# Patient Record
Sex: FEMALE | Race: WHITE | NOT HISPANIC OR LATINO | Employment: OTHER | URBAN - METROPOLITAN AREA
[De-identification: names, ages, dates, MRNs, and addresses within clinical notes are randomized per-mention and may not be internally consistent; named-entity substitution may affect disease eponyms.]

---

## 2017-02-24 ENCOUNTER — ALLSCRIPTS OFFICE VISIT (OUTPATIENT)
Dept: OTHER | Facility: OTHER | Age: 82
End: 2017-02-24

## 2017-02-24 ENCOUNTER — HOSPITAL ENCOUNTER (OUTPATIENT)
Dept: RADIOLOGY | Facility: MEDICAL CENTER | Age: 82
Discharge: HOME | End: 2017-02-24
Payer: MEDICARE

## 2017-02-24 DIAGNOSIS — M25.519 PAIN IN SHOULDER: ICD-10-CM

## 2017-02-24 DIAGNOSIS — M75.101 RIGHT ROTATOR CUFF TEAR: ICD-10-CM

## 2017-02-24 PROCEDURE — 73030 X-RAY EXAM OF SHOULDER: CPT

## 2017-03-17 ENCOUNTER — APPOINTMENT (OUTPATIENT)
Dept: LAB | Facility: MEDICAL CENTER | Age: 82
End: 2017-03-17
Payer: MEDICARE

## 2017-03-17 DIAGNOSIS — M15.9 POLYOSTEOARTHRITIS: ICD-10-CM

## 2017-03-17 DIAGNOSIS — D17.71 BENIGN LIPOMATOUS NEOPLASM OF KIDNEY: ICD-10-CM

## 2017-03-17 DIAGNOSIS — I10 ESSENTIAL (PRIMARY) HYPERTENSION: ICD-10-CM

## 2017-03-17 DIAGNOSIS — Z00.00 ENCOUNTER FOR GENERAL ADULT MEDICAL EXAMINATION WITHOUT ABNORMAL FINDINGS: ICD-10-CM

## 2017-03-17 DIAGNOSIS — N18.9 CHRONIC KIDNEY DISEASE: ICD-10-CM

## 2017-03-17 DIAGNOSIS — I25.10 ATHEROSCLEROTIC HEART DISEASE OF NATIVE CORONARY ARTERY WITHOUT ANGINA PECTORIS: ICD-10-CM

## 2017-03-17 DIAGNOSIS — E03.9 HYPOTHYROIDISM: ICD-10-CM

## 2017-03-17 DIAGNOSIS — K21.9 GASTRO-ESOPHAGEAL REFLUX DISEASE WITHOUT ESOPHAGITIS: ICD-10-CM

## 2017-03-17 DIAGNOSIS — I49.3 VENTRICULAR PREMATURE DEPOLARIZATION: ICD-10-CM

## 2017-03-17 DIAGNOSIS — E78.2 MIXED HYPERLIPIDEMIA: ICD-10-CM

## 2017-03-17 LAB
ALBUMIN SERPL BCP-MCNC: 3.9 G/DL (ref 3.5–5)
ALP SERPL-CCNC: 129 U/L (ref 46–116)
ALT SERPL W P-5'-P-CCNC: 29 U/L (ref 12–78)
ANION GAP SERPL CALCULATED.3IONS-SCNC: 7 MMOL/L (ref 4–13)
AST SERPL W P-5'-P-CCNC: 23 U/L (ref 5–45)
BASOPHILS # BLD AUTO: 0.03 THOUSANDS/ΜL (ref 0–0.1)
BASOPHILS NFR BLD AUTO: 1 % (ref 0–1)
BILIRUB SERPL-MCNC: 0.48 MG/DL (ref 0.2–1)
BUN SERPL-MCNC: 20 MG/DL (ref 5–25)
CALCIUM SERPL-MCNC: 9.4 MG/DL (ref 8.3–10.1)
CHLORIDE SERPL-SCNC: 108 MMOL/L (ref 100–108)
CHOLEST SERPL-MCNC: 165 MG/DL (ref 50–200)
CO2 SERPL-SCNC: 29 MMOL/L (ref 21–32)
CREAT SERPL-MCNC: 1.24 MG/DL (ref 0.6–1.3)
CREAT UR-MCNC: 76.2 MG/DL
EOSINOPHIL # BLD AUTO: 0.11 THOUSAND/ΜL (ref 0–0.61)
EOSINOPHIL NFR BLD AUTO: 2 % (ref 0–6)
ERYTHROCYTE [DISTWIDTH] IN BLOOD BY AUTOMATED COUNT: 13.5 % (ref 11.6–15.1)
GFR SERPL CREATININE-BSD FRML MDRD: 40.5 ML/MIN/1.73SQ M
GLUCOSE P FAST SERPL-MCNC: 94 MG/DL (ref 65–99)
HCT VFR BLD AUTO: 38 % (ref 34.8–46.1)
HDLC SERPL-MCNC: 56 MG/DL (ref 40–60)
HGB BLD-MCNC: 12.3 G/DL (ref 11.5–15.4)
LDLC SERPL CALC-MCNC: 90 MG/DL (ref 0–100)
LYMPHOCYTES # BLD AUTO: 1.27 THOUSANDS/ΜL (ref 0.6–4.47)
LYMPHOCYTES NFR BLD AUTO: 23 % (ref 14–44)
MCH RBC QN AUTO: 28.9 PG (ref 26.8–34.3)
MCHC RBC AUTO-ENTMCNC: 32.4 G/DL (ref 31.4–37.4)
MCV RBC AUTO: 89 FL (ref 82–98)
MONOCYTES # BLD AUTO: 0.37 THOUSAND/ΜL (ref 0.17–1.22)
MONOCYTES NFR BLD AUTO: 7 % (ref 4–12)
NEUTROPHILS # BLD AUTO: 3.66 THOUSANDS/ΜL (ref 1.85–7.62)
NEUTS SEG NFR BLD AUTO: 67 % (ref 43–75)
NRBC BLD AUTO-RTO: 0 /100 WBCS
PLATELET # BLD AUTO: 233 THOUSANDS/UL (ref 149–390)
PMV BLD AUTO: 9.8 FL (ref 8.9–12.7)
POTASSIUM SERPL-SCNC: 3.9 MMOL/L (ref 3.5–5.3)
PROT SERPL-MCNC: 7 G/DL (ref 6.4–8.2)
PROT UR-MCNC: 7 MG/DL
PROT/CREAT UR: 0.09 MG/G{CREAT} (ref 0–0.1)
RBC # BLD AUTO: 4.25 MILLION/UL (ref 3.81–5.12)
SODIUM SERPL-SCNC: 144 MMOL/L (ref 136–145)
T4 FREE SERPL-MCNC: 0.87 NG/DL (ref 0.76–1.46)
TRIGL SERPL-MCNC: 97 MG/DL
TSH SERPL DL<=0.05 MIU/L-ACNC: 5.31 UIU/ML (ref 0.36–3.74)
WBC # BLD AUTO: 5.45 THOUSAND/UL (ref 4.31–10.16)

## 2017-03-17 PROCEDURE — 82570 ASSAY OF URINE CREATININE: CPT

## 2017-03-17 PROCEDURE — 80053 COMPREHEN METABOLIC PANEL: CPT

## 2017-03-17 PROCEDURE — 84443 ASSAY THYROID STIM HORMONE: CPT

## 2017-03-17 PROCEDURE — 84156 ASSAY OF PROTEIN URINE: CPT

## 2017-03-17 PROCEDURE — 80061 LIPID PANEL: CPT

## 2017-03-17 PROCEDURE — 86376 MICROSOMAL ANTIBODY EACH: CPT

## 2017-03-17 PROCEDURE — 84439 ASSAY OF FREE THYROXINE: CPT

## 2017-03-17 PROCEDURE — 85025 COMPLETE CBC W/AUTO DIFF WBC: CPT

## 2017-03-17 PROCEDURE — 36415 COLL VENOUS BLD VENIPUNCTURE: CPT

## 2017-03-18 LAB — THYROPEROXIDASE AB SERPL-ACNC: 104 IU/ML (ref 0–34)

## 2017-04-07 ENCOUNTER — HOSPITAL ENCOUNTER (OUTPATIENT)
Dept: RADIOLOGY | Facility: MEDICAL CENTER | Age: 82
Discharge: HOME | End: 2017-04-07
Payer: MEDICARE

## 2017-04-07 ENCOUNTER — ALLSCRIPTS OFFICE VISIT (OUTPATIENT)
Dept: OTHER | Facility: OTHER | Age: 82
End: 2017-04-07

## 2017-04-07 ENCOUNTER — TRANSCRIBE ORDERS (OUTPATIENT)
Dept: ADMINISTRATIVE | Facility: HOSPITAL | Age: 82
End: 2017-04-07

## 2017-04-07 DIAGNOSIS — M25.559 PAIN IN HIP: ICD-10-CM

## 2017-04-07 DIAGNOSIS — M25.579 PAIN IN ANKLE: ICD-10-CM

## 2017-04-07 DIAGNOSIS — M16.12 PRIMARY OSTEOARTHRITIS OF LEFT HIP: Primary | ICD-10-CM

## 2017-04-07 PROCEDURE — 73610 X-RAY EXAM OF ANKLE: CPT

## 2017-04-07 PROCEDURE — 73502 X-RAY EXAM HIP UNI 2-3 VIEWS: CPT

## 2017-04-12 RX ORDER — CLOPIDOGREL BISULFATE 75 MG/1
75 TABLET ORAL DAILY
COMMUNITY
End: 2018-11-24 | Stop reason: SDUPTHER

## 2017-04-13 ENCOUNTER — HOSPITAL ENCOUNTER (OUTPATIENT)
Dept: RADIOLOGY | Facility: HOSPITAL | Age: 82
Discharge: HOME | End: 2017-04-13
Attending: ORTHOPAEDIC SURGERY
Payer: MEDICARE

## 2017-04-13 DIAGNOSIS — M16.12 PRIMARY OSTEOARTHRITIS OF LEFT HIP: ICD-10-CM

## 2017-04-13 PROCEDURE — 20610 DRAIN/INJ JOINT/BURSA W/O US: CPT

## 2017-04-13 PROCEDURE — 77002 NEEDLE LOCALIZATION BY XRAY: CPT

## 2017-04-13 RX ORDER — LIDOCAINE WITH 8.4% SOD BICARB 0.9%(10ML)
10 SYRINGE (ML) INJECTION ONCE
Status: COMPLETED | OUTPATIENT
Start: 2017-04-13 | End: 2017-04-13

## 2017-04-13 RX ORDER — BUPIVACAINE HYDROCHLORIDE 2.5 MG/ML
10 INJECTION, SOLUTION EPIDURAL; INFILTRATION; INTRACAUDAL
Status: COMPLETED | OUTPATIENT
Start: 2017-04-13 | End: 2017-04-13

## 2017-04-13 RX ORDER — LIDOCAINE WITH 8.4% SOD BICARB 0.9%(10ML)
5 SYRINGE (ML) INJECTION ONCE
Status: DISCONTINUED | OUTPATIENT
Start: 2017-04-13 | End: 2017-04-13

## 2017-04-13 RX ORDER — METHYLPREDNISOLONE ACETATE 80 MG/ML
80 INJECTION, SUSPENSION INTRA-ARTICULAR; INTRALESIONAL; INTRAMUSCULAR; SOFT TISSUE
Status: COMPLETED | OUTPATIENT
Start: 2017-04-13 | End: 2017-04-13

## 2017-04-13 RX ADMIN — LIDOCAINE HYDROCHLORIDE 3 ML: 10 INJECTION, SOLUTION INFILTRATION; PERINEURAL at 13:45

## 2017-04-13 RX ADMIN — IOHEXOL 5 ML: 300 INJECTION, SOLUTION INTRAVENOUS at 13:45

## 2017-04-13 RX ADMIN — BUPIVACAINE HYDROCHLORIDE 2 ML: 2.5 INJECTION, SOLUTION EPIDURAL; INFILTRATION; INTRACAUDAL; PERINEURAL at 13:45

## 2017-04-13 RX ADMIN — METHYLPREDNISOLONE ACETATE 80 MG: 80 INJECTION, SUSPENSION INTRA-ARTICULAR; INTRALESIONAL; INTRAMUSCULAR; SOFT TISSUE at 13:45

## 2017-04-20 ENCOUNTER — ALLSCRIPTS OFFICE VISIT (OUTPATIENT)
Dept: OTHER | Facility: OTHER | Age: 82
End: 2017-04-20

## 2017-04-20 LAB
CLARITY UR: NORMAL
COLOR UR: YELLOW
GLUCOSE (HISTORICAL): NORMAL
HGB UR QL STRIP.AUTO: NORMAL
KETONES UR STRIP-MCNC: NORMAL MG/DL
LEUKOCYTE ESTERASE UR QL STRIP: NORMAL
NITRITE UR QL STRIP: NORMAL
PH UR STRIP.AUTO: 5 [PH]
PROT UR STRIP-MCNC: NORMAL MG/DL
SP GR UR STRIP.AUTO: 1

## 2017-05-15 ENCOUNTER — HOSPITAL ENCOUNTER (OUTPATIENT)
Dept: RADIOLOGY | Facility: MEDICAL CENTER | Age: 82
Discharge: HOME | End: 2017-05-15
Payer: MEDICARE

## 2017-05-15 DIAGNOSIS — Z12.31 ENCOUNTER FOR SCREENING MAMMOGRAM FOR MALIGNANT NEOPLASM OF BREAST: ICD-10-CM

## 2017-05-15 PROCEDURE — G0202 SCR MAMMO BI INCL CAD: HCPCS

## 2017-05-16 ENCOUNTER — GENERIC CONVERSION - ENCOUNTER (OUTPATIENT)
Dept: OTHER | Facility: OTHER | Age: 82
End: 2017-05-16

## 2017-05-22 ENCOUNTER — ALLSCRIPTS OFFICE VISIT (OUTPATIENT)
Dept: OTHER | Facility: OTHER | Age: 82
End: 2017-05-22

## 2017-05-22 ENCOUNTER — TRANSCRIBE ORDERS (OUTPATIENT)
Dept: ADMINISTRATIVE | Facility: HOSPITAL | Age: 82
End: 2017-05-22

## 2017-05-22 DIAGNOSIS — E78.2 MIXED HYPERLIPIDEMIA: ICD-10-CM

## 2017-05-22 DIAGNOSIS — I49.3 VENTRICULAR PREMATURE BEATS: ICD-10-CM

## 2017-05-22 DIAGNOSIS — I10 ESSENTIAL HYPERTENSION, MALIGNANT: Primary | ICD-10-CM

## 2017-05-31 ENCOUNTER — ALLSCRIPTS OFFICE VISIT (OUTPATIENT)
Dept: OTHER | Facility: OTHER | Age: 82
End: 2017-05-31

## 2017-07-28 DIAGNOSIS — E03.9 HYPOTHYROIDISM: ICD-10-CM

## 2017-09-21 ENCOUNTER — HOSPITAL ENCOUNTER (OUTPATIENT)
Dept: NON INVASIVE DIAGNOSTICS | Facility: MEDICAL CENTER | Age: 82
Discharge: HOME | End: 2017-09-21
Payer: MEDICARE

## 2017-09-21 DIAGNOSIS — E78.2 MIXED HYPERLIPIDEMIA: ICD-10-CM

## 2017-09-21 DIAGNOSIS — I49.3 VENTRICULAR PREMATURE DEPOLARIZATION: ICD-10-CM

## 2017-09-21 DIAGNOSIS — I10 ESSENTIAL (PRIMARY) HYPERTENSION: ICD-10-CM

## 2017-09-21 DIAGNOSIS — I25.10 ATHEROSCLEROTIC HEART DISEASE OF NATIVE CORONARY ARTERY WITHOUT ANGINA PECTORIS: ICD-10-CM

## 2017-09-21 PROCEDURE — 93306 TTE W/DOPPLER COMPLETE: CPT

## 2017-09-22 DIAGNOSIS — I10 ESSENTIAL (PRIMARY) HYPERTENSION: ICD-10-CM

## 2017-09-22 DIAGNOSIS — I49.3 VENTRICULAR PREMATURE DEPOLARIZATION: ICD-10-CM

## 2017-09-22 DIAGNOSIS — I25.10 ATHEROSCLEROTIC HEART DISEASE OF NATIVE CORONARY ARTERY WITHOUT ANGINA PECTORIS: ICD-10-CM

## 2017-09-22 DIAGNOSIS — E78.2 MIXED HYPERLIPIDEMIA: ICD-10-CM

## 2017-10-27 ENCOUNTER — ALLSCRIPTS OFFICE VISIT (OUTPATIENT)
Dept: OTHER | Facility: OTHER | Age: 82
End: 2017-10-27

## 2017-10-27 ENCOUNTER — TRANSCRIBE ORDERS (OUTPATIENT)
Dept: ADMINISTRATIVE | Facility: HOSPITAL | Age: 82
End: 2017-10-27

## 2017-10-27 DIAGNOSIS — M16.12 PRIMARY OSTEOARTHRITIS OF LEFT HIP: Primary | ICD-10-CM

## 2017-10-28 NOTE — PROGRESS NOTES
Assessment  1  Primary localized osteoarthritis of left hip (712 15) ()    80-year-old female last arthritis left hip with significant symptoms  An injection of cortisone steroid is indicated  This reschedule fluoroscopically  I did ask her to consider elective left hip replacement if her symptoms persist   I would welcome the opportunity see back in 3 months time for follow-up     Plan  Primary localized osteoarthritis of left hip    · Follow-up visit in 3 months Evaluation and Treatment  Follow-up  Status: Hold For -  Scheduling  Requested for: 27Oct2017   · Fluoroscopic Guidance For Needle Placement; Status:Active; Requested AIX:[id number];     Chief Complaint  1  Hip Pain    History of Present Illness  HPI: Patient is a 80-year-old female with an arthritic left hip who presents for follow-up  She describes 6 weeks of relief following an injection to the left hip joint that was performed several months ago  She reports return of pain in left hip  She has pain and level left hip joint, pain is made worse weightbearing, pain worsens activities  Pain greatly interferes with her pursuit of activities of daily living, despite use of a single-point cane  Review of Systems    Constitutional: No fever, no chills, feels well, no tiredness, no recent weight gain or loss  Eyes: No complaints of eyesight problems, no red eyes  ENT: no loss of hearing, no nosebleeds, no sore throat  Cardiovascular: No complaints of chest pain, no palpitations, no leg claudication or lower extremity edema  Respiratory: no compliants of shortness of breath, no wheezing, no cough  Gastrointestinal: no complaints of abdominal pain, no constipation, no nausea or diarrhea, no vomiting, no bloody stools  Genitourinary: no complaints of dysuria, no incontinence  Musculoskeletal: as noted in HPI  Integumentary: no complaints of skin rash or lesion, no itching or dry skin, no skin wounds     Neurological: no complaints of headache, no confusion, no numbness or tingling, no dizziness  Endocrine: No complaints of muscle weakness, no feelings of weakness, no frequent urination, no excessive thirst    Psychiatric: No suicidal thoughts, no anxiety, no feelings of depression  Active Problems  1  Acquired valgus deformity of knee, left (736 41) (M21 062)   2  Advance directive discussed with patient (V65 49) (Z71 89)   3  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)   4  Atherosclerotic heart disease of native coronary artery without angina pectoris (414 01)   (I25 10)   5  CKD (chronic kidney disease) (585 9) (N18 9)   6  Essential hypertension (401 9) (I10)   7  Gastroesophageal reflux disease (530 81) (K21 9)   8  Generalized osteoarthritis of multiple sites (715 09) (M15 9)   9  Mixed hyperlipidemia (272 2) (E78 2)   10  Primary localized osteoarthritis of left hip (715 15) (M16 12)   11  PVCs (premature ventricular contractions) (427 69) (I49 3)   12  Subclinical hypothyroidism (244 8) (E03 9)   13  Tear of right rotator cuff (840 4) (M75 101)   14  Trochanteric bursitis of left hip (726 5) (M70 62)   15  Urgency of urination (788 63) (R39 15)   16  Venous insufficiency (chronic) (peripheral) (459 81) (I87 2)   17  Vulvar Burning (625 8)    Past Medical History   · History of Abnormal EKG (794 31) (R94 31)   · History of anemia (V12 3) (Z86 2)   · History of depression (V11 8) (Z86 59)   · History of dizziness (V13 89) (Y11 094)   · History of hypercalcemia (V12 29) (Z86 39)   · 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)   · History of Osteoporosis screening (V82 81) (A45 941)   · History of Plantar fasciitis of right foot (728 71) (M72 2)   · History of Slurred speech (784 59) (R47 81)   · History of Superficial thrombophlebitis of leg, unspecified laterality    The active problems and past medical history were reviewed and updated today        Surgical History   · History of Cath Stent Placement   · History of Excision Of Lesion Face Malignant   · History of Total Abdominal Hysterectomy With Removal Of Both Ovaries   · History of Total Hip Replacement    The surgical history was reviewed and updated today  Family History  Mother    · Family history of Coronary disease   · Family history of cerebrovascular accident (CVA) (V17 1) (Z80 1)  Father    · Family history of Coronary disease  Family History    · Family history of Kidney Cancer (V16 51)    Social History   · Denied: History of Alcohol Use (History)   · Caffeine use (V49 89) (F15 90)   · Drinks wine (V49 89) (Z78 9)   · OCCASIONAL WINE WITH DINNER   · Denied: History of Drug Use   · Never A Smoker   · Never smoked cigarettes (V49 89) (Z78 9)    Current Meds   1  Adult Aspirin EC Low Strength 81 MG Oral Tablet Delayed Release; Take 1 tablet daily; Therapy: (Recorded:13Jan2014) to Recorded   2  Atorvastatin Calcium 40 MG Oral Tablet (Lipitor); take 1 tablet by mouth once daily as   directed; Therapy: 37DRK3262 to ([id number])  Requested for: 92Pux7237; Last   Rx:46Aqm3370 Ordered   3  Biotin CAPS; Therapy: (Arelis Nicholson) to Recorded   4  Centrum Silver TABS; TAKE 1 TABLET DAILY; Therapy: (Recorded:80Aku7690) to Recorded   5  Clopidogrel Bisulfate 75 MG Oral Tablet; TAKE 1 TABLET BY MOUTH   ONCE DAILY; Therapy: 69OMF7780 to (Keri Gongora)  Requested for: 39Pco4665; Last   Rx:78Iys4709 Ordered   6  ICaps CAPS; TAKE AS DIRECTED; Therapy: (Recorded:44Fpm1723) to Recorded   7  Levothyroxine Sodium 25 MCG Oral Tablet; TAKE 1 TABLET BY MOUTH DAILY; Therapy: 70LWY4404 to (Marion Diaz)  Requested for: 47PJP2009; Last   Rx:81Wih7852 Ordered   8  Metamucil CAPS; USE AS DIRECTED; Therapy: (Recorded:04Atr7150) to Recorded   9  Metoprolol Succinate ER 50 MG Oral Tablet Extended Release 24 Hour; take 1 tablet by   mouth once daily; Therapy: 26BDL5567 to (Evaluate:26Qnd5942)  Requested for: 63TED2531; Last   Rx:69Ndv9654 Ordered   10   Nitrostat 0 4 MG Sublingual Tablet Sublingual (Nitroglycerin); PLACE 1 TABLET UNDER    THE TONGUE AS NEEDED EVERY 5 MINUTES UP TO 3 TIMES FOR    CHEST PAIN;    Therapy: 95DSG4193 to (Evaluate:85Hnb1241)  Requested for: 75Lry5589; Last    Rx:06Zmp2527 Ordered   11  Omega-3 Fish Oil 1200 MG Oral Capsule; TAKE AS DIRECTED; Therapy: (Adilene Sarmiento) to Recorded   12  Omeprazole 20 MG Oral Capsule Delayed Release; take 1 capsule daily; Therapy: 24XSU2611 to (Evaluate:50Nkw6937)  Requested for: 90RKH1678; Last    Rx:64Ahs8360 Ordered   13  Restasis 0 05 % Ophthalmic Emulsion Recorded   14  Systane Ultra 0 4-0 3 % Ophthalmic Solution; Therapy: (Recorded:35Yxj8919) to Recorded   15  Valsartan-Hydrochlorothiazide 80-12 5 MG Oral Tablet (Diovan HCT); take 1 tablet by    mouth once daily; Therapy: 86BYT3353 to (Evaluate:31Ssu2514)  Requested for: 11UIW6151; Last    Rx:90Pws4159 Ordered   16  VESIcare 5 MG Oral Tablet; take 1 tablet by mouth once daily; Therapy: 55UYM5025 to (Evaluate:10Gtx6361)  Requested for: 15IFA5280; Last    NS:39NFO5172 Ordered   17  Vitamin D 1000 UNIT Oral Tablet; TAKE 1 TABLET DAILY; Therapy: (Recorded:70Hmr7536) to Recorded    Allergies  1  Adhesive Bandages Miscellaneous   2  Ciprofloxacin-Ciproflox HCl ER TB24   3  Iodinated Contrast Media  4  IVP Dye    Vitals   Recorded: 21LJS8311 09:12AM   Heart Rate 76   Systolic 950   Diastolic 71   Height 5 ft 1 in   Weight 146 lb    BMI Calculated 27 59   BSA Calculated 1 65     Physical Exam  Gait pattern is without antalgia despite use of a single-point cane  Breathing is unlabored  Right hip moves well  Left hip has restriction of internal rotation when a flexed 90Â°  This elicits pain and left groin  There is no thigh atrophy, and is no calf tenderness on the left side  There is a very little limb of inequality right being longer than left  Results/Data  I personally reviewed the films/images/results in the office today   My interpretation follows  X-ray Review Review of previous x-rays shows arthritic left hip, and replaced right hip  Future Appointments    Date/Time Provider Specialty Site   11/13/2017 03:00 PM FELIPA Marshall  Cardiology [facility]   11/30/2017 02:00 PM FELIPA Newman   Family Medicine [address]     Signatures   Electronically signed by : FELIPA Rabago ; Oct 27 2017  9:36AM EST                       (Author)

## 2017-11-01 DIAGNOSIS — I49.3 VENTRICULAR PREMATURE DEPOLARIZATION: ICD-10-CM

## 2017-11-01 DIAGNOSIS — E03.9 HYPOTHYROIDISM: ICD-10-CM

## 2017-11-01 DIAGNOSIS — I25.10 ATHEROSCLEROTIC HEART DISEASE OF NATIVE CORONARY ARTERY WITHOUT ANGINA PECTORIS: ICD-10-CM

## 2017-11-01 DIAGNOSIS — Z00.00 ENCOUNTER FOR GENERAL ADULT MEDICAL EXAMINATION WITHOUT ABNORMAL FINDINGS: ICD-10-CM

## 2017-11-01 DIAGNOSIS — I10 ESSENTIAL (PRIMARY) HYPERTENSION: ICD-10-CM

## 2017-11-01 DIAGNOSIS — M15.9 POLYOSTEOARTHRITIS: ICD-10-CM

## 2017-11-01 DIAGNOSIS — N18.9 CHRONIC KIDNEY DISEASE: ICD-10-CM

## 2017-11-01 DIAGNOSIS — K21.9 GASTRO-ESOPHAGEAL REFLUX DISEASE WITHOUT ESOPHAGITIS: ICD-10-CM

## 2017-11-06 ENCOUNTER — HOSPITAL ENCOUNTER (OUTPATIENT)
Dept: RADIOLOGY | Facility: HOSPITAL | Age: 82
Discharge: HOME | End: 2017-11-06
Attending: ORTHOPAEDIC SURGERY
Payer: MEDICARE

## 2017-11-06 DIAGNOSIS — M16.12 PRIMARY OSTEOARTHRITIS OF LEFT HIP: ICD-10-CM

## 2017-11-06 PROCEDURE — 20610 DRAIN/INJ JOINT/BURSA W/O US: CPT

## 2017-11-06 PROCEDURE — A9577 INJ MULTIHANCE: HCPCS | Performed by: ORTHOPAEDIC SURGERY

## 2017-11-06 PROCEDURE — 77002 NEEDLE LOCALIZATION BY XRAY: CPT

## 2017-11-06 RX ORDER — BUPIVACAINE HYDROCHLORIDE 2.5 MG/ML
10 INJECTION, SOLUTION EPIDURAL; INFILTRATION; INTRACAUDAL ONCE
Status: COMPLETED | OUTPATIENT
Start: 2017-11-06 | End: 2017-11-06

## 2017-11-06 RX ORDER — LIDOCAINE HYDROCHLORIDE 10 MG/ML
10 INJECTION, SOLUTION EPIDURAL; INFILTRATION; INTRACAUDAL; PERINEURAL ONCE
Status: COMPLETED | OUTPATIENT
Start: 2017-11-06 | End: 2017-11-06

## 2017-11-06 RX ORDER — METHYLPREDNISOLONE ACETATE 80 MG/ML
80 INJECTION, SUSPENSION INTRA-ARTICULAR; INTRALESIONAL; INTRAMUSCULAR; SOFT TISSUE ONCE
Status: COMPLETED | OUTPATIENT
Start: 2017-11-06 | End: 2017-11-06

## 2017-11-06 RX ADMIN — GADOBENATE DIMEGLUMINE 2 ML: 529 INJECTION, SOLUTION INTRAVENOUS at 12:59

## 2017-11-06 RX ADMIN — BUPIVACAINE HYDROCHLORIDE 3 ML: 2.5 INJECTION, SOLUTION EPIDURAL; INFILTRATION; INTRACAUDAL; PERINEURAL at 12:52

## 2017-11-06 RX ADMIN — LIDOCAINE HYDROCHLORIDE 7 ML: 10 INJECTION, SOLUTION EPIDURAL; INFILTRATION; INTRACAUDAL; PERINEURAL at 12:53

## 2017-11-06 RX ADMIN — METHYLPREDNISOLONE ACETATE 80 MG: 80 INJECTION, SUSPENSION INTRA-ARTICULAR; INTRALESIONAL; INTRAMUSCULAR; SOFT TISSUE at 12:52

## 2017-11-13 ENCOUNTER — ALLSCRIPTS OFFICE VISIT (OUTPATIENT)
Dept: OTHER | Facility: OTHER | Age: 82
End: 2017-11-13

## 2017-11-14 NOTE — PROGRESS NOTES
Assessment  Assessed    1  Atherosclerotic heart disease of native coronary artery without angina pectoris (414 01) (I25 10)   2  PVCs (premature ventricular contractions) (427 69) (I49 3)   3  Essential hypertension (401 9) (I10)   4  Mixed hyperlipidemia (272 2) (E78 2)    Plan  Atherosclerotic heart disease of native coronary artery without angina pectoris, Essentialhypertension, Mixed hyperlipidemia, PVCs (premature ventricular contractions)    · Follow-up visit in 6 months Evaluation and Treatment  Follow-up  Status: Hold For -Scheduling  Requested for: 27BZP8142   Ordered; Atherosclerotic heart disease of native coronary artery without angina pectoris, Essential hypertension, Mixed hyperlipidemia, PVCs (premature ventricular contractions); Ordered By: Sylvia Guerrier Performed:  Due: 38DGD0185    Discussion/Summary  Cardiology Discussion Summary Free Text Note Form [facility]:   I will continue her present medical regimen  She appears well compensated  I've asked her to call if there is a problem in the interim otherwise I will see her again in six months time  Chief Complaint  Chief Complaint Free Text Note Form: f/u  denies any cardiac issues      History of Present Illness  Cardiology HPI Free Text Note Form [facility]: The patient is here for a follow-up visit  She was last seen by me in May  Since that time in September she had an echocardiogram performed which demonstrated preserved LV systolic function with mild tricuspid and pulmonic regurgitation  She has been doing well  She has had no chest pain or significant dyspnea  Hyperlipidemia (Follow-Up): The patient states her hyperlipidemia has been under good control since the last visit  She has no significant interval events  Symptoms:   Premature Ventricular Complexes (Brief): The patient is being seen for a routine clinic follow-up of premature ventricular complexes  The patient is currently asymptomatic  Hypertension (Follow-Up):  The patient presents for follow-up of essential hypertension  The patient states she has been doing well with her blood pressure control since the last visit  Symptoms:      Review of Systems  Cardiology Female ROS:    Cardiac: No complaints of chest pain, no palpitations, no fainting  Skin: No complaints of nonhealing sores or skin rash  Genitourinary: No complaints of recurrent urinary tract infections, frequent urination at night, difficult urination, blood in urine, kidney stones, loss of bladder control, kidney problems, denies any birth control or hormone replacement, is not post menopausal, not currently pregnant  Psychological: No complaints of feeling depressed, anxiety, panic attacks, or difficulty concentrating  General: No complaints of trouble sleeping, lack of energy, fatigue, appetite changes, weight changes, fever, frequent infections, or night sweats  Respiratory: No complaints of shortness of breath, cough with sputum, or wheezing  HEENT: No complaints of serious problems, hearing problems, nose problems, throat problems, or snoring  Gastrointestinal: No complaints of liver problems, nausea, vomiting, heartburn, constipation, bloody stools, diarrhea, problems swallowing, adbominal pain, or rectal bleeding  Hematologic: No complaints of bleeding disorders, anemia, blood clots, or excessive brusing  Neurological: No complaints of numbness, tingling, dizziness, weakness, seizures, headaches, syncope or fainting, AM fatigue, daytime sleepiness, no witnessed apnea episodes  Musculoskeletal: No complaints of arthritis, back pain, or painfull swelling  Active Problems  Problems    1  Acquired valgus deformity of knee, left (736 41) (M21 062)   2  Advance directive discussed with patient (V65 49) (Z71 89)   3  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)   4  Atherosclerotic heart disease of native coronary artery without angina pectoris (414 01) (I25 10)   5   CKD (chronic kidney disease) (585 9) (N18 9)   6  Essential hypertension (401 9) (I10)   7  Gastroesophageal reflux disease (530 81) (K21 9)   8  Generalized osteoarthritis of multiple sites (715 09) (M15 9)   9  Mixed hyperlipidemia (272 2) (E78 2)   10  Primary localized osteoarthritis of left hip (715 15) (M16 12)   11  PVCs (premature ventricular contractions) (427 69) (I49 3)   12  Subclinical hypothyroidism (244 8) (E03 9)   13  Tear of right rotator cuff (840 4) (M75 101)   14  Trochanteric bursitis of left hip (726 5) (M70 62)   15  Urgency of urination (788 63) (R39 15)   16  Venous insufficiency (chronic) (peripheral) (459 81) (I87 2)   17  Vulvar Burning (625 8)    Past Medical History  Problems    1  History of Abnormal EKG (794 31) (R94 31)   2  History of anemia (V12 3) (Z86 2)   3  History of depression (V11 8) (Z86 59)   4  History of dizziness (V13 89) (Z87 898)   5  History of hypercalcemia (V12 29) (Z86 39)   6  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)   7  History of Osteoporosis screening (V82 81) (Z13 820)   8  History of Plantar fasciitis of right foot (728 71) (M72 2)   9  History of Slurred speech (784 59) (R47 81)   10  History of Superficial thrombophlebitis of leg, unspecified laterality  Active Problems And Past Medical History Reviewed: The active problems and past medical history were reviewed and updated today  Surgical History  Problems    1  History of Cath Stent Placement   2  History of Excision Of Lesion Face Malignant   3  History of Total Abdominal Hysterectomy With Removal Of Both Ovaries   4  History of Total Hip Replacement    Family History  Mother    1  Family history of Coronary disease   2  Family history of cerebrovascular accident (CVA) (V17 1) (Z82 3)  Father    3  Family history of Coronary disease  Family History    4   Family history of Kidney Cancer (V16 51)    Social History  Problems    · Denied: History of Alcohol Use (History)   · Caffeine use (V49 89) (F15 90)   · Drinks wine (V49 89) (Z78 9)   · Denied: History of Drug Use   · Never A Smoker   · Never smoked cigarettes (V49 89) (Z78 9)    Current Meds   1  Adult Aspirin EC Low Strength 81 MG Oral Tablet Delayed Release; Take 1 tablet daily; Therapy: (Recorded:13Jan2014) to Recorded   2  Atorvastatin Calcium 40 MG Oral Tablet; take 1 tablet by mouth once daily as directed; Therapy: 07KFG5746 to (21 )  Requested for: 00Dpn6672; Last Rx:28Uwu0292 Ordered   3  Biotin CAPS; Therapy: (Mello Cook) to Recorded   4  Centrum Silver TABS; TAKE 1 TABLET DAILY; Therapy: (Recorded:75Ped7263) to Recorded   5  Clopidogrel Bisulfate 75 MG Oral Tablet; TAKE 1 TABLET BY MOUTH   ONCE DAILY; Therapy: 93TXQ2499 to (Pauline Fort)  Requested for: 91Ecm3958; Last Rx:76Rxq5204 Ordered   6  ICaps CAPS; TAKE AS DIRECTED; Therapy: (Recorded:26Ebu3306) to Recorded   7  Levothyroxine Sodium 25 MCG Oral Tablet; TAKE 1 TABLET BY MOUTH DAILY; Therapy: 52XND6988 to (Salome Pear)  Requested for: 27ZDH8270; Last Rx:65Cla2082 Ordered   8  Metamucil CAPS; USE AS DIRECTED; Therapy: (Recorded:02Ihe6101) to Recorded   9  Metoprolol Succinate ER 50 MG Oral Tablet Extended Release 24 Hour; take 1 tablet by mouth once daily; Therapy: 25VBP4596 to (Evaluate:97Rag5955)  Requested for: 63JNW3785; Last Rx:81Hyp8672 Ordered   10  Nitrostat 0 4 MG Sublingual Tablet Sublingual; PLACE 1 TABLET UNDER THE TONGUE  AS NEEDED EVERY 5 MINUTES UP TO 3 TIMES FOR CHEST PAIN;  Therapy: 61RKX5668 to (Evaluate:68Xux7507)  Requested for: 36Qnr7467; Last  Rx:55Arv9967 Ordered   11  Omega-3 Fish Oil 1200 MG Oral Capsule; TAKE AS DIRECTED; Therapy: (Wyvonnia Copugo) to Recorded   12  Omeprazole 20 MG Oral Capsule Delayed Release; take 1 capsule daily; Therapy: 30VVH6682 to (Evaluate:06Btm4229)  Requested for: 92KJX0757; Last  Rx:83Oje5515 Ordered   13  Restasis 0 05 % Ophthalmic Emulsion Recorded   14  Systane Ultra 0 4-0 3 % Ophthalmic Solution;   Therapy: (Recorded:00Ehv7344) to Recorded   15  Valsartan-Hydrochlorothiazide 80-12 5 MG Oral Tablet; take 1 tablet by mouth once daily; Therapy: 72XZF9648 to (Evaluate:11Ozv7521)  Requested for: 04DPH7403; Last  Rx:99Fea6981 Ordered   16  VESIcare 5 MG Oral Tablet; take 1 tablet by mouth once daily; Therapy: 85QCF0829 to (Evaluate:20Oct2018)  Requested for: 05YRO2465; Last  HV:03GCF1998 Ordered   17  Vitamin D 1000 UNIT Oral Tablet; TAKE 1 TABLET DAILY; Therapy: (Recorded:09Ahn2446) to Recorded  Medication List Reviewed: The medication list was reviewed and updated today  Allergies  Medication    1  Adhesive Bandages Miscellaneous   2  Ciprofloxacin-Ciproflox HCl ER TB24   3  Iodinated Contrast Media  Non-Medication    4  IVP Dye    Vitals  Vital Signs    Recorded: 59CJT7684 03:01PM   Heart Rate 80   Systolic 422, LUE, Sitting   Diastolic 70, LUE, Sitting   Height 5 ft 1 in   Weight 144 lb    BMI Calculated 27 21   BSA Calculated 1 64       Physical Exam   Constitutional  General appearance: No acute distress, well appearing and well nourished  Eyes  Conjunctiva and Sclera examination: Conjunctiva pink, sclera anicteric  Ears, Nose, Mouth, and Throat - Oropharynx: Clear, nares are clear, mucous membranes are moist   Neck  Neck and thyroid: Normal, supple, trachea midline, no thyromegaly  Pulmonary  Respiratory effort: No increased work of breathing or signs of respiratory distress  Auscultation of lungs: Clear to auscultation, no rales, no rhonchi, no wheezing, good air movement  Cardiovascular  Palpation of heart: Normal PMI, no thrills  Auscultation of heart: Normal rate and rhythm, normal S1 and S2, no murmurs  Carotid pulses: Normal, 2+ bilaterally  Pedal pulses: Normal, 2+ bilaterally  Examination of extremities for edema and/or varicosities: Normal    Chest - Chest: Normal   Musculoskeletal Gait and station: Normal gait  Skin - Skin and subcutaneous tissue: Normal without rashes or lesions   Skin is warm and well perfused, normal turgor  Neurologic - Speech: Normal    Psychiatric - Orientation to person, place, and time: Normal -- Mood and affect: Normal       Future Appointments    Date/Time Provider Specialty Site   02/09/2018 11:15 AM FELIPA Landeros  Orthopedic Surgery [facility]   11/30/2017 02:00 PM FELIPA Hancock   Family Medicine [address] [facility]       Signatures   Electronically signed by : FELIPA Corbett ; Nov 13 2017  3:18PM EST                       (Author)

## 2017-11-28 ENCOUNTER — GENERIC CONVERSION - ENCOUNTER (OUTPATIENT)
Dept: OTHER | Facility: OTHER | Age: 82
End: 2017-11-28

## 2017-11-28 ENCOUNTER — LAB CONVERSION - ENCOUNTER (OUTPATIENT)
Dept: OTHER | Facility: OTHER | Age: 82
End: 2017-11-28

## 2017-11-28 LAB — TSH SERPL DL<=0.05 MIU/L-ACNC: 4.46 MIU/L (ref 0.4–4.5)

## 2017-11-30 ENCOUNTER — ALLSCRIPTS OFFICE VISIT (OUTPATIENT)
Dept: OTHER | Facility: OTHER | Age: 82
End: 2017-11-30

## 2017-12-05 NOTE — PROGRESS NOTES
Assessment    1  Encounter for preventive health examination (V70 0) (Z00 00)   2  Essential hypertension (401 9) (I10)   3  Mixed hyperlipidemia (272 2) (E78 2)   4  Atherosclerotic heart disease of native coronary artery without angina pectoris (414 01)   (I25 10)   5  CKD (chronic kidney disease) (585 9) (N18 9)   6  Hypothyroidism (244 9) (E03 9)   7  Gastroesophageal reflux disease (530 81) (K21 9)   8  Generalized osteoarthritis of multiple sites (715 09) (M15 9)    Plan  Angiomyolipoma of kidney, Atherosclerotic heart disease of native coronary artery without  angina pectoris, CKD (chronic kidney disease), Essential hypertension,  Gastroesophageal reflux disease, Generalized osteoarthritis of multiple sites,  Hypothyroidism, Mixed hyperlipidemia, PVCs (premature ventricular contractions)    · (1) CBC/PLT/DIFF; Status:Active; Requested for:21May2018;    · (1) COMPREHENSIVE METABOLIC PANEL; Status:Active; Requested for:21May2018;    · (1) LIPID PANEL, FASTING; Status:Active; Requested for:21May2018;    · (1) TSH WITH FT4 REFLEX; Status:Active; Requested for:21May2018; Atherosclerotic heart disease of native coronary artery without angina pectoris    · Clopidogrel Bisulfate 75 MG Oral Tablet; TAKE 1 TABLET BY MOUTH   ONCE  DAILY   · Nitrostat 0 4 MG Sublingual Tablet Sublingual (Nitroglycerin); PLACE 1 TABLET  UNDER THE TONGUE AS NEEDED EVERY 5 MINUTES UP TO 3 TIMES  FOR CHEST PAIN  Essential hypertension    · Follow-up visit in 6 months Evaluation and Treatment  Follow-up  Status: Hold For -  Scheduling  Requested for: 11XLN0540   · Eat a low fat and low cholesterol diet ; Status:Complete;   Done: 99IXE7843   · We encourage you to begin to make lifestyle changes to help control your blood  pressure    These may include losing weight, increasing your activity level, limiting salt in  your diet, decreasing alcohol intake, and eating a diet low in fat and rich in fruits  and vegetables ; Status:Complete; Done: 98ZIT1116  Gastroesophageal reflux disease    · Omeprazole 20 MG Oral Capsule Delayed Release; take 1 capsule daily  PMH: Subclinical hypothyroidism    · Levothyroxine Sodium 25 MCG Oral Tablet    Discussion/Summary    Continue with current medications  hold off restarting Levothyroxine  I discussed PT for gait and balance she declined  Repeat labs prior to next visit in 6 months  High-dose flu vaccine today  Up-to-date with pneumococcal vaccines  She is not interested in Zostavax  Possible side effects of new medications were reviewed with the patient/guardian today  The treatment plan was reviewed with the patient/guardian  The patient/guardian understands and agrees with the treatment plan      History of Present Illness  followup visit  medications reviewed  labs 03/2017 see note  hypertension blood pressures have been stable  creatinine 1 24  electrolytes normal  urine protein/creatinine ratio 0 09  Hgb 12  3  hyperlipidemia and CAD on Atorvastatin 40 mg daily  cholesterol 165  TGs 97  HDL 56  LDL 90  FBS 94  LFTs normal  episode of slurred speech 10/2015 no recurrence  on ASA 81 mg daily and Plavix  she refused work up  lives alone in [facility] apartment  independent with ADLs and IADLs  Review of Systems    Constitutional: feeling tired, but no recent weight gain and no recent weight loss  Eyes: no eyesight problems  Cardiovascular: palpitations, lower extremity edema and cardiology evaluation 5/2015 for exertional dyspnea and palpitations  Holter monitor showed normal sinus rhythm  139 single PVCs  No sustained ventricular rhythm  rare PACs, consisting of 183 single PACs  3 were noted in bigeminy  10 noted in couplets  no sustained supraventricular rhythm  No significant pauses or high grade AV block  Echocardiogram showed normal left ventricular function, ejection fraction 50% grade 1 diastolic dysfunction  No significant valvular abnormalities   chronic swelling right lower leg  s/p resection of leiomyosarcoma  Repeat echocardiogram 09/2017 normal left ventricular systolic function  EF 60%  No regional wall motion abnormalities  Right ventricle normal  Trace MR  No pericardial effusion  , but no chest pain  Respiratory: see above, but no cough, no orthopnea, no wheezing, no shortness of breath during exertion and no PND  Gastrointestinal: GERD stable on Omeprazole  no reflux  no dysphagia  , but no abdominal pain, no nausea, no vomiting, no constipation, no diarrhea and no blood in stools  Genitourinary: urinary frequency on Vesicare  nocturia x 1  followed by urology  right renal angiomyolipoma  renal u/s 09/2015, but no dysuria and no incontinence  Musculoskeletal: arthralgias and orthopedic evaluation 2/2017 for right shoulder pain  X-rays of right shoulder showed mild glenohumeral osteoarthritis and mild right AC joint osteoarthritis  She completed a course of physical therapy  She has been using as needed Aleve  X-rays of left hip showed moderate to severe osteoarthritis  Status post left hip intra-articular steroid injection 4/2017 and 11/2017 with symptomatic improvement  , but no myalgias  Neurological: difficulty walking and Status post recent fall patient attributed to new glasses  No injuries  At times feels off balance or unsteady no vertigo  Has both a cane and a walker  , but no headache and no dizziness  Endocrine: past history of mildly elevated Ca++  03/2017 Ca++ 9 4  11/2016 Ca++ 10 4  03/2016 Ca 10 4  11/2015 Ca++ 9 8  07/2015 10 4  01/2015 10 5 no change from 09/2014  SPEP normal  intact PTH normal  hypothyroidism  3/2017 TSH 5 310  Positive thyroid microsomal antibody  patient was started on Levothyroxine 25 mcg daily at last visit 05/2017  in August She stop levothyroxine when she started itching  No rash  repeat TSH 11/2017 4 46  Hematologic/Lymphatic: a tendency for easy bruising and on ASA and Plavix  Active Problems    1   Acquired valgus deformity of knee, left (736 41) (M21 062)   2  Advance directive discussed with patient (V65 49) (Z71 89)   3  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)   4  Atherosclerotic heart disease of native coronary artery without angina pectoris (414 01)   (I25 10)   5  CKD (chronic kidney disease) (585 9) (N18 9)   6  Essential hypertension (401 9) (I10)   7  Gastroesophageal reflux disease (530 81) (K21 9)   8  Generalized osteoarthritis of multiple sites (715 09) (M15 9)   9  Hypothyroidism (244 9) (E03 9)   10  Mixed hyperlipidemia (272 2) (E78 2)   11  Primary localized osteoarthritis of left hip (715 15) (M16 12)   12  PVCs (premature ventricular contractions) (427 69) (I49 3)   13  Tear of right rotator cuff (840 4) (M75 101)   14  Venous insufficiency (chronic) (peripheral) (459 81) (I87 2)    Past Medical History    1  History of anemia (V12 3) (Z86 2)   2  History of depression (V11 8) (Z86 59)   3  History of hypercalcemia (V12 29) (Z86 39)   4  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)   5  History of Plantar fasciitis of right foot (728 71) (M72 2)   6  History of Superficial thrombophlebitis of leg, unspecified laterality   7  History of Trochanteric bursitis of left hip (726 5) (M70 62)    Surgical History    1  History of Cath Stent Placement   2  History of Excision Of Lesion Face Malignant   3  History of Total Abdominal Hysterectomy With Removal Of Both Ovaries   4  History of Total Hip Replacement    Family History  Mother    1  Family history of Coronary disease   2  Family history of cerebrovascular accident (CVA) (V17 1) (Z82 3)  Father    3  Family history of Coronary disease  Family History    4  Family history of Kidney Cancer (V16 51)    Social History    · Denied: History of Alcohol Use (History)   · Caffeine use (V49 89) (F15 90)   · Drinks wine (V49 89) (Z78 9)   · Denied: History of Drug Use   · Never A Smoker   · Never smoked cigarettes (V49 89) (Z78 9)    Current Meds   1   Adult Aspirin EC Low Strength 81 MG Oral Tablet Delayed Release; Take 1 tablet daily; Therapy: (Recorded:25Keh7081) to Recorded   2  Atorvastatin Calcium 40 MG Oral Tablet; take 1 tablet by mouth once daily as directed; Therapy: 75HZF2307 to ([id number])  Requested for: 40Upz7437; Last   Rx:27Cfp9976 Ordered   3  Biotin CAPS; Therapy: (Pastor Mark) to Recorded   4  Centrum Silver TABS; TAKE 1 TABLET DAILY; Therapy: (Recorded:32Nkk3268) to Recorded   5  Clopidogrel Bisulfate 75 MG Oral Tablet; TAKE 1 TABLET BY MOUTH   ONCE DAILY; Therapy: 79HCV5399 to (Funmilayo Broussard)  Requested for: 91Gtj5560; Last   Rx:45Wih1811 Ordered   6  ICaps CAPS; TAKE AS DIRECTED; Therapy: (Recorded:67Tyv5729) to Recorded   7  Levothyroxine Sodium 25 MCG Oral Tablet; TAKE 1 TABLET BY MOUTH DAILY; Therapy: 07VHZ9501 to (Eulice Malia)  Requested for: 75MKB5252; Last   Rx:28Erp7091 Ordered   8  Metamucil CAPS; USE AS DIRECTED; Therapy: (Recorded:50Isc7606) to Recorded   9  Metoprolol Succinate ER 50 MG Oral Tablet Extended Release 24 Hour; take 1 tablet by   mouth once daily; Therapy: 12XTN2153 to (Evaluate:13Ggb3482)  Requested for: 13YUB8437; Last   Rx:54Xnh4222 Ordered   10  Nitrostat 0 4 MG Sublingual Tablet Sublingual; PLACE 1 TABLET UNDER THE TONGUE    AS NEEDED EVERY 5 MINUTES UP TO 3 TIMES FOR CHEST PAIN;    Therapy: 83JEZ7029 to (Evaluate:24Ldm9100)  Requested for: 83Iby8490; Last    Rx:61Gel8453 Ordered   11  Omega-3 Fish Oil 1200 MG Oral Capsule; TAKE AS DIRECTED; Therapy: (Pastor Mark) to Recorded   12  Omeprazole 20 MG Oral Capsule Delayed Release; take 1 capsule daily; Therapy: 40RAQ3481 to (Evaluate:67Mts9321)  Requested for: 07DBV0535; Last    Rx:33Wty3566 Ordered   13  Restasis 0 05 % Ophthalmic Emulsion Recorded   14  Systane Ultra 0 4-0 3 % Ophthalmic Solution; Therapy: (Recorded:42Pkw5725) to Recorded   15   Valsartan-Hydrochlorothiazide 80-12 5 MG Oral Tablet; take 1 tablet by mouth once daily; Therapy: 62FIG3865 to (Evaluate:56Man8066)  Requested for: 07YET8441; Last    Rx:44Hvh2220 Ordered   16  VESIcare 5 MG Oral Tablet; take 1 tablet by mouth once daily; Therapy: 92ESX4862 to (Evaluate:20Oct2018)  Requested for: 24MMF6864; Last    RQ:78VSB7504 Ordered   17  Vitamin D 1000 UNIT Oral Tablet; TAKE 1 TABLET DAILY; Therapy: (Recorded:00Fpq1129) to Recorded    Allergies    1  Adhesive Bandages Miscellaneous   2  Ciprofloxacin-Ciproflox HCl ER TB24   3  Iodinated Contrast Media    4  IVP Dye    Vitals  Vital Signs    Recorded: 17NGA4650 01:55PM   Temperature 96 9 F   Heart Rate 72   Respiration 16   Systolic 234   Diastolic 76   Height 5 ft 1 in   Weight 144 lb    BMI Calculated 27 21   BSA Calculated 1 64     Physical Exam    Constitutional   General appearance: No acute distress, well appearing and well nourished  Eyes   Conjunctiva and lids: No swelling, erythema or discharge  sclera anicteric  Ears, Nose, Mouth, and Throat   Otoscopic examination: Tympanic membranes translucent with normal light reflex  Canals patent without erythema  Oropharynx: Normal with no erythema, edema, exudate or lesions  Neck   Neck: Supple, symmetric, trachea midline, no masses  No JVD  Thyroid: Normal, no thyromegaly  There were no thyroid nodules  Pulmonary   Auscultation of lungs: Clear to auscultation  Cardiovascular   Auscultation of heart: Normal rate and rhythm, normal S1 and S2, no murmurs  Carotid pulses: 2+ bilaterally  no bruit heard over the right carotid and no bruit heard over the left carotid  Examination of extremities for edema and/or varicosities: Abnormal   right ankle 1+ pitting edema  Abdomen   Abdomen: Non-tender, no masses  Bowel sounds were normal    Liver and spleen: No hepatomegaly or splenomegaly  Lymphatic   Palpation of lymph nodes in neck: No lymphadenopathy    no anterior cervical node enlargement, no posterior cervical node enlargement, no submandibular node enlargement and no supraclavicular node enlargement  Musculoskeletal   Gait and station: Normal     Joints, bones, and muscles: Abnormal   Heberden's nodes hands  bilateral knee valgus deformity  + crepitus  no effusion  no joint line tenderness  Muscle strength/tone: Normal   Motor Strength Findings: normal upper extremity strength and normal lower extremity strength  Skin   Skin and subcutaneous tissue: Normal without rashes or lesions  Neurologic   Cranial nerves: Cranial nerves II-XII intact  Psychiatric   Recent and remote memory: Intact  Mood and affect: Normal        Results/Data  (Q) TSH, 3RD GENERATION W/REFLEX TO FT4 50LRK0611 09:49AM Sun Sear     Test Name Result Flag Reference   TSH W/REFLEX TO FT4 4 46 mIU/L  0 40-4 50     FL INJECTION LEFT HIP (STEROIDS) 66QFV2363 11:51AM Dani Solitario     Test Name Result Flag Reference   FL INJECTION LEFT HIP (STEROIDS) (Report)     LEFT HIP INJECTION     INDICATION: Chronic left hip and groin pain  Patient is allergic to iodinated diagnostic contrast agents  COMPARISON: 4/7/2017 XR Left Hip/Pelvis     IMAGES: 5     FLUOROSCOPY TIME:  0 1 minutes     FINDINGS:     After the risks and benefits of the procedure were thoroughly explained, informed consent was obtained  The patient verbalized expressed understanding of the above risks and wished to proceed with the procedure  Final standard time-out procedure performed  The patient was prepped and draped in the usual sterile fashion  1% lidocaine solution was utilized for local anesthesia  Intermittent fluoroscopy was utilized for placement a 20 gauge 3 5 inch spinal needle within the left hip joint  After    positioning was confirmed with 2 mL of Gadavist, a solution comprised of 1 mL 80 mg/mL Depomedrol, 2 mL of 0 25% Sensorcaine and 2 mL 1% Xylocaine was injected into the left hip joint  The patient tolerated the procedure well  There were no    complications   I asked the patient to call us with any questions, concerns, or acute problems  The patient expressed understanding of the above  IMPRESSION:     Technically successful left hip steroid injection  Procedure was performed by Rudy Puente PA-C under the direct supervision of Dr Radha Cardoza  PERFORMED, DICTATED AND SIGNED BY: Padmini Starks PA-C       Workstation performed: [id number]     Signed by:   Radha Cardoza MD   17     ECHO COMPLETE WITH CONTRAST IF INDICATED 2017 10:00AM Rambo Gallego Order Number: [id number]    - Patient Instructions: To schedule this appointment, please contact Central Scheduling at [phone]  Test Name Result Flag Reference   ECHO COMPLETE WITH CONTRAST IF INDICATED (Report)     [address] [place] [address]   [phone]     Transthoracic Echocardiogram   2D, M-mode, Doppler, and Color Doppler     Study date: 21-Sep-2017     Patient: Andrew Weiner   MR number: [id number]   Account number: [de-identified]   : 1926   Age: 80 years   Gender: Female   Status: Outpatient   Location: [place] [address]    Height: 61 in   Weight: 147 lb   BP: 150/ 60 mmHg     Indications: Atherosclerotic Heart Disease of native Coronary Artery without Angina Pectoris, Essential Primary Hypertension, Mixed Hyperlipidemia, Ventricular Premature Depolarization     Diagnoses: E78 2 - Mixed hyperlipidemia, I10  - Essential (primary) hypertension, I25 10 - Atherosclerotic heart disease of native coronary artery without angina pectoris, I49 3 - Ventricular premature depolarization     Sonographer: Ld Fuentes, BS,RDCS   Primary Physician: William Martinez MD   Referring Physician: Libra Burr MD   Group: [facility] [address]   Interpreting Physician: Andie Ledesma MD     SUMMARY     LEFT VENTRICLE:   Systolic function was normal  Ejection fraction was estimated to be 60 %     There were no regional wall motion abnormalities  Doppler parameters were consistent with abnormal left ventricular relaxation (grade 1 diastolic dysfunction)  Doppler parameters were consistent with high ventricular filling pressure, with an E/E' of 18  TRICUSPID VALVE:   There was mild regurgitation  PULMONIC VALVE:   There was trace to mild regurgitation  HISTORY: PRIOR HISTORY: Atherosclerotic Heart Disease of Native Coronary Artery without Angina Pectoris, Premature Ventricular Contractions, Essential Hypertension, Mixed Hyperlipidemia     PROCEDURE: The study was performed in the [address]  This was a routine study  The transthoracic approach was used  The study included complete 2D imaging, M-mode, complete spectral Doppler, and color Doppler  The heart rate was   63 bpm, at the start of the study  Images were obtained from the parasternal, apical, subcostal, and suprasternal notch acoustic windows  Echocardiographic views were limited due to poor acoustic window availability, decreased penetration,   and lung interference  This was a technically difficult study  LEFT VENTRICLE: Size was normal  Systolic function was normal  Ejection fraction was estimated to be 60 %  There were no regional wall motion abnormalities  Wall thickness could not be accurately determined  DOPPLER: There was an increased   relative contribution of atrial contraction to ventricular filling  Doppler parameters were consistent with abnormal left ventricular relaxation (grade 1 diastolic dysfunction)  Doppler parameters were consistent with high ventricular   filling pressure, with an E/E' of 18  RIGHT VENTRICLE: The size was normal  Systolic function was normal  Wall thickness was normal      LEFT ATRIUM: Size was normal      RIGHT ATRIUM: Size was normal      MITRAL VALVE: Valve structure was normal  There was normal leaflet separation  DOPPLER: The transmitral velocity was within the normal range  There was no evidence for stenosis  There was trace regurgitation  AORTIC VALVE: The valve was trileaflet  Leaflets exhibited normal thickness and normal cuspal separation  DOPPLER: Transaortic velocity was within the normal range  There was no evidence for stenosis  There was no significant   regurgitation  TRICUSPID VALVE: The valve structure was normal  There was normal leaflet separation  DOPPLER: The transtricuspid velocity was within the normal range  There was no evidence for stenosis  There was mild regurgitation  Pulmonary artery   systolic pressure was within the normal range  Estimated peak PA pressure was 25 mmHg  PULMONIC VALVE: Leaflets exhibited normal thickness, no calcification, and normal cuspal separation  DOPPLER: The transpulmonic velocity was within the normal range  There was trace to mild regurgitation  PERICARDIUM: There was no pericardial effusion  The pericardium was normal in appearance  AORTA: The root exhibited normal size  SYSTEMIC VEINS: IVC: The inferior vena cava was normal in size   Respirophasic changes were normal      SYSTEM MEASUREMENT TABLES     2D   %FS: 28 32 %   AV Diam: 1 63 cm   EDV(Teich): 67 53 ml   EF(Teich): 55 3 %   ESV(Teich): 30 19 ml   IVSd: 0 83 cm   LA Area: 16 57 cm2   LA Diam: 4 34 cm   LVEDV MOD A4C: 85 9 ml   LVEF MOD A4C: 71 76 %   LVESV MOD A4C: 24 26 ml   LVIDd: 3 94 cm   LVIDs: 2 82 cm   LVLd A4C: 7 36 cm   LVLs A4C: 5 78 cm   LVPWd: 0 7 cm   RA Area: 12 29 cm2   RV Diam : 1 73 cm   SV MOD A4C: 61 65 ml   SV(Cube): 38 63 ml   SV(Teich): 37 34 ml     CW   MV VTI: 35 94 cm   MV Vmax: 1 08 m/s   MV Vmean: 0 56 m/s   MV maxP 62 mmHg   MV meanP 51 mmHg   TR MaxP 03 mmHg   TR Vmax: 2 35 m/s     MM   TAPSE: 1 51 cm     PW   E': 0 04 m/s   E/E': 20 43   MV A Javier: 0 89 m/s   MV Dec Kemper: 4 34 m/s2   MV DecT: 169 82 ms   MV E Javier: 0 72 m/s   MV E/A Ratio: 0 81     [employer] Accredited Echocardiography Laboratory     Prepared and electronically signed by     Linsey Ford MD   Signed 21-Sep-2017 11:16:02     * MAMMO SCREENING BILATERAL W CAD 74JEZ6827 03:08PM Baron Bolden Order Number: [id number]     Test Name Result Flag Reference   MAMMO SCREENING BILATERAL W CAD (Report)     Patient History:   Patient is postmenopausal and has history of other cancer at age    68  No known family history of cancer  Took estrogen for 10 years  Patient has never smoked  Patient's BMI is 27 2  Reason for exam: screening, asymptomatic  Mammo Screening Bilateral W CAD: May 15, 2017 - Check In #:    [de-identified]   Bilateral CC and MLO view(s) were taken  Technologist: BRANDON Sue (R)(M)   Prior study comparison: March 1, 2016, mammo screening bilateral    W CAD performed at [address] [facility]  February 25, 2015, bilateral [facility] digtl scrn mammo w/CAD performed at [address] [facility]  There are scattered fibroglandular densities  No dominant soft tissue mass or suspicious calcifications are    noted  The skin and nipple contours are within normal limits  No mammographic evidence of malignancy  ACR BI-RADSï¾® Assessments: BiRad:1 - Negative   A reminder letter will be scheduled  Recommendation:   Routine screening mammogram in 1 year  Analyzed by CAD     8-10% of cancers will be missed on mammography  Management of a    palpable abnormality must be based on clinical grounds  Patients   will be notified of their results via letter from our facility  Accredited by [employer] of Radiology and FDA  Transcription Location: BRANDON Wilson 98: [id number]     Risk Value(s):   Myriad Table: 1 5%   Signed by:   Rebel Soriano MD   5/16/17     * XR ANKLE 3+ VIEW RIGHT 07Apr2017 09:37AM Stefanie Exon Order Number: [id number]   Performing Comments: 4     Test Name Result Flag Reference   XR ANKLE 3+ VW RIGHT (Report)     RIGHT ANKLE     INDICATION: Right ankle pain       COMPARISON: None     VIEWS: 3     IMAGES: 3     FINDINGS:   Diffuse osteopenia     There is no acute fracture or dislocation  Mild degenerative changes throughout the ankle     No lytic or blastic lesions are seen  Small plantar calcaneal heel spur     Numerous posterior vascular clips extending into the calf       IMPRESSION:   Diffuse osteopenia   Mild degenerative arthritis   Small heel spur   No acute osseous abnormality  Workstation performed: [id number]     Signed by:   Nohemy Rain MD   4/8/17     * XR HIP/PELV 2-3 VWS LEFT W PELVIS IF PERFORMED 07Apr2017 09:19AM Amrit Postal Order Number: [id number]   Performing Comments:  4     Test Name Result Flag Reference   * XR HIP/PELV 2-3 VWS LEFT (Report)     LEFT HIP     INDICATION: 70-year-old female, Left hip and groin pain  COMPARISON: 1/13/2011 x-rays     VIEWS: AP pelvis and 2 coned down views     IMAGES: 3     FINDINGS:     There is no fracture or dislocation  Progressive moderate to severe degenerative arthritis left hip     Stable intact appearing right hip arthroplasty     No lytic or blastic lesions are seen  Extraosseous extent of arthroplasty cement, unchanged       IMPRESSION:   Progressive moderate to severe degenerative arthritis left hip     Stable intact appearing right hip arthroplasty     No acute osseous abnormality  Workstation performed: [id number]     Signed by:   Nohemy Rain MD   4/8/17     (1) CBC/PLT/DIFF [id number] 09:25AM Rosemary Borjas Order Number: [id number]     Test Name Result Flag Reference   WBC COUNT 5 45 Thousand/uL  4 31-10 16   RBC COUNT 4 25 Million/uL  3 81-5 12   HEMOGLOBIN 12 3 g/dL  11 5-15 4   HEMATOCRIT 38 0 %  34 8-46  1   MCV 89 fL  82-98   MCH 28 9 pg  26 8-34 3   MCHC 32 4 g/dL  31 4-37 4   RDW 13 5 %  11 6-15 1   MPV 9 8 fL  8 9-12 7   PLATELET COUNT 396 Thousands/uL  149-390   nRBC AUTOMATED 0 /100 WBCs     NEUTROPHILS RELATIVE PERCENT 67 %  43-75   LYMPHOCYTES RELATIVE PERCENT 23 %  14-44   MONOCYTES RELATIVE PERCENT 7 %  4-12   EOSINOPHILS RELATIVE PERCENT 2 %  0-6   BASOPHILS RELATIVE PERCENT 1 %  0-1   NEUTROPHILS ABSOLUTE COUNT 3 66 Thousands/? ??L  1 85-7 62   LYMPHOCYTES ABSOLUTE COUNT 1 27 Thousands/? ??L  0 60-4 47   MONOCYTES ABSOLUTE COUNT 0 37 Thousand/? ??L  0 17-1 22   EOSINOPHILS ABSOLUTE COUNT 0 11 Thousand/? ??L  0 00-0 61   BASOPHILS ABSOLUTE COUNT 0 03 Thousands/? ??L  0 00-0 10   - Patient Instructions: This bloodwork is non-fasting  Please drink two glasses of water morning of bloodwork  - Patient Instructions: This bloodwork is non-fasting  Please drink two glasses of water morning of bloodwork  (1) COMPREHENSIVE METABOLIC PANEL [id number] 08:98YN Jaylon Trujillo Order Number: [id number]     Test Name Result Flag Reference   SODIUM 144 mmol/L  136-145   POTASSIUM 3 9 mmol/L  3 5-5 3   CHLORIDE 108 mmol/L  100-108   CARBON DIOXIDE 29 mmol/L  21-32   ANION GAP (CALC) 7 mmol/L  4-13   BLOOD UREA NITROGEN 20 mg/dL  5-25   CREATININE 1 24 mg/dL  0 60-1 30   Standardized to IDMS reference method   CALCIUM 9 4 mg/dL  8 3-10 1   BILI, TOTAL 0 48 mg/dL  0 20-1 00   ALK PHOSPHATAS 129 U/L H    ALT (SGPT) 29 U/L  12-78   AST(SGOT) 23 U/L  5-45   ALBUMIN 3 9 g/dL  3 5-5 0   TOTAL PROTEIN 7 0 g/dL  6 4-8 2   eGFR Non-African American 40 5 ml/min/1 73sq m     - Patient Instructions: This is a fasting blood test  Water,black tea or black  coffee only after 9:00pm the night before test Drink 2 glasses of water the morning of test   National Kidney Disease Education Program recommendations are as follows:  GFR calculation is accurate only with a steady state creatinine  Chronic Kidney disease less than 60 ml/min/1 73 sq  meters  Kidney failure less than 15 ml/min/1 73 sq  meters     GLUCOSE FASTING 94 mg/dL  65-99     (1) LIPID PANEL, FASTING [id number] 09:25AM Jaylon Trujillo Order Number: [id number]     Test Name Result Flag Reference CHOLESTEROL 165 mg/dL     HDL,DIRECT 56 mg/dL  40-60   Specimen collection should occur prior to Metamizole administration due to the potential for falsely depressed results  LDL CHOLESTEROL CALCULATED 90 mg/dL  0-100   - Patient Instructions: This is a fasting blood test  Water,black tea or black  coffee only after 9:00pm the night before test   Drink 2 glasses of water the morning of test     - Patient Instructions: This is a fasting blood test  Water,black tea or black  coffee only after 9:00pm the night before test Drink 2 glasses of water the morning of test   Triglyceride:         Normal              <150 mg/dl       Borderline High    150-199 mg/dl       High               200-499 mg/dl       Very High          >499 mg/dl  Cholesterol:         Desirable        <200 mg/dl      Borderline High  200-239 mg/dl      High             >239 mg/dl  HDL Cholesterol:        High    >59 mg/dL      Low     <41 mg/dL  LDL CALCULATED:    This screening LDL is a calculated result  It does not have the accuracy of the Direct Measured LDL in the monitoring of patients with hyperlipidemia and/or statin therapy  Direct Measure LDL (HHG787) must be ordered separately in these patients  TRIGLYCERIDES 97 mg/dL  <=150   Specimen collection should occur prior to N-Acetylcysteine or Metamizole administration due to the potential for falsely depressed results  (1) TSH WITH FT4 REFLEX [id number] 09:25AM Karlene Otero Order Number: [id number]     Test Name Result Flag Reference   TSH 5 310 uIU/mL H 0 358-3 740   - Patient Instructions: This is a fasting blood test  Water,black tea or black  coffee only after 9:00pm the night before test Drink 2 glasses of water the morning of test   Patients undergoing fluorescein dye angiography may retain small amounts of fluorescein in the body for 48-72 hours post procedure  Samples containing fluorescein can produce falsely depressed TSH values   If the patient had this procedure,a specimen should be resubmitted post fluorescein clearance  The recommended reference ranges for TSH during pregnancy are as follows:  First trimester 0 1 to 2 5 uIU/mL  Second trimester  0 2 to 3 0 uIU/mL  Third trimester 0 3 to 3 0 uIU/m   T4,FREE 0 87 ng/dL  0 76-1 46   - Patient Instructions: This is a fasting blood test  Water,black tea or black  coffee only after 9:00pm the night before test Drink 2 glasses of water the morning of test      (1) THYROID MICROSOMAL ANTIBODY [id number] 09:25AM Bhanu Pickett Order Number: [id number]     Test Name Result Flag Reference   [facility]  IU/mL H 0 - 34   Performed at:  [address] [place] [address]  [id number]  : Lambert Urias MD, Phone:  [phone]     (1) URINE PROTEIN CREATININE RATIO [id number] 09:25AM Bhanu Pickett Order Number: [id number]     Test Name Result Flag Reference   CREATININE URINE 76 2 mg/dL     URINE PROTEIN:CREATININE RATIO 0 09  0 00-0 10   URINE PROTEIN 7 mg/dL       * XR SHOULDER 2+ VIEW RIGHT [id number] 09:12AM Jorge Castelan Order Number: [id number]   Performing Comments:  5     Test Name Result Flag Reference   XR SHOULDER 2+ VW RIGHT (Report)     RIGHT SHOULDER     INDICATION: Posttraumatic right shoulder pain   COMPARISON: 6/25/2015 x-rays     VIEWS: 3     IMAGES: 3     FINDINGS:     There is no acute fracture or dislocation  Mild degenerative changes involve the acromioclavicular and glenohumeral joints  Tiny calcification is present adjacent to the greater tuberosity consistent with calcific tendinitis  These findings are stable  No lytic or blastic lesions are seen  Soft tissues are unremarkable  IMPRESSION:   Mild degenerative arthritis   Calcific tendinitis   No acute osseous abnormality     Stable appearance       Workstation performed: [id number]     Signed by:   Lelia Lynn MD   2/25/17     Future Appointments    Date/Time Provider Specialty Site   02/09/2018 11:15 AM FELIPA Meraz  Orthopedic Surgery [facility] [place] [facility] [place]   05/30/2018 02:00 PM FELIPA Glass   Family Medicine [id number]     Signatures   Electronically signed by : FELIPA Beckwith ; Nov 30 2017  5:38PM EST                       (Author)

## 2018-01-13 VITALS
BODY MASS INDEX: 27.38 KG/M2 | HEART RATE: 67 BPM | DIASTOLIC BLOOD PRESSURE: 74 MMHG | HEIGHT: 61 IN | WEIGHT: 145 LBS | SYSTOLIC BLOOD PRESSURE: 183 MMHG

## 2018-01-13 VITALS
BODY MASS INDEX: 27.19 KG/M2 | HEART RATE: 72 BPM | RESPIRATION RATE: 16 BRPM | HEIGHT: 61 IN | WEIGHT: 144 LBS | SYSTOLIC BLOOD PRESSURE: 128 MMHG | TEMPERATURE: 96.9 F | DIASTOLIC BLOOD PRESSURE: 76 MMHG

## 2018-01-13 VITALS
HEIGHT: 61 IN | BODY MASS INDEX: 27.19 KG/M2 | SYSTOLIC BLOOD PRESSURE: 128 MMHG | HEART RATE: 62 BPM | WEIGHT: 144 LBS | DIASTOLIC BLOOD PRESSURE: 70 MMHG

## 2018-01-13 VITALS
SYSTOLIC BLOOD PRESSURE: 130 MMHG | HEIGHT: 61 IN | WEIGHT: 144 LBS | DIASTOLIC BLOOD PRESSURE: 70 MMHG | BODY MASS INDEX: 27.19 KG/M2 | HEART RATE: 80 BPM

## 2018-01-13 VITALS
SYSTOLIC BLOOD PRESSURE: 166 MMHG | HEIGHT: 61 IN | DIASTOLIC BLOOD PRESSURE: 71 MMHG | WEIGHT: 146 LBS | HEART RATE: 76 BPM | BODY MASS INDEX: 27.56 KG/M2

## 2018-01-14 VITALS
TEMPERATURE: 97.4 F | DIASTOLIC BLOOD PRESSURE: 70 MMHG | RESPIRATION RATE: 16 BRPM | WEIGHT: 146 LBS | BODY MASS INDEX: 27.56 KG/M2 | SYSTOLIC BLOOD PRESSURE: 134 MMHG | HEART RATE: 70 BPM | HEIGHT: 61 IN

## 2018-01-14 VITALS
WEIGHT: 145 LBS | DIASTOLIC BLOOD PRESSURE: 79 MMHG | HEART RATE: 80 BPM | BODY MASS INDEX: 27.38 KG/M2 | SYSTOLIC BLOOD PRESSURE: 174 MMHG | HEIGHT: 61 IN

## 2018-01-14 VITALS
HEIGHT: 61 IN | DIASTOLIC BLOOD PRESSURE: 60 MMHG | WEIGHT: 147 LBS | SYSTOLIC BLOOD PRESSURE: 150 MMHG | HEART RATE: 70 BPM | BODY MASS INDEX: 27.75 KG/M2

## 2018-01-15 NOTE — RESULT NOTES
Verified Results  * MAMMO SCREENING BILATERAL W CAD [id number] 03:08PM Curly Solid Order Number: [id number]     Test Name Result Flag Reference   MAMMO SCREENING BILATERAL W CAD (Report)     Patient History:   Patient is postmenopausal and has history of other cancer at age    68  No known family history of cancer  Took estrogen for 10 years  Patient has never smoked  Patient's BMI is 27 2  Reason for exam: screening, asymptomatic  Mammo Screening Bilateral W CAD: May 15, 2017 - Check In #:    [de-identified]   Bilateral CC and MLO view(s) were taken  Technologist: BRANDON Washington (R)(M)   Prior study comparison: March 1, 2016, mammo screening bilateral    W CAD performed at [address] [facility]  February 25, 2015, bilateral [facility] digtl scrn mammo w/CAD performed at [address] [facility]  There are scattered fibroglandular densities  No dominant soft tissue mass or suspicious calcifications are    noted  The skin and nipple contours are within normal limits  No mammographic evidence of malignancy  ACR BI-RADSï¾® Assessments: BiRad:1 - Negative   A reminder letter will be scheduled  Recommendation:   Routine screening mammogram in 1 year  Analyzed by CAD     8-10% of cancers will be missed on mammography  Management of a    palpable abnormality must be based on clinical grounds  Patients   will be notified of their results via letter from our facility  Accredited by [employer] of [employer] and FDA       Transcription Location: BRANDON Mastmicah 98: [id number]     Risk Value(s):   Myriad Table: 1 5%   Signed by:   Kavin Moody MD   5/16/17

## 2018-01-17 NOTE — RESULT NOTES
Verified Results  (Q) TSH, 3RD GENERATION W/REFLEX TO FT4 [id number] 09:49AM [facility]     Test Name Result Flag Reference   TSH W/REFLEX TO FT4 4 46 mIU/L  0 40-4 50

## 2018-01-24 NOTE — PROGRESS NOTES
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)    Plan  Angiomyolipoma of kidney, Urgency of urination    · Urine Dip Non-Automated- POC; Status:Complete - Retrospective By Protocol  Authorization;   Done: 78UNS0282 01:18PM   Performed: In Office; Due:20Apr2018; Last Updated By:Miryam Nobles; 4/20/2017 1:19:11 PM;Ordered; For:Angiomyolipoma of kidney, Urgency of urination; Ordered By:Tamera Starkey;  Urgency of urination    · Follow-up visit in 1 year Evaluation and Treatment  Follow-up  Status: Hold For -  Scheduling,Retrospective Authorization  Requested for: 20Apr2017   Ordered; For: Urgency of urination; Ordered By: Brayton Mortimer Performed:  Due: 25Apr2017; Last Updated By: Brayton Mortimer; 4/20/2017 1:27:15 PM   · Measure Post Void Residual - POC; Status:Active - Perform Order; Requested  for:20Apr2017;    Perform: In Office; Due:20Apr2018; Ordered; For:Urgency of urination; Ordered By:Rudy Starkey; Discussion/Summary  Discussion Summary:   Patient with history of stable AML, urgency/urge incontinence managed by Dr Didier Peralta    Patient will continue to take vesicare 5mg daily  No refills needed at this time  Patient will follow up in 1 year with a PVR at her next appointment  We will hold on further ultrasound imaging of AML secondary to the patient's age and stable ultrasound  All questions answered  Chief Complaint  Chief Complaint Free Text Note Form: patient presents for PVR;urgency; R AML      History of Present Illness  HPI: Glenroy Cook is a 80year old female patient of Dr Didier Peralta with a history of stable right AML and urinary urgency presenting for 1 year follow up  She continues to take vesicare 5mg daily with good urinary results  She does have some dry mouth and consitpation associated with the medication however uses OTC lozenges and stool softeners   She denies any urinary tract infections over the past year  She feels like she has a good stream and feels empty  She denies any dysuria, gross hematuria, nocturia, urgency, or urge incontinence  PVR in the office today was 31mL  Review of Systems  Complete-Female Urology:   Constitutional: No fever, no chills, feels well, no tiredness, no recent weight gain or weight loss  Respiratory: No complaints of shortness of breath, no wheezing, no cough, no SOB on exertion, no orthopnea, no PND  Cardiovascular: No complaints of slow heart rate, no fast heart rate, no chest pain, no palpitations, no leg claudication, no lower extremity edema  Gastrointestinal: No complaints of abdominal pain, no constipation, no nausea or vomiting, no diarrhea, no bloody stools  Genitourinary: Empty sensation and stream quality good, but No complaints of dysuria, no incontinence, no pelvic pain, no dysmenorrhea, no vaginal discharge or bleeding, no dysuria, no urinary hesitancy, no feelings of urinary urgency, no incontinence, no hematuria and no nocturia  Musculoskeletal: No complaints of arthralgias, no myalgias, no joint swelling or stiffness, no limb pain or swelling  Integumentary: No complaints of skin rash or lesions, no itching, no skin wounds, no breast pain or lump  Hematologic/Lymphatic: No complaints of swollen glands, no swollen glands in the neck, does not bleed easily, does not bruise easily  Neurological: No complaints of headache, no confusion, no convulsions, no numbness, no dizziness or fainting, no tingling, no limb weakness, no difficulty walking  ROS Reviewed:   ROS reviewed  Active Problems    1  Acquired valgus deformity of knee, left (736 41) (M21 062)   2  Advance directive discussed with patient (V65 49) (Z71 89)   3  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)   4  Ankle pain (719 47) (M25 579)   5  Atherosclerotic heart disease of native coronary artery without angina pectoris (414 01)   (I25 10)   6   CKD (chronic kidney disease) (585 9) (N18 9)   7  Essential hypertension (401 9) (I10)   8  Gastroesophageal reflux disease (530 81) (K21 9)   9  Generalized osteoarthritis of multiple sites (715 09) (M15 9)   10  Hip pain (719 45) (M25 559)   11  History of surgery for malignant neoplasm (V45 89) (Z98 890)   12  Influenza vaccination administered at current visit (V04 81) (Z23)   13  Mixed hyperlipidemia (272 2) (E78 2)   14  Osteoporosis screening (V82 81) (Z13 820)   15  Plantar fasciitis of right foot (728 71) (M72 2)   16  Primary localized osteoarthritis of left hip (715 15) (M16 12)   17  PVCs (premature ventricular contractions) (427 69) (I49 3)   18  Screening for genitourinary condition (V81 6) (Z13 89)   19  Shoulder pain (719 41) (M25 519)   20  Subclinical hypothyroidism (244 8) (E03 9)   21  Tear of right rotator cuff (840 4) (M75 101)   22  Trochanteric bursitis of left hip (726 5) (M70 62)   23  Urgency of urination (788 63) (R39 15)   24  Urinary frequency (788 41) (R35 0)   25  Venous insufficiency (chronic) (peripheral) (459 81) (I87 2)   26  Vulvar Burning (625 8)    Past Medical History    1  History of Abnormal EKG (794 31) (R94 31)   2  History of anemia (V12 3) (Z86 2)   3  History of depression (V11 8) (Z86 59)   4  History of dizziness (V13 89) (Z87 898)   5  History of hypercalcemia (V12 29) (Z86 39)   6  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)   7  History of Osteoporosis screening (V82 81) (Z13 820)   8  History of Slurred speech (784 59) (R47 81)   9  History of Superficial thrombophlebitis of leg, unspecified laterality  Active Problems And Past Medical History Reviewed: The active problems and past medical history were reviewed and updated today  Surgical History    1  History of Cath Stent Placement   2  History of Excision Of Lesion Face Malignant   3  History of Total Abdominal Hysterectomy With Removal Of Both Ovaries   4  History of Total Hip Replacement  Surgical History Reviewed:    The surgical history was reviewed and updated today  Family History  Mother    1  Family history of Coronary disease   2  Family history of cerebrovascular accident (CVA) (V17 1) (Z82 3)  Father    3  Family history of Coronary disease  Family History    4  Family history of Kidney Cancer (V16 51)  Family History Reviewed: The family history was reviewed and updated today  Social History    · Denied: History of Alcohol Use (History)   · Caffeine use (V49 89) (F15 90)   · Drinks wine (V49 89) (Z78 9)   · Denied: History of Drug Use   · Never A Smoker  Social History Reviewed: The social history was reviewed and updated today  Current Meds   1  Adult Aspirin EC Low Strength 81 MG Oral Tablet Delayed Release; Take 1 tablet daily; Therapy: (Recorded:13Jan2014) to Recorded   2  Atorvastatin Calcium 40 MG Oral Tablet; TAKE ONE TABLET BY MOUTH ONCE DAILY AS   DIRECTED; Therapy: 63YIF5575 to (Evaluate:96Ncw5189)  Requested for: 10Aug2016; Last   Rx:10Aug2016 Ordered   3  Centrum Silver TABS; TAKE 1 TABLET DAILY; Therapy: (Recorded:34Vdc4810) to Recorded   4  Clopidogrel Bisulfate 75 MG Oral Tablet; TAKE 1 TABLET BY MOUTH   ONCE DAILY; Therapy: 21WHP0258 to (Evaluate:96Wyv4221)  Requested for: 91Sqp2053; Last   Rx:28Fkx5264 Ordered   5  [employer] Fish Oil + D3 5136-4618 MG-UNIT CAPS Recorded   6  ICaps CAPS; TAKE AS DIRECTED; Therapy: (Recorded:51Mst1669) to Recorded   7  Metamucil CAPS; USE AS DIRECTED; Therapy: (Recorded:39Dut1579) to Recorded   8  Metoprolol Succinate ER 50 MG Oral Tablet Extended Release 24 Hour; Take 1 tablet   daily; Therapy: 14XWW2818 to (Evaluate:52Exx2280)  Requested for: 10Aug2016; Last   Rx:10Aug2016 Ordered   9  Nitrostat 0 4 MG Sublingual Tablet Sublingual; PLACE 1 TABLET UNDER THE TONGUE   AS NEEDED EVERY 5 MINUTES UP TO 3 TIMES FOR CHEST PAIN;   Therapy: 50CYQ6188 to (Evaluate:56Wan6544)  Requested for: 87Suh3237; Last   Rx:15Wum1218 Ordered   10   Omeprazole 20 MG Oral Capsule Delayed Release; take 1 capsule daily; Therapy: 02NGM9919 to (Last Rx:24Nte2251)  Requested for: 21Bfe4913 Ordered   11  Restasis 0 05 % Ophthalmic Emulsion Recorded   12  Systane Ultra 0 4-0 3 % Ophthalmic Solution; Therapy: (Recorded:82Tou2936) to Recorded   13  Valsartan-Hydrochlorothiazide 80-12 5 MG Oral Tablet; TAKE 1 TABLET DAILY; Therapy: 79RVT8823 to (Last Rx:49Zta3801)  Requested for: 32Jri5894 Ordered   14  VESIcare 5 MG Oral Tablet; take 1 tablet by mouth every day; Therapy: 64HFA9269 to (Evaluate:12Mar2017)  Requested for: 17Dfo4780; Last    Rx:83Zsi7385 Ordered   15  Vitamin D 1000 UNIT Oral Tablet; TAKE 1 TABLET DAILY; Therapy: (Recorded:21Lnw7086) to Recorded  Medication List Reviewed: The medication list was reviewed and updated today  Allergies    1  Adhesive Bandages Miscellaneous   2  Ciprofloxacin-Ciproflox HCl ER TB24   3  Iodinated Contrast Media    4  IVP Dye    Vitals  Vital Signs    Recorded: 20Apr2017 01:17PM   Heart Rate 62   Systolic 790   Diastolic 70   Height 5 ft 1 in   Weight 144 lb    BMI Calculated 27 21   BSA Calculated 1 64     Physical Exam    Constitutional   General appearance: No acute distress, well appearing and well nourished  Pulmonary   Respiratory effort: No increased work of breathing or signs of respiratory distress  Cardiovascular   Examination of extremities for edema and/or varicosities: Normal     Musculoskeletal   Gait and station: Normal     Skin   Skin and subcutaneous tissue: Normal without rashes or lesions  Additional Exam:  no focal neurologic deficits  Mood and affect appropriate        Results/Data  Urine Dip Non-Automated- POC 20Apr2017 01:18PM Kiana Starkey     Test Name Result Flag Reference   Color Yellow     Clarity Transparent     Leukocytes -     Nitrite -     Blood -     Protein -     Ph 5 0     Specific Gravity 1 005     Ketone -     Glucose -         Procedure    Procedure: Bladder Ultrasound Post Void Residual  Test indication: Urinary Urgency  Equipment And Procedure: The patient voided  U/S Findings: bladder scan= 31ml  Future Appointments    Date/Time Provider Specialty Site   06/30/2017 10:00 AM FELIPA Curry  Orthopedic Surgery [facility] [place] [facility]   05/05/2017 01:40 PM FELIPA Rahman  Cardiology [facility]   05/31/2017 02:30 PM FELIPA Noble   Family Medicine [facility]   04/26/2018 01:15 PM [address] [place] [address] Urology [facility] Codie Rousseau     Signatures   Electronically signed by : Guille Tee, ; Apr 20 2017  1:39PM EST                       (Author)    Electronically signed by : Sherrie Jarvis MD; Apr 20 2017  3:41PM EST

## 2018-02-05 ENCOUNTER — TELEPHONE (OUTPATIENT)
Dept: FAMILY MEDICINE CLINIC | Facility: CLINIC | Age: 83
End: 2018-02-05

## 2018-02-05 DIAGNOSIS — E78.00 PURE HYPERCHOLESTEROLEMIA: Primary | ICD-10-CM

## 2018-02-05 RX ORDER — ATORVASTATIN CALCIUM 40 MG/1
40 TABLET, FILM COATED ORAL DAILY
Qty: 90 TABLET | Refills: 3 | Status: SHIPPED | OUTPATIENT
Start: 2018-02-05 | End: 2018-08-24

## 2018-02-05 RX ORDER — ATORVASTATIN CALCIUM 40 MG/1
1 TABLET, FILM COATED ORAL DAILY
COMMUNITY
Start: 2011-09-30 | End: 2018-02-05 | Stop reason: SDUPTHER

## 2018-02-09 ENCOUNTER — OFFICE VISIT (OUTPATIENT)
Dept: OBGYN CLINIC | Facility: MEDICAL CENTER | Age: 83
End: 2018-02-09
Payer: MEDICARE

## 2018-02-09 VITALS
WEIGHT: 140 LBS | HEIGHT: 61 IN | BODY MASS INDEX: 26.43 KG/M2 | SYSTOLIC BLOOD PRESSURE: 150 MMHG | HEART RATE: 64 BPM | DIASTOLIC BLOOD PRESSURE: 74 MMHG

## 2018-02-09 DIAGNOSIS — M25.552 PAIN IN LEFT HIP: Primary | ICD-10-CM

## 2018-02-09 DIAGNOSIS — M16.12 ARTHRITIS OF LEFT HIP: ICD-10-CM

## 2018-02-09 DIAGNOSIS — M70.62 TROCHANTERIC BURSITIS OF LEFT HIP: ICD-10-CM

## 2018-02-09 PROCEDURE — 20610 DRAIN/INJ JOINT/BURSA W/O US: CPT | Performed by: PHYSICIAN ASSISTANT

## 2018-02-09 PROCEDURE — 99213 OFFICE O/P EST LOW 20 MIN: CPT | Performed by: ORTHOPAEDIC SURGERY

## 2018-02-09 RX ORDER — OMEPRAZOLE 20 MG/1
20 CAPSULE, DELAYED RELEASE ORAL DAILY
Refills: 0 | COMMUNITY
Start: 2017-11-30 | End: 2018-12-03 | Stop reason: SDUPTHER

## 2018-02-09 RX ORDER — LIDOCAINE HYDROCHLORIDE 10 MG/ML
2 INJECTION, SOLUTION INFILTRATION; PERINEURAL
Status: COMPLETED | OUTPATIENT
Start: 2018-02-09 | End: 2018-02-09

## 2018-02-09 RX ORDER — NICOTINE POLACRILEX 2 MG
GUM BUCCAL
COMMUNITY
End: 2021-12-08 | Stop reason: ALTCHOICE

## 2018-02-09 RX ORDER — SOLIFENACIN SUCCINATE 5 MG/1
TABLET, FILM COATED ORAL
Refills: 0 | COMMUNITY
Start: 2018-01-24 | End: 2018-08-21 | Stop reason: SDUPTHER

## 2018-02-09 RX ORDER — FOLIC ACID/MULTIVIT,IRON,MINER 0.4MG-18MG
TABLET ORAL
COMMUNITY

## 2018-02-09 RX ORDER — BETAMETHASONE SODIUM PHOSPHATE AND BETAMETHASONE ACETATE 3; 3 MG/ML; MG/ML
12 INJECTION, SUSPENSION INTRA-ARTICULAR; INTRALESIONAL; INTRAMUSCULAR; SOFT TISSUE
Status: COMPLETED | OUTPATIENT
Start: 2018-02-09 | End: 2018-02-09

## 2018-02-09 RX ORDER — ASPIRIN 81 MG/1
1 TABLET ORAL DAILY
COMMUNITY

## 2018-02-09 RX ORDER — NITROGLYCERIN 0.4 MG/1
TABLET SUBLINGUAL
Refills: 0 | COMMUNITY
Start: 2017-11-30 | End: 2020-02-17 | Stop reason: SDUPTHER

## 2018-02-09 RX ORDER — VALSARTAN AND HYDROCHLOROTHIAZIDE 80; 12.5 MG/1; MG/1
1 TABLET, FILM COATED ORAL DAILY
Refills: 0 | COMMUNITY
Start: 2017-11-22 | End: 2018-02-19 | Stop reason: SDUPTHER

## 2018-02-09 RX ORDER — METOPROLOL SUCCINATE 50 MG/1
TABLET, EXTENDED RELEASE ORAL
COMMUNITY
Start: 2016-11-08 | End: 2018-07-28 | Stop reason: SDUPTHER

## 2018-02-09 RX ORDER — CYCLOSPORINE 0.5 MG/ML
EMULSION OPHTHALMIC
Refills: 0 | COMMUNITY
Start: 2017-12-06

## 2018-02-09 RX ADMIN — LIDOCAINE HYDROCHLORIDE 2 ML: 10 INJECTION, SOLUTION INFILTRATION; PERINEURAL at 11:56

## 2018-02-09 RX ADMIN — BETAMETHASONE SODIUM PHOSPHATE AND BETAMETHASONE ACETATE 12 MG: 3; 3 INJECTION, SUSPENSION INTRA-ARTICULAR; INTRALESIONAL; INTRAMUSCULAR; SOFT TISSUE at 11:56

## 2018-02-09 NOTE — PROGRESS NOTES
89 y o female care of left hip pain  She has known arthritic changes of her left hip  She drives significant benefit by periodic injections to the left hip  She relates that 3 days ago she had significant left hip pain the posterior buttock the side of the hip and felt as if it may be dislocated or have suffered a severe injury  Mary Ann Valeist quickly though the pain has gotten better and she is relatively pain free at today's visit compared to 3 days ago  She is accompanied by a female family member    Review of Systems  Review of systems negative unless otherwise specified in HPI    Past Medical History  Past Medical History:   Diagnosis Date    Cancer ([facility] [address] )     Hypertension     Osteoarthritis     Stomach disorder        Past Surgical History  Past Surgical History:   Procedure Laterality Date    CORONARY STENT PLACEMENT      FOOT SURGERY      HIP SURGERY      HYSTERECTOMY         Current Medications  Current Outpatient Prescriptions on File Prior to Visit   Medication Sig Dispense Refill    atorvastatin (LIPITOR) 40 mg tablet Take 1 tablet (40 mg total) by mouth daily for 90 days 90 tablet 3    clopidogrel (PLAVIX) 75 mg tablet Take 75 mg by mouth daily       No current facility-administered medications on file prior to visit  Recent Labs [facility])  @LABALLVALUEIP(HCT,HGB,WBC,PT,INR,ESR,CRP,GLUCOSE,HGBA1C)@      Physical exam  Standing the patient's pelvis is parallel to the floor she does not off load 1 leg or the other  She does have reproducible discomfort of a modest integrity lower outer quadrant of her posterior left buttock or the sciatic trough  Her most exquisite discomfort comes from direct palpation over the lateral proximal femur or trochanteric bursa      When seated her left leg is slightly externally rotated with hip flexion  She has significant discomfort with internal rotation  She has no motor deficit to knee extension or dorsiflexion of the foot at the ankle left lower extremity    Imaging  X-rays AP pelvis and left hip show degenerative changes of the left hip the incidentally show a total hip that was done many years ago that has an all poly cup and cement    Procedure  Large joint arthrocentesis  Date/Time: 2/9/2018 11:56 AM  Consent given by: patient  Site marked: site marked  Supporting Documentation  Indications: pain   Procedure Details  Location: hip - L greater trochanteric bursa  Needle size: 22 G  Ultrasound guidance: no  Approach: lateral  Medications administered: 2 mL lidocaine 1 %; 12 mg betamethasone acetate-betamethasone sodium phosphate 6 (3-3) mg/mL    Patient tolerance: patient tolerated the procedure well with no immediate complications  Dressing:  Sterile dressing applied          Assessment/Plan:   80 y  o female left hip degenerative osteoarthritis  As well as  greater trochanteric bursitis, left hip    She was offered and accepted a well placed injection at today's office visit to her greater trochanteric bursa  We will see her in follow-up in 3 months    Also at today's visit we felt we could hold her by scheduling a future left intra-articular hip injection under fluoroscopic control at an appropriate date and time  She was quite pleased with this action as well  Her exam was provided by and plan formulated by the attending surgeon    It was my privilege to assist him in its delivery

## 2018-02-09 NOTE — PATIENT INSTRUCTIONS
You had an injection provided to the lateral hip today to your hip bursa  You are permitted to stand and walk as you desire      Also to assist you I ordered a fluoroscopic guided hip injection similar to your one of 6 November 2017    I also asked for the young lady that did such a remarkable job,     We will see you in follow-up in 3 months

## 2018-02-19 DIAGNOSIS — I10 HYPERTENSION, UNSPECIFIED TYPE: Primary | ICD-10-CM

## 2018-02-19 RX ORDER — VALSARTAN AND HYDROCHLOROTHIAZIDE 80; 12.5 MG/1; MG/1
1 TABLET, FILM COATED ORAL DAILY
Qty: 90 TABLET | Refills: 3 | Status: SHIPPED | OUTPATIENT
Start: 2018-02-19 | End: 2018-11-14 | Stop reason: SDUPTHER

## 2018-04-26 ENCOUNTER — TELEPHONE (OUTPATIENT)
Dept: OBGYN CLINIC | Facility: HOSPITAL | Age: 83
End: 2018-04-26

## 2018-04-26 DIAGNOSIS — M25.552 PAIN IN LEFT HIP: Primary | ICD-10-CM

## 2018-04-26 NOTE — TELEPHONE ENCOUNTER
Called Paola Sorenson regarding getting her injection scheduled for her hip  She wanted to get it done in May before she left for [facility] for a month  I called central scheduling & got Paola Sorenson scheduled for 05/21 @ 8088p

## 2018-04-26 NOTE — TELEPHONE ENCOUNTER
I did indeed till this patient that I can order her hip injections over the phone  This is the 1st such message, since that visit of February    I entered a order or request for left hip steroid injection  This will be followed up on by our surgery schedulers      You may inform the patient that we are taking care of her

## 2018-04-26 NOTE — TELEPHONE ENCOUNTER
Patient is calling   [phone]  Patient sees Dr Cynthia Waldron    Patient is calling because at her last appointment Jeannie Bang told her that he would give her 3 month "rolling" appointments for the fluroscopy injections at [facility]  Patient is still waiting to hear back  Please advise

## 2018-05-10 ENCOUNTER — TELEPHONE (OUTPATIENT)
Dept: FAMILY MEDICINE CLINIC | Facility: CLINIC | Age: 83
End: 2018-05-10

## 2018-05-10 DIAGNOSIS — Z12.31 ENCOUNTER FOR SCREENING MAMMOGRAM FOR BREAST CANCER: Primary | ICD-10-CM

## 2018-05-11 ENCOUNTER — OFFICE VISIT (OUTPATIENT)
Dept: OBGYN CLINIC | Facility: MEDICAL CENTER | Age: 83
End: 2018-05-11
Payer: MEDICARE

## 2018-05-11 VITALS — BODY MASS INDEX: 27.19 KG/M2 | HEIGHT: 61 IN | WEIGHT: 144 LBS

## 2018-05-11 DIAGNOSIS — M25.511 CHRONIC RIGHT SHOULDER PAIN: Primary | ICD-10-CM

## 2018-05-11 DIAGNOSIS — M25.552 LEFT HIP PAIN: ICD-10-CM

## 2018-05-11 DIAGNOSIS — M70.62 TROCHANTERIC BURSITIS OF LEFT HIP: ICD-10-CM

## 2018-05-11 DIAGNOSIS — G89.29 CHRONIC RIGHT SHOULDER PAIN: Primary | ICD-10-CM

## 2018-05-11 PROCEDURE — 20610 DRAIN/INJ JOINT/BURSA W/O US: CPT | Performed by: ORTHOPAEDIC SURGERY

## 2018-05-11 PROCEDURE — 99213 OFFICE O/P EST LOW 20 MIN: CPT | Performed by: ORTHOPAEDIC SURGERY

## 2018-05-11 RX ORDER — BUPIVACAINE HYDROCHLORIDE 2.5 MG/ML
2 INJECTION, SOLUTION INFILTRATION; PERINEURAL
Status: COMPLETED | OUTPATIENT
Start: 2018-05-11 | End: 2018-05-11

## 2018-05-11 RX ORDER — BETAMETHASONE SODIUM PHOSPHATE AND BETAMETHASONE ACETATE 3; 3 MG/ML; MG/ML
12 INJECTION, SUSPENSION INTRA-ARTICULAR; INTRALESIONAL; INTRAMUSCULAR; SOFT TISSUE
Status: COMPLETED | OUTPATIENT
Start: 2018-05-11 | End: 2018-05-11

## 2018-05-11 RX ORDER — LIDOCAINE HYDROCHLORIDE 10 MG/ML
2 INJECTION, SOLUTION INFILTRATION; PERINEURAL
Status: COMPLETED | OUTPATIENT
Start: 2018-05-11 | End: 2018-05-11

## 2018-05-11 RX ADMIN — BETAMETHASONE SODIUM PHOSPHATE AND BETAMETHASONE ACETATE 12 MG: 3; 3 INJECTION, SUSPENSION INTRA-ARTICULAR; INTRALESIONAL; INTRAMUSCULAR; SOFT TISSUE at 12:00

## 2018-05-11 RX ADMIN — BETAMETHASONE SODIUM PHOSPHATE AND BETAMETHASONE ACETATE 12 MG: 3; 3 INJECTION, SUSPENSION INTRA-ARTICULAR; INTRALESIONAL; INTRAMUSCULAR; SOFT TISSUE at 12:01

## 2018-05-11 RX ADMIN — BUPIVACAINE HYDROCHLORIDE 2 ML: 2.5 INJECTION, SOLUTION INFILTRATION; PERINEURAL at 12:01

## 2018-05-11 RX ADMIN — BUPIVACAINE HYDROCHLORIDE 2 ML: 2.5 INJECTION, SOLUTION INFILTRATION; PERINEURAL at 12:00

## 2018-05-11 RX ADMIN — LIDOCAINE HYDROCHLORIDE 2 ML: 10 INJECTION, SOLUTION INFILTRATION; PERINEURAL at 12:01

## 2018-05-11 RX ADMIN — LIDOCAINE HYDROCHLORIDE 2 ML: 10 INJECTION, SOLUTION INFILTRATION; PERINEURAL at 12:00

## 2018-05-11 NOTE — PROGRESS NOTES
89 y o female with arthritic left hip, and a tear of the right shoulder rotator cuff presents for evaluation  She has onset of lateral left hip pain, and global pain in the right shoulder  Occurs the absent neck pain, occurs in the absence of back pain  She reports almost no left groin pain, the left hip pain is primarily lateral based in nature      Review of Systems  Review of systems negative unless otherwise specified in HPI    Past Medical History  Past Medical History:   Diagnosis Date    Anemia     last assessed - [id number]    Cancer [facility])     Depression     last assessed - [id number]    Hypercalcemia     last assessed - [id number]    Hypertension     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    Osteoarthritis     Plantar fasciitis of right foot     last assessed - 07Apr2017    Stomach disorder     Superficial thrombophlebitis of leg     unspecified laterality; last assessed - [id number]    Trochanteric bursitis of left hip     last assessed - 07Apr2017       Past Surgical History  Past Surgical History:   Procedure Laterality Date    CORONARY STENT PLACEMENT      stent RCA    FOOT SURGERY      HIP SURGERY      HYSTERECTOMY      SKIN LESION EXCISION  11/2010    Face - BCC - nose    TOTAL ABDOMINAL HYSTERECTOMY W/ BILATERAL SALPINGOOPHORECTOMY      TOTAL HIP ARTHROPLASTY Right        Current Medications  Current Outpatient Prescriptions on File Prior to Visit   Medication Sig Dispense Refill    aspirin (ADULT ASPIRIN EC LOW STRENGTH) 81 mg EC tablet Take 1 tablet by mouth daily      Biotin 1 MG CAPS Take by mouth      cholecalciferol (VITAMIN D3) 1,000 units tablet Take 1 tablet by mouth daily      clopidogrel (PLAVIX) 75 mg tablet Take 75 mg by mouth daily      metoprolol succinate (TOPROL-XL) 50 mg 24 hr tablet       Multiple Vitamins-Minerals (CENTRUM SILVER PO) Take 1 tablet by mouth daily      Multiple Vitamins-Minerals (ICAPS AREDS 2) CAPS Take by mouth      nitroglycerin (NITROSTAT) 0 4 mg SL tablet place 1 tablet under the tongue if needed EVERY 5 MINUTES UP TO 3 TIMES FOR CHEST PAIN  0    Omega-3 Fatty Acids (OMEGA-3 FISH OIL) 1200 MG CAPS Take by mouth      omeprazole (PriLOSEC) 20 mg delayed release capsule Take 20 mg by mouth daily  0    psyllium (METAMUCIL) 0 52 g capsule Take by mouth      RESTASIS 0 05 % ophthalmic emulsion instill 1 drop into both eyes twice a day  0    valsartan-hydrochlorothiazide (DIOVAN-HCT) 80-12 5 MG per tablet Take 1 tablet by mouth daily 90 tablet 3    VESICARE 5 MG tablet   0    atorvastatin (LIPITOR) 40 mg tablet Take 1 tablet (40 mg total) by mouth daily for 90 days 90 tablet 3     No current facility-administered medications on file prior to visit  Recent Labs [facility])    0  Lab Value Date/Time   HCT 38 0 03/17/2017 0925   HCT 36 8 11/16/2016 0919   HGB 12 3 03/17/2017 0925   HGB 12 2 11/16/2016 0919   WBC 5 45 03/17/2017 0925   WBC 6 1 11/16/2016 0919   GLUCOSE 96 11/16/2015 0915         Physical exam  · General: Awake, Alert, Oriented  · Eyes: Pupils equal, round and reactive to light  · Heart: regular rate and rhythm  · Lungs: No audible wheezing  · Abdomen: soft  Right shoulder is not effused  There is incomplete for flexion incomplete abduction  There is weakness of external rotation strength testing  Left hip is very little groin pain with motion  There is tenderness the patient the trochanteric flare  This occurs that erythema or fluctuance  Imaging  None performed today    Procedure  Injections corticosteroid were provided for the right shoulder in the subacromial space, and left greater trochanteric bursa today    There documented below    Large joint arthrocentesis  Date/Time: 5/11/2018 12:00 PM  Consent given by: patient  Supporting Documentation  Indications: pain   Procedure Details  Location: shoulder - R subacromial bursa  Preparation: Patient was prepped and draped in the usual sterile fashion  Needle size: 20 G  Approach: posterolateral  Medications administered: 2 mL bupivacaine 0 25 %; 12 mg betamethasone acetate-betamethasone sodium phosphate 6 (3-3) mg/mL; 2 mL lidocaine 1 %    Patient tolerance: patient tolerated the procedure well with no immediate complications  Dressing:  Sterile dressing applied  Large joint arthrocentesis  Date/Time: 5/11/2018 12:01 PM  Consent given by: patient  Procedure Details  Location: hip - L greater trochanteric bursa  Needle size: 20 G  Approach: lateral  Medications administered: 2 mL bupivacaine 0 25 %; 2 mL lidocaine 1 %; 12 mg betamethasone acetate-betamethasone sodium phosphate 6 (3-3) mg/mL    Patient tolerance: patient tolerated the procedure well with no immediate complications  Dressing:  Sterile dressing applied          Assessment/Plan:   80 y  o female who has lateral left hip pain consistent with trochanteric bursitis, addition she has a tear of the right shoulder rotator cuff and has subacromial impingement symptoms  Injections corticosteroid x2 are indicated  They are advised, except, administers outlined above    Would welcome the opportunity see back in the office in 3 months time for follow-up

## 2018-05-21 DIAGNOSIS — I49.3 VENTRICULAR PREMATURE DEPOLARIZATION: ICD-10-CM

## 2018-05-21 DIAGNOSIS — K21.9 GASTRO-ESOPHAGEAL REFLUX DISEASE WITHOUT ESOPHAGITIS: ICD-10-CM

## 2018-05-21 DIAGNOSIS — E78.2 MIXED HYPERLIPIDEMIA: ICD-10-CM

## 2018-05-21 DIAGNOSIS — M15.9 POLYOSTEOARTHRITIS: ICD-10-CM

## 2018-05-21 DIAGNOSIS — D17.71 BENIGN LIPOMATOUS NEOPLASM OF KIDNEY: ICD-10-CM

## 2018-05-21 DIAGNOSIS — N18.9 CHRONIC KIDNEY DISEASE: ICD-10-CM

## 2018-05-21 DIAGNOSIS — I10 ESSENTIAL (PRIMARY) HYPERTENSION: ICD-10-CM

## 2018-05-21 DIAGNOSIS — I25.10 ATHEROSCLEROTIC HEART DISEASE OF NATIVE CORONARY ARTERY WITHOUT ANGINA PECTORIS: ICD-10-CM

## 2018-05-21 DIAGNOSIS — E03.9 HYPOTHYROIDISM: ICD-10-CM

## 2018-05-22 LAB
ALBUMIN SERPL-MCNC: 4 G/DL (ref 3.6–5.1)
ALBUMIN/GLOB SERPL: 1.7 (CALC) (ref 1–2.5)
ALP SERPL-CCNC: 124 U/L (ref 33–130)
ALT SERPL-CCNC: 19 U/L (ref 6–29)
AST SERPL-CCNC: 17 U/L (ref 10–35)
BASOPHILS # BLD AUTO: 32 CELLS/UL (ref 0–200)
BASOPHILS NFR BLD AUTO: 0.5 %
BILIRUB SERPL-MCNC: 0.6 MG/DL (ref 0.2–1.2)
BUN SERPL-MCNC: 27 MG/DL (ref 7–25)
BUN/CREAT SERPL: 22 (CALC) (ref 6–22)
CALCIUM SERPL-MCNC: 10 MG/DL (ref 8.6–10.4)
CHLORIDE SERPL-SCNC: 107 MMOL/L (ref 98–110)
CHOLEST SERPL-MCNC: 188 MG/DL
CHOLEST/HDLC SERPL: 3.8 (CALC)
CO2 SERPL-SCNC: 23 MMOL/L (ref 20–31)
CREAT SERPL-MCNC: 1.22 MG/DL (ref 0.6–0.88)
EOSINOPHIL # BLD AUTO: 141 CELLS/UL (ref 15–500)
EOSINOPHIL NFR BLD AUTO: 2.2 %
ERYTHROCYTE [DISTWIDTH] IN BLOOD BY AUTOMATED COUNT: 13 % (ref 11–15)
GLOBULIN SER CALC-MCNC: 2.3 G/DL (CALC) (ref 1.9–3.7)
GLUCOSE SERPL-MCNC: 93 MG/DL (ref 65–99)
HCT VFR BLD AUTO: 36.3 % (ref 35–45)
HDLC SERPL-MCNC: 50 MG/DL
HGB BLD-MCNC: 11.8 G/DL (ref 11.7–15.5)
LDLC SERPL CALC-MCNC: 114 MG/DL (CALC)
LYMPHOCYTES # BLD AUTO: 2112 CELLS/UL (ref 850–3900)
LYMPHOCYTES NFR BLD AUTO: 33 %
MCH RBC QN AUTO: 29.1 PG (ref 27–33)
MCHC RBC AUTO-ENTMCNC: 32.5 G/DL (ref 32–36)
MCV RBC AUTO: 89.4 FL (ref 80–100)
MONOCYTES # BLD AUTO: 589 CELLS/UL (ref 200–950)
MONOCYTES NFR BLD AUTO: 9.2 %
NEUTROPHILS # BLD AUTO: 3526 CELLS/UL (ref 1500–7800)
NEUTROPHILS NFR BLD AUTO: 55.1 %
NONHDLC SERPL-MCNC: 138 MG/DL (CALC)
PLATELET # BLD AUTO: 276 THOUSAND/UL (ref 140–400)
PMV BLD REES-ECKER: 9.5 FL (ref 7.5–12.5)
POTASSIUM SERPL-SCNC: 4 MMOL/L (ref 3.5–5.3)
PROT SERPL-MCNC: 6.3 G/DL (ref 6.1–8.1)
RBC # BLD AUTO: 4.06 MILLION/UL (ref 3.8–5.1)
SL AMB EGFR AFRICAN AMERICAN: 45 ML/MIN/1.73M2
SL AMB EGFR NON AFRICAN AMERICAN: 38 ML/MIN/1.73M2
SODIUM SERPL-SCNC: 140 MMOL/L (ref 135–146)
TRIGL SERPL-MCNC: 128 MG/DL
TSH SERPL-ACNC: 3.84 MIU/L (ref 0.4–4.5)
WBC # BLD AUTO: 6.4 THOUSAND/UL (ref 3.8–10.8)

## 2018-05-30 ENCOUNTER — OFFICE VISIT (OUTPATIENT)
Dept: FAMILY MEDICINE CLINIC | Facility: CLINIC | Age: 83
End: 2018-05-30
Payer: MEDICARE

## 2018-05-30 VITALS
BODY MASS INDEX: 27.88 KG/M2 | WEIGHT: 142 LBS | HEIGHT: 60 IN | SYSTOLIC BLOOD PRESSURE: 128 MMHG | TEMPERATURE: 96.8 F | DIASTOLIC BLOOD PRESSURE: 58 MMHG | HEART RATE: 60 BPM

## 2018-05-30 DIAGNOSIS — I10 ESSENTIAL HYPERTENSION: Primary | ICD-10-CM

## 2018-05-30 DIAGNOSIS — I25.10 ATHEROSCLEROSIS OF NATIVE CORONARY ARTERY OF NATIVE HEART WITHOUT ANGINA PECTORIS: ICD-10-CM

## 2018-05-30 DIAGNOSIS — E03.9 ACQUIRED HYPOTHYROIDISM: ICD-10-CM

## 2018-05-30 DIAGNOSIS — N18.30 STAGE 3 CHRONIC KIDNEY DISEASE (HCC): ICD-10-CM

## 2018-05-30 DIAGNOSIS — E78.2 MIXED HYPERLIPIDEMIA: ICD-10-CM

## 2018-05-30 PROBLEM — M25.552 LEFT HIP PAIN: Status: RESOLVED | Noted: 2018-05-11 | Resolved: 2018-05-30

## 2018-05-30 PROBLEM — M21.062: Status: ACTIVE | Noted: 2017-04-07

## 2018-05-30 PROBLEM — M75.101 TEAR OF RIGHT ROTATOR CUFF: Status: ACTIVE | Noted: 2017-02-24

## 2018-05-30 PROBLEM — M16.12 PRIMARY LOCALIZED OSTEOARTHRITIS OF LEFT HIP: Status: ACTIVE | Noted: 2017-04-07

## 2018-05-30 PROCEDURE — 99214 OFFICE O/P EST MOD 30 MIN: CPT | Performed by: FAMILY MEDICINE

## 2018-05-30 NOTE — PROGRESS NOTES
Assessment/Plan:     Diagnoses and all orders for this visit:    Essential hypertension  -     CBC and differential  -     Comprehensive metabolic panel    Mixed hyperlipidemia  -     Lipid panel    Stage 3 chronic kidney disease    Acquired hypothyroidism  -     TSH, 3rd generation with T4 reflex    Atherosclerosis of native coronary artery of native heart without angina pectoris        continue with current medications  Office visit in 6 months with repeat labs at that time  Follow up with various specialists  Patient ID: Audi Méndez is a 80 y o  female  followup visit  medications reviewed  labs 05/2018 see note  hypertension blood pressures have been stable on Valsartan HCTZ 80/12 5 daily and Metoprolol ER 50 mg daily  creatinine 1 22  electrolytes normal  Hgb 11  8   hyperlipidemia and CAD on Atorvastatin 40 mg daily  Lipid profile 05/2018 cholesterol 188  TGs 128  HDL 50  CEQ63  FBS 93  LFTs normal  episode of slurred speech 10/2015 no recurrence  on ASA 81 mg daily and Plavix  she refused work up  lives alone in [facility] apartment  independent with ADLs and IADLs  The following portions of the patient's history were reviewed and updated as appropriate: allergies, current medications, past family history, past medical history, past social history, past surgical history and problem list     Review of Systems   Constitutional: Negative for appetite change, chills, fatigue, fever and unexpected weight change  HENT: Negative for congestion, ear pain, hearing loss, postnasal drip, rhinorrhea and trouble swallowing  Eyes: Negative for visual disturbance  Respiratory: Negative for cough, shortness of breath and wheezing  Cardiovascular: Negative for chest pain, palpitations and leg swelling  Cardiology evaluation 5/2015 for exertional dyspnea and palpitations  Holter monitor showed normal sinus rhythm  139 single PVCs  No sustained ventricular rhythm   rare PACs, consisting of 183 single PACs  3 were noted in bigeminy  10 noted in couplets  no sustained supraventricular rhythm  No significant pauses or high grade AV block  Echocardiogram showed normal left ventricular function, ejection fraction 02% grade 1 diastolic dysfunction  No significant valvular abnormalities  chronic swelling right lower leg  s/p resection of leiomyosarcoma  Repeat echocardiogram 09/2017 normal left ventricular systolic function  EF 60%  No regional wall motion abnormalities  Right ventricle normal  Trace MR  No pericardial effusion  Gastrointestinal: Negative for abdominal pain, blood in stool, constipation, diarrhea, nausea and vomiting  GERD stable on Omeprazole  no reflux  no dysphagia  Endocrine:        Past history of mildly elevated Ca++  05/2018 Ca++ 10 0   03/2017 Ca++ 9 4  11/2016 Ca++ 10 4  03/2016 Ca 10 4  11/2015 Ca++ 9 8  07/2015 10 4  01/2015 10 5 no change from 09/2014  SPEP normal  intact PTH normal  hypothyroidism  05/2018 TSH 3 84  3/2017 TSH 5 310  Positive thyroid microsomal antibody  patient was started on Levothyroxine 25 mcg daily  05/2017  in August She stop levothyroxine when she started itching  No rash  repeat TSH 11/2017 4 46  Genitourinary: Negative for difficulty urinating  Urinary frequency on Vesicare  nocturia x 1  followed by urology  right renal angiomyolipoma  renal u/s 09/2015   Musculoskeletal: Positive for arthralgias  Negative for myalgias  05/2018 s/p left trochanteric bursa and right subacromial bursa steroid injections  02/2018 left trochanteric bursa steroid injection  Orthopedic evaluation  2/2017 for right shoulder pain  X-rays of right shoulder showed mild glenohumeral osteoarthritis and mild right AC joint osteoarthritis  She completed a course of physical therapy  X-rays of left hip showed moderate to severe osteoarthritis   Status post left hip intra-articular steroid injection 4/2017 and 11/2017 with symptomatic improvement       Skin: Negative for rash  Neurological: Negative for dizziness, weakness and headaches  Hematological: Negative for adenopathy  Bruises/bleeds easily  On ASA and Plavix  Psychiatric/Behavioral: Negative for dysphoric mood and sleep disturbance  Objective:      /58 (BP Location: Left arm, Patient Position: Sitting, Cuff Size: Standard)   Pulse 60   Temp (!) 96 8 °F (36 °C)   Ht 5' (1 524 m)   Wt 64 4 kg (142 lb)   Breastfeeding? No   BMI 27 73 kg/m²          Physical Exam   Constitutional: She is oriented to person, place, and time  She appears well-developed and well-nourished  No distress  HENT:   Right Ear: Tympanic membrane normal    Left Ear: Tympanic membrane normal    Mouth/Throat: Mucous membranes are normal  No oral lesions  Normal dentition  Eyes: Conjunctivae and EOM are normal  Pupils are equal, round, and reactive to light  No scleral icterus  Neck: Neck supple  No JVD present  No tracheal deviation present  No thyroid mass and no thyromegaly present  Cardiovascular: Normal rate, regular rhythm and normal heart sounds  Exam reveals no gallop  No murmur heard  Pulmonary/Chest: Effort normal and breath sounds normal  No respiratory distress  She has no wheezes  She has no rales  Abdominal: Soft  Bowel sounds are normal  She exhibits no distension and no mass  There is no tenderness  There is no rebound and no guarding  Musculoskeletal: She exhibits no edema or tenderness  Chronic lymphedema right medial ankle  Full ROM knees  Bilateral valgus deformity  + crepitus  Decrease ROM left hip with internal and external rotation  Lymphadenopathy:     She has no cervical adenopathy  Neurological: She is alert and oriented to person, place, and time  No cranial nerve deficit  Skin: No rash noted  Psychiatric: She has a normal mood and affect  Her behavior is normal    Nursing note and vitals reviewed        Recent Results (from the past 336 hour(s))   Lipid panel    Collection Time: 05/21/18  8:29 AM   Result Value Ref Range    Total Cholesterol 188 <200 mg/dL    SL AMB HDL CHOLESTEROL 50 (L) >50 mg/dL    Triglycerides 128 <150 mg/dL    SL AMB LDL-CHOLESTEROL 114 (H) mg/dL (calc)    SL AMB CHOL/HDLC RATIO 3 8 <5 0 (calc)    SL AMB NON HDL CHOLESTEROL 138 (H) <130 mg/dL (calc)   Comprehensive metabolic panel    Collection Time: 05/21/18  8:29 AM   Result Value Ref Range    SL AMB GLUCOSE 93 65 - 99 mg/dL    BUN 27 (H) 7 - 25 mg/dL    Creatinine, Serum 1 22 (H) 0 60 - 0 88 mg/dL    eGFR Non  38 (L) > OR = 60 mL/min/1 73m2    SL AMB EGFR  45 (L) > OR = 60 mL/min/1 73m2    SL AMB BUN/CREATININE RATIO 22 6 - 22 (calc)    SL AMB SODIUM 140 135 - 146 mmol/L    SL AMB POTASSIUM 4 0 3 5 - 5 3 mmol/L    SL AMB CHLORIDE 107 98 - 110 mmol/L    SL AMB CARBON DIOXIDE 23 20 - 31 mmol/L    SL AMB CALCIUM 10 0 8 6 - 10 4 mg/dL    SL AMB PROTEIN, TOTAL 6 3 6 1 - 8 1 g/dL    Serum Albumin 4 0 3 6 - 5 1 g/dL    SL AMB GLOBULIN 2 3 1 9 - 3 7 g/dL (calc)    SL AMB ALBUMIN/GLOBULIN RATIO 1 7 1 0 - 2 5 (calc)    SL AMB BILIRUBIN, TOTAL 0 6 0 2 - 1 2 mg/dL    SL AMB ALKALINE PHOSPHATASE 124 33 - 130 U/L    SL AMB AST 17 10 - 35 U/L    SL AMB ALT 19 6 - 29 U/L   CBC and differential    Collection Time: 05/21/18  8:29 AM   Result Value Ref Range    SL AMB LAB WHITE BLOOD CELL COUNT 6 4 3 8 - 10 8 Thousand/uL    SL AMB LAB RED BLOOD CELLS 4 06 3 80 - 5 10 Million/uL    Hemoglobin 11 8 11 7 - 15 5 g/dL    Hematocrit 36 3 35 0 - 45 0 %    MCV 89 4 80 0 - 100 0 fL    MCH 29 1 27 0 - 33 0 pg    MCHC 32 5 32 0 - 36 0 g/dL    RDW 13 0 11 0 - 15 0 %    Platelet Count 831 553 - 400 Thousand/uL    SL AMB MPV 9 5 7 5 - 12 5 fL    Neutrophils (Absolute) 3,526 1,500 - 7,800 cells/uL    Lymphocytes (Absolute) 2,112 850 - 3,900 cells/uL    Monocytes (Absolute) 589 200 - 950 cells/uL    Eosinophils (Absolute) 141 15 - 500 cells/uL    Basophils (Absolute) 32 0 - 200 cells/uL    Neutrophils 55 1 %    Lymphocytes 33 0 %    Monocytes 9 2 %    Eosinophils 2 2 %    Basophils 0 5 %   TSH, 3rd generation with T4 reflex    Collection Time: 05/21/18  8:29 AM   Result Value Ref Range    SL AMB TSH W/ REFLEX TO FREE T4 3 84 0 40 - 4 50 mIU/L

## 2018-06-05 ENCOUNTER — OFFICE VISIT (OUTPATIENT)
Dept: UROLOGY | Facility: CLINIC | Age: 83
End: 2018-06-05
Payer: MEDICARE

## 2018-06-05 VITALS
DIASTOLIC BLOOD PRESSURE: 78 MMHG | WEIGHT: 141 LBS | HEART RATE: 64 BPM | SYSTOLIC BLOOD PRESSURE: 134 MMHG | HEIGHT: 60 IN | BODY MASS INDEX: 27.68 KG/M2

## 2018-06-05 DIAGNOSIS — D17.71 ANGIOMYOLIPOMA OF KIDNEY: Primary | ICD-10-CM

## 2018-06-05 PROCEDURE — 99213 OFFICE O/P EST LOW 20 MIN: CPT | Performed by: PHYSICIAN ASSISTANT

## 2018-06-05 NOTE — PROGRESS NOTES
1  Angiomyolipoma of kidney           Assessment and plan:     History of stable AML, urgency/urge incontinence managed by Dr Beth Choudhary  -patient's urinary symptoms are completely stabilized at this time  She is interested in trying a  Off of the medication which I feel is reasonable  She is instructed to perform a bladder voiding diary for quantitative evaluation  Should she feel like her urination worsens off of anticholinergic she will restart  -otherwise she will follow-up in 1 year for PVR  All questions answered  Farhan Read PA-C      Chief Complaint     Follow-up overactive bladder    History of Present Illness     Mike Pablo is a 80 y o  female patient of Dr bhat with a history of right AML, and overactive bladder presenting for 1 year follow-up  Patient has a known history of right angiomyolipoma  Patient had demonstrated stability on imaging and routine screening had been discontinued the previous year  Patient does have baseline urinary urgency and frequency  She has been managed on VESIcare 5 mg daily for the past 2-3 years  Patient states that her urination has completely stabilized at this time  She does have some issues with constipation and dry mouth from this medication  Patient's PVR in the office today is 14 mm  Laboratory     Lab Results   Component Value Date    CREATININE 1 22 (H) 05/21/2018       Review of Systems     Review of Systems   Constitutional: Negative for activity change, appetite change, chills, diaphoresis, fatigue, fever and unexpected weight change  Respiratory: Negative for chest tightness and shortness of breath  Cardiovascular: Negative for chest pain, palpitations and leg swelling  Gastrointestinal: Negative for abdominal distention, abdominal pain, constipation, diarrhea, nausea and vomiting     Genitourinary: Negative for decreased urine volume, difficulty urinating, dysuria, enuresis, flank pain, frequency, genital sores, hematuria and urgency  Musculoskeletal: Negative for back pain, gait problem and myalgias  Skin: Negative for color change, pallor, rash and wound  Psychiatric/Behavioral: Negative for behavioral problems  The patient is not nervous/anxious  Allergies     Allergies   Allergen Reactions    Ciprofloxacin     Other      Iv contrast  Adhesive tape       Physical Exam     Physical Exam   Constitutional: She is oriented to person, place, and time  She appears well-developed and well-nourished  No distress  HENT:   Head: Normocephalic and atraumatic  Eyes: Conjunctivae are normal    Neck: Normal range of motion  No tracheal deviation present  Pulmonary/Chest: Effort normal    Musculoskeletal: Normal range of motion  She exhibits no edema or deformity  Neurological: She is alert and oriented to person, place, and time  Skin: Skin is warm and dry  She is not diaphoretic  No erythema  No pallor  Ecchymoses on her right upper arm  Psychiatric: She has a normal mood and affect   Her behavior is normal          Vital Signs     Vitals:    06/05/18 1348   BP: 134/78   Pulse: 64   Weight: 64 kg (141 lb)   Height: 5' (1 524 m)         Current Medications       Current Outpatient Prescriptions:     aspirin (ADULT ASPIRIN EC LOW STRENGTH) 81 mg EC tablet, Take 1 tablet by mouth daily, Disp: , Rfl:     atorvastatin (LIPITOR) 40 mg tablet, Take 1 tablet (40 mg total) by mouth daily for 90 days, Disp: 90 tablet, Rfl: 3    Biotin 1 MG CAPS, Take by mouth, Disp: , Rfl:     cholecalciferol (VITAMIN D3) 1,000 units tablet, Take 1 tablet by mouth daily, Disp: , Rfl:     clopidogrel (PLAVIX) 75 mg tablet, Take 75 mg by mouth daily, Disp: , Rfl:     metoprolol succinate (TOPROL-XL) 50 mg 24 hr tablet, , Disp: , Rfl:     Multiple Vitamins-Minerals (CENTRUM SILVER PO), Take 1 tablet by mouth daily, Disp: , Rfl:     Multiple Vitamins-Minerals (ICAPS AREDS 2) CAPS, Take by mouth, Disp: , Rfl:    nitroglycerin (NITROSTAT) 0 4 mg SL tablet, place 1 tablet under the tongue if needed EVERY 5 MINUTES UP TO 3 TIMES FOR CHEST PAIN, Disp: , Rfl: 0    Omega-3 Fatty Acids (OMEGA-3 FISH OIL) 1200 MG CAPS, Take by mouth, Disp: , Rfl:     omeprazole (PriLOSEC) 20 mg delayed release capsule, Take 20 mg by mouth daily, Disp: , Rfl: 0    RESTASIS 0 05 % ophthalmic emulsion, instill 1 drop into both eyes twice a day, Disp: , Rfl: 0    valsartan-hydrochlorothiazide (DIOVAN-HCT) 80-12 5 MG per tablet, Take 1 tablet by mouth daily, Disp: 90 tablet, Rfl: 3    VESICARE 5 MG tablet, , Disp: , Rfl: 0    psyllium (METAMUCIL) 0 52 g capsule, Take by mouth, Disp: , Rfl:       Active Problems     Patient Active Problem List   Diagnosis    Chronic right shoulder pain    Trochanteric bursitis of left hip    Acquired valgus deformity of knee, left    Angiomyolipoma of kidney    Atherosclerotic heart disease of native coronary artery without angina pectoris    CKD (chronic kidney disease)    Essential hypertension    Gastroesophageal reflux disease    Generalized osteoarthritis of multiple sites    Hypothyroidism    Mixed hyperlipidemia    Primary localized osteoarthritis of left hip    PVCs (premature ventricular contractions)    Tear of right rotator cuff    Venous insufficiency (chronic) (peripheral)         Past Medical History     Past Medical History:   Diagnosis Date    Anemia     last assessed - [id number]    Cancer [facility])     Depression     last assessed - [id number]    Hypercalcemia     last assessed - [id number]    Hypertension     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    Osteoarthritis     Plantar fasciitis of right foot     last assessed - 07Apr2017    Stomach disorder     Superficial thrombophlebitis of leg     unspecified laterality; last assessed - [id number]    Trochanteric bursitis of left hip     last assessed - 07Apr2017         Surgical History     Past Surgical History: Procedure Laterality Date    CORONARY STENT PLACEMENT      stent RCA    FOOT SURGERY      HIP SURGERY      HYSTERECTOMY      SKIN LESION EXCISION  11/2010    Face - BCC - nose    TOTAL ABDOMINAL HYSTERECTOMY W/ BILATERAL SALPINGOOPHORECTOMY      TOTAL HIP ARTHROPLASTY Right          Family History     Family History   Problem Relation Age of Onset    Stroke Mother      cerebrovascular accident (CVA)    Coronary artery disease Mother      coronary disease    Stroke Father     Coronary artery disease Father      coronary disease    Stroke Brother     Kidney cancer Family          Social History     Social History       Radiology

## 2018-06-25 ENCOUNTER — HOSPITAL ENCOUNTER (OUTPATIENT)
Dept: RADIOLOGY | Facility: HOSPITAL | Age: 83
Discharge: HOME | End: 2018-06-25
Admitting: RADIOLOGY
Payer: MEDICARE

## 2018-06-25 DIAGNOSIS — M25.552 PAIN IN LEFT HIP: ICD-10-CM

## 2018-06-25 PROCEDURE — 20610 DRAIN/INJ JOINT/BURSA W/O US: CPT

## 2018-06-25 PROCEDURE — 77002 NEEDLE LOCALIZATION BY XRAY: CPT

## 2018-06-25 RX ORDER — BUPIVACAINE HYDROCHLORIDE 2.5 MG/ML
10 INJECTION, SOLUTION EPIDURAL; INFILTRATION; INTRACAUDAL
Status: COMPLETED | OUTPATIENT
Start: 2018-06-25 | End: 2018-06-25

## 2018-06-25 RX ORDER — METHYLPREDNISOLONE ACETATE 80 MG/ML
80 INJECTION, SUSPENSION INTRA-ARTICULAR; INTRALESIONAL; INTRAMUSCULAR; SOFT TISSUE
Status: COMPLETED | OUTPATIENT
Start: 2018-06-25 | End: 2018-06-25

## 2018-06-25 RX ORDER — LIDOCAINE HYDROCHLORIDE 10 MG/ML
15 INJECTION, SOLUTION INFILTRATION; PERINEURAL
Status: COMPLETED | OUTPATIENT
Start: 2018-06-25 | End: 2018-06-25

## 2018-06-25 RX ADMIN — METHYLPREDNISOLONE ACETATE 80 MG: 80 INJECTION, SUSPENSION INTRA-ARTICULAR; INTRALESIONAL; INTRAMUSCULAR; SOFT TISSUE at 12:15

## 2018-06-25 RX ADMIN — LIDOCAINE HYDROCHLORIDE 10 ML: 10 INJECTION, SOLUTION INFILTRATION; PERINEURAL at 12:30

## 2018-06-25 RX ADMIN — BUPIVACAINE HYDROCHLORIDE 10 ML: 2.5 INJECTION, SOLUTION EPIDURAL; INFILTRATION; INTRACAUDAL; PERINEURAL at 12:15

## 2018-06-25 RX ADMIN — IOHEXOL 5 ML: 300 INJECTION, SOLUTION INTRAVENOUS at 12:10

## 2018-07-05 ENCOUNTER — TELEPHONE (OUTPATIENT)
Dept: FAMILY MEDICINE CLINIC | Facility: CLINIC | Age: 83
End: 2018-07-05

## 2018-07-05 NOTE — TELEPHONE ENCOUNTER
Patient called stating that she has a long purplish bruise- bleeding under her skin on her right arm, going from her shoulder down to her elbow  She states she also has a rotator cuff tear on that arm  Please advise

## 2018-07-06 ENCOUNTER — OFFICE VISIT (OUTPATIENT)
Dept: FAMILY MEDICINE CLINIC | Facility: CLINIC | Age: 83
End: 2018-07-06
Payer: MEDICARE

## 2018-07-06 VITALS
TEMPERATURE: 96.8 F | RESPIRATION RATE: 16 BRPM | WEIGHT: 139.8 LBS | HEART RATE: 72 BPM | BODY MASS INDEX: 27.3 KG/M2 | SYSTOLIC BLOOD PRESSURE: 140 MMHG | DIASTOLIC BLOOD PRESSURE: 66 MMHG

## 2018-07-06 DIAGNOSIS — R58 ECCHYMOSIS: Primary | ICD-10-CM

## 2018-07-06 DIAGNOSIS — M19.011 PRIMARY OSTEOARTHRITIS OF RIGHT SHOULDER: ICD-10-CM

## 2018-07-06 PROCEDURE — 99213 OFFICE O/P EST LOW 20 MIN: CPT | Performed by: FAMILY MEDICINE

## 2018-07-06 NOTE — PROGRESS NOTES
Assessment/Plan:     Diagnoses and all orders for this visit:    Ecchymosis    Primary osteoarthritis of right shoulder        Symptom treatment for pain  Prn Tylenol, ice  She is scheduled to go away for one month  Could consider PT when she returns  Patient ID: Maria M  is a 80 y o  female  Follow up visit  Patient developed a large ecchymotic area right upper arm extending to right elbow  Pain right shoulder not new  No trauma or injury  On ASA and Plavix  2018 right subacromial bursa steroid injection  Orthopedic evaluation  2017 for right shoulder pain  X-rays of right shoulder showed mild glenohumeral osteoarthritis and mild right AC joint osteoarthritis  She completed a course of physical therapy  Current medications reviewed  The following portions of the patient's history were reviewed and updated as appropriate: allergies, current medications, past family history, past medical history, past social history, past surgical history and problem list     Review of Systems   Constitutional: Negative for chills and fever  Respiratory: Negative for cough, shortness of breath and wheezing  Cardiovascular: Negative for chest pain and palpitations  Gastrointestinal: Negative for diarrhea and nausea  Neurological: Negative for dizziness and headaches  Objective:      /66 (BP Location: Left arm, Patient Position: Sitting, Cuff Size: Adult)   Pulse 72   Temp (!) 96 8 °F (36 °C) (Tympanic)   Resp 16   Wt 63 4 kg (139 lb 12 8 oz)   BMI 27 30 kg/m²          Physical Exam   Constitutional: No distress  Musculoskeletal: She exhibits tenderness  She exhibits no deformity  Right shoulder: She exhibits decreased range of motion, tenderness and crepitus  She exhibits no bony tenderness, no swelling, no effusion, no deformity, normal pulse and normal strength          Arms:

## 2018-07-28 DIAGNOSIS — I10 ESSENTIAL HYPERTENSION: Primary | ICD-10-CM

## 2018-07-30 RX ORDER — METOPROLOL SUCCINATE 50 MG/1
TABLET, EXTENDED RELEASE ORAL
Qty: 90 TABLET | Refills: 3 | Status: SHIPPED | OUTPATIENT
Start: 2018-07-30 | End: 2019-07-21 | Stop reason: SDUPTHER

## 2018-08-08 DIAGNOSIS — R35.0 URINARY FREQUENCY: Primary | ICD-10-CM

## 2018-08-08 NOTE — TELEPHONE ENCOUNTER
Patient stated that she was away on vacation and had used 1 every other day  She decided that she would try stopping since she just ran out    She will call if she feels like she needs to restart

## 2018-08-08 NOTE — TELEPHONE ENCOUNTER
Received a fax from [address] [place] [address] regarding refilll on Vesicare  According to last office note, patient wanted to stop medication    LM for patient to call to see if she wanted to restart

## 2018-08-21 RX ORDER — SOLIFENACIN SUCCINATE 5 MG/1
5 TABLET, FILM COATED ORAL DAILY
Qty: 90 TABLET | Refills: 3 | Status: SHIPPED | OUTPATIENT
Start: 2018-08-21 | End: 2019-05-15 | Stop reason: SDUPTHER

## 2018-08-21 NOTE — TELEPHONE ENCOUNTER
Patient left message on RN voicemail stating she is "going crazy with frequency" since being off [address]  She is requesting to restart  OK to restart per Savannah Patel notes from 6/5/18 if patient's urinary symptoms worsen  Patient would like it sent to [facility]

## 2018-08-24 ENCOUNTER — OFFICE VISIT (OUTPATIENT)
Dept: OBGYN CLINIC | Facility: MEDICAL CENTER | Age: 83
End: 2018-08-24
Payer: MEDICARE

## 2018-08-24 VITALS
SYSTOLIC BLOOD PRESSURE: 144 MMHG | BODY MASS INDEX: 28.27 KG/M2 | HEART RATE: 51 BPM | HEIGHT: 60 IN | WEIGHT: 144 LBS | DIASTOLIC BLOOD PRESSURE: 78 MMHG

## 2018-08-24 DIAGNOSIS — M70.62 TROCHANTERIC BURSITIS OF LEFT HIP: ICD-10-CM

## 2018-08-24 DIAGNOSIS — M25.511 CHRONIC RIGHT SHOULDER PAIN: ICD-10-CM

## 2018-08-24 DIAGNOSIS — M25.552 LEFT HIP PAIN: Primary | ICD-10-CM

## 2018-08-24 DIAGNOSIS — G89.29 CHRONIC RIGHT SHOULDER PAIN: ICD-10-CM

## 2018-08-24 PROCEDURE — 99213 OFFICE O/P EST LOW 20 MIN: CPT | Performed by: ORTHOPAEDIC SURGERY

## 2018-08-24 PROCEDURE — 20610 DRAIN/INJ JOINT/BURSA W/O US: CPT | Performed by: ORTHOPAEDIC SURGERY

## 2018-08-24 RX ORDER — BETAMETHASONE SODIUM PHOSPHATE AND BETAMETHASONE ACETATE 3; 3 MG/ML; MG/ML
12 INJECTION, SUSPENSION INTRA-ARTICULAR; INTRALESIONAL; INTRAMUSCULAR; SOFT TISSUE
Status: COMPLETED | OUTPATIENT
Start: 2018-08-24 | End: 2018-08-24

## 2018-08-24 RX ORDER — LIDOCAINE HYDROCHLORIDE 10 MG/ML
2 INJECTION, SOLUTION EPIDURAL; INFILTRATION; INTRACAUDAL; PERINEURAL
Status: COMPLETED | OUTPATIENT
Start: 2018-08-24 | End: 2018-08-24

## 2018-08-24 RX ORDER — ATORVASTATIN CALCIUM 40 MG/1
40 TABLET, FILM COATED ORAL DAILY
COMMUNITY
End: 2018-10-30 | Stop reason: SDUPTHER

## 2018-08-24 RX ORDER — BUPIVACAINE HYDROCHLORIDE 2.5 MG/ML
2 INJECTION, SOLUTION INFILTRATION; PERINEURAL
Status: COMPLETED | OUTPATIENT
Start: 2018-08-24 | End: 2018-08-24

## 2018-08-24 RX ADMIN — BETAMETHASONE SODIUM PHOSPHATE AND BETAMETHASONE ACETATE 12 MG: 3; 3 INJECTION, SUSPENSION INTRA-ARTICULAR; INTRALESIONAL; INTRAMUSCULAR; SOFT TISSUE at 11:17

## 2018-08-24 RX ADMIN — LIDOCAINE HYDROCHLORIDE 2 ML: 10 INJECTION, SOLUTION EPIDURAL; INFILTRATION; INTRACAUDAL; PERINEURAL at 11:17

## 2018-08-24 RX ADMIN — BUPIVACAINE HYDROCHLORIDE 2 ML: 2.5 INJECTION, SOLUTION INFILTRATION; PERINEURAL at 11:17

## 2018-08-24 NOTE — PROGRESS NOTES
Assessment:  1  Left hip pain     2  Chronic right shoulder pain  Large joint arthrocentesis   3  Trochanteric bursitis of left hip         Plan:  Right shoulder injected today  Ice today  Activities as tolerated      To do next visit:  Return in about 3 months (around 11/24/2018)  Scribe Attestation    I,:   Ford Akhtar am acting as a scribe while in the presence of the attending physician :        I,:   Nieves Palumbo MD personally performed the services described in this documentation    as scribed in my presence :              Subjective:   Michael Bush is a 80 y o  female who presents today for follow up of right shoulder pain and left trochanteric bursitis  Patient states she has increased right shoulder pain with ROM of the right shoulder predominately at or above the shoulder         Review of systems negative unless otherwise specified in HPI      Past Medical History:   Diagnosis Date    Anemia     last assessed - [id number]    Cancer [facility])     Depression     last assessed - [id number]    Hypercalcemia     last assessed - [id number]    Hypertension     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    Osteoarthritis     Plantar fasciitis of right foot     last assessed - [id number]    Stomach disorder     Superficial thrombophlebitis of leg     unspecified laterality; last assessed - [id number]    Trochanteric bursitis of left hip     last assessed - [id number]       Past Surgical History:   Procedure Laterality Date    CORONARY STENT PLACEMENT      stent RCA    FOOT SURGERY      HIP SURGERY      HYSTERECTOMY      SKIN LESION EXCISION  11/2010    Face - BCC - nose    TOTAL ABDOMINAL HYSTERECTOMY W/ BILATERAL SALPINGOOPHORECTOMY      TOTAL HIP ARTHROPLASTY Right        Family History   Problem Relation Age of Onset    Stroke Mother         cerebrovascular accident (CVA)    Coronary artery disease Mother         coronary disease    Stroke Father     Coronary artery disease Father         coronary disease    Stroke Brother     Kidney cancer Family        Social History     Occupational History    Not on file       Social History Main Topics    Smoking status: Never Smoker    Smokeless tobacco: Never Used    Alcohol use Yes      Comment: occasional wine with dinner    Drug use: No    Sexual activity: Not on file         Current Outpatient Prescriptions:     aspirin (ADULT ASPIRIN EC LOW STRENGTH) 81 mg EC tablet, Take 1 tablet by mouth daily, Disp: , Rfl:     atorvastatin (LIPITOR) 40 mg tablet, Take 40 mg by mouth daily, Disp: , Rfl:     Biotin 1 MG CAPS, Take by mouth, Disp: , Rfl:     cholecalciferol (VITAMIN D3) 1,000 units tablet, Take 1 tablet by mouth daily, Disp: , Rfl:     clopidogrel (PLAVIX) 75 mg tablet, Take 75 mg by mouth daily, Disp: , Rfl:     metoprolol succinate (TOPROL-XL) 50 mg 24 hr tablet, TAKE 1 TABLET BY MOUTH EVERY DAY, Disp: 90 tablet, Rfl: 3    Multiple Vitamins-Minerals (ICAPS AREDS 2) CAPS, Take by mouth, Disp: , Rfl:     nitroglycerin (NITROSTAT) 0 4 mg SL tablet, place 1 tablet under the tongue if needed EVERY 5 MINUTES UP TO 3 TIMES FOR CHEST PAIN, Disp: , Rfl: 0    Omega-3 Fatty Acids (OMEGA-3 FISH OIL) 1200 MG CAPS, Take by mouth, Disp: , Rfl:     omeprazole (PriLOSEC) 20 mg delayed release capsule, Take 20 mg by mouth daily, Disp: , Rfl: 0    psyllium (METAMUCIL) 0 52 g capsule, Take by mouth, Disp: , Rfl:     RESTASIS 0 05 % ophthalmic emulsion, instill 1 drop into both eyes twice a day, Disp: , Rfl: 0    solifenacin (VESICARE) 5 mg tablet, Take 1 tablet (5 mg total) by mouth daily, Disp: 90 tablet, Rfl: 3    valsartan-hydrochlorothiazide (DIOVAN-HCT) 80-12 5 MG per tablet, Take 1 tablet by mouth daily, Disp: 90 tablet, Rfl: 3    Allergies   Allergen Reactions    Ciprofloxacin     Other      Iv contrast  Adhesive tape            Vitals:    08/24/18 1101   BP: 144/78   Pulse: (!) 51       Objective:          Physical Exam Ortho Exam  Right shoulder exam  No erythema noted  Mild ecchymosis lateral shoulder  Patient has decreased ROM in all planes  Weakness with ER and FF    Left greater trochanteric bursa is TTP on exam today  No erythema, ecchymosis, or swelling on exam today    Diagnostics, reviewed and taken today if performed as documented:    None performed      Procedures, if performed today:  Large joint arthrocentesis  Date/Time: 8/24/2018 11:17 AM  Consent given by: patient  Site marked: site marked  Timeout: Immediately prior to procedure a time out was called to verify the correct patient, procedure, equipment, support staff and site/side marked as required   Supporting Documentation  Indications: pain   Procedure Details  Location: shoulder - R subacromial bursa  Preparation: Patient was prepped and draped in the usual sterile fashion  Needle size: 22 G  Ultrasound guidance: no  Approach: lateral  Medications administered: 2 mL bupivacaine 0 25 %; 12 mg betamethasone acetate-betamethasone sodium phosphate 6 (3-3) mg/mL; 2 mL lidocaine (PF) 1 %    Patient tolerance: patient tolerated the procedure well with no immediate complications  Dressing:  Sterile dressing applied

## 2018-10-30 DIAGNOSIS — E78.00 HYPERCHOLESTEREMIA: Primary | ICD-10-CM

## 2018-10-30 RX ORDER — ATORVASTATIN CALCIUM 40 MG/1
40 TABLET, FILM COATED ORAL DAILY
Qty: 90 TABLET | Refills: 3 | Status: SHIPPED | OUTPATIENT
Start: 2018-10-30 | End: 2019-10-20 | Stop reason: SDUPTHER

## 2018-11-13 ENCOUNTER — TELEPHONE (OUTPATIENT)
Dept: OBGYN CLINIC | Facility: HOSPITAL | Age: 83
End: 2018-11-13

## 2018-11-13 DIAGNOSIS — M25.552 PAIN IN LEFT HIP: Primary | ICD-10-CM

## 2018-11-13 NOTE — TELEPHONE ENCOUNTER
Order placed electronically    You may inform the patient    She does not have to come to an appointment to get this scheduled

## 2018-11-13 NOTE — TELEPHONE ENCOUNTER
Caller: patient  Callback# [phone]  Dr Ishmael Pennington        Patient called requesting FL injection left hip (non arthrogram) please advise thanks

## 2018-11-14 DIAGNOSIS — I10 HYPERTENSION, UNSPECIFIED TYPE: ICD-10-CM

## 2018-11-15 RX ORDER — VALSARTAN AND HYDROCHLOROTHIAZIDE 80; 12.5 MG/1; MG/1
1 TABLET, FILM COATED ORAL DAILY
Qty: 90 TABLET | Refills: 3 | Status: SHIPPED | OUTPATIENT
Start: 2018-11-15 | End: 2019-08-12 | Stop reason: SDUPTHER

## 2018-11-15 NOTE — TELEPHONE ENCOUNTER
Patient given Central Scheduling number [phone] to call to schedule her FL hip injection  She verbalized understanding

## 2018-11-24 DIAGNOSIS — I25.10 CORONARY ARTERY DISEASE INVOLVING NATIVE CORONARY ARTERY OF NATIVE HEART WITHOUT ANGINA PECTORIS: Primary | ICD-10-CM

## 2018-11-26 ENCOUNTER — HOSPITAL ENCOUNTER (OUTPATIENT)
Dept: RADIOLOGY | Facility: HOSPITAL | Age: 83
Discharge: HOME | End: 2018-11-26
Admitting: RADIOLOGY
Payer: MEDICARE

## 2018-11-26 DIAGNOSIS — M25.552 PAIN IN LEFT HIP: ICD-10-CM

## 2018-11-26 PROCEDURE — 77002 NEEDLE LOCALIZATION BY XRAY: CPT

## 2018-11-26 PROCEDURE — 20610 DRAIN/INJ JOINT/BURSA W/O US: CPT

## 2018-11-26 RX ORDER — LIDOCAINE HYDROCHLORIDE 10 MG/ML
10 INJECTION, SOLUTION INFILTRATION; PERINEURAL ONCE
Status: COMPLETED | OUTPATIENT
Start: 2018-11-26 | End: 2018-11-26

## 2018-11-26 RX ORDER — METHYLPREDNISOLONE ACETATE 80 MG/ML
80 INJECTION, SUSPENSION INTRA-ARTICULAR; INTRALESIONAL; INTRAMUSCULAR; SOFT TISSUE ONCE
Status: COMPLETED | OUTPATIENT
Start: 2018-11-26 | End: 2018-11-26

## 2018-11-26 RX ORDER — BUPIVACAINE HYDROCHLORIDE 2.5 MG/ML
30 INJECTION, SOLUTION EPIDURAL; INFILTRATION; INTRACAUDAL ONCE
Status: COMPLETED | OUTPATIENT
Start: 2018-11-26 | End: 2018-11-26

## 2018-11-26 RX ORDER — CLOPIDOGREL BISULFATE 75 MG/1
TABLET ORAL
Qty: 90 TABLET | Refills: 3 | Status: SHIPPED | OUTPATIENT
Start: 2018-11-26 | End: 2019-08-19 | Stop reason: SDUPTHER

## 2018-11-26 RX ADMIN — METHYLPREDNISOLONE ACETATE 80 MG: 80 INJECTION, SUSPENSION INTRA-ARTICULAR; INTRALESIONAL; INTRAMUSCULAR; SOFT TISSUE at 12:15

## 2018-11-26 RX ADMIN — BUPIVACAINE HYDROCHLORIDE 3 ML: 2.5 INJECTION, SOLUTION EPIDURAL; INFILTRATION; INTRACAUDAL at 12:15

## 2018-11-26 RX ADMIN — IOHEXOL 1 ML: 300 INJECTION, SOLUTION INTRAVENOUS at 12:15

## 2018-11-26 RX ADMIN — LIDOCAINE HYDROCHLORIDE 6 ML: 10 INJECTION, SOLUTION INFILTRATION; PERINEURAL at 12:15

## 2018-12-03 ENCOUNTER — OFFICE VISIT (OUTPATIENT)
Dept: FAMILY MEDICINE CLINIC | Facility: CLINIC | Age: 83
End: 2018-12-03
Payer: MEDICARE

## 2018-12-03 VITALS
DIASTOLIC BLOOD PRESSURE: 64 MMHG | RESPIRATION RATE: 16 BRPM | HEART RATE: 68 BPM | HEIGHT: 60 IN | SYSTOLIC BLOOD PRESSURE: 130 MMHG | WEIGHT: 140 LBS | TEMPERATURE: 98.6 F | BODY MASS INDEX: 27.48 KG/M2

## 2018-12-03 DIAGNOSIS — Z00.00 MEDICARE ANNUAL WELLNESS VISIT, SUBSEQUENT: ICD-10-CM

## 2018-12-03 DIAGNOSIS — E78.2 MIXED HYPERLIPIDEMIA: ICD-10-CM

## 2018-12-03 DIAGNOSIS — I25.10 ATHEROSCLEROSIS OF NATIVE CORONARY ARTERY OF NATIVE HEART WITHOUT ANGINA PECTORIS: ICD-10-CM

## 2018-12-03 DIAGNOSIS — I10 ESSENTIAL HYPERTENSION: Primary | ICD-10-CM

## 2018-12-03 DIAGNOSIS — Z23 NEED FOR INFLUENZA VACCINATION: ICD-10-CM

## 2018-12-03 DIAGNOSIS — K21.9 GASTROESOPHAGEAL REFLUX DISEASE WITHOUT ESOPHAGITIS: ICD-10-CM

## 2018-12-03 DIAGNOSIS — N18.30 STAGE 3 CHRONIC KIDNEY DISEASE (HCC): ICD-10-CM

## 2018-12-03 PROBLEM — M25.552 LEFT HIP PAIN: Status: RESOLVED | Noted: 2018-08-24 | Resolved: 2018-12-03

## 2018-12-03 PROCEDURE — G0008 ADMIN INFLUENZA VIRUS VAC: HCPCS

## 2018-12-03 PROCEDURE — 90662 IIV NO PRSV INCREASED AG IM: CPT

## 2018-12-03 PROCEDURE — 99214 OFFICE O/P EST MOD 30 MIN: CPT | Performed by: FAMILY MEDICINE

## 2018-12-03 RX ORDER — OMEPRAZOLE 20 MG/1
20 CAPSULE, DELAYED RELEASE ORAL DAILY
Qty: 90 CAPSULE | Refills: 3 | Status: SHIPPED | OUTPATIENT
Start: 2018-12-03 | End: 2019-08-21 | Stop reason: SDUPTHER

## 2018-12-03 NOTE — PROGRESS NOTES
Assessment and Plan:    Problem List Items Addressed This Visit     Atherosclerotic heart disease of native coronary artery without angina pectoris    CKD (chronic kidney disease)    Essential hypertension    Relevant Orders    CBC and differential    Comprehensive metabolic panel    Gastroesophageal reflux disease - Primary    Relevant Medications    omeprazole (PriLOSEC) 20 mg delayed release capsule    Mixed hyperlipidemia    Relevant Orders    Lipid panel    TSH, 3rd generation with Free T4 reflex      Other Visit Diagnoses     Medicare annual wellness visit, subsequent        Need for influenza vaccination        Relevant Orders    PREFERRED: influenza vaccine, 7592-1247, high-dose, PF 0 5 mL, for patients 72 yr+ (FLUZONE HIGH-DOSE) (Completed)        Health Maintenance Due   Topic Date Due    Pneumococcal PPSV23/PCV13 65+ Years / High and Highest Risk (2 of 2 - PPSV23) 01/13/2016       Patient has had Pneumovax and Prevnar 13  HPI:  Jyotsna Danielson is a 80 y o  female here for her Subsequent Wellness Visit      Patient Active Problem List   Diagnosis    Chronic right shoulder pain    Trochanteric bursitis of left hip    Acquired valgus deformity of knee, left    Angiomyolipoma of kidney    Atherosclerotic heart disease of native coronary artery without angina pectoris    CKD (chronic kidney disease)    Essential hypertension    Gastroesophageal reflux disease    Generalized osteoarthritis of multiple sites    Hypothyroidism    Mixed hyperlipidemia    Primary localized osteoarthritis of left hip    PVCs (premature ventricular contractions)    Tear of right rotator cuff    Venous insufficiency (chronic) (peripheral)     Past Medical History:   Diagnosis Date    Anemia     last assessed - [id number]    Cancer [facility])     Depression     last assessed - [id number]    Hypercalcemia     last assessed - [id number]    Hypertension     Leiomyosarcoma ([facility] [address] ) 2002    right lower extremity    Osteoarthritis     Plantar fasciitis of right foot     last assessed - 07Apr2017    Stomach disorder     Superficial thrombophlebitis of leg     unspecified laterality; last assessed - [id number]    Trochanteric bursitis of left hip     last assessed - 07Apr2017     Past Surgical History:   Procedure Laterality Date    CORONARY STENT PLACEMENT      stent RCA    FL INJECTION LEFT HIP (NON ARTHROGRAM)  11/26/2018    FOOT SURGERY      HIP SURGERY      HYSTERECTOMY      SKIN LESION EXCISION  11/2010    Face - BCC - nose    TOTAL ABDOMINAL HYSTERECTOMY W/ BILATERAL SALPINGOOPHORECTOMY      TOTAL HIP ARTHROPLASTY Right      Family History   Problem Relation Age of Onset    Stroke Mother         cerebrovascular accident (CVA)    Coronary artery disease Mother         coronary disease    Stroke Father     Coronary artery disease Father         coronary disease    Stroke Brother     Kidney cancer Family      History   Smoking Status    Never Smoker   Smokeless Tobacco    Never Used     History   Alcohol Use    Yes     Comment: occasional wine with dinner      History   Drug Use No       Current Outpatient Prescriptions   Medication Sig Dispense Refill    aspirin (ADULT ASPIRIN EC LOW STRENGTH) 81 mg EC tablet Take 1 tablet by mouth daily      atorvastatin (LIPITOR) 40 mg tablet Take 1 tablet (40 mg total) by mouth daily 90 tablet 3    Biotin 1 MG CAPS Take by mouth      cholecalciferol (VITAMIN D3) 1,000 units tablet Take 1 tablet by mouth daily      clopidogrel (PLAVIX) 75 mg tablet TAKE 1 TABLET BY MOUTH EVERY DAY 90 tablet 3    metoprolol succinate (TOPROL-XL) 50 mg 24 hr tablet TAKE 1 TABLET BY MOUTH EVERY DAY 90 tablet 3    Multiple Vitamins-Minerals (ICAPS AREDS 2) CAPS Take by mouth      nitroglycerin (NITROSTAT) 0 4 mg SL tablet place 1 tablet under the tongue if needed EVERY 5 MINUTES UP TO 3 TIMES FOR CHEST PAIN  0    Omega-3 Fatty Acids (OMEGA-3 FISH OIL) 1200 MG CAPS Take by mouth  omeprazole (PriLOSEC) 20 mg delayed release capsule Take 1 capsule (20 mg total) by mouth daily for 90 days 90 capsule 3    psyllium (METAMUCIL) 0 52 g capsule Take by mouth      RESTASIS 0 05 % ophthalmic emulsion instill 1 drop into both eyes twice a day  0    solifenacin (VESICARE) 5 mg tablet Take 1 tablet (5 mg total) by mouth daily 90 tablet 3    valsartan-hydrochlorothiazide (DIOVAN-HCT) 80-12 5 MG per tablet Take 1 tablet by mouth daily 90 tablet 3     No current facility-administered medications for this visit  Allergies   Allergen Reactions    Ciprofloxacin     Other      Iv contrast  Adhesive tape     Immunization History   Administered Date(s) Administered    Influenza 11/18/2015, 11/21/2016, 11/30/2017    Influenza Split High Dose Preservative Free IM 11/19/2012, 10/03/2013, 09/24/2014, 11/18/2015, 11/21/2016, 11/30/2017    Influenza TIV (IM) 10/24/2011    Influenza, high dose seasonal 0 5 mL 12/03/2018    Pneumococcal Conjugate 13-Valent 11/18/2015       Patient Care Team:  Lisa Grace MD as PCP - General  MD Lisa Souza MD Murry Elliot, MD (Orthopedic Surgery)  Randolph Vasquez MD (Urology)    Medicare Screening Tests and Risk Assessments:  Kuldip Mazariegos is here for her Subsequent Wellness visit  Last Medicare Wellness visit information reviewed, patient interviewed and updates made to the record today  Health Risk Assessment:  Patient rates overall health as good  Patient feels that their physical health rating is Slightly worse  Eyesight was rated as Slightly worse  Hearing was rated as Slightly worse  Patient feels that their emotional and mental health rating is Same  Pain experienced by patient in the last 7 days has been Some  Patient's pain rating has been 5/10  Emotional/Mental Health:  Patient has been feeling nervous/anxious  PHQ-9 Depression Screening:    Frequency of the following problems over the past two weeks:      1   Little interest or pleasure in doing things: 1 - several days      2  Feeling down, depressed, or hopeless: 0 - not at all      3  Trouble falling or staying asleep, or sleeping too much: 0 - not at all      4  Feeling tired or having little energy: 1 - several days      5  Poor appetite or overeatin - not at all      6  Feeling bad about yourself - or that you are a failure or have let yourself or your family down: 0 - not at all      7  Trouble concentrating on things, such as reading the newspaper or watching television: 0 - not at all      8  Moving or speaking so slowly that other people could have noticed  Or the opposite - being so fidgety or restless that you have been moving around a lot more than usual: 0 - not at all      9  Thoughts that you would be better off dead, or of hurting yourself in some way: 0 - not at all  PHQ-2 Score: 1  PHQ-9 Score: 2    Broken Bones/Falls: Fall Risk Assessment:    In the past year, patient has experienced: No history of falling in past year          Bladder/Bowel:  Patient has not leaked urine accidently in the last six months  Patient reports loss of bowel control  Immunizations:  Patient has had a flu vaccination within the last year  Patient has received a pneumonia shot  Patient has not received a shingles shot  Patient has not received tetanus/diphtheria shot  Home Safety:  Patient does not have trouble with stairs inside or outside of their home  Patient currently reports that there are no safety hazards present in home, working smoke alarms, working carbon monoxide detectors  Preventative Screenings:   Breast cancer screening performed, colon cancer screen completed, cholesterol screen completed, glaucoma eye exam completed,     Nutrition:  Current diet: Low Cholesterol, Low Saturated Fat and Limited junk food with servings of the following:    Medications:  Patient is currently taking over-the-counter supplements       List of OTC medications includes: ASA 81 mg daily, Omega 3, MVI, vitamin D and Biotin  Patient is able to manage medications  Lifestyle Choices:  Patient reports no tobacco use  Patient has not smoked or used tobacco in the past   Patient reports alcohol use  Alcohol use per week: holiday dinners   Patient drives a vehicle  Patient wears seat belt  Current level of exercise of physical activity described by patient as: limted due to left hip problem   Activities of Daily Living:  Can get out of bed by his or her self, able to dress self, able to make own meals, able to do own shopping, able to bathe self, can do own laundry/housekeeping, can manage own money, pay bills and track expenses    Previous Hospitalizations:  No hospitalization or ED visit in past 12 months        Advanced Directives:  Patient has decided on a power of   Patient has spoken to designated power of   Patient has not completed advanced directive  Preventative Screening/Counseling:      Cardiovascular:      General: Screening Not Indicated      Counseling: Healthy Diet and Healthy Weight          Diabetes:      General: Screening Current          Colorectal Cancer:      General: Screening Current          Breast Cancer:      General: Screening Current          Cervical Cancer:      General: Screening Not Indicated          Osteoporosis:      General: Screening Current          AAA:      General: Screening Not Indicated          Glaucoma:      General: Screening Current          HIV:      General: Screening Not Indicated          Hepatitis C:      General: Screening Not Indicated        Advanced Directives:   Patient has no living will for healthcare, has durable POA for healthcare, patient does not have an advanced directive  End of life assessment reviewed with patient       Immunizations:      Influenza: Influenza UTD This Year      Pneumococcal: Lifetime Vaccine Completed      Shingrix: Patient Declines      TD: Patient Declines      TDAP: Patient Declines      Other Preventative Counseling (Non-Medicare):   Fall Prevention, Increase physical activity and Nutrition Counseling      Referrals:  Referral(s) to: Cardiologist and Orthopedics

## 2018-12-03 NOTE — PROGRESS NOTES
Assessment/Plan:       Diagnoses and all orders for this visit:    Essential hypertension  -     CBC and differential  -     Comprehensive metabolic panel    Stage 3 chronic kidney disease (HCC)    Mixed hyperlipidemia  -     Lipid panel  -     TSH, 3rd generation with Free T4 reflex    Atherosclerosis of native coronary artery of native heart without angina pectoris    Gastroesophageal reflux disease without esophagitis  -     omeprazole (PriLOSEC) 20 mg delayed release capsule; Take 1 capsule (20 mg total) by mouth daily for 90 days    Medicare annual wellness visit, subsequent    Need for influenza vaccination  -     PREFERRED: influenza vaccine, 0594-7177, high-dose, PF 0 5 mL, for patients 65 yr+ (FLUZONE HIGH-DOSE)          Continue with current medications  Repeat labs  HD flu vaccine today  Office visit in 4-6 months     Patient ID: Lisset Reason is a 80 y o  female  followup visit  medications reviewed  Last labs 05/2018 see note  hypertension blood pressures have been stable on Valsartan HCTZ 80/12 5 daily and Metoprolol ER 50 mg daily  creatinine 1 22  GFR 45  electrolytes normal  Hgb 11  8   hyperlipidemia and CAD on Atorvastatin 40 mg daily  Lipid profile 05/2018 cholesterol 188  TGs 128  HDL 50  YUK19  FBS 93  LFTs normal  Patient lives alone in a senior [employer] apartment  independent with ADLs and IADLs  Still driving  The following portions of the patient's history were reviewed and updated as appropriate: allergies, current medications, past family history, past medical history, past social history, past surgical history and problem list     Review of Systems   Constitutional: Positive for unexpected weight change (4 lb weight loss from 08/2018)  Negative for activity change, appetite change, chills, fatigue and fever  HENT: Negative for congestion, ear pain, hearing loss, postnasal drip, rhinorrhea and trouble swallowing  Eyes: Negative for visual disturbance     Respiratory: Negative for cough, shortness of breath and wheezing  No orthopnea or PND   Cardiovascular: Negative for chest pain, palpitations and leg swelling  Cardiology evaluation 5/2015 for exertional dyspnea and palpitations  Holter monitor showed normal sinus rhythm  139 single PVCs  No sustained ventricular rhythm  rare PACs, consisting of 183 single PACs  3 were noted in bigeminy  10 noted in couplets  no sustained supraventricular rhythm  No significant pauses or high grade AV block  Echocardiogram showed normal left ventricular function, ejection fraction 86% grade 1 diastolic dysfunction  No significant valvular abnormalities  chronic swelling right lower leg  s/p resection of leiomyosarcoma  Repeat echocardiogram 09/2017 normal left ventricular systolic function  EF 60%  No regional wall motion abnormalities  Right ventricle normal  Trace MR  No pericardial effusion  Gastrointestinal: Negative for abdominal pain, blood in stool, constipation, diarrhea, nausea and vomiting  GERD stable on Omeprazole  no reflux  no dysphagia  Endocrine:        Past history of mildly elevated Ca++  05/2018 Ca++ 10 0   03/2017 Ca++ 9 4  11/2016 Ca++ 10 4  03/2016 Ca 10 4  11/2015 Ca++ 9 8  07/2015 10 4  01/2015 10 5 no change from 09/2014  SPEP normal  intact PTH normal  hypothyroidism  05/2018 TSH 3 84  3/2017 TSH 5 310  Positive thyroid microsomal antibody  patient was started on Levothyroxine 25 mcg daily  05/2017  in August She stop levothyroxine when she started itching  No rash  repeat TSH 11/2017 4 46  Genitourinary: Negative for difficulty urinating and dysuria  OAB stable on Vesicare  nocturia x 1  followed by urology  right renal angiomyolipoma  renal u/s 09/2015   Musculoskeletal: Positive for arthralgias  Negative for myalgias  OA left hip  X-rays of left hip showed moderate to severe osteoarthritis  11/2018 left hip intra articular steroid injection with symptom improvement   05/2018 s/p left trochanteric bursa and right subacromial bursa steroid injections  02/2018 left trochanteric bursa steroid injection  Orthopedic evaluation  2/2017 for right shoulder pain  X-rays of right shoulder showed mild glenohumeral osteoarthritis and mild right AC joint osteoarthritis  She completed a course of physical therapy  Skin: Negative for rash  Neurological: Negative for dizziness and headaches  Episode of slurred speech 10/2015 no recurrence  on ASA 81 mg daily and Plavix  she refused work up  Hematological: Negative for adenopathy  Bruises/bleeds easily  On ASA and Plavix  Psychiatric/Behavioral: Negative for dysphoric mood  Objective:      /64 (BP Location: Left arm, Patient Position: Sitting, Cuff Size: Standard)   Pulse 68   Temp 98 6 °F (37 °C)   Resp 16   Ht 5' (1 524 m)   Wt 63 5 kg (140 lb)   BMI 27 34 kg/m²          Physical Exam   Constitutional: She is oriented to person, place, and time  She appears well-developed and well-nourished  No distress  HENT:   Mouth/Throat: Oropharynx is clear and moist and mucous membranes are normal  No oral lesions  Normal dentition  Eyes: Pupils are equal, round, and reactive to light  Conjunctivae and EOM are normal  No scleral icterus  Neck: No JVD present  Carotid bruit is not present  No tracheal deviation present  No thyroid mass and no thyromegaly present  Cardiovascular: Normal rate, regular rhythm and normal heart sounds  Exam reveals no gallop  No murmur heard  Pulmonary/Chest: Effort normal and breath sounds normal  No respiratory distress  She has no wheezes  She has no rales  Abdominal: Soft  Bowel sounds are normal  She exhibits no distension and no mass  There is no tenderness  There is no rebound and no guarding  Musculoskeletal: Normal range of motion  She exhibits no edema or deformity  Lymphadenopathy:     She has no cervical adenopathy     Neurological: She is alert and oriented to person, place, and time  She displays normal reflexes  No cranial nerve deficit  Skin: No rash noted  Psychiatric: She has a normal mood and affect  Nursing note and vitals reviewed          Recent Results (from the past 5040 hour(s))   Lipid panel    Collection Time: 05/21/18  8:29 AM   Result Value Ref Range    Total Cholesterol 188 <200 mg/dL    HDL 50 (L) >50 mg/dL    Triglycerides 128 <150 mg/dL    LDL Direct 114 (H) mg/dL (calc)    Chol HDLC Ratio 3 8 <5 0 (calc)    Non-HDL Cholesterol 138 (H) <130 mg/dL (calc)   Comprehensive metabolic panel    Collection Time: 05/21/18  8:29 AM   Result Value Ref Range    Glucose, Random 93 65 - 99 mg/dL    BUN 27 (H) 7 - 25 mg/dL    Creatinine 1 22 (H) 0 60 - 0 88 mg/dL    eGFR Non  38 (L) > OR = 60 mL/min/1 73m2    SL AMB EGFR  45 (L) > OR = 60 mL/min/1 73m2    SL AMB BUN/CREATININE RATIO 22 6 - 22 (calc)    Sodium 140 135 - 146 mmol/L    Potassium 4 0 3 5 - 5 3 mmol/L    Chloride 107 98 - 110 mmol/L    CO2 23 20 - 31 mmol/L    SL AMB CALCIUM 10 0 8 6 - 10 4 mg/dL    SL AMB PROTEIN, TOTAL 6 3 6 1 - 8 1 g/dL    Albumin 4 0 3 6 - 5 1 g/dL    Globulin 2 3 1 9 - 3 7 g/dL (calc)    Albumin/Globulin Ratio 1 7 1 0 - 2 5 (calc)    TOTAL BILIRUBIN 0 6 0 2 - 1 2 mg/dL    Alkaline Phosphatase 124 33 - 130 U/L    SL AMB AST 17 10 - 35 U/L    SL AMB ALT 19 6 - 29 U/L   CBC and differential    Collection Time: 05/21/18  8:29 AM   Result Value Ref Range    White Blood Cell Count 6 4 3 8 - 10 8 Thousand/uL    Red Blood Cell Count 4 06 3 80 - 5 10 Million/uL    Hemoglobin 11 8 11 7 - 15 5 g/dL    HCT 36 3 35 0 - 45 0 %    MCV 89 4 80 0 - 100 0 fL    MCH 29 1 27 0 - 33 0 pg    MCHC 32 5 32 0 - 36 0 g/dL    RDW 13 0 11 0 - 15 0 %    Platelet Count 480 580 - 400 Thousand/uL    SL AMB MPV 9 5 7 5 - 12 5 fL    Neutrophils (Absolute) 3,526 1,500 - 7,800 cells/uL    Lymphocytes (Absolute) 2,112 850 - 3,900 cells/uL    Monocytes (Absolute) 589 200 - 950 cells/uL    Eosinophils (Absolute) 141 15 - 500 cells/uL    Basophils ABS 32 0 - 200 cells/uL    Neutrophils 55 1 %    Lymphocytes 33 0 %    Monocytes 9 2 %    Eosinophils 2 2 %    Basophils PCT 0 5 %   TSH, 3rd generation with T4 reflex    Collection Time: 05/21/18  8:29 AM   Result Value Ref Range    TSH W/RFX TO FREE T4 3 84 0 40 - 4 50 mIU/L

## 2018-12-06 ENCOUNTER — HOSPITAL ENCOUNTER (EMERGENCY)
Facility: HOSPITAL | Age: 83
Discharge: HOME | End: 2018-12-06
Attending: EMERGENCY MEDICINE
Payer: MEDICARE

## 2018-12-06 ENCOUNTER — APPOINTMENT (EMERGENCY)
Dept: RADIOLOGY | Facility: HOSPITAL | Age: 83
End: 2018-12-06
Payer: MEDICARE

## 2018-12-06 ENCOUNTER — APPOINTMENT (EMERGENCY)
Dept: CT IMAGING | Facility: HOSPITAL | Age: 83
End: 2018-12-06
Payer: MEDICARE

## 2018-12-06 VITALS
OXYGEN SATURATION: 4 % | SYSTOLIC BLOOD PRESSURE: 163 MMHG | DIASTOLIC BLOOD PRESSURE: 91 MMHG | TEMPERATURE: 97.5 F | HEART RATE: 56 BPM | RESPIRATION RATE: 16 BRPM

## 2018-12-06 DIAGNOSIS — S62.308A CLOSED FRACTURE OF 5TH METACARPAL: Primary | ICD-10-CM

## 2018-12-06 PROCEDURE — 73130 X-RAY EXAM OF HAND: CPT

## 2018-12-06 PROCEDURE — 70450 CT HEAD/BRAIN W/O DYE: CPT

## 2018-12-06 PROCEDURE — 99284 EMERGENCY DEPT VISIT MOD MDM: CPT

## 2018-12-06 PROCEDURE — 73110 X-RAY EXAM OF WRIST: CPT

## 2018-12-06 RX ORDER — OXYCODONE HYDROCHLORIDE AND ACETAMINOPHEN 5; 325 MG/1; MG/1
1 TABLET ORAL EVERY 6 HOURS PRN
Qty: 6 TABLET | Refills: 0 | Status: SHIPPED | OUTPATIENT
Start: 2018-12-06 | End: 2018-12-16

## 2018-12-06 RX ORDER — OXYCODONE HYDROCHLORIDE AND ACETAMINOPHEN 5; 325 MG/1; MG/1
1 TABLET ORAL ONCE
Status: COMPLETED | OUTPATIENT
Start: 2018-12-06 | End: 2018-12-06

## 2018-12-06 RX ORDER — DOCUSATE SODIUM 100 MG/1
100 CAPSULE, LIQUID FILLED ORAL EVERY 12 HOURS
Qty: 60 CAPSULE | Refills: 0 | Status: SHIPPED | OUTPATIENT
Start: 2018-12-06 | End: 2019-01-25

## 2018-12-06 RX ADMIN — OXYCODONE AND ACETAMINOPHEN 1 TABLET: 5; 325 TABLET ORAL at 12:18

## 2018-12-06 NOTE — ED ATTENDING ATTESTATION
I, Rick Bernard, DO, saw and evaluated the patient  I have discussed the patient with the resident/non-physician practitioner and agree with the resident's/non-physician practitioner's findings, Plan of Care, and MDM as documented in the resident's/non-physician practitioner's note, except where noted  All available labs and Radiology studies were reviewed  At this point I agree with the current assessment done in the Emergency Department  I have conducted an independent evaluation of this patient a history and physical is as follows:      Critical Care Time  CritCare Time    Procedures       80year-old female mechanical fall from standing, injury swelling and ecchymosis to right hand/wrist   On Plavix did also hit her head  Will CT head, will x-ray hand and wrist, patient will likely require splint, possibly reduction    Patient found have a nondisplaced fracture of proximal 5th metacarpal   Will be placed in ulnar gutter splint

## 2018-12-06 NOTE — ED NOTES
Attempted iv access x 2 attempts without success, call bell within reach, bed low position, side rail x 1 up     Nelson Horse, RN  12/06/18 [id number] [facility], RN  12/06/18 0758

## 2018-12-06 NOTE — ED NOTES
Call bell within reach, bed low position     Danilo Osullivan, KENDRICK  12/06/18 [address], RN  12/06/18 1300

## 2018-12-06 NOTE — ED NOTES
Patient aware she is awaiting ct scan and that the tech will be in to splint her arm    Call welsh within reach     Chikis Adair RN  12/06/18 [id number]

## 2018-12-06 NOTE — DISCHARGE INSTRUCTIONS
Hand Fracture   WHAT YOU NEED TO KNOW:   A hand fracture is a break in one of the bones in your hand  This includes the bones in the wrist and fingers, and those that connect the wrist to the fingers  A hand fracture may be caused by twisting or bending the hand in the wrong way  It may also be caused by a fall, a crush injury, or a sports injury  DISCHARGE INSTRUCTIONS:   Return to the emergency department if:   · Your have severe pain that does not get better, even with pain medicine  · Your injured hand or forearm is cold, numb, or pale  · Your cast or splint gets wet, damaged, or comes off  · Your arm feels warm, tender, and painful  It may look swollen and red  Contact your healthcare provider if:   · You have new sores around your brace, cast, or splint  · You notice a bad smell coming from under your cast     · You have questions or concerns about your condition or care  Medicines:   · NSAIDs , such as ibuprofen, help decrease swelling, pain, and fever  This medicine is available with or without a doctor's order  NSAIDs can cause stomach bleeding or kidney problems in certain people  If you take blood thinner medicine, always ask your healthcare provider if NSAIDs are safe for you  Always read the medicine label and follow directions  · Acetaminophen  decreases pain and fever  It is available without a doctor's order  Ask how much to take and how often to take it  Follow directions  Acetaminophen can cause liver damage if not taken correctly  · Prescription pain medicine  may be given  Ask how to take this medicine safely  · Take your medicine as directed  Contact your healthcare provider if you think your medicine is not helping or if you have side effects  Tell him or her if you are allergic to any medicine  Keep a list of the medicines, vitamins, and herbs you take  Include the amounts, and when and why you take them  Bring the list or the pill bottles to follow-up visits   Carry your medicine list with you in case of an emergency  Follow up with your healthcare provider or hand specialist as directed: You may need to return to have your cast, splint, or stitches removed  Write down your questions so you remember to ask them during your visits  Manage your symptoms:   · Wear your splint as directed  Do not remove the splint until you follow up with your healthcare provider or hand specialist      · Apply ice  on your hand for 15 to 20 minutes every hour or as directed  Use an ice pack, or put crushed ice in a plastic bag  Cover it with a towel before you apply it to your skin  Ice helps prevent tissue damage and decreases swelling and pain  · Elevate  your hand above the level of his or her heart as often as you can  This will help decrease swelling and pain  Prop your hand on pillows or blankets to keep it elevated comfortably  · Go to physical therapy as directed  A physical therapist teaches you exercises to help improve movement and strength and to decrease pain  Bathing with a cast or splint:  Do not let your cast or splint get wet  Before bathing, cover the cast or splint with a plastic bag  Tape the bag to your skin above the cast or splint to seal out the water  Keep your hand out of the water in case the bag leaks  Follow instructions about when it is okay to take a bath or shower  Cast or splint care:   · Check the skin around the cast or splint for redness or sores every day  · Do not push down or lean on any part of the cast or splint because it may break  · Do not use a sharp or pointed object to scratch your skin under the cast or splint  Activity:  You may not be able to drive for up to 2 weeks  Ask when it is safe for you to drive and return to other activities such as sports  © 2017 [address] [employer] Information is for End User's use only and may not be sold, redistributed or otherwise used for commercial purposes   All illustrations and images included in CareNotes® are the copyrighted property of [employer]  or [employer]  The above information is an  only  It is not intended as medical advice for individual conditions or treatments  Talk to your doctor, nurse or pharmacist before following any medical regimen to see if it is safe and effective for you

## 2018-12-07 ENCOUNTER — OFFICE VISIT (OUTPATIENT)
Dept: OBGYN CLINIC | Facility: MEDICAL CENTER | Age: 83
End: 2018-12-07
Payer: MEDICARE

## 2018-12-07 VITALS — RESPIRATION RATE: 18 BRPM

## 2018-12-07 DIAGNOSIS — S62.346A NONDISPLACED FRACTURE OF BASE OF FIFTH METACARPAL BONE, RIGHT HAND, INITIAL ENCOUNTER FOR CLOSED FRACTURE: Primary | ICD-10-CM

## 2018-12-07 PROCEDURE — 99213 OFFICE O/P EST LOW 20 MIN: CPT | Performed by: ORTHOPAEDIC SURGERY

## 2018-12-07 NOTE — PROGRESS NOTES
Assessment:  No diagnosis found  Plan:  The patient presents with right hand pain s/p a fall 2 days ago  Today she complains of ulnar sided right hand pain  She is tender over ulna side of right hand with ecchymosis and swelling  A wrist brace is provided today  The patient is instructed to move her elbow and shoulder  The patient should folow up in one month    Universal wrist splint  To do next visit:  No Follow-up on file  The above stated was discussed in layman's terms and the patient expressed understanding  All questions were answered to the patient's satisfaction  Scribe Attestation    I,:   Rg Nelson am acting as a scribe while in the presence of the attending physician :        I,:   Camila Freedman MD personally performed the services described in this documentation    as scribed in my presence :              Subjective:   Lisset Byrne is a 80 y o  female who presents follow up of right 5th metacarpal fracture  She is s/p Right subacromial steroid injection, 8/24/18  She is s/p left hip IA injection 11/26/18 with lasting benefit of improve gait  She recently fell over a curb landing on her right side, head, shoulder and hand two days ago  She did go to the ED and was diagnosed with right 5th metacarpal fracture  Today she complains of right ulnar sided hand pain  Using the right hand aggravates  Medications alleviate  She is currently using oxycodone for relief         Review of systems negative unless otherwise specified in HPI    Past Medical History:   Diagnosis Date    Anemia     last assessed - [id number]    Cancer [facility])     Depression     last assessed - [id number]    Hypercalcemia     last assessed - [id number]    Hypertension     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    Osteoarthritis     Plantar fasciitis of right foot     last assessed - [id number]    Stomach disorder     Superficial thrombophlebitis of leg     unspecified laterality; last assessed - [id number]    Trochanteric bursitis of left hip     last assessed - [id number]       Past Surgical History:   Procedure Laterality Date    CORONARY STENT PLACEMENT      stent RCA    FL INJECTION LEFT HIP (NON ARTHROGRAM)  11/26/2018    FOOT SURGERY      HIP SURGERY      HYSTERECTOMY      SKIN LESION EXCISION  11/2010    Face - BCC - nose    TOTAL ABDOMINAL HYSTERECTOMY W/ BILATERAL SALPINGOOPHORECTOMY      TOTAL HIP ARTHROPLASTY Right        Family History   Problem Relation Age of Onset    Stroke Mother         cerebrovascular accident (CVA)    Coronary artery disease Mother         coronary disease    Stroke Father     Coronary artery disease Father         coronary disease    Stroke Brother     Kidney cancer Family        Social History     Occupational History    Not on file       Social History Main Topics    Smoking status: Never Smoker    Smokeless tobacco: Never Used    Alcohol use Yes      Comment: occasional wine with dinner    Drug use: No    Sexual activity: Not on file         Current Outpatient Prescriptions:     aspirin (ADULT ASPIRIN EC LOW STRENGTH) 81 mg EC tablet, Take 1 tablet by mouth daily, Disp: , Rfl:     atorvastatin (LIPITOR) 40 mg tablet, Take 1 tablet (40 mg total) by mouth daily, Disp: 90 tablet, Rfl: 3    Biotin 1 MG CAPS, Take by mouth, Disp: , Rfl:     cholecalciferol (VITAMIN D3) 1,000 units tablet, Take 1 tablet by mouth daily, Disp: , Rfl:     clopidogrel (PLAVIX) 75 mg tablet, TAKE 1 TABLET BY MOUTH EVERY DAY, Disp: 90 tablet, Rfl: 3    docusate sodium (COLACE) 100 mg capsule, Take 1 capsule (100 mg total) by mouth every 12 (twelve) hours, Disp: 60 capsule, Rfl: 0    metoprolol succinate (TOPROL-XL) 50 mg 24 hr tablet, TAKE 1 TABLET BY MOUTH EVERY DAY, Disp: 90 tablet, Rfl: 3    Multiple Vitamins-Minerals (ICAPS AREDS 2) CAPS, Take by mouth, Disp: , Rfl:     nitroglycerin (NITROSTAT) 0 4 mg SL tablet, place 1 tablet under the tongue if needed EVERY 5 MINUTES UP TO 3 TIMES FOR CHEST PAIN, Disp: , Rfl: 0    Omega-3 Fatty Acids (OMEGA-3 FISH OIL) 1200 MG CAPS, Take by mouth, Disp: , Rfl:     omeprazole (PriLOSEC) 20 mg delayed release capsule, Take 1 capsule (20 mg total) by mouth daily for 90 days, Disp: 90 capsule, Rfl: 3    psyllium (METAMUCIL) 0 52 g capsule, Take by mouth, Disp: , Rfl:     RESTASIS 0 05 % ophthalmic emulsion, instill 1 drop into both eyes twice a day, Disp: , Rfl: 0    solifenacin (VESICARE) 5 mg tablet, Take 1 tablet (5 mg total) by mouth daily, Disp: 90 tablet, Rfl: 3    valsartan-hydrochlorothiazide (DIOVAN-HCT) 80-12 5 MG per tablet, Take 1 tablet by mouth daily, Disp: 90 tablet, Rfl: 3    oxyCODONE-acetaminophen (PERCOCET) 5-325 mg per tablet, Take 1 tablet by mouth every 6 (six) hours as needed for moderate pain for up to 10 days Max Daily Amount: 4 tablets (Patient not taking: Reported on 12/7/2018 ), Disp: 6 tablet, Rfl: 0    Allergies   Allergen Reactions    Ciprofloxacin     Other      Iv contrast  Adhesive tape            Vitals:    12/07/18 1416   Resp: 18       Objective:          Physical Exam  Physical exam  · General: Awake, Alert, Oriented  · Eyes: Pupils equal, round and reactive to light  · Heart: regular rate and rhythm  · Lungs: No audible wheezing  · Abdomen: soft                    Ortho Exam  Right hand:  TTP over ulnar side of hand  Swelling of the distal metacarpals  Mild ecchymosis over posterior hand      Diagnostics, reviewed and taken today if performed as documented:    None performed    X-rays of right hand, 12/6/18 reviewed today      The attending physician has personally reviewed the pertinent films in PACS and interpretation is as follows:      Procedures, if performed today:    Procedures    None performed      Portions of the record may have been created with voice recognition software   Occasional wrong word or "sound a like" substitutions may have occurred due to the inherent limitations of voice recognition software   Read the chart carefully and recognize, using context, where substitutions have occurred

## 2018-12-08 NOTE — ED PROVIDER NOTES
History  Chief Complaint   Patient presents with   Armando Tye Fall     pt states she fell yesterday and landed on sidewalk  did hit right front of head, and c/o right wrist pain  denies loc, does take plavix  81 y/o F presents to ED due to fall yesterday with continued pain in R arm  Pt reports she was walking on street and attempted to raise her leg to get on sidewalk, clipped her toe on the edge and fell forward onto the concrete with her arms bracing her  Pt reports she did hit the top right aspect of her forehead as she came down, was down for approximately 1 minute prior to someone assisting her to her feet, and denies LOC  Pt reports her pain in R hand/wrist gradually worsened since, with throbbing pain constantly and sharp pains worse when using hand  Pt reports she attempted OTC medications, ice without relief  Denies LOC, syncope, CP, SOB, nausea, vomiting, dizziness, HA, visual disturbances, gait difficulties  Daughter present and states she has not noticed any confusion, abnormal behavior, facial drooping, abnormal gait, speech difficulty    Pt reports currently on Plavix for coronary stents  History provided by:  Patient and relative   used: No    Fall   Mechanism of injury: fall    Injury location:  Head/neck and hand  Head/neck injury location:  Head  Hand injury location:  R hand and R wrist  Incident location:  Street  Time since incident:  1 day  Fall:     Fall occurred:  Standing    Height of fall:  From standing    Impact surface:  [place]    Point of impact:  Head, face, hands and outstretched arms  Associated symptoms: no abdominal pain, no back pain, no blindness, no chest pain, no difficulty breathing, no headaches, no hearing loss, no nausea, no neck pain, no seizures and no vomiting        Prior to Admission Medications   Prescriptions Last Dose Informant Patient Reported? Taking?    Biotin 1 MG CAPS  Self Yes No   Sig: Take by mouth   Multiple Vitamins-Minerals (ICAPS AREDS 2) CAPS  Self Yes No   Sig: Take by mouth   Omega-3 Fatty Acids (OMEGA-3 FISH OIL) 1200 MG CAPS  Self Yes No   Sig: Take by mouth   RESTASIS 0 05 % ophthalmic emulsion  Self Yes No   Sig: instill 1 drop into both eyes twice a day   aspirin (ADULT ASPIRIN EC LOW STRENGTH) 81 mg EC tablet  Self Yes No   Sig: Take 1 tablet by mouth daily   atorvastatin (LIPITOR) 40 mg tablet  Self No No   Sig: Take 1 tablet (40 mg total) by mouth daily   cholecalciferol (VITAMIN D3) 1,000 units tablet  Self Yes No   Sig: Take 1 tablet by mouth daily   clopidogrel (PLAVIX) 75 mg tablet  Self No No   Sig: TAKE 1 TABLET BY MOUTH EVERY DAY   metoprolol succinate (TOPROL-XL) 50 mg 24 hr tablet  Self No No   Sig: TAKE 1 TABLET BY MOUTH EVERY DAY   nitroglycerin (NITROSTAT) 0 4 mg SL tablet  Self Yes No   Sig: place 1 tablet under the tongue if needed EVERY 5 MINUTES UP TO 3 TIMES FOR CHEST PAIN   omeprazole (PriLOSEC) 20 mg delayed release capsule  Self No No   Sig: Take 1 capsule (20 mg total) by mouth daily for 90 days   psyllium (METAMUCIL) 0 52 g capsule  Self Yes No   Sig: Take by mouth   solifenacin (VESICARE) 5 mg tablet  Self No No   Sig: Take 1 tablet (5 mg total) by mouth daily   valsartan-hydrochlorothiazide (DIOVAN-HCT) 80-12 5 MG per tablet  Self No No   Sig: Take 1 tablet by mouth daily      Facility-Administered Medications: None       Past Medical History:   Diagnosis Date    Anemia     last assessed - [id number]    [facility])     Depression     last assessed - [id number]    Hypercalcemia     last assessed - [id number]    Hypertension     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    Osteoarthritis     Plantar fasciitis of right foot     last assessed - [id number]    Stomach disorder     Superficial thrombophlebitis of leg     unspecified laterality; last assessed - [id number]    Trochanteric bursitis of left hip     last assessed - 07Apr2017       Past Surgical History:   Procedure Laterality Date    CORONARY STENT PLACEMENT      stent RCA    FL INJECTION LEFT HIP (NON ARTHROGRAM)  11/26/2018    FOOT SURGERY      HIP SURGERY      HYSTERECTOMY      SKIN LESION EXCISION  11/2010    Face - BCC - nose    TOTAL ABDOMINAL HYSTERECTOMY W/ BILATERAL SALPINGOOPHORECTOMY      TOTAL HIP ARTHROPLASTY Right        Family History   Problem Relation Age of Onset    Stroke Mother         cerebrovascular accident (CVA)    Coronary artery disease Mother         coronary disease    Stroke Father     Coronary artery disease Father         coronary disease    Stroke Brother     Kidney cancer Family      I have reviewed and agree with the history as documented  Social History   Substance Use Topics    Smoking status: Never Smoker    Smokeless tobacco: Never Used    Alcohol use Yes      Comment: occasional wine with dinner        Review of Systems   Constitutional: Negative for activity change, appetite change and fatigue  HENT: Negative for dental problem and hearing loss  Eyes: Negative for blindness, photophobia and visual disturbance  Cardiovascular: Negative for chest pain, palpitations and leg swelling  Gastrointestinal: Negative for abdominal pain, blood in stool, diarrhea, nausea and vomiting  Musculoskeletal: Negative for back pain, gait problem and neck pain  Neurological: Negative for dizziness, tremors, seizures, syncope, facial asymmetry, speech difficulty, weakness, light-headedness, numbness and headaches  Psychiatric/Behavioral: Negative  Physical Exam  Physical Exam   Constitutional: She is oriented to person, place, and time  She appears well-developed and well-nourished  No distress  HENT:   Head: Normocephalic and atraumatic  No hemotympanum  No dental injury  Eyes: Pupils are equal, round, and reactive to light  Neck: Normal range of motion  Cardiovascular: Normal rate and regular rhythm      Pulmonary/Chest: Effort normal and breath sounds normal  No respiratory distress  Abdominal: Soft  Bowel sounds are normal  There is no tenderness  Musculoskeletal:        Right shoulder: She exhibits decreased range of motion (chronic--currently seeing orthopedics/physical therapy for rotator cuff )  She exhibits no tenderness, no bony tenderness, no deformity, no laceration, no pain, no spasm and normal strength  Left shoulder: Normal         Right elbow: Normal        Left elbow: Normal         Right wrist: She exhibits decreased range of motion (due to pain), tenderness (over ulnar aspect distally), bony tenderness and swelling  She exhibits no deformity  Left wrist: Normal         Right hip: Normal         Left hip: Normal         Right knee: Normal         Left knee: Normal         Right ankle: Normal         Left ankle: Normal         Right hand: She exhibits tenderness (ulnar aspect) and swelling  She exhibits normal capillary refill  Normal sensation noted  Normal strength noted  Hands:  Neurological: She is alert and oriented to person, place, and time  She has normal strength  No cranial nerve deficit or sensory deficit  She displays a negative Romberg sign  She displays no seizure activity  Coordination and gait normal  GCS eye subscore is 4  GCS verbal subscore is 5  GCS motor subscore is 6  Skin: Skin is warm and dry  Capillary refill takes less than 2 seconds  Nursing note and vitals reviewed        Vital Signs  ED Triage Vitals   Temperature Pulse Respirations Blood Pressure SpO2   12/06/18 1038 12/06/18 1038 12/06/18 1038 12/06/18 1038 12/06/18 1038   97 5 °F (36 4 °C) 63 18 (!) 186/75 96 %      Temp Source Heart Rate Source Patient Position - Orthostatic VS BP Location FiO2 (%)   12/06/18 1038 12/06/18 1038 12/06/18 1259 12/06/18 1154 --   Oral Monitor Sitting Left arm       Pain Score       12/06/18 1154       6           Vitals:    12/06/18 1038 12/06/18 1154 12/06/18 1259   BP: (!) 186/75 (!) 176/74 163/91   Pulse: 63 59 56 Patient Position - Orthostatic VS:   Sitting       Visual Acuity  Visual Acuity      Most Recent Value   L Pupil Size (mm)  3   R Pupil Size (mm)  3          ED Medications  Medications   oxyCODONE-acetaminophen (PERCOCET) 5-325 mg per tablet 1 tablet (1 tablet Oral Given 12/6/18 1218)       Diagnostic Studies  Results Reviewed     None                 CT head without contrast   Final Result by Chapito Hadley MD (12/06 1248)      No acute intracranial abnormality  Workstation performed: [id number]         XR hand 3+ views RIGHT   ED Interpretation by Chey Okeefe DO (12/06 1134)   Fracture of proximal 5th metacarpal      Final Result by Groton Cutting, DO (12/06 1200)   1  Chip fracture base of 5th metacarpal laterally  2   Slight widening of the scapholunate distance  Correlate for scapholunate ligamentous injury  Workstation performed: [id number]         XR wrist 3+ views RIGHT   ED Interpretation by Chey Okeefe DO (12/06 1134)   Fracture of proximal 5th metacarpal      Final Result by Groton Cutting, DO (12/06 1159)   1  Small chip fracture base of 5th metacarpal laterally  2   Slight widening of the scapholunate distance  Correlate for scapholunate ligamentous injury        Workstation performed: [id number]                    Procedures  Procedures       Phone Contacts  ED Phone Contact    ED Course                   Stroke Assessment     Row Name 12/06/18 1104             NIH Stroke Scale    Interval Baseline      Level of Consciousness (1a ) 0      LOC Questions (1b ) 0      LOC Commands (1c ) 0      Best Gaze (2 ) 0      Visual (3 ) 0      Facial Palsy (4 ) 0      Motor Arm, Left (5a ) 0      Motor Arm, Right (5b ) 0      Motor Leg, Left (6a ) 0      Motor Leg, Right (6b ) 0      Limb Ataxia (7 ) 0      Sensory (8 ) 0      Best Language (9 ) 0      Dysarthria (10 ) 0      Extinction and Inattention (11 ) (Formerly Neglect) 0      Total 0 MDM  Number of Diagnoses or Management Options  Closed fracture of 5th metacarpal:   Diagnosis management comments: 79 y/o F currently on Plavix for Coronary Stents presents to ED due to fall yesterday with continued pain in R arm  Denies LOC, syncope, CP, SOB, nausea, vomiting, dizziness, HA, visual disturbances, gait difficulties  Daughter present and states she has not noticed any confusion, abnormal behavior, facial drooping, abnormal gait, speech difficulty  VS reviewed and pt stable  Exam reveals large hematoma on R ulnar aspect of the dorsum, tenderness over the distal ulnar aspect of the hand/wrist, but otherwise normocephalic, no significant findings on neurologic exam  Will check XR to r/o fx, dislocation, CT head due to r/o intracranial bleed, give analgesia for pain/discomfort, ice pack for hematoma  Reassess  Reassessed with fracture of base of 5th metacarpal, pt has good Ortho f/u, recommendation to f/u within next 3 days--pt sts she has appt tomorrow  Return to ED instructions given, pt sts she understands  Amount and/or Complexity of Data Reviewed  Tests in the radiology section of CPT®: ordered and reviewed  Review and summarize past medical records: yes  Discuss the patient with other providers: yes  Independent visualization of images, tracings, or specimens: yes    Risk of Complications, Morbidity, and/or Mortality  Presenting problems: moderate  Diagnostic procedures: moderate  Management options: moderate      CritCare Time    Disposition  Final diagnoses:   Closed fracture of 5th metacarpal     Time reflects when diagnosis was documented in both MDM as applicable and the Disposition within this note     Time User Action Codes Description Comment    12/6/2018  1:01 PM [facility] Add [[id number]] Closed fracture of 5th metacarpal       ED Disposition     ED Disposition Condition Comment    Discharge  Jm Carrillo discharge to home/self care      Condition at discharge: Good        Follow-up Information     Follow up With Specialties Details Why Contact Info Additional Information    Charisse Grullon MD Family Medicine  As needed [address] [facility] [address] [facility]       Nicolas Carrero MD Orthopedic Surgery Schedule an appointment as soon as possible for a visit  [address]  [address] [facility]  [facility] Emergency Medicine  If symptoms worsen [address] [facility]  [phone], [address], [place], [place], [address]          Discharge Medication List as of 12/6/2018  1:05 PM      START taking these medications    Details   oxyCODONE-acetaminophen (PERCOCET) 5-325 mg per tablet Take 1 tablet by mouth every 6 (six) hours as needed for moderate pain for up to 10 days Max Daily Amount: 4 tablets, Starting u 12/6/2018, Until Sun 12/16/2018, Print         CONTINUE these medications which have NOT CHANGED    Details   aspirin (ADULT ASPIRIN EC LOW STRENGTH) 81 mg EC tablet Take 1 tablet by mouth daily, Historical Med      atorvastatin (LIPITOR) 40 mg tablet Take 1 tablet (40 mg total) by mouth daily, Starting Tue 10/30/2018, Normal      Biotin 1 MG CAPS Take by mouth, Historical Med      cholecalciferol (VITAMIN D3) 1,000 units tablet Take 1 tablet by mouth daily, Historical Med      clopidogrel (PLAVIX) 75 mg tablet TAKE 1 TABLET BY MOUTH EVERY DAY, Normal      metoprolol succinate (TOPROL-XL) 50 mg 24 hr tablet TAKE 1 TABLET BY MOUTH EVERY DAY, Normal      Multiple Vitamins-Minerals (ICAPS AREDS 2) CAPS Take by mouth, Historical Med      nitroglycerin (NITROSTAT) 0 4 mg SL tablet place 1 tablet under the tongue if needed EVERY 5 MINUTES UP TO 3 TIMES FOR CHEST PAIN, Historical Med      Omega-3 Fatty Acids (OMEGA-3 FISH OIL) 1200 MG CAPS Take by mouth, Historical Med      omeprazole (PriLOSEC) 20 mg delayed release capsule Take 1 capsule (20 mg total) by mouth daily for 90 days, Starting Mon 12/3/2018, Until Sun 3/3/2019, Normal      psyllium (METAMUCIL) 0 52 g capsule Take by mouth, Historical Med      RESTASIS 0 05 % ophthalmic emulsion instill 1 drop into both eyes twice a day, Historical Med      solifenacin (VESICARE) 5 mg tablet Take 1 tablet (5 mg total) by mouth daily, Starting Tue 8/21/2018, Normal      valsartan-hydrochlorothiazide (DIOVAN-HCT) 80-12 5 MG per tablet Take 1 tablet by mouth daily, Starting Thu 11/15/2018, Normal           No discharge procedures on file      ED Provider  Electronically Signed by           Cherelle Wolfe PA-C  12/08/18 0655

## 2019-01-25 ENCOUNTER — OFFICE VISIT (OUTPATIENT)
Dept: OBGYN CLINIC | Facility: MEDICAL CENTER | Age: 84
End: 2019-01-25
Payer: MEDICARE

## 2019-01-25 ENCOUNTER — APPOINTMENT (OUTPATIENT)
Dept: RADIOLOGY | Facility: MEDICAL CENTER | Age: 84
End: 2019-01-25
Payer: MEDICARE

## 2019-01-25 VITALS — BODY MASS INDEX: 27.29 KG/M2 | WEIGHT: 139 LBS | HEIGHT: 60 IN | RESPIRATION RATE: 18 BRPM

## 2019-01-25 DIAGNOSIS — M70.62 TROCHANTERIC BURSITIS OF LEFT HIP: ICD-10-CM

## 2019-01-25 DIAGNOSIS — S62.346A NONDISPLACED FRACTURE OF BASE OF FIFTH METACARPAL BONE, RIGHT HAND, INITIAL ENCOUNTER FOR CLOSED FRACTURE: ICD-10-CM

## 2019-01-25 DIAGNOSIS — Z09 FRACTURE FOLLOW-UP: Primary | ICD-10-CM

## 2019-01-25 DIAGNOSIS — M16.12 ARTHRITIS OF LEFT HIP: ICD-10-CM

## 2019-01-25 PROCEDURE — 99213 OFFICE O/P EST LOW 20 MIN: CPT | Performed by: ORTHOPAEDIC SURGERY

## 2019-01-25 PROCEDURE — 73120 X-RAY EXAM OF HAND: CPT

## 2019-01-25 PROCEDURE — 20610 DRAIN/INJ JOINT/BURSA W/O US: CPT | Performed by: ORTHOPAEDIC SURGERY

## 2019-01-25 RX ORDER — LIDOCAINE HYDROCHLORIDE 10 MG/ML
2 INJECTION, SOLUTION INFILTRATION; PERINEURAL
Status: COMPLETED | OUTPATIENT
Start: 2019-01-25 | End: 2019-01-25

## 2019-01-25 RX ORDER — BETAMETHASONE SODIUM PHOSPHATE AND BETAMETHASONE ACETATE 3; 3 MG/ML; MG/ML
6 INJECTION, SUSPENSION INTRA-ARTICULAR; INTRALESIONAL; INTRAMUSCULAR; SOFT TISSUE
Status: COMPLETED | OUTPATIENT
Start: 2019-01-25 | End: 2019-01-25

## 2019-01-25 RX ADMIN — LIDOCAINE HYDROCHLORIDE 2 ML: 10 INJECTION, SOLUTION INFILTRATION; PERINEURAL at 14:17

## 2019-01-25 RX ADMIN — BETAMETHASONE SODIUM PHOSPHATE AND BETAMETHASONE ACETATE 6 MG: 3; 3 INJECTION, SUSPENSION INTRA-ARTICULAR; INTRALESIONAL; INTRAMUSCULAR; SOFT TISSUE at 14:17

## 2019-01-25 NOTE — PROGRESS NOTES
Subjective; Follow-up of right 5th metacarpal fracture in this 80year-old female  Also has history of trochanteric bursitis and left hip arthralgia as well as left hip arthritis which has required treatment  She comes the office today wearing a universal right wrist splint and has the absence of significant pain and the wrist splint limits her function  She would like treatment for her left hip lateral hip pain and bursitis    She would also like assisted with obtaining a fluoroscopic guided injection of steroid for the left hip proper February we would be her appropriate follow-up month for this service      Past Medical History:   Diagnosis Date    Anemia     last assessed - [id number]    Cancer [facility])     Depression     last assessed - [id number]    Glaucoma     Hypercalcemia     last assessed - [id number]    Hypertension     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    Macular degeneration     Osteoarthritis     Plantar fasciitis of right foot     last assessed - 07Apr2017    Stomach disorder     Superficial thrombophlebitis of leg     unspecified laterality; last assessed - [id number]    Trochanteric bursitis of left hip     last assessed - 07Apr2017       Past Surgical History:   Procedure Laterality Date    CORONARY STENT PLACEMENT      stent RCA    FL INJECTION LEFT HIP (NON ARTHROGRAM)  11/26/2018    FOOT SURGERY      HIP SURGERY      HYSTERECTOMY      SKIN LESION EXCISION  11/2010    Face - BCC - nose    TOTAL ABDOMINAL HYSTERECTOMY W/ BILATERAL SALPINGOOPHORECTOMY      TOTAL HIP ARTHROPLASTY Right        Family History   Problem Relation Age of Onset    Stroke Mother         cerebrovascular accident (CVA)    Coronary artery disease Mother         coronary disease    Stroke Father     Coronary artery disease Father         coronary disease    Stroke Brother     Kidney cancer Family        Social History   Substance Use Topics    Smoking status: Never Smoker    Smokeless tobacco: Never Used    Alcohol use Yes      Comment: occasional wine with dinner     Exam;    Her wrist splint was removed from the right hand  Immediately noticed is finger deformities and wrist deformities of both of her upper extremities  She has indentation of the thenar eminence of both hands she has accentuated the 1st CMC joint of both hands and she has distal phalanges all deformity of the small fingers of her hands    She has no pain to palpation of the hand overlying the 4th and the 5th metacarpal   She is able to make a hand grasp with minimal pain right hand  The right hand is her dominant hand    The left lateral hip has reproducible pain to palpation over the trochanteric bursal  The left hip also offers intra-articular pain with internal and external rotation    X-rays; right hand series, shows arthritic changes conducive to her inspection and clinical exam   Previous findings of fracture of the 5th metacarpal are now healed  Large joint arthrocentesis  Date/Time: 1/25/2019 2:17 PM  Consent given by: patient  Procedure Details  Location: hip - L greater trochanteric bursa  Needle size: 22 G  Approach: lateral  Medications administered: 2 mL lidocaine 1 %; 6 mg betamethasone acetate-betamethasone sodium phosphate 6 (3-3) mg/mL    Patient tolerance: patient tolerated the procedure well with no immediate complications  Dressing:  Sterile dressing applied      Impression;     History of a 5th metacarpal fracture right hand  Arthritic changes multiple joints  Left greater trochanteric hip bursitis  Left hip arthritis    Plan;    She may discontinue the use of the universal hand and wrist splint  She may use her right hand as desired  For relief she underwent a left greater trochanteric bursa injection  Also at today's appointment a fluoroscopic guided injection for her left hip was ordered to be done in February  This accomplished her visit concluded we will see her in follow-up in 3 months  Her exam was supervised by and plan formulated by the attending surgeon it was my privilege to assist him in its delivery

## 2019-03-04 ENCOUNTER — HOSPITAL ENCOUNTER (OUTPATIENT)
Dept: RADIOLOGY | Facility: HOSPITAL | Age: 84
Discharge: HOME | End: 2019-03-04
Attending: ORTHOPAEDIC SURGERY
Payer: MEDICARE

## 2019-03-04 DIAGNOSIS — M16.12 ARTHRITIS OF LEFT HIP: ICD-10-CM

## 2019-03-04 DIAGNOSIS — M70.62 TROCHANTERIC BURSITIS OF LEFT HIP: ICD-10-CM

## 2019-03-04 PROCEDURE — 20610 DRAIN/INJ JOINT/BURSA W/O US: CPT

## 2019-03-04 PROCEDURE — 77002 NEEDLE LOCALIZATION BY XRAY: CPT

## 2019-03-04 RX ORDER — BUPIVACAINE HYDROCHLORIDE 2.5 MG/ML
10 INJECTION, SOLUTION EPIDURAL; INFILTRATION; INTRACAUDAL
Status: COMPLETED | OUTPATIENT
Start: 2019-03-04 | End: 2019-03-04

## 2019-03-04 RX ORDER — LIDOCAINE HYDROCHLORIDE 10 MG/ML
10 INJECTION, SOLUTION EPIDURAL; INFILTRATION; INTRACAUDAL; PERINEURAL
Status: COMPLETED | OUTPATIENT
Start: 2019-03-04 | End: 2019-03-04

## 2019-03-04 RX ORDER — METHYLPREDNISOLONE ACETATE 80 MG/ML
80 INJECTION, SUSPENSION INTRA-ARTICULAR; INTRALESIONAL; INTRAMUSCULAR; SOFT TISSUE
Status: COMPLETED | OUTPATIENT
Start: 2019-03-04 | End: 2019-03-04

## 2019-03-04 RX ADMIN — IOHEXOL 3 ML: 300 INJECTION, SOLUTION INTRAVENOUS at 15:02

## 2019-03-04 RX ADMIN — METHYLPREDNISOLONE ACETATE 80 MG: 80 INJECTION, SUSPENSION INTRA-ARTICULAR; INTRALESIONAL; INTRAMUSCULAR; SOFT TISSUE at 15:01

## 2019-03-04 RX ADMIN — BUPIVACAINE HYDROCHLORIDE 3 ML: 2.5 INJECTION, SOLUTION EPIDURAL; INFILTRATION; INTRACAUDAL; PERINEURAL at 14:56

## 2019-03-04 RX ADMIN — LIDOCAINE HYDROCHLORIDE 8 ML: 10 INJECTION, SOLUTION EPIDURAL; INFILTRATION; INTRACAUDAL; PERINEURAL at 15:02

## 2019-04-26 ENCOUNTER — OFFICE VISIT (OUTPATIENT)
Dept: OBGYN CLINIC | Facility: MEDICAL CENTER | Age: 84
End: 2019-04-26
Payer: MEDICARE

## 2019-04-26 VITALS
RESPIRATION RATE: 18 BRPM | WEIGHT: 138 LBS | BODY MASS INDEX: 27.09 KG/M2 | SYSTOLIC BLOOD PRESSURE: 171 MMHG | HEART RATE: 65 BPM | DIASTOLIC BLOOD PRESSURE: 75 MMHG | HEIGHT: 60 IN

## 2019-04-26 DIAGNOSIS — M70.62 TROCHANTERIC BURSITIS OF LEFT HIP: Primary | ICD-10-CM

## 2019-04-26 DIAGNOSIS — M16.12 ARTHRITIS OF LEFT HIP: ICD-10-CM

## 2019-04-26 PROCEDURE — 20610 DRAIN/INJ JOINT/BURSA W/O US: CPT | Performed by: ORTHOPAEDIC SURGERY

## 2019-04-26 PROCEDURE — 99213 OFFICE O/P EST LOW 20 MIN: CPT | Performed by: ORTHOPAEDIC SURGERY

## 2019-04-26 RX ORDER — BUPIVACAINE HYDROCHLORIDE 2.5 MG/ML
2 INJECTION, SOLUTION INFILTRATION; PERINEURAL
Status: COMPLETED | OUTPATIENT
Start: 2019-04-26 | End: 2019-04-26

## 2019-04-26 RX ORDER — BETAMETHASONE SODIUM PHOSPHATE AND BETAMETHASONE ACETATE 3; 3 MG/ML; MG/ML
12 INJECTION, SUSPENSION INTRA-ARTICULAR; INTRALESIONAL; INTRAMUSCULAR; SOFT TISSUE
Status: COMPLETED | OUTPATIENT
Start: 2019-04-26 | End: 2019-04-26

## 2019-04-26 RX ORDER — LIDOCAINE HYDROCHLORIDE 20 MG/ML
2 INJECTION, SOLUTION EPIDURAL; INFILTRATION; INTRACAUDAL; PERINEURAL
Status: COMPLETED | OUTPATIENT
Start: 2019-04-26 | End: 2019-04-26

## 2019-04-26 RX ORDER — LATANOPROST 50 UG/ML
SOLUTION/ DROPS OPHTHALMIC
Refills: 4 | COMMUNITY
Start: 2019-02-07

## 2019-04-26 RX ADMIN — LIDOCAINE HYDROCHLORIDE 2 ML: 20 INJECTION, SOLUTION EPIDURAL; INFILTRATION; INTRACAUDAL; PERINEURAL at 11:50

## 2019-04-26 RX ADMIN — BETAMETHASONE SODIUM PHOSPHATE AND BETAMETHASONE ACETATE 12 MG: 3; 3 INJECTION, SUSPENSION INTRA-ARTICULAR; INTRALESIONAL; INTRAMUSCULAR; SOFT TISSUE at 11:50

## 2019-04-26 RX ADMIN — BUPIVACAINE HYDROCHLORIDE 2 ML: 2.5 INJECTION, SOLUTION INFILTRATION; PERINEURAL at 11:50

## 2019-05-15 DIAGNOSIS — R35.0 URINARY FREQUENCY: ICD-10-CM

## 2019-05-15 RX ORDER — SOLIFENACIN SUCCINATE 5 MG/1
5 TABLET, FILM COATED ORAL DAILY
Qty: 90 TABLET | Refills: 3 | Status: SHIPPED | OUTPATIENT
Start: 2019-05-15 | End: 2019-09-03 | Stop reason: SDUPTHER

## 2019-06-03 ENCOUNTER — HOSPITAL ENCOUNTER (OUTPATIENT)
Dept: RADIOLOGY | Facility: HOSPITAL | Age: 84
Discharge: HOME | End: 2019-06-03

## 2019-06-04 ENCOUNTER — OFFICE VISIT (OUTPATIENT)
Dept: FAMILY MEDICINE CLINIC | Facility: CLINIC | Age: 84
End: 2019-06-04
Payer: MEDICARE

## 2019-06-04 VITALS
WEIGHT: 136.5 LBS | HEIGHT: 60 IN | SYSTOLIC BLOOD PRESSURE: 130 MMHG | BODY MASS INDEX: 26.8 KG/M2 | DIASTOLIC BLOOD PRESSURE: 62 MMHG | TEMPERATURE: 97.9 F | RESPIRATION RATE: 18 BRPM | HEART RATE: 86 BPM

## 2019-06-04 DIAGNOSIS — I25.10 ATHEROSCLEROSIS OF NATIVE CORONARY ARTERY OF NATIVE HEART WITHOUT ANGINA PECTORIS: ICD-10-CM

## 2019-06-04 DIAGNOSIS — I49.3 PVCS (PREMATURE VENTRICULAR CONTRACTIONS): ICD-10-CM

## 2019-06-04 DIAGNOSIS — I10 ESSENTIAL HYPERTENSION: Primary | ICD-10-CM

## 2019-06-04 DIAGNOSIS — M15.9 GENERALIZED OSTEOARTHRITIS OF MULTIPLE SITES: ICD-10-CM

## 2019-06-04 DIAGNOSIS — N18.30 STAGE 3 CHRONIC KIDNEY DISEASE (HCC): ICD-10-CM

## 2019-06-04 DIAGNOSIS — E78.2 MIXED HYPERLIPIDEMIA: ICD-10-CM

## 2019-06-04 DIAGNOSIS — Z00.00 MEDICARE ANNUAL WELLNESS VISIT, SUBSEQUENT: ICD-10-CM

## 2019-06-04 PROCEDURE — 99214 OFFICE O/P EST MOD 30 MIN: CPT | Performed by: FAMILY MEDICINE

## 2019-06-04 PROCEDURE — G0439 PPPS, SUBSEQ VISIT: HCPCS | Performed by: FAMILY MEDICINE

## 2019-06-10 ENCOUNTER — HOSPITAL ENCOUNTER (OUTPATIENT)
Dept: RADIOLOGY | Facility: HOSPITAL | Age: 84
Discharge: HOME | End: 2019-06-10
Payer: MEDICARE

## 2019-06-10 DIAGNOSIS — M16.12 ARTHRITIS OF LEFT HIP: ICD-10-CM

## 2019-06-10 PROCEDURE — 20610 DRAIN/INJ JOINT/BURSA W/O US: CPT

## 2019-06-10 PROCEDURE — 77002 NEEDLE LOCALIZATION BY XRAY: CPT

## 2019-06-10 RX ORDER — LIDOCAINE HYDROCHLORIDE 10 MG/ML
7 INJECTION, SOLUTION EPIDURAL; INFILTRATION; INTRACAUDAL; PERINEURAL ONCE
Status: CANCELLED | OUTPATIENT
Start: 2019-06-10

## 2019-06-10 RX ORDER — METHYLPREDNISOLONE ACETATE 80 MG/ML
1 INJECTION, SUSPENSION INTRA-ARTICULAR; INTRALESIONAL; INTRAMUSCULAR; SOFT TISSUE ONCE
Status: CANCELLED | OUTPATIENT
Start: 2019-06-10

## 2019-06-10 RX ORDER — BUPIVACAINE HYDROCHLORIDE 2.5 MG/ML
2 INJECTION, SOLUTION EPIDURAL; INFILTRATION; INTRACAUDAL ONCE
Status: CANCELLED | OUTPATIENT
Start: 2019-06-10

## 2019-06-10 RX ADMIN — IOHEXOL 7 ML: 300 INJECTION, SOLUTION INTRAVENOUS at 13:30

## 2019-06-11 ENCOUNTER — OFFICE VISIT (OUTPATIENT)
Dept: UROLOGY | Facility: CLINIC | Age: 84
End: 2019-06-11
Payer: MEDICARE

## 2019-06-11 VITALS
WEIGHT: 139.6 LBS | HEIGHT: 60 IN | BODY MASS INDEX: 27.41 KG/M2 | SYSTOLIC BLOOD PRESSURE: 148 MMHG | HEART RATE: 84 BPM | DIASTOLIC BLOOD PRESSURE: 68 MMHG

## 2019-06-11 DIAGNOSIS — R35.0 URINARY FREQUENCY: Primary | ICD-10-CM

## 2019-06-11 LAB — POST-VOID RESIDUAL VOLUME, ML POC: 20 ML

## 2019-06-11 PROCEDURE — 51798 US URINE CAPACITY MEASURE: CPT | Performed by: PHYSICIAN ASSISTANT

## 2019-06-11 PROCEDURE — 99213 OFFICE O/P EST LOW 20 MIN: CPT | Performed by: PHYSICIAN ASSISTANT

## 2019-07-21 DIAGNOSIS — I10 ESSENTIAL HYPERTENSION: ICD-10-CM

## 2019-07-21 RX ORDER — METOPROLOL SUCCINATE 50 MG/1
TABLET, EXTENDED RELEASE ORAL
Qty: 90 TABLET | Refills: 3 | Status: SHIPPED | OUTPATIENT
Start: 2019-07-21 | End: 2020-01-19

## 2019-07-26 ENCOUNTER — OFFICE VISIT (OUTPATIENT)
Dept: OBGYN CLINIC | Facility: MEDICAL CENTER | Age: 84
End: 2019-07-26
Payer: MEDICARE

## 2019-07-26 VITALS
HEIGHT: 60 IN | SYSTOLIC BLOOD PRESSURE: 155 MMHG | DIASTOLIC BLOOD PRESSURE: 73 MMHG | BODY MASS INDEX: 27.29 KG/M2 | WEIGHT: 139 LBS | HEART RATE: 69 BPM

## 2019-07-26 DIAGNOSIS — M16.12 ARTHRITIS OF LEFT HIP: ICD-10-CM

## 2019-07-26 DIAGNOSIS — M70.62 TROCHANTERIC BURSITIS OF LEFT HIP: Primary | ICD-10-CM

## 2019-07-26 PROCEDURE — 20610 DRAIN/INJ JOINT/BURSA W/O US: CPT | Performed by: ORTHOPAEDIC SURGERY

## 2019-07-26 PROCEDURE — 99213 OFFICE O/P EST LOW 20 MIN: CPT | Performed by: ORTHOPAEDIC SURGERY

## 2019-07-26 RX ORDER — BUPIVACAINE HYDROCHLORIDE 2.5 MG/ML
2 INJECTION, SOLUTION INFILTRATION; PERINEURAL
Status: COMPLETED | OUTPATIENT
Start: 2019-07-26 | End: 2019-07-26

## 2019-07-26 RX ORDER — LIDOCAINE HYDROCHLORIDE 10 MG/ML
2 INJECTION, SOLUTION INFILTRATION; PERINEURAL
Status: COMPLETED | OUTPATIENT
Start: 2019-07-26 | End: 2019-07-26

## 2019-07-26 RX ORDER — BETAMETHASONE SODIUM PHOSPHATE AND BETAMETHASONE ACETATE 3; 3 MG/ML; MG/ML
12 INJECTION, SUSPENSION INTRA-ARTICULAR; INTRALESIONAL; INTRAMUSCULAR; SOFT TISSUE
Status: COMPLETED | OUTPATIENT
Start: 2019-07-26 | End: 2019-07-26

## 2019-07-26 RX ADMIN — BETAMETHASONE SODIUM PHOSPHATE AND BETAMETHASONE ACETATE 12 MG: 3; 3 INJECTION, SUSPENSION INTRA-ARTICULAR; INTRALESIONAL; INTRAMUSCULAR; SOFT TISSUE at 11:25

## 2019-07-26 RX ADMIN — LIDOCAINE HYDROCHLORIDE 2 ML: 10 INJECTION, SOLUTION INFILTRATION; PERINEURAL at 11:25

## 2019-07-26 RX ADMIN — BUPIVACAINE HYDROCHLORIDE 2 ML: 2.5 INJECTION, SOLUTION INFILTRATION; PERINEURAL at 11:25

## 2019-07-26 NOTE — PROGRESS NOTES
89 y o female presenting for follow-up for left hip pain  At her last visit she was administered a greater trochanteric bursa injection which provided good relief for her  In the interim she also received an intra-articular hip injection which improves her groin pain  At this point in time, her lateral hip pain has begun to return, so she is interested in another injection       Review of Systems  Review of systems negative unless otherwise specified in HPI    Past Medical History  Past Medical History:   Diagnosis Date    Anemia     last assessed - [id number]    [facility])     Depression     last assessed - [id number]    Glaucoma     Hypercalcemia     last assessed - [id number]    Hypertension     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    Macular degeneration     Osteoarthritis     Plantar fasciitis of right foot     last assessed - 07Apr2017    Stomach disorder     Superficial thrombophlebitis of leg     unspecified laterality; last assessed - [id number]    Trochanteric bursitis of left hip     last assessed - 07Apr2017       Past Surgical History  Past Surgical History:   Procedure Laterality Date    CORONARY STENT PLACEMENT      stent RCA    FL INJECTION LEFT HIP (NON ARTHROGRAM)  11/26/2018    FL INJECTION LEFT HIP (NON ARTHROGRAM)  3/4/2019    FL INJECTION LEFT HIP (NON ARTHROGRAM)  6/10/2019    FOOT SURGERY      HIP SURGERY      HYSTERECTOMY      SKIN LESION EXCISION  11/2010    Face - BCC - nose    TOTAL ABDOMINAL HYSTERECTOMY W/ BILATERAL SALPINGOOPHORECTOMY      TOTAL HIP ARTHROPLASTY Right        Current Medications  Current Outpatient Medications on File Prior to Visit   Medication Sig Dispense Refill    aspirin (ADULT ASPIRIN EC LOW STRENGTH) 81 mg EC tablet Take 1 tablet by mouth daily      atorvastatin (LIPITOR) 40 mg tablet Take 1 tablet (40 mg total) by mouth daily 90 tablet 3    Biotin 1 MG CAPS Take by mouth      cholecalciferol (VITAMIN D3) 1,000 units tablet Take 1 tablet by mouth daily      clopidogrel (PLAVIX) 75 mg tablet TAKE 1 TABLET BY MOUTH EVERY DAY 90 tablet 3    latanoprost (XALATAN) 0 005 % ophthalmic solution INSTILL 1 DROP INTO BOTH EYES EVERY DAY AS DIRECTED  4    metoprolol succinate (TOPROL-XL) 50 mg 24 hr tablet TAKE 1 TABLET BY MOUTH EVERY DAY 90 tablet 3    Multiple Vitamins-Minerals (ICAPS AREDS 2) CAPS Take by mouth      nitroglycerin (NITROSTAT) 0 4 mg SL tablet place 1 tablet under the tongue if needed EVERY 5 MINUTES UP TO 3 TIMES FOR CHEST PAIN  0    Omega-3 Fatty Acids (OMEGA-3 FISH OIL) 1200 MG CAPS Take by mouth      omeprazole (PriLOSEC) 20 mg delayed release capsule Take 1 capsule (20 mg total) by mouth daily for 90 days 90 capsule 3    Polyvinyl Alcohol-Povidone PF (REFRESH) 1 4-0 6 % SOLN Apply to eye      RESTASIS 0 05 % ophthalmic emulsion instill 1 drop into both eyes twice a day  0    solifenacin (VESICARE) 5 mg tablet Take 1 tablet (5 mg total) by mouth daily 90 tablet 3    valsartan-hydrochlorothiazide (DIOVAN-HCT) 80-12 5 MG per tablet Take 1 tablet by mouth daily 90 tablet 3     No current facility-administered medications on file prior to visit          Recent Labs [facility])  0   Lab Value Date/Time    HCT 36 3 05/21/2018 0829    HCT 38 0 03/17/2017 0925    HCT 36 8 11/16/2016 0919    HGB 11 8 05/21/2018 0829    HGB 12 3 03/17/2017 0925    HGB 12 2 11/16/2016 0919    WBC 6 4 05/21/2018 0829    WBC 5 45 03/17/2017 0925    WBC 6 1 11/16/2016 0919    GLUCOSE 96 11/16/2015 0915         Physical exam  · General: Awake, Alert, Oriented  · Eyes: Pupils equal, round and reactive to light  · Heart: regular rate and rhythm  · Lungs: No audible wheezing  · Abdomen: soft  MSK L hip  -skin overlying the hip is intact without erythema or ecchymosis  -tender to palpation over greater trochanter  -some groin pain with flexion/abduction/internal rotation  -positive Stinchfield test  -motor and sensory intact L3-S1    Imaging  X-ray of the left hip shows significant degenerative changes with diminished joint space and marginal osteophyte formation  Procedure  Large joint arthrocentesis: L greater trochanteric bursa  Date/Time: 7/26/2019 11:25 AM  Site marked: site marked  Timeout: Immediately prior to procedure a time out was called to verify the correct patient, procedure, equipment, support staff and site/side marked as required   Supporting Documentation  Indications: pain   Procedure Details  Location: hip - L greater trochanteric bursa  Preparation: Patient was prepped and draped in the usual sterile fashion  Needle size: 22 G  Approach: lateral  Medications administered: 2 mL bupivacaine 0 25 %; 2 mL lidocaine 1 %; 12 mg betamethasone acetate-betamethasone sodium phosphate 6 (3-3) mg/mL    Patient tolerance: patient tolerated the procedure well with no immediate complications  Dressing:  Sterile dressing applied            Assessment/Plan:   80 y  o female presenting for left greater trochanteric bursitis and left hip osteoarthritis  She was offered and administered an injection to her left greater troch bursa  She was instructed to follow up in approximately 3 months for repeat injection

## 2019-08-12 DIAGNOSIS — I10 HYPERTENSION, UNSPECIFIED TYPE: ICD-10-CM

## 2019-08-12 RX ORDER — VALSARTAN AND HYDROCHLOROTHIAZIDE 80; 12.5 MG/1; MG/1
1 TABLET, FILM COATED ORAL DAILY
Qty: 90 TABLET | Refills: 3 | Status: SHIPPED | OUTPATIENT
Start: 2019-08-12 | End: 2020-08-03

## 2019-08-19 DIAGNOSIS — I25.10 CORONARY ARTERY DISEASE INVOLVING NATIVE CORONARY ARTERY OF NATIVE HEART WITHOUT ANGINA PECTORIS: ICD-10-CM

## 2019-08-19 RX ORDER — CLOPIDOGREL BISULFATE 75 MG/1
75 TABLET ORAL DAILY
Qty: 90 TABLET | Refills: 3 | Status: SHIPPED | OUTPATIENT
Start: 2019-08-19 | End: 2020-04-16 | Stop reason: SDUPTHER

## 2019-08-21 DIAGNOSIS — K21.9 GASTROESOPHAGEAL REFLUX DISEASE WITHOUT ESOPHAGITIS: ICD-10-CM

## 2019-08-21 RX ORDER — OMEPRAZOLE 20 MG/1
20 CAPSULE, DELAYED RELEASE ORAL DAILY
Qty: 90 CAPSULE | Refills: 1 | Status: SHIPPED | OUTPATIENT
Start: 2019-08-21 | End: 2020-02-20

## 2019-09-03 DIAGNOSIS — R35.0 URINARY FREQUENCY: ICD-10-CM

## 2019-09-03 RX ORDER — SOLIFENACIN SUCCINATE 5 MG/1
5 TABLET, FILM COATED ORAL DAILY
Qty: 90 TABLET | Refills: 1 | Status: SHIPPED | OUTPATIENT
Start: 2019-09-03 | End: 2020-02-14 | Stop reason: CLARIF

## 2019-09-03 NOTE — TELEPHONE ENCOUNTER
An Auto-fax Refill Request for Solifenacin (VESICARE) 5mg was received from [facility]/pharmacy #[id number] Patient has PACE so a new script needed every six months    Script for same, 90 day supply with 1 refill was queued and forwarded to the Advanced Practitioner covering the [place] location for approval

## 2019-09-05 ENCOUNTER — TELEPHONE (OUTPATIENT)
Dept: OBGYN CLINIC | Facility: HOSPITAL | Age: 84
End: 2019-09-05

## 2019-09-05 DIAGNOSIS — M16.12 ARTHRITIS OF LEFT HIP: Primary | ICD-10-CM

## 2019-09-05 NOTE — TELEPHONE ENCOUNTER
Caller: Scott Jay, daughter  Call back number: [phone]  Patient's doctor: Dr Sandra Zambrano    Patient is asking if she can have a left hip injection again  If so, can a script be put in the system for this   Please advise

## 2019-09-24 ENCOUNTER — HOSPITAL ENCOUNTER (OUTPATIENT)
Dept: RADIOLOGY | Facility: HOSPITAL | Age: 84
Discharge: HOME | End: 2019-09-24
Payer: MEDICARE

## 2019-09-24 ENCOUNTER — TRANSCRIBE ORDERS (OUTPATIENT)
Dept: RADIOLOGY | Facility: HOSPITAL | Age: 84
End: 2019-09-24

## 2019-09-24 DIAGNOSIS — M16.12 ARTHRITIS OF LEFT HIP: ICD-10-CM

## 2019-09-24 PROCEDURE — 20610 DRAIN/INJ JOINT/BURSA W/O US: CPT

## 2019-09-24 PROCEDURE — 77002 NEEDLE LOCALIZATION BY XRAY: CPT

## 2019-09-24 RX ORDER — LIDOCAINE HYDROCHLORIDE 10 MG/ML
10 INJECTION, SOLUTION INFILTRATION; PERINEURAL
Status: COMPLETED | OUTPATIENT
Start: 2019-09-24 | End: 2019-09-24

## 2019-09-24 RX ORDER — METHYLPREDNISOLONE ACETATE 80 MG/ML
80 INJECTION, SUSPENSION INTRA-ARTICULAR; INTRALESIONAL; INTRAMUSCULAR; SOFT TISSUE
Status: COMPLETED | OUTPATIENT
Start: 2019-09-24 | End: 2019-09-24

## 2019-09-24 RX ORDER — BUPIVACAINE HYDROCHLORIDE 2.5 MG/ML
10 INJECTION, SOLUTION EPIDURAL; INFILTRATION; INTRACAUDAL
Status: COMPLETED | OUTPATIENT
Start: 2019-09-24 | End: 2019-09-24

## 2019-09-24 RX ADMIN — METHYLPREDNISOLONE ACETATE 80 MG: 80 INJECTION, SUSPENSION INTRA-ARTICULAR; INTRALESIONAL; INTRAMUSCULAR; SOFT TISSUE at 15:39

## 2019-09-24 RX ADMIN — BUPIVACAINE HYDROCHLORIDE 7 ML: 2.5 INJECTION, SOLUTION EPIDURAL; INFILTRATION; INTRACAUDAL; PERINEURAL at 15:39

## 2019-09-24 RX ADMIN — LIDOCAINE HYDROCHLORIDE 7 ML: 10 INJECTION, SOLUTION INFILTRATION; PERINEURAL at 15:39

## 2019-09-24 RX ADMIN — IOHEXOL 3 ML: 300 INJECTION, SOLUTION INTRAVENOUS at 15:39

## 2019-10-20 DIAGNOSIS — E78.00 HYPERCHOLESTEREMIA: ICD-10-CM

## 2019-10-20 RX ORDER — ATORVASTATIN CALCIUM 40 MG/1
TABLET, FILM COATED ORAL
Qty: 90 TABLET | Refills: 3 | Status: SHIPPED | OUTPATIENT
Start: 2019-10-20 | End: 2020-10-06

## 2019-10-25 ENCOUNTER — OFFICE VISIT (OUTPATIENT)
Dept: OBGYN CLINIC | Facility: MEDICAL CENTER | Age: 84
End: 2019-10-25
Payer: MEDICARE

## 2019-10-25 VITALS
SYSTOLIC BLOOD PRESSURE: 173 MMHG | BODY MASS INDEX: 27.09 KG/M2 | HEART RATE: 69 BPM | DIASTOLIC BLOOD PRESSURE: 65 MMHG | RESPIRATION RATE: 20 BRPM | WEIGHT: 138 LBS | HEIGHT: 60 IN

## 2019-10-25 DIAGNOSIS — M70.62 TROCHANTERIC BURSITIS OF LEFT HIP: ICD-10-CM

## 2019-10-25 DIAGNOSIS — M16.12 ARTHRITIS OF LEFT HIP: Primary | ICD-10-CM

## 2019-10-25 DIAGNOSIS — M25.552 LEFT HIP PAIN: ICD-10-CM

## 2019-10-25 PROCEDURE — 20610 DRAIN/INJ JOINT/BURSA W/O US: CPT | Performed by: ORTHOPAEDIC SURGERY

## 2019-10-25 PROCEDURE — 99213 OFFICE O/P EST LOW 20 MIN: CPT | Performed by: ORTHOPAEDIC SURGERY

## 2019-10-25 RX ORDER — LIDOCAINE HYDROCHLORIDE 10 MG/ML
2 INJECTION, SOLUTION INFILTRATION; PERINEURAL
Status: COMPLETED | OUTPATIENT
Start: 2019-10-25 | End: 2019-10-25

## 2019-10-25 RX ORDER — BETAMETHASONE SODIUM PHOSPHATE AND BETAMETHASONE ACETATE 3; 3 MG/ML; MG/ML
12 INJECTION, SUSPENSION INTRA-ARTICULAR; INTRALESIONAL; INTRAMUSCULAR; SOFT TISSUE
Status: COMPLETED | OUTPATIENT
Start: 2019-10-25 | End: 2019-10-25

## 2019-10-25 RX ORDER — BUPIVACAINE HYDROCHLORIDE 2.5 MG/ML
2 INJECTION, SOLUTION INFILTRATION; PERINEURAL
Status: COMPLETED | OUTPATIENT
Start: 2019-10-25 | End: 2019-10-25

## 2019-10-25 RX ADMIN — LIDOCAINE HYDROCHLORIDE 2 ML: 10 INJECTION, SOLUTION INFILTRATION; PERINEURAL at 10:57

## 2019-10-25 RX ADMIN — BETAMETHASONE SODIUM PHOSPHATE AND BETAMETHASONE ACETATE 12 MG: 3; 3 INJECTION, SUSPENSION INTRA-ARTICULAR; INTRALESIONAL; INTRAMUSCULAR; SOFT TISSUE at 10:57

## 2019-10-25 RX ADMIN — BUPIVACAINE HYDROCHLORIDE 2 ML: 2.5 INJECTION, SOLUTION INFILTRATION; PERINEURAL at 10:57

## 2019-10-25 NOTE — PROGRESS NOTES
Assessment:  1  Arthritis of left hip  FL injection left hip (non arthrogram)   2  Trochanteric bursitis of left hip     3  Left hip pain  FL injection left hip (non arthrogram)       Plan:  The patient was provided with left trochanteric bursa steroid injection  She should have repeat left IA hip injection in two months  She should follow up in 3 months  To do next visit:  Return in about 3 months (around 1/25/2020)  The above stated was discussed in layman's terms and the patient expressed understanding  All questions were answered to the patient's satisfaction  Scribe Attestation    I,:   Cheryl Nassar am acting as a scribe while in the presence of the attending physician :        I,:   Laly Sawant MD personally performed the services described in this documentation    as scribed in my presence :              Subjective:   Katt Dhaliwal is a 80 y o  female who presents for follow up of left hip  She is s/p left trochanteric bursa steroid injection, 7/26/19 and left IA hip steroid injection, 924/19, with benefit  Today she has minimal left hip complaints  She does complain of right > left ankle pain  She can have right ankle swelling          Review of systems negative unless otherwise specified in HPI    Past Medical History:   Diagnosis Date    Anemia     last assessed - [id number]    Cancer [facility])     Depression     last assessed - [id number]    Glaucoma     Hypercalcemia     last assessed - [id number]    Hypertension     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    Macular degeneration     Osteoarthritis     Plantar fasciitis of right foot     last assessed - 07Apr2017    Stomach disorder     Superficial thrombophlebitis of leg     unspecified laterality; last assessed - [id number]    Trochanteric bursitis of left hip     last assessed - 07Apr2017       Past Surgical History:   Procedure Laterality Date    CORONARY STENT PLACEMENT      stent RCA    FL INJECTION LEFT HIP (NON ARTHROGRAM)  11/26/2018    FL INJECTION LEFT HIP (NON ARTHROGRAM)  3/4/2019    FL INJECTION LEFT HIP (NON ARTHROGRAM)  6/10/2019    FL INJECTION LEFT HIP (NON ARTHROGRAM)  9/24/2019    FOOT SURGERY      HIP SURGERY      HYSTERECTOMY      SKIN LESION EXCISION  11/2010    Face - BCC - nose    TOTAL ABDOMINAL HYSTERECTOMY W/ BILATERAL SALPINGOOPHORECTOMY      TOTAL HIP ARTHROPLASTY Right        Family History   Problem Relation Age of Onset    Stroke Mother         cerebrovascular accident (CVA)    Coronary artery disease Mother         coronary disease    Stroke Father     Coronary artery disease Father         coronary disease    Stroke Brother     Kidney cancer Family        Social History     Occupational History    Not on file   Tobacco Use    Smoking status: Never Smoker    Smokeless tobacco: Never Used   Substance and Sexual Activity    Alcohol use: Yes     Comment: occasional wine with dinner    Drug use: No    Sexual activity: Not on file         Current Outpatient Medications:     aspirin (ADULT ASPIRIN EC LOW STRENGTH) 81 mg EC tablet, Take 1 tablet by mouth daily, Disp: , Rfl:     atorvastatin (LIPITOR) 40 mg tablet, TAKE 1 TABLET BY MOUTH EVERY DAY, Disp: 90 tablet, Rfl: 3    Biotin 1 MG CAPS, Take by mouth, Disp: , Rfl:     cholecalciferol (VITAMIN D3) 1,000 units tablet, Take 1 tablet by mouth daily, Disp: , Rfl:     clopidogrel (PLAVIX) 75 mg tablet, Take 1 tablet (75 mg total) by mouth daily, Disp: 90 tablet, Rfl: 3    latanoprost (XALATAN) 0 005 % ophthalmic solution, INSTILL 1 DROP INTO BOTH EYES EVERY DAY AS DIRECTED, Disp: , Rfl: 4    metoprolol succinate (TOPROL-XL) 50 mg 24 hr tablet, TAKE 1 TABLET BY MOUTH EVERY DAY, Disp: 90 tablet, Rfl: 3    Multiple Vitamins-Minerals (ICAPS AREDS 2) CAPS, Take by mouth, Disp: , Rfl:     nitroglycerin (NITROSTAT) 0 4 mg SL tablet, place 1 tablet under the tongue if needed EVERY 5 MINUTES UP TO 3 TIMES FOR CHEST PAIN, Disp: , Rfl: 0    Omega-3 Fatty Acids (OMEGA-3 FISH OIL) 1200 MG CAPS, Take by mouth, Disp: , Rfl:     omeprazole (PriLOSEC) 20 mg delayed release capsule, Take 1 capsule (20 mg total) by mouth daily for 180 days, Disp: 90 capsule, Rfl: 1    Polyvinyl Alcohol-Povidone PF (REFRESH) 1 4-0 6 % SOLN, Apply to eye, Disp: , Rfl:     RESTASIS 0 05 % ophthalmic emulsion, instill 1 drop into both eyes twice a day, Disp: , Rfl: 0    solifenacin (VESICARE) 5 mg tablet, Take 1 tablet (5 mg total) by mouth daily, Disp: 90 tablet, Rfl: 1    valsartan-hydrochlorothiazide (DIOVAN-HCT) 80-12 5 MG per tablet, Take 1 tablet by mouth daily, Disp: 90 tablet, Rfl: 3    Allergies   Allergen Reactions    Other      Iv contrast  Adhesive tape    Ciprofloxacin      Patient does not remember            Vitals:    10/25/19 1032   BP: (!) 173/65   Pulse: 69   Resp: 20       Objective:  Physical exam  · General: Awake, Alert, Oriented  · Eyes: Pupils equal, round and reactive to light  · Heart: regular rate and rhythm  · Lungs: No audible wheezing  · Abdomen: soft                    Ortho Exam   Left hip:  TTP left trochanteric bursa  Mild pain with IR  Patient sits comfortably in chair with hip flexed at 90 degrees  Patient stands from seated position without assistance    Bilateal ankles:  Mild swelling right > left   Good ROM  Calf compartments soft and supple  Sensation intact  Toes are warm sensate and mobile    Diagnostics, reviewed and taken today if performed as documented:    None performed     Procedures, if performed today:    Large joint arthrocentesis: L greater trochanteric bursa  Date/Time: 10/25/2019 10:57 AM  Consent given by: patient  Site marked: site marked  Supporting Documentation  Indications: pain   Procedure Details  Location: hip - L greater trochanteric bursa  Needle size: 22 G  Ultrasound guidance: no  Approach: lateral  Medications administered: 12 mg betamethasone acetate-betamethasone sodium phosphate 6 (3-3) mg/mL; 2 mL lidocaine 1 %; 2 mL bupivacaine 0 25 %    Patient tolerance: patient tolerated the procedure well with no immediate complications  Dressing:  Sterile dressing applied          Portions of the record may have been created with voice recognition software  Occasional wrong word or "sound a like" substitutions may have occurred due to the inherent limitations of voice recognition software  Read the chart carefully and recognize, using context, where substitutions have occurred

## 2019-11-04 ENCOUNTER — OFFICE VISIT (OUTPATIENT)
Dept: FAMILY MEDICINE CLINIC | Facility: CLINIC | Age: 84
End: 2019-11-04
Payer: MEDICARE

## 2019-11-04 VITALS
DIASTOLIC BLOOD PRESSURE: 58 MMHG | RESPIRATION RATE: 17 BRPM | WEIGHT: 140.5 LBS | TEMPERATURE: 97.7 F | SYSTOLIC BLOOD PRESSURE: 144 MMHG | BODY MASS INDEX: 26.53 KG/M2 | OXYGEN SATURATION: 97 % | HEIGHT: 61 IN | HEART RATE: 76 BPM

## 2019-11-04 DIAGNOSIS — E03.9 ACQUIRED HYPOTHYROIDISM: ICD-10-CM

## 2019-11-04 DIAGNOSIS — I49.3 PVCS (PREMATURE VENTRICULAR CONTRACTIONS): ICD-10-CM

## 2019-11-04 DIAGNOSIS — R26.9 GAIT DISTURBANCE: ICD-10-CM

## 2019-11-04 DIAGNOSIS — Z00.00 MEDICARE ANNUAL WELLNESS VISIT, SUBSEQUENT: ICD-10-CM

## 2019-11-04 DIAGNOSIS — E78.2 MIXED HYPERLIPIDEMIA: ICD-10-CM

## 2019-11-04 DIAGNOSIS — Z23 NEED FOR INFLUENZA VACCINATION: ICD-10-CM

## 2019-11-04 DIAGNOSIS — M16.12 PRIMARY LOCALIZED OSTEOARTHRITIS OF LEFT HIP: ICD-10-CM

## 2019-11-04 DIAGNOSIS — N18.30 STAGE 3 CHRONIC KIDNEY DISEASE (HCC): ICD-10-CM

## 2019-11-04 DIAGNOSIS — I25.10 ATHEROSCLEROSIS OF NATIVE CORONARY ARTERY OF NATIVE HEART WITHOUT ANGINA PECTORIS: ICD-10-CM

## 2019-11-04 DIAGNOSIS — I10 ESSENTIAL HYPERTENSION: Primary | ICD-10-CM

## 2019-11-04 PROBLEM — S62.346A NONDISPLACED FRACTURE OF BASE OF FIFTH METACARPAL BONE, RIGHT HAND, INITIAL ENCOUNTER FOR CLOSED FRACTURE: Status: RESOLVED | Noted: 2018-12-07 | Resolved: 2019-11-04

## 2019-11-04 PROBLEM — M25.552 LEFT HIP PAIN: Status: RESOLVED | Noted: 2018-05-11 | Resolved: 2019-11-04

## 2019-11-04 PROCEDURE — G0008 ADMIN INFLUENZA VIRUS VAC: HCPCS

## 2019-11-04 PROCEDURE — G0439 PPPS, SUBSEQ VISIT: HCPCS | Performed by: FAMILY MEDICINE

## 2019-11-04 PROCEDURE — 99214 OFFICE O/P EST MOD 30 MIN: CPT | Performed by: FAMILY MEDICINE

## 2019-11-04 PROCEDURE — 90662 IIV NO PRSV INCREASED AG IM: CPT

## 2019-11-04 NOTE — PROGRESS NOTES
Assessment and Plan:     Problem List Items Addressed This Visit        Endocrine    Hypothyroidism    Relevant Orders    TSH, 3rd generation with Free T4 reflex       Cardiovascular and Mediastinum    Atherosclerotic heart disease of native coronary artery without angina pectoris    Essential hypertension - Primary    Relevant Orders    CBC and differential    Comprehensive metabolic panel    PVCs (premature ventricular contractions)       Musculoskeletal and Integument    Primary localized osteoarthritis of left hip       Genitourinary    CKD (chronic kidney disease)       Other    Mixed hyperlipidemia    Relevant Orders    Lipid panel      Other Visit Diagnoses     Gait disturbance        Relevant Orders    Vitamin B12    Folate    Medicare annual wellness visit, subsequent        Need for influenza vaccination        Relevant Orders    influenza vaccine, 9198-9117, high-dose, PF 0 5 mL (FLUZONE HIGH-DOSE)           Preventive health issues were discussed with patient, and age appropriate screening tests were ordered as noted in patient's After Visit Summary  Personalized health advice and appropriate referrals for health education or preventive services given if needed, as noted in patient's After Visit Summary       History of Present Illness:     Patient presents for Medicare Annual Wellness visit    Patient Care Team:  Jake Mtz MD as PCP - MD Terry Paez MD (Orthopedic Surgery)  Alix Grady MD (Urology)  Raj Colvin MD (Ophthalmology)     Problem List:     Patient Active Problem List   Diagnosis    Chronic right shoulder pain    Trochanteric bursitis of left hip    Acquired valgus deformity of knee, left    Angiomyolipoma of kidney    Atherosclerotic heart disease of native coronary artery without angina pectoris    CKD (chronic kidney disease)    Essential hypertension    Gastroesophageal reflux disease    Generalized osteoarthritis of multiple sites  Hypothyroidism    Mixed hyperlipidemia    Primary localized osteoarthritis of left hip    PVCs (premature ventricular contractions)    Tear of right rotator cuff    Venous insufficiency (chronic) (peripheral)      Past Medical and Surgical History:     Past Medical History:   Diagnosis Date    Anemia     last assessed - 24Sep2014    Cancer [facility])     Depression     last assessed - 24Sep2014    Glaucoma     Hypercalcemia     last assessed - [id number]    Hypertension     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    Macular degeneration     Nondisplaced fracture of base of fifth metacarpal bone, right hand, initial encounter for closed fracture 12/7/2018    Osteoarthritis     Plantar fasciitis of right foot     last assessed - 07Apr2017    Stomach disorder     Superficial thrombophlebitis of leg     unspecified laterality; last assessed - [id number]    Trochanteric bursitis of left hip     last assessed - 07Apr2017     Past Surgical History:   Procedure Laterality Date    CORONARY STENT PLACEMENT      stent RCA    FL INJECTION LEFT HIP (NON ARTHROGRAM)  11/26/2018    FL INJECTION LEFT HIP (NON ARTHROGRAM)  3/4/2019    FL INJECTION LEFT HIP (NON ARTHROGRAM)  6/10/2019    FL INJECTION LEFT HIP (NON ARTHROGRAM)  9/24/2019    FOOT SURGERY      HIP SURGERY      HYSTERECTOMY      SKIN LESION EXCISION  11/2010    Face - BCC - nose    TOTAL ABDOMINAL HYSTERECTOMY W/ BILATERAL SALPINGOOPHORECTOMY      TOTAL HIP ARTHROPLASTY Right       Family History:     Family History   Problem Relation Age of Onset    Stroke Mother         cerebrovascular accident (CVA)    Coronary artery disease Mother         coronary disease    Stroke Father     Coronary artery disease Father         coronary disease    Stroke Brother     Kidney cancer Family       Social History:     Social History     Socioeconomic History    Marital status:       Spouse name: None    Number of children: None    Years of education: None    Highest education level: None   Occupational History    None   Social Needs    Financial resource strain: None    Food insecurity:     Worry: None     Inability: None    Transportation needs:     Medical: None     Non-medical: None   Tobacco Use    Smoking status: Never Smoker    Smokeless tobacco: Never Used   Substance and Sexual Activity    Alcohol use: Yes     Frequency: 2-4 times a month     Drinks per session: 1 or 2     Binge frequency: Never     Comment: occasional wine with dinner    Drug use: No    Sexual activity: Not Currently     Partners: Male     Birth control/protection: None   Lifestyle    Physical activity:     Days per week: None     Minutes per session: None    Stress: None   Relationships    Social connections:     Talks on phone: None     Gets together: None     Attends Christian service: None     Active member of club or organization: None     Attends meetings of clubs or organizations: None     Relationship status: None    Intimate partner violence:     Fear of current or ex partner: None     Emotionally abused: None     Physically abused: None     Forced sexual activity: None   Other Topics Concern    None   Social History Narrative    Caffeine use       Medications and Allergies:     Current Outpatient Medications   Medication Sig Dispense Refill    aspirin (ADULT ASPIRIN EC LOW STRENGTH) 81 mg EC tablet Take 1 tablet by mouth daily      atorvastatin (LIPITOR) 40 mg tablet TAKE 1 TABLET BY MOUTH EVERY DAY 90 tablet 3    Biotin 1 MG CAPS Take by mouth      cholecalciferol (VITAMIN D3) 1,000 units tablet Take 1 tablet by mouth daily      clopidogrel (PLAVIX) 75 mg tablet Take 1 tablet (75 mg total) by mouth daily 90 tablet 3    latanoprost (XALATAN) 0 005 % ophthalmic solution INSTILL 1 DROP INTO BOTH EYES EVERY DAY AS DIRECTED  4    metoprolol succinate (TOPROL-XL) 50 mg 24 hr tablet TAKE 1 TABLET BY MOUTH EVERY DAY 90 tablet 3    Multiple Vitamins-Minerals (ICAPS AREDS 2) CAPS Take by mouth      nitroglycerin (NITROSTAT) 0 4 mg SL tablet place 1 tablet under the tongue if needed EVERY 5 MINUTES UP TO 3 TIMES FOR CHEST PAIN  0    Omega-3 Fatty Acids (OMEGA-3 FISH OIL) 1200 MG CAPS Take by mouth      omeprazole (PriLOSEC) 20 mg delayed release capsule Take 1 capsule (20 mg total) by mouth daily for 180 days 90 capsule 1    Polyvinyl Alcohol-Povidone PF (REFRESH) 1 4-0 6 % SOLN Apply to eye      RESTASIS 0 05 % ophthalmic emulsion instill 1 drop into both eyes twice a day  0    solifenacin (VESICARE) 5 mg tablet Take 1 tablet (5 mg total) by mouth daily 90 tablet 1    valsartan-hydrochlorothiazide (DIOVAN-HCT) 80-12 5 MG per tablet Take 1 tablet by mouth daily 90 tablet 3     No current facility-administered medications for this visit  Allergies   Allergen Reactions    Other      Iv contrast  Adhesive tape    Ciprofloxacin      Patient does not remember      Immunizations:     Immunization History   Administered Date(s) Administered    INFLUENZA 11/18/2015, 11/21/2016, 11/30/2017    Influenza Split High Dose Preservative Free IM 11/19/2012, 10/03/2013, 09/24/2014, 11/18/2015, 11/21/2016, 11/30/2017    Influenza TIV (IM) 10/24/2011    Influenza, high dose seasonal 0 5 mL 12/03/2018    Pneumococcal Conjugate 13-Valent 11/18/2015    Pneumococcal Polysaccharide PPV23 01/02/2008      Health Maintenance: There are no preventive care reminders to display for this patient  Topic Date Due    INFLUENZA VACCINE  07/01/2019      Medicare Health Risk Assessment:     /58 (BP Location: Left arm, Patient Position: Sitting, Cuff Size: Large)   Pulse 76   Temp 97 7 °F (36 5 °C)   Resp 17   Ht 5' 0 5" (1 537 m)   Wt 63 7 kg (140 lb 8 oz)   SpO2 97%   Breastfeeding? No   BMI 26 99 kg/m²      Aguilar [place] is here for her Subsequent Wellness visit   Last Medicare Wellness visit information reviewed, patient interviewed and updates made to the record today  Health Risk Assessment:   Patient rates overall health as good  Patient feels that their physical health rating is same  Eyesight was rated as much worse  Hearing was rated as slightly worse  Patient feels that their emotional and mental health rating is slightly worse  Pain experienced in the last 7 days has been some  Patient's pain rating has been 3/10  Depression Screening:   PHQ-2 Score: 1      Fall Risk Screening: In the past year, patient has experienced: history of falling in past year    Number of falls: 1  Injured during fall?: No    Feels unsteady when standing or walking?: Yes    Worried about falling?: Yes      Urinary Incontinence Screening:   Patient has not leaked urine accidently in the last six months  Home Safety:  Patient has trouble with stairs inside or outside of their home  Patient has working smoke alarms and has no working carbon monoxide detector  Home safety hazards include: none  Nutrition:   Current diet is Regular and Limited junk food  Medications:   Patient is currently taking over-the-counter supplements  OTC medications include: see medication list  Patient is able to manage medications  Activities of Daily Living (ADLs)/Instrumental Activities of Daily Living (IADLs):   Walk and transfer into and out of bed and chair?: Yes  Dress and groom yourself?: Yes    Bathe or shower yourself?: Yes    Feed yourself?  Yes  Do your laundry/housekeeping?: Yes  Manage your money, pay your bills and track your expenses?: Yes  Make your own meals?: Yes    Do your own shopping?: Yes    Advance Care Planning:   Living will: Yes    Advanced directive: Yes      Cognitive Screening:   Provider or family/friend/caregiver concerned regarding cognition?: Yes    PREVENTIVE SCREENINGS      Cardiovascular Screening:    General: Screening Not Indicated and History Lipid Disorder      Diabetes Screening:       Due for: Blood Glucose      Colorectal Cancer Screening:     General: Screening Not Indicated      Breast Cancer Screening:     General: Risks and Benefits Discussed and Patient Declines      Cervical Cancer Screening:    General: Screening Not Indicated      Osteoporosis Screening:    General: Patient Declines      Abdominal Aortic Aneurysm (AAA) Screening:        General: Screening Not Indicated      Hepatitis C Screening:    General: Screening Not Indicated    Other Counseling Topics:   Calcium and vitamin D intake         Thao Lunsford MD

## 2019-11-04 NOTE — PROGRESS NOTES
Assessment/Plan:     Diagnoses and all orders for this visit:    Essential hypertension  -     CBC and differential  -     Comprehensive metabolic panel    Stage 3 chronic kidney disease (HCC)    Mixed hyperlipidemia  -     Lipid panel    Atherosclerosis of native coronary artery of native heart without angina pectoris    PVCs (premature ventricular contractions)    Gait disturbance  -     Vitamin B12  -     Folate    Primary localized osteoarthritis of left hip    Acquired hypothyroidism  -     TSH, 3rd generation with Free T4 reflex    Medicare annual wellness visit, subsequent    Need for influenza vaccination  -     influenza vaccine, 1905-3483, high-dose, PF 0 5 mL (FLUZONE HIGH-DOSE)        Continue with current medications  Repeat labs  Re: memory issues I discussed additional testing she declined at this time  Monitor for any changes  Flu vaccine today  OV 6 months  Patient ID: Funmilayo Mccracken is a 80 y o  female  Followup visit  Here with her daughter  Medications reviewed  No longer driving  Patient lives alone in a senior high rise apartment  Independent with ADLs and most of her IADLs  Need some assist with grocery shopping  She eats TV dinners or pre cook meals  Her daughter feels there has been some decline  Forgetful  No recent labs  Hypertension blood pressures have been stable on Valsartan HCTZ 80/12 5 daily and Metoprolol ER 50 mg daily  05/2018 creatinine 1 22  GFR 45  electrolytes normal  Hgb 11 8  Hyperlipidemia and CAD on Atorvastatin 40 mg daily  Lipid profile 05/2018 cholesterol 188  TGs 128  HDL 50  LIH20  FBS 93  LFTs normal        The following portions of the patient's history were reviewed and updated as appropriate: allergies, current medications, past family history, past medical history, past social history, past surgical history and problem list     Review of Systems   Constitutional: Negative for appetite change, chills, fatigue, fever and unexpected weight change  HENT: Positive for hearing loss  Negative for congestion, ear pain, rhinorrhea, sore throat and trouble swallowing  Eyes: Positive for visual disturbance  ARMD and glaucoma  Respiratory: Negative for cough, shortness of breath and wheezing  No orthopnea or PND   Cardiovascular: Negative for chest pain, palpitations and leg swelling  Cardiology evaluation 5/2015 for exertional dyspnea and palpitations  Holter monitor showed normal sinus rhythm  139 single PVCs  No sustained ventricular rhythm  rare PACs, consisting of 183 single PACs  3 were noted in bigeminy  10 noted in couplets  no sustained supraventricular rhythm  No significant pauses or high grade AV block  Echocardiogram showed normal left ventricular function, ejection fraction 41% grade 1 diastolic dysfunction  No significant valvular abnormalities  chronic swelling right lower leg  s/p resection of leiomyosarcoma  Repeat echocardiogram 09/2017 normal left ventricular systolic function  EF 60%  No regional wall motion abnormalities  Right ventricle normal  Trace MR  No pericardial effusion  Gastrointestinal: Negative for abdominal pain, blood in stool, constipation, diarrhea, nausea and vomiting  GERD stable on Omeprazole  no reflux  no dysphagia  Endocrine:        Past history of mildly elevated Ca++  05/2018 Ca++ 10 0   03/2017 Ca++ 9 4  11/2016 Ca++ 10 4  03/2016 Ca 10 4  11/2015 Ca++ 9 8  07/2015 10 4  01/2015 10 5 no change from 09/2014  SPEP normal  intact PTH normal  hypothyroidism  05/2018 TSH 3 84  3/2017 TSH 5 310  Positive thyroid microsomal antibody  patient was started on Levothyroxine 25 mcg daily  05/2017  in August She stop levothyroxine when she started itching  No rash  repeat TSH 11/2017 4 46  Genitourinary: Negative for difficulty urinating, dysuria, frequency and hematuria  OAB stable on Vesicare  nocturia x 1  followed by urology  right renal angiomyolipoma   renal u/s 09/2015 Musculoskeletal: Positive for arthralgias and gait problem  Negative for myalgias  Recurrent left hip pain  OA left hip  She is followed by orthopedics  X-rays of left hip showed moderate to severe osteoarthritis  03/2019 left hip intra articular steroid injection with symptom improvement   10/2019 left hip bursa injection  OA right shoulder  X-rays of right shoulder showed mild glenohumeral osteoarthritis and mild right AC joint osteoarthritis  She completed a course of physical therapy  Skin: Negative for rash  Neurological: Negative for dizziness and headaches  ER visit for fall 12/2018  No LOC  12/2018 CT scan of head no acute intracranial abnormality  Decreased attenuation noted in the periventricular and subcortical white matter is demonstrating moderate microangiopathic change  episode of slurred speech 10/2015 no recurrence  on ASA 81 mg daily and Plavix  she refused work up  Hematological: Negative for adenopathy  Bruises/bleeds easily  On ASA and Plavix  Psychiatric/Behavioral: Negative for dysphoric mood and sleep disturbance  Objective:      /58 (BP Location: Left arm, Patient Position: Sitting, Cuff Size: Large)   Pulse 76   Temp 97 7 °F (36 5 °C)   Resp 17   Ht 5' 0 5" (1 537 m)   Wt 63 7 kg (140 lb 8 oz)   SpO2 97%   Breastfeeding? No   BMI 26 99 kg/m²          Physical Exam   Constitutional: She is oriented to person, place, and time  She appears well-developed and well-nourished  No distress  HENT:   Mouth/Throat: Oropharynx is clear and moist and mucous membranes are normal  No oral lesions  Normal dentition  Eyes: Pupils are equal, round, and reactive to light  Conjunctivae and EOM are normal  No scleral icterus  Neck: No JVD present  Carotid bruit is not present  No tracheal deviation present  No thyroid mass and no thyromegaly present  Cardiovascular: Normal rate, regular rhythm and normal heart sounds  Exam reveals no gallop     No murmur heard  Pulmonary/Chest: Effort normal and breath sounds normal  No respiratory distress  She has no wheezes  She has no rales  Abdominal: Soft  Bowel sounds are normal  She exhibits no distension, no abdominal bruit and no mass  There is no hepatosplenomegaly  There is no tenderness  There is no rebound and no guarding  Musculoskeletal: Normal range of motion  She exhibits edema (chronic edema right ankle  )  Lymphadenopathy:     She has no cervical adenopathy  Neurological: She is alert and oriented to person, place, and time  No cranial nerve deficit  Skin: No rash noted  No cyanosis  Nails show no clubbing  Psychiatric: She has a normal mood and affect  Her speech is normal and behavior is normal  Thought content normal  She exhibits abnormal recent memory  Nursing note and vitals reviewed

## 2019-11-26 ENCOUNTER — APPOINTMENT (OUTPATIENT)
Dept: LAB | Facility: MEDICAL CENTER | Age: 84
End: 2019-11-26
Payer: MEDICARE

## 2019-11-26 LAB
ALBUMIN SERPL BCP-MCNC: 3.9 G/DL (ref 3.5–5)
ALP SERPL-CCNC: 110 U/L (ref 46–116)
ALT SERPL W P-5'-P-CCNC: 25 U/L (ref 12–78)
ANION GAP SERPL CALCULATED.3IONS-SCNC: 8 MMOL/L (ref 4–13)
AST SERPL W P-5'-P-CCNC: 18 U/L (ref 5–45)
BASOPHILS # BLD AUTO: 0.04 THOUSANDS/ΜL (ref 0–0.1)
BASOPHILS NFR BLD AUTO: 1 % (ref 0–1)
BILIRUB SERPL-MCNC: 0.59 MG/DL (ref 0.2–1)
BUN SERPL-MCNC: 20 MG/DL (ref 5–25)
CALCIUM ALBUM COR SERPL-MCNC: 10.6 MG/DL (ref 8.3–10.1)
CALCIUM SERPL-MCNC: 10.5 MG/DL (ref 8.3–10.1)
CHLORIDE SERPL-SCNC: 109 MMOL/L (ref 100–108)
CHOLEST SERPL-MCNC: 179 MG/DL (ref 50–200)
CO2 SERPL-SCNC: 23 MMOL/L (ref 21–32)
CREAT SERPL-MCNC: 1.18 MG/DL (ref 0.6–1.3)
EOSINOPHIL # BLD AUTO: 0.12 THOUSAND/ΜL (ref 0–0.61)
EOSINOPHIL NFR BLD AUTO: 2 % (ref 0–6)
ERYTHROCYTE [DISTWIDTH] IN BLOOD BY AUTOMATED COUNT: 13.4 % (ref 11.6–15.1)
FOLATE SERPL-MCNC: >20 NG/ML (ref 3.1–17.5)
GFR SERPL CREATININE-BSD FRML MDRD: 40 ML/MIN/1.73SQ M
GLUCOSE P FAST SERPL-MCNC: 93 MG/DL (ref 65–99)
HCT VFR BLD AUTO: 38.5 % (ref 34.8–46.1)
HDLC SERPL-MCNC: 53 MG/DL
HGB BLD-MCNC: 12 G/DL (ref 11.5–15.4)
IMM GRANULOCYTES # BLD AUTO: 0.01 THOUSAND/UL (ref 0–0.2)
IMM GRANULOCYTES NFR BLD AUTO: 0 % (ref 0–2)
LDLC SERPL CALC-MCNC: 100 MG/DL (ref 0–100)
LYMPHOCYTES # BLD AUTO: 1.24 THOUSANDS/ΜL (ref 0.6–4.47)
LYMPHOCYTES NFR BLD AUTO: 21 % (ref 14–44)
MCH RBC QN AUTO: 28.8 PG (ref 26.8–34.3)
MCHC RBC AUTO-ENTMCNC: 31.2 G/DL (ref 31.4–37.4)
MCV RBC AUTO: 93 FL (ref 82–98)
MONOCYTES # BLD AUTO: 0.4 THOUSAND/ΜL (ref 0.17–1.22)
MONOCYTES NFR BLD AUTO: 7 % (ref 4–12)
NEUTROPHILS # BLD AUTO: 4.09 THOUSANDS/ΜL (ref 1.85–7.62)
NEUTS SEG NFR BLD AUTO: 69 % (ref 43–75)
NONHDLC SERPL-MCNC: 126 MG/DL
NRBC BLD AUTO-RTO: 0 /100 WBCS
PLATELET # BLD AUTO: 238 THOUSANDS/UL (ref 149–390)
PMV BLD AUTO: 9.6 FL (ref 8.9–12.7)
POTASSIUM SERPL-SCNC: 4.4 MMOL/L (ref 3.5–5.3)
PROT SERPL-MCNC: 7.1 G/DL (ref 6.4–8.2)
RBC # BLD AUTO: 4.16 MILLION/UL (ref 3.81–5.12)
SODIUM SERPL-SCNC: 140 MMOL/L (ref 136–145)
T4 FREE SERPL-MCNC: 0.86 NG/DL (ref 0.76–1.46)
TRIGL SERPL-MCNC: 131 MG/DL
TSH SERPL DL<=0.05 MIU/L-ACNC: 3.75 UIU/ML (ref 0.36–3.74)
VIT B12 SERPL-MCNC: 338 PG/ML (ref 100–900)
WBC # BLD AUTO: 5.9 THOUSAND/UL (ref 4.31–10.16)

## 2019-11-26 PROCEDURE — 82607 VITAMIN B-12: CPT | Performed by: FAMILY MEDICINE

## 2019-11-26 PROCEDURE — 80061 LIPID PANEL: CPT | Performed by: FAMILY MEDICINE

## 2019-11-26 PROCEDURE — 84443 ASSAY THYROID STIM HORMONE: CPT | Performed by: FAMILY MEDICINE

## 2019-11-26 PROCEDURE — 84439 ASSAY OF FREE THYROXINE: CPT | Performed by: FAMILY MEDICINE

## 2019-11-26 PROCEDURE — 80053 COMPREHEN METABOLIC PANEL: CPT | Performed by: FAMILY MEDICINE

## 2019-11-26 PROCEDURE — 82746 ASSAY OF FOLIC ACID SERUM: CPT | Performed by: FAMILY MEDICINE

## 2019-11-26 PROCEDURE — 85025 COMPLETE CBC W/AUTO DIFF WBC: CPT | Performed by: FAMILY MEDICINE

## 2019-11-26 PROCEDURE — 36415 COLL VENOUS BLD VENIPUNCTURE: CPT | Performed by: FAMILY MEDICINE

## 2019-12-01 ENCOUNTER — TELEPHONE (OUTPATIENT)
Dept: FAMILY MEDICINE CLINIC | Facility: CLINIC | Age: 84
End: 2019-12-01

## 2019-12-01 NOTE — TELEPHONE ENCOUNTER
Call re labs  All results are normal except for mildly elevated calcium at 10 5 normal 8 3 to 10 1  This has been issue in the past  No additional testing needed at this time  TSH borderline abnormal no treatment needed  Vitamin B 12 low normal  I would recommend OTC oral vitamin B 12 1,000 mcg daily       SL AMB CALCIUM (mg/dL)   Date Value   05/21/2018 10 0     Calcium (mg/dL)   Date Value   11/26/2019 10 5 (H)   03/17/2017 9 4   11/16/2016 10 4   03/22/2016 10 4   11/16/2015 9 8         Recent Results (from the past 336 hour(s))   CBC and differential    Collection Time: 11/26/19  9:49 AM   Result Value Ref Range    WBC 5 90 4 31 - 10 16 Thousand/uL    RBC 4 16 3 81 - 5 12 Million/uL    Hemoglobin 12 0 11 5 - 15 4 g/dL    Hematocrit 38 5 34 8 - 46 1 %    MCV 93 82 - 98 fL    MCH 28 8 26 8 - 34 3 pg    MCHC 31 2 (L) 31 4 - 37 4 g/dL    RDW 13 4 11 6 - 15 1 %    MPV 9 6 8 9 - 12 7 fL    Platelets 087 374 - 118 Thousands/uL    nRBC 0 /100 WBCs    Neutrophils Relative 69 43 - 75 %    Immat GRANS % 0 0 - 2 %    Lymphocytes Relative 21 14 - 44 %    Monocytes Relative 7 4 - 12 %    Eosinophils Relative 2 0 - 6 %    Basophils Relative 1 0 - 1 %    Neutrophils Absolute 4 09 1 85 - 7 62 Thousands/µL    Immature Grans Absolute 0 01 0 00 - 0 20 Thousand/uL    Lymphocytes Absolute 1 24 0 60 - 4 47 Thousands/µL    Monocytes Absolute 0 40 0 17 - 1 22 Thousand/µL    Eosinophils Absolute 0 12 0 00 - 0 61 Thousand/µL    Basophils Absolute 0 04 0 00 - 0 10 Thousands/µL   Comprehensive metabolic panel    Collection Time: 11/26/19  9:49 AM   Result Value Ref Range    Sodium 140 136 - 145 mmol/L    Potassium 4 4 3 5 - 5 3 mmol/L    Chloride 109 (H) 100 - 108 mmol/L    CO2 23 21 - 32 mmol/L    ANION GAP 8 4 - 13 mmol/L    BUN 20 5 - 25 mg/dL    Creatinine 1 18 0 60 - 1 30 mg/dL    Glucose, Fasting 93 65 - 99 mg/dL    Calcium 10 5 (H) 8 3 - 10 1 mg/dL    Corrected Calcium 10 6 (H) 8 3 - 10 1 mg/dL    AST 18 5 - 45 U/L    ALT 25 12 - 78 U/L    Alkaline Phosphatase 110 46 - 116 U/L    Total Protein 7 1 6 4 - 8 2 g/dL    Albumin 3 9 3 5 - 5 0 g/dL    Total Bilirubin 0 59 0 20 - 1 00 mg/dL    eGFR 40 ml/min/1 73sq m   Lipid panel    Collection Time: 11/26/19  9:49 AM   Result Value Ref Range    Cholesterol 179 50 - 200 mg/dL    Triglycerides 131 <=150 mg/dL    HDL, Direct 53 >=40 mg/dL    LDL Calculated 100 0 - 100 mg/dL    Non-HDL-Chol (CHOL-HDL) 126 mg/dl   TSH, 3rd generation with Free T4 reflex    Collection Time: 11/26/19  9:49 AM   Result Value Ref Range    TSH 3RD GENERATON 3 750 (H) 0 358 - 3 740 uIU/mL   Vitamin B12    Collection Time: 11/26/19  9:49 AM   Result Value Ref Range    Vitamin B-12 338 100 - 900 pg/mL   Folate    Collection Time: 11/26/19  9:49 AM   Result Value Ref Range    Folate >20 0 (H) 3 1 - 17 5 ng/mL   T4, free    Collection Time: 11/26/19  9:49 AM   Result Value Ref Range    Free T4 0 86 0 76 - 1 46 ng/dL

## 2019-12-05 ENCOUNTER — APPOINTMENT (EMERGENCY)
Dept: RADIOLOGY | Facility: HOSPITAL | Age: 84
End: 2019-12-05
Payer: MEDICARE

## 2019-12-05 ENCOUNTER — APPOINTMENT (EMERGENCY)
Dept: CT IMAGING | Facility: HOSPITAL | Age: 84
End: 2019-12-05
Payer: MEDICARE

## 2019-12-05 ENCOUNTER — HOSPITAL ENCOUNTER (OUTPATIENT)
Facility: HOSPITAL | Age: 84
Setting detail: OBSERVATION
Discharge: HOME HEALTH | End: 2019-12-06
Attending: EMERGENCY MEDICINE | Admitting: FAMILY MEDICINE
Payer: MEDICARE

## 2019-12-05 DIAGNOSIS — S32.009A CLOSED FRACTURE OF TRANSVERSE PROCESS OF LUMBAR VERTEBRA, INITIAL ENCOUNTER (HCC): Primary | ICD-10-CM

## 2019-12-05 DIAGNOSIS — S30.1XXA CONTUSION OF FLANK, INITIAL ENCOUNTER: ICD-10-CM

## 2019-12-05 DIAGNOSIS — S20.222A CONTUSION OF UPPER BACK, LEFT, INITIAL ENCOUNTER: ICD-10-CM

## 2019-12-05 DIAGNOSIS — N18.30 CHRONIC KIDNEY FAILURE, STAGE 3 (MODERATE) (HCC): ICD-10-CM
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
## 2019-12-05 PROBLEM — Z95.5 HISTORY OF CORONARY ARTERY STENT PLACEMENT: Status: ACTIVE | Noted: 2019-12-05

## 2019-12-05 PROBLEM — E83.52 HYPERCALCEMIA: Status: ACTIVE | Noted: 2019-12-05

## 2019-12-05 LAB
ABO GROUP BLD: NORMAL
ALBUMIN SERPL BCP-MCNC: 3.9 G/DL (ref 3.5–5)
ALP SERPL-CCNC: 121 U/L (ref 46–116)
ALT SERPL W P-5'-P-CCNC: 20 U/L (ref 12–78)
ANION GAP SERPL CALCULATED.3IONS-SCNC: 14 MMOL/L (ref 4–13)
APTT PPP: 24 SECONDS (ref 23–37)
AST SERPL W P-5'-P-CCNC: 17 U/L (ref 5–45)
BACTERIA UR QL AUTO: ABNORMAL /HPF
BASOPHILS # BLD AUTO: 0.04 THOUSANDS/ΜL (ref 0–0.1)
BASOPHILS NFR BLD AUTO: 1 % (ref 0–1)
BILIRUB SERPL-MCNC: 0.57 MG/DL (ref 0.2–1)
BILIRUB UR QL STRIP: NEGATIVE
BLD GP AB SCN SERPL QL: NEGATIVE
BUN SERPL-MCNC: 24 MG/DL (ref 5–25)
CALCIUM SERPL-MCNC: 10.7 MG/DL (ref 8.3–10.1)
CHLORIDE SERPL-SCNC: 104 MMOL/L (ref 100–108)
CK SERPL-CCNC: 71 U/L (ref 26–192)
CLARITY UR: CLEAR
CO2 SERPL-SCNC: 23 MMOL/L (ref 21–32)
COLOR UR: YELLOW
CREAT SERPL-MCNC: 1.28 MG/DL (ref 0.6–1.3)
EOSINOPHIL # BLD AUTO: 0.07 THOUSAND/ΜL (ref 0–0.61)
EOSINOPHIL NFR BLD AUTO: 1 % (ref 0–6)
ERYTHROCYTE [DISTWIDTH] IN BLOOD BY AUTOMATED COUNT: 13.3 % (ref 11.6–15.1)
GFR SERPL CREATININE-BSD FRML MDRD: 36 ML/MIN/1.73SQ M
GLUCOSE SERPL-MCNC: 121 MG/DL (ref 65–140)
GLUCOSE UR STRIP-MCNC: NEGATIVE MG/DL
HCT VFR BLD AUTO: 38.4 % (ref 34.8–46.1)
HGB BLD-MCNC: 12.1 G/DL (ref 11.5–15.4)
HGB UR QL STRIP.AUTO: NEGATIVE
IMM GRANULOCYTES # BLD AUTO: 0.02 THOUSAND/UL (ref 0–0.2)
IMM GRANULOCYTES NFR BLD AUTO: 0 % (ref 0–2)
INR PPP: 1 (ref 0.84–1.19)
KETONES UR STRIP-MCNC: NEGATIVE MG/DL
LEUKOCYTE ESTERASE UR QL STRIP: ABNORMAL
LIPASE SERPL-CCNC: 51 U/L (ref 73–393)
LYMPHOCYTES # BLD AUTO: 1.07 THOUSANDS/ΜL (ref 0.6–4.47)
LYMPHOCYTES NFR BLD AUTO: 15 % (ref 14–44)
MCH RBC QN AUTO: 29.2 PG (ref 26.8–34.3)
MCHC RBC AUTO-ENTMCNC: 31.5 G/DL (ref 31.4–37.4)
MCV RBC AUTO: 93 FL (ref 82–98)
MONOCYTES # BLD AUTO: 0.71 THOUSAND/ΜL (ref 0.17–1.22)
MONOCYTES NFR BLD AUTO: 10 % (ref 4–12)
NEUTROPHILS # BLD AUTO: 5.39 THOUSANDS/ΜL (ref 1.85–7.62)
NEUTS SEG NFR BLD AUTO: 73 % (ref 43–75)
NITRITE UR QL STRIP: NEGATIVE
NON-SQ EPI CELLS URNS QL MICRO: ABNORMAL /HPF
NRBC BLD AUTO-RTO: 0 /100 WBCS
PH UR STRIP.AUTO: 5.5 [PH] (ref 4.5–8)
PLATELET # BLD AUTO: 205 THOUSANDS/UL (ref 149–390)
PMV BLD AUTO: 9.5 FL (ref 8.9–12.7)
POTASSIUM SERPL-SCNC: 3.8 MMOL/L (ref 3.5–5.3)
PROT SERPL-MCNC: 7.1 G/DL (ref 6.4–8.2)
PROT UR STRIP-MCNC: NEGATIVE MG/DL
PROTHROMBIN TIME: 12.6 SECONDS (ref 11.6–14.5)
RBC # BLD AUTO: 4.15 MILLION/UL (ref 3.81–5.12)
RBC #/AREA URNS AUTO: ABNORMAL /HPF
RH BLD: NEGATIVE
SODIUM SERPL-SCNC: 141 MMOL/L (ref 136–145)
SP GR UR STRIP.AUTO: 1.01 (ref 1–1.03)
SPECIMEN EXPIRATION DATE: NORMAL
UROBILINOGEN UR QL STRIP.AUTO: 0.2 E.U./DL
WBC # BLD AUTO: 7.3 THOUSAND/UL (ref 4.31–10.16)
WBC #/AREA URNS AUTO: ABNORMAL /HPF

## 2019-12-05 PROCEDURE — 99214 OFFICE O/P EST MOD 30 MIN: CPT | Performed by: PHYSICIAN ASSISTANT

## 2019-12-05 PROCEDURE — 82550 ASSAY OF CK (CPK): CPT | Performed by: EMERGENCY MEDICINE

## 2019-12-05 PROCEDURE — 99285 EMERGENCY DEPT VISIT HI MDM: CPT | Performed by: EMERGENCY MEDICINE

## 2019-12-05 PROCEDURE — 83690 ASSAY OF LIPASE: CPT | Performed by: EMERGENCY MEDICINE

## 2019-12-05 PROCEDURE — 86850 RBC ANTIBODY SCREEN: CPT | Performed by: EMERGENCY MEDICINE

## 2019-12-05 PROCEDURE — 71250 CT THORAX DX C-: CPT

## 2019-12-05 PROCEDURE — G8979 MOBILITY GOAL STATUS: HCPCS

## 2019-12-05 PROCEDURE — 85610 PROTHROMBIN TIME: CPT | Performed by: EMERGENCY MEDICINE

## 2019-12-05 PROCEDURE — 93005 ELECTROCARDIOGRAM TRACING: CPT

## 2019-12-05 PROCEDURE — 99220 PR INITIAL OBSERVATION CARE/DAY 70 MINUTES: CPT | Performed by: FAMILY MEDICINE

## 2019-12-05 PROCEDURE — 85025 COMPLETE CBC W/AUTO DIFF WBC: CPT | Performed by: EMERGENCY MEDICINE

## 2019-12-05 PROCEDURE — 86901 BLOOD TYPING SEROLOGIC RH(D): CPT | Performed by: EMERGENCY MEDICINE

## 2019-12-05 PROCEDURE — 80053 COMPREHEN METABOLIC PANEL: CPT | Performed by: EMERGENCY MEDICINE

## 2019-12-05 PROCEDURE — 1123F ACP DISCUSS/DSCN MKR DOCD: CPT | Performed by: PHYSICIAN ASSISTANT

## 2019-12-05 PROCEDURE — 71045 X-RAY EXAM CHEST 1 VIEW: CPT

## 2019-12-05 PROCEDURE — 86900 BLOOD TYPING SEROLOGIC ABO: CPT | Performed by: EMERGENCY MEDICINE

## 2019-12-05 PROCEDURE — 81001 URINALYSIS AUTO W/SCOPE: CPT

## 2019-12-05 PROCEDURE — 85730 THROMBOPLASTIN TIME PARTIAL: CPT | Performed by: EMERGENCY MEDICINE

## 2019-12-05 PROCEDURE — 97163 PT EVAL HIGH COMPLEX 45 MIN: CPT

## 2019-12-05 PROCEDURE — 99285 EMERGENCY DEPT VISIT HI MDM: CPT

## 2019-12-05 PROCEDURE — 97116 GAIT TRAINING THERAPY: CPT

## 2019-12-05 PROCEDURE — 74176 CT ABD & PELVIS W/O CONTRAST: CPT

## 2019-12-05 PROCEDURE — 36415 COLL VENOUS BLD VENIPUNCTURE: CPT | Performed by: EMERGENCY MEDICINE

## 2019-12-05 PROCEDURE — G8978 MOBILITY CURRENT STATUS: HCPCS

## 2019-12-05 RX ORDER — DOCUSATE SODIUM 100 MG/1
100 CAPSULE, LIQUID FILLED ORAL 2 TIMES DAILY PRN
Status: DISCONTINUED | OUTPATIENT
Start: 2019-12-05 | End: 2019-12-06 | Stop reason: HOSPADM

## 2019-12-05 RX ORDER — OXYCODONE HYDROCHLORIDE 5 MG/1
2.5 TABLET ORAL EVERY 4 HOURS PRN
Status: DISCONTINUED | OUTPATIENT
Start: 2019-12-05 | End: 2019-12-06 | Stop reason: HOSPADM

## 2019-12-05 RX ORDER — LIDOCAINE 50 MG/G
1 PATCH TOPICAL DAILY
Status: COMPLETED | OUTPATIENT
Start: 2019-12-05 | End: 2019-12-05

## 2019-12-05 RX ORDER — ACETAMINOPHEN 325 MG/1
975 TABLET ORAL EVERY 8 HOURS SCHEDULED
Status: DISCONTINUED | OUTPATIENT
Start: 2019-12-05 | End: 2019-12-06 | Stop reason: HOSPADM

## 2019-12-05 RX ORDER — METOPROLOL SUCCINATE 50 MG/1
50 TABLET, EXTENDED RELEASE ORAL DAILY
Status: DISCONTINUED | OUTPATIENT
Start: 2019-12-05 | End: 2019-12-06 | Stop reason: HOSPADM

## 2019-12-05 RX ORDER — POLYETHYLENE GLYCOL 3350 17 G/17G
17 POWDER, FOR SOLUTION ORAL DAILY PRN
Status: DISCONTINUED | OUTPATIENT
Start: 2019-12-05 | End: 2019-12-06 | Stop reason: HOSPADM

## 2019-12-05 RX ORDER — POLYVINYL ALCOHOL 14 MG/ML
1 SOLUTION/ DROPS OPHTHALMIC EVERY 12 HOURS SCHEDULED
Status: DISCONTINUED | OUTPATIENT
Start: 2019-12-05 | End: 2019-12-05

## 2019-12-05 RX ORDER — LOSARTAN POTASSIUM 50 MG/1
50 TABLET ORAL DAILY
Status: DISCONTINUED | OUTPATIENT
Start: 2019-12-05 | End: 2019-12-06 | Stop reason: HOSPADM

## 2019-12-05 RX ORDER — HYDRALAZINE HYDROCHLORIDE 20 MG/ML
5 INJECTION INTRAMUSCULAR; INTRAVENOUS EVERY 8 HOURS PRN
Status: DISCONTINUED | OUTPATIENT
Start: 2019-12-05 | End: 2019-12-06 | Stop reason: HOSPADM

## 2019-12-05 RX ORDER — HYDROCHLOROTHIAZIDE 12.5 MG/1
12.5 TABLET ORAL DAILY
Status: DISCONTINUED | OUTPATIENT
Start: 2019-12-05 | End: 2019-12-06 | Stop reason: HOSPADM

## 2019-12-05 RX ORDER — LIDOCAINE 50 MG/G
1 PATCH TOPICAL ONCE
Status: DISCONTINUED | OUTPATIENT
Start: 2019-12-05 | End: 2019-12-06 | Stop reason: HOSPADM

## 2019-12-05 RX ORDER — LATANOPROST 50 UG/ML
1 SOLUTION/ DROPS OPHTHALMIC
Status: DISCONTINUED | OUTPATIENT
Start: 2019-12-06 | End: 2019-12-06 | Stop reason: HOSPADM

## 2019-12-05 RX ORDER — PANTOPRAZOLE SODIUM 40 MG/1
40 TABLET, DELAYED RELEASE ORAL
Status: DISCONTINUED | OUTPATIENT
Start: 2019-12-05 | End: 2019-12-06 | Stop reason: HOSPADM

## 2019-12-05 RX ORDER — ATORVASTATIN CALCIUM 40 MG/1
40 TABLET, FILM COATED ORAL DAILY
Status: DISCONTINUED | OUTPATIENT
Start: 2019-12-05 | End: 2019-12-06 | Stop reason: HOSPADM

## 2019-12-05 RX ORDER — ONDANSETRON 2 MG/ML
4 INJECTION INTRAMUSCULAR; INTRAVENOUS EVERY 6 HOURS PRN
Status: DISCONTINUED | OUTPATIENT
Start: 2019-12-05 | End: 2019-12-06 | Stop reason: HOSPADM

## 2019-12-05 RX ADMIN — METOPROLOL SUCCINATE 50 MG: 50 TABLET, EXTENDED RELEASE ORAL at 09:44

## 2019-12-05 RX ADMIN — ACETAMINOPHEN 975 MG: 325 TABLET, FILM COATED ORAL at 21:06

## 2019-12-05 RX ADMIN — LIDOCAINE 1 PATCH: 50 PATCH TOPICAL at 09:44

## 2019-12-05 RX ADMIN — DEXTRAN 70 AND HYPROMELLOSE 2910 1 DROP: 1; 3 SOLUTION/ DROPS OPHTHALMIC at 09:45

## 2019-12-05 RX ADMIN — HYDROCHLOROTHIAZIDE 12.5 MG: 12.5 TABLET ORAL at 09:44

## 2019-12-05 RX ADMIN — ACETAMINOPHEN 975 MG: 325 TABLET, FILM COATED ORAL at 14:17

## 2019-12-05 RX ADMIN — DEXTRAN 70 AND HYPROMELLOSE 2910 1 DROP: 1; 3 SOLUTION/ DROPS OPHTHALMIC at 21:08

## 2019-12-05 RX ADMIN — ACETAMINOPHEN 975 MG: 325 TABLET, FILM COATED ORAL at 09:44

## 2019-12-05 RX ADMIN — LOSARTAN POTASSIUM 50 MG: 50 TABLET, FILM COATED ORAL at 09:44

## 2019-12-05 RX ADMIN — PANTOPRAZOLE SODIUM 40 MG: 40 TABLET, DELAYED RELEASE ORAL at 09:44

## 2019-12-05 RX ADMIN — ATORVASTATIN CALCIUM 40 MG: 40 TABLET, FILM COATED ORAL at 09:44

## 2019-12-05 NOTE — H&P
H&P- Julia Delay 1/22/1926, 80 y o  female MRN: [id number]    Unit/Bed#: S -01 Encounter: [id number]    Primary Care Provider: Jai Kinney MD   Date and time admitted to hospital: 12/5/2019  3:13 AM        * Traumatic closed nondisplaced fracture of lumbar vertebra, initial encounter [facility])  Assessment & Plan  · Status post fall 12/4 while trying to close the curtains  Reports falling onto left back  Denies LOC, head injury, CP, SOB  Takes plavix and aspirin for CAD/stent placement  · [facility]  · CT chest abdomen pelvis:  Acute Nondisplaced fracture of left transverse process L2 to L3  Atelectasis vs infiltrate on CT scan - patient is without respiratory complains at this time  · Plan  · Consult PT, consult Ortho  · LSO brace  · Geriatric pain management   · Incentive spirometer   · Hold plavix and aspirin at this time due to significant bruising      Hypercalcemia  Assessment & Plan  · Calcium 10 7 with near normal albumin   · Recheck in AM, hold vitamin d    History of coronary artery stent placement  Assessment & Plan  · History of RCA stent in 2009  · On Plavix and aspirin, hold aspirin and plavix for today due to significant bruising  · Hgb stable, recheck in AM      Asymptomatic hypertensive urgency  Assessment & Plan  · 183/75 on arrival to emergency department  Improved to 174/7    · Echocardiogram 2017:  LVEF 61%, grade 1 diastolic dysfunction, trace mitral regurgitation, mild tricuspid regurgitation, mild pulmonic regurgitation  · Continue home regimen:  Metoprolol succinate 50 mg daily, valsartan-HCTZ 80-12 5 daily  · PRN hydralazine for SBP >180 despite adequate pain control     Chronic kidney failure, stage 3 (moderate) (HCC)  Assessment & Plan  · Current creatinine 1 2, this is her baseline  · Avoid nephrotoxins, hypotension      VTE Prophylaxis: s/p fall on plavix, aspirin   / sequential compression device   Code Status: Level 3   POLST: POLST is not applicable to this patient  Discussion with family: patient     Anticipated Length of Stay:  Patient will be admitted on an Observation basis with an anticipated length of stay of  Less than 2 midnights  Justification for Hospital Stay: PT consult, ortho consult, LSO brace     Total Time for Visit, including Counseling / Coordination of Care: 45 minutes  Greater than 50% of this total time spent on direct patient counseling and coordination of care  Chief Complaint:   Fall, back pain     History of Present Illness:    Melissa Noe is a 80 y o  female who presents with fall and back pain that occurred 12/4 while she was trying to close her curtains  She lives indepdently in a [facility] apartment  She reports falling onto the left side of her back onto a coffee table  She denies any chest pain, palpitations, shortness of breath, loss of consciousness, head injury  She currently is on plavix and aspirin for history of CAD with stent placement  Past medical history significant for HTN, CKD, CAD with coronary stent, GERD, hypothyroidism  On arrival to emergency department, ct scan reveals acute nondisplaced fracture of left transverse process L2 to L3  Labs appear stable with the exception of calcium of 10 7  Recheck in AM, hold vitamin D   PT and ortho consult pending  She will require LSO brace  She denies any loss of bowel or bladder  She has no numbness, tingling, loss of sensation  For hypertensive urgency, continue home regimen  Hydralazine PRN if she remains elevated despite adequate pain control  Review of Systems:    Review of Systems   Constitutional: Negative for activity change, chills, fatigue, fever and unexpected weight change  HENT: Negative  Eyes: Negative  Respiratory: Negative for cough, chest tightness, shortness of breath and wheezing  Cardiovascular: Negative for chest pain, palpitations and leg swelling     Gastrointestinal: Positive for diarrhea (secondary to lactose intake)  Negative for abdominal distention, abdominal pain, anal bleeding, blood in stool, constipation, nausea and vomiting  Genitourinary: Positive for difficulty urinating  Negative for dysuria, frequency and urgency  Musculoskeletal: Positive for back pain  Negative for neck stiffness  Skin: Positive for color change (bruise)  Negative for rash  Neurological: Negative for dizziness, facial asymmetry, speech difficulty, weakness, light-headedness and headaches  Psychiatric/Behavioral: Negative  Past Medical and Surgical History:     Past Medical History:   Diagnosis Date    Anemia     last assessed - [id number]    Cancer [facility])     Depression     last assessed - [id number]    Glaucoma     Hypercalcemia     last assessed - [id number]    Hypertension     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    Macular degeneration     Nondisplaced fracture of base of fifth metacarpal bone, right hand, initial encounter for closed fracture 12/7/2018    Osteoarthritis     Plantar fasciitis of right foot     last assessed - 07Apr2017    Stomach disorder     Superficial thrombophlebitis of leg     unspecified laterality; last assessed - [id number]    Trochanteric bursitis of left hip     last assessed - 07Apr2017       Past Surgical History:   Procedure Laterality Date    CORONARY STENT PLACEMENT      stent RCA    FL INJECTION LEFT HIP (NON ARTHROGRAM)  11/26/2018    FL INJECTION LEFT HIP (NON ARTHROGRAM)  3/4/2019    FL INJECTION LEFT HIP (NON ARTHROGRAM)  6/10/2019    FL INJECTION LEFT HIP (NON ARTHROGRAM)  9/24/2019    FOOT SURGERY      HIP SURGERY      HYSTERECTOMY      SKIN LESION EXCISION  11/2010    Face - BCC - nose    TOTAL ABDOMINAL HYSTERECTOMY W/ BILATERAL SALPINGOOPHORECTOMY      TOTAL HIP ARTHROPLASTY Right        Meds/Allergies:    Prior to Admission medications    Medication Sig Start Date End Date Taking?  Authorizing Provider   aspirin (ADULT ASPIRIN EC LOW STRENGTH) 81 mg EC tablet Take 1 tablet by mouth daily    Historical Provider, MD   atorvastatin (LIPITOR) 40 mg tablet TAKE 1 TABLET BY MOUTH EVERY DAY 10/20/19   Maryann Bautista MD   Biotin 1 MG CAPS Take by mouth    Historical Provider, MD   cholecalciferol (VITAMIN D3) 1,000 units tablet Take 1 tablet by mouth daily    Historical Provider, MD   clopidogrel (PLAVIX) 75 mg tablet Take 1 tablet (75 mg total) by mouth daily 8/19/19   Maryann Bautista MD   latanoprost (XALATAN) 0 005 % ophthalmic solution INSTILL 1 DROP INTO BOTH EYES EVERY DAY AS DIRECTED 2/7/19   Historical Provider, MD   metoprolol succinate (TOPROL-XL) 50 mg 24 hr tablet TAKE 1 TABLET BY MOUTH EVERY DAY 7/21/19   Maryann Bautista MD   Multiple Vitamins-Minerals (ICAPS AREDS 2) CAPS Take by mouth    Historical Provider, MD   nitroglycerin (NITROSTAT) 0 4 mg SL tablet place 1 tablet under the tongue if needed EVERY 5 MINUTES UP TO 3 TIMES FOR CHEST PAIN 11/30/17   Historical Provider, MD   Omega-3 Fatty Acids (OMEGA-3 FISH OIL) 1200 MG CAPS Take by mouth    Historical Provider, MD   omeprazole (PriLOSEC) 20 mg delayed release capsule Take 1 capsule (20 mg total) by mouth daily for 180 days 8/21/19 2/17/20  Maryann Bautista MD   Polyvinyl Alcohol-Povidone PF (REFRESH) 1 4-0 6 % SOLN Apply to eye    Historical Provider, MD   RESTASIS 0 05 % ophthalmic emulsion instill 1 drop into both eyes twice a day 12/6/17   Historical Provider, MD   solifenacin (VESICARE) 5 mg tablet Take 1 tablet (5 mg total) by mouth daily 9/3/19   Sandra Flores PA-C   valsartan-hydrochlorothiazide (DIOVAN-HCT) 80-12 5 MG per tablet Take 1 tablet by mouth daily 8/12/19   Maryann Bautista MD     I have reviewed home medications with patient personally  Allergies:    Allergies   Allergen Reactions    Other      Iv contrast  Adhesive tape    Iv Contrast [Iodinated Diagnostic Agents]     Ciprofloxacin      Patient does not remember       Social History: Marital Status:      Patient Pre-hospital Living Situation: independent senior [place]  Patient Pre-hospital Level of Mobility: cane or walker    Substance Use History:   Social History     Substance and Sexual Activity   Alcohol Use Yes    Frequency: 2-4 times a month    Drinks per session: 1 or 2    Binge frequency: Never    Comment: occasional wine with dinner     Social History     Tobacco Use   Smoking Status Never Smoker   Smokeless Tobacco Never Used     Social History     Substance and Sexual Activity   Drug Use No       Family History:    non-contributory    Physical Exam:     Vitals:   Blood Pressure: 160/90 (12/05/19 0756)  Pulse: 67 (12/05/19 0756)  Temperature: 98 7 °F (37 1 °C) (12/05/19 0756)  Temp Source: Oral (12/05/19 0318)  Respirations: 18 (12/05/19 0756)  Height: 5' 1" (154 9 cm) (12/05/19 0756)  Weight - Scale: 66 7 kg (147 lb 0 8 oz) (12/05/19 0756)  SpO2: 96 % (12/05/19 0732)    Physical Exam   Constitutional: She is oriented to person, place, and time  She appears well-developed and well-nourished  No distress  HENT:   Head: Normocephalic and atraumatic  Eyes: Pupils are equal, round, and reactive to light  EOM are normal  No scleral icterus  Neck: Normal range of motion  Neck supple  Cardiovascular: Normal rate, regular rhythm, normal heart sounds and intact distal pulses  No murmur heard  Pulmonary/Chest: Effort normal and breath sounds normal  No respiratory distress  She has no wheezes  She has no rales  She exhibits no tenderness  Abdominal: Soft  Bowel sounds are normal  She exhibits no distension and no mass  There is no tenderness  There is no guarding  Musculoskeletal: She exhibits tenderness  She exhibits no edema  Neurological: She is alert and oriented to person, place, and time  Skin: Skin is warm  Capillary refill takes less than 2 seconds  Bruising to left    Psychiatric: She has a normal mood and affect               Additional Data:     Lab Results: I have personally reviewed pertinent reports  Results from last 7 days   Lab Units 12/05/19  0356   WBC Thousand/uL 7 30   HEMOGLOBIN g/dL 12 1   HEMATOCRIT % 38 4   PLATELETS Thousands/uL 205   NEUTROS PCT % 73   LYMPHS PCT % 15   MONOS PCT % 10   EOS PCT % 1     Results from last 7 days   Lab Units 12/05/19  0356   SODIUM mmol/L 141   POTASSIUM mmol/L 3 8   CHLORIDE mmol/L 104   CO2 mmol/L 23   BUN mg/dL 24   CREATININE mg/dL 1 28   ANION GAP mmol/L 14*   CALCIUM mg/dL 10 7*   ALBUMIN g/dL 3 9   TOTAL BILIRUBIN mg/dL 0 57   ALK PHOS U/L 121*   ALT U/L 20   AST U/L 17   GLUCOSE RANDOM mg/dL 121     Results from last 7 days   Lab Units 12/05/19  0356   INR  1 00                   Imaging: I have personally reviewed pertinent reports  XR chest 1 view portable   ED Interpretation by Mesha Villafana MD (12/05 4619)   Cardiomegaly read by me  Final Result by Billy Salinas MD (12/05 5629)         1  Patchy opacity within the left lung base, which may represent atelectasis or developing pneumonia  Findings are also seen on CT performed subsequently after this radiograph  2   Stable moderate cardiomegaly  Workstation performed: [id number]         CT chest abdomen pelvis wo contrast   ED Interpretation by Mesha Villafana MD (12/05 5810)   FINDINGS:      CHEST      LUNGS:  Patchy density at the left base may represent atelectasis or infiltrate   New 9 mm lobulated density in the posterior right upper lobe on image 3/43-46, 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  Luna Frater is no tracheal or endobronchial    lesion  PLEURA:  Unremarkable  HEART/GREAT VESSELS:  Normal heart size   Coronary artery calcifications noted   Normal caliber thoracic aorta with mild atherosclerotic calcifications  MEDIASTINUM AND BILLIE:  Unremarkable  CHEST WALL AND LOWER NECK:   Unremarkable        ABDOMEN   Absence of intravenous contrast enhancement limits evaluation of the solid abdominal viscera  LIVER/BILIARY TREE:  Unremarkable  GALLBLADDER:  No calcified gallstones  No pericholecystic inflammatory change  SPLEEN:  Unremarkable  PANCREAS:  Unremarkable  ADRENAL GLANDS:  Unremarkable  KIDNEYS/URETERS:  Partially exophytic fat density nodule measuring 2 7 x 2 7 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   Probable simple cyst at the interpolar left kidney measures 2 0 x 2 2 cm   No hydronephrosis or calculi  STOMACH AND BOWEL: Robbert Louder is colonic diverticulosis without evidence of acute diverticulitis  APPENDIX:  No findings to suggest appendicitis  ABDOMINOPELVIC CAVITY:  No ascites or free intraperitoneal air  No lymphadenopathy  VESSELS:  Atherosclerotic changes are present   No evidence of aneurysm  PELVIS      REPRODUCTIVE ORGANS:  Patient is status post hysterectomy  URINARY BLADDER:  Unremarkable  ABDOMINAL WALL/INGUINAL REGIONS:  Unremarkable  OSSEOUS STRUCTURES:  Acute nondisplaced fractures of the left transverse processes of L2 and L3   Status post right hip arthroplasty  Impression:        Nondisplaced fractures of the left transverse processes of L2 and L3   No other evidence of acute posttraumatic abnormality  Mild left basilar atelectasis versus infiltrate  Colonic diverticulosis  Workstation performed: [id number]         Final Result by Karla Mcdonald MD (09/81 9844)      Nondisplaced fractures of the left transverse processes of L2 and L3  No other evidence of acute posttraumatic abnormality  Mild left basilar atelectasis versus infiltrate  Colonic diverticulosis  Workstation performed: [id number]             EKG, Pathology, and Other Studies Reviewed on Admission:   · EKG documention: sinus rhythm, 1st AV block     Allscripts / Epic Records Reviewed: Yes     ** Please Note: This note has been constructed using a voice recognition system  **

## 2019-12-05 NOTE — PLAN OF CARE
Problem: PHYSICAL THERAPY ADULT  Goal: Performs mobility at highest level of function for planned discharge setting  See evaluation for individualized goals  Description  Treatment/Interventions: Functional transfer training, LE strengthening/ROM, Therapeutic exercise, Endurance training, Patient/family training, Equipment eval/education, Bed mobility, Gait training          See flowsheet documentation for full assessment, interventions and recommendations  12/5/2019 1823 by Nolvia Nguyễn, PT  Outcome: Progressing  Note:   Prognosis: Fair  Problem List: Decreased strength, Decreased range of motion, Decreased endurance, Impaired balance, Decreased mobility, Decreased safety awareness, Orthopedic restrictions, Pain  Assessment: Therapist introduced roller walker use w/ mobility to address impairments noted during evaluation  Pt was noted to have improvement in mobility status w/ use of roller walker w/ increased ambulation distance  Pt is at continued risk for falls per time to complete Timed Up and Go (using 13 5 seconds as fall risk cut score)  continued inpatient PT tx is indicated to reduce fall risk factors and progress mobility training as appropriate  Recommendation: Home PT, OT consult          See flowsheet documentation for full assessment  12/5/2019 1817 by Nolvia Nguyễn, PT  Outcome: Progressing  Note:   Prognosis: Fair  Problem List: Decreased strength, Decreased range of motion, Decreased endurance, Impaired balance, Decreased mobility, Decreased safety awareness, Orthopedic restrictions, Pain  Assessment: Pt presents with fall and back pain that occurred 12/4 while she was trying to close her curtains  Dx: left transverse process fxs of L2 and L3, hypercalcemia, hypertensive urgency, and CKD  order placed for PT eval and tx, w/ activity order of WBAT B LEs and LSO for out of bed   Currently LSO brace is unavailable - contacted Dr Jeanne Ga who stated pt is appropriate to mobilize w/o brace until it can be obtained  pt presents w/ comorbidities of anemia, glaucoma, HTN, OA, and right RANDALL and personal factors of advanced age, mobilizing w/ assistive device, limited home support, positive fall history and depression  pt presents w/ pain, weakness, decreased endurance, impaired balance, gait deviations, decreased safety awareness, orthopedic restrictions and fall risk  these impairments are evident in findings from physical examination (weakness and decreased ROM), mobility assessment (need for standby assist w/ all phases of mobility when usually mobilizing independently, tolerance to only 50 feet of ambulation and need for cueing for mobility technique), and Barthel Index: 70/100  pt needed input for task focus and mobility technique  pt is at risk for falls due to physical and safety awareness deficits  pt's clinical presentation is unstable/unpredictable (evident in need for assist w/ all phases of mobility when usually mobilizing independently, tolerance to only 50 feet of ambulation, pain impacting overall mobility status and need for input for mobility technique/safety)  pt needs inpatient PT tx to improve mobility deficits  discharge recommendation is for home PT to reduce fall risk and maximize level of functional independence  Pt would benefit from OT consult to address safety awareness and ADL adaptation for LE dressing/care  Recommendation: Home PT, OT consult          See flowsheet documentation for full assessment

## 2019-12-05 NOTE — UTILIZATION REVIEW
Initial Clinical Review    Admission: Date/Time/Statement: Observation Admission 12/05/19 0614  Orders Placed This Encounter   Procedures    Place in Observation     Standing Status:   Standing     Number of Occurrences:   1     Order Specific Question:   Admitting Physician     Answer:   Rogers Yanes [[id number]]     Order Specific Question:   Level of Care     Answer:   Med Surg [[id number]]     ED Arrival Information     Expected Arrival Acuity Means of Arrival Escorted By Service Admission Type    - 12/5/2019 03:12 Emergent Ambulance [address] Emergency    Arrival Complaint    Fall-back pain        Chief Complaint   Patient presents with    Fall     Pt states she was trying to close her curtains at home causing her to fall around 1800  -head strike -LOC Reports falling onto the L side of her back +bruising +thinners Denies any difficulty urinating Denies any other complaints  Assessment/Plan: 80 y o  female who presents with fall and back pain that occurred 12/4 while she was trying to close her curtains  She lives indepdently in a [facility] apartment  She reports falling onto the left side of her back onto a coffee table  She denies any chest pain, palpitations, shortness of breath, loss of consciousness, head injury  She currently is on plavix and aspirin for history of CAD with stent placement  On arrival to emergency department, ct scan reveals acute nondisplaced fracture of left transverse process L2 to L3  For hypertensive urgency, continue home regimen  Hydralazine PRN if she remains elevated despite adequate pain control        Traumatic closed nondisplaced fracture of lumbar vertebra, initial encounter [facility])  Assessment & Plan  · Status post fall 12/4 while trying to close the curtains  Reports falling onto left back  Denies LOC, head injury, CP, SOB  Takes plavix and aspirin for CAD/stent placement    · Lives in [facility] apartment  · CT chest abdomen pelvis:  Acute Nondisplaced fracture of left transverse process L2 to L3  Atelectasis vs infiltrate on CT scan - patient is without respiratory complains at this time  · Plan  ? Consult PT, consult Ortho  ? LSO brace  ? Geriatric pain management   ? Incentive spirometer   ? Hold plavix and aspirin at this time due to significant bruising  Hypercalcemia  Assessment & Plan  · Calcium 10 7 with near normal albumin   · Recheck in AM, hold vitamin d  History of coronary artery stent placement  Assessment & Plan  · History of RCA stent in 2009  · On Plavix and aspirin, hold aspirin and plavix for today due to significant bruising  · Hgb stable, recheck in AM    Asymptomatic hypertensive urgency  Assessment & Plan  · 183/75 on arrival to emergency department  Improved to 174/7  · Echocardiogram 2017:  LVEF 83%, grade 1 diastolic dysfunction, trace mitral regurgitation, mild tricuspid regurgitation, mild pulmonic regurgitation  · Continue home regimen:  Metoprolol succinate 50 mg daily, valsartan-HCTZ 80-12 5 daily  · PRN hydralazine for SBP >180 despite adequate pain control   Chronic kidney failure, stage 3 (moderate) ([facility])  Assessment & Plan  · Current creatinine 1 2, this is her baseline  · Avoid nephrotoxins, hypotension    12/5 orthopedics: 80 y  o female with nondisplaced fractures of the transverse processes of L2 and L3 after mechanical fall  Plan:   · Recommend conservative management at this time  · LSO brace when out of bed  · Pain control  · WBAT b/l LEs  · PT/OT    Follow-up with our spine specialist, Dr Trenton Mcgee, in six weeks if pain persists  ·   ED Triage Vitals [12/05/19 0318]   Temperature Pulse Respirations Blood Pressure SpO2   97 7 °F (36 5 °C) 64 16 (!) 183/75 97 %      Temp Source Heart Rate Source Patient Position - Orthostatic VS BP Location FiO2 (%)   Oral Monitor Sitting Left arm --      Pain Score       7        Wt Readings from Last 1 Encounters:   12/05/19 66 7 kg (147 lb 0 8 oz)     Additional Vital Signs:   Date/Time  Temp  Pulse  Resp  BP  SpO2  O2 Device  Patient Position - Orthostatic VS   12/05/19 0756  98 7 °F (37 1 °C)  67  18  160/90      Lying   12/05/19 0732    62  16  194/79Abnormal   96 %  None (Room air)  Lying   12/05/19 0505    59  16  175/74Abnormal   98 %  None (Room air)  Sitting   12/05/19 0400    60  16  180/77Abnormal   97 %  None (Room air)  Sitting   12/05/19 0318  97 7 °F (36 5 °C)  64  16  183/75Abnormal   97 %  None (Room air)  Sitting      Weights (last 14 days)     Date/Time  Weight  Weight Method  Height   12/05/19 0756  66 7 kg (147 lb 0 8 oz)  Bed scale  5' 1" (1 549 m)   12/05/19 0506  67 8 kg (149 lb 7 6 oz)  Bed scale     12/05/19 0318      5' 1" (1 549 m)       Pertinent Labs/Diagnostic Test Results:   Results from last 7 days   Lab Units 12/05/19  0356   WBC Thousand/uL 7 30   HEMOGLOBIN g/dL 12 1   HEMATOCRIT % 38 4   PLATELETS Thousands/uL 205   NEUTROS ABS Thousands/µL 5 39         Results from last 7 days   Lab Units 12/05/19  0356   SODIUM mmol/L 141   POTASSIUM mmol/L 3 8   CHLORIDE mmol/L 104   CO2 mmol/L 23   ANION GAP mmol/L 14*   BUN mg/dL 24   CREATININE mg/dL 1 28   EGFR ml/min/1 73sq m 36   CALCIUM mg/dL 10 7*     Results from last 7 days   Lab Units 12/05/19  0356   AST U/L 17   ALT U/L 20   ALK PHOS U/L 121*   TOTAL PROTEIN g/dL 7 1   ALBUMIN g/dL 3 9   TOTAL BILIRUBIN mg/dL 0 57         Results from last 7 days   Lab Units 12/05/19  0356   GLUCOSE RANDOM mg/dL 121           Results from last 7 days   Lab Units 12/05/19  0356   CK TOTAL U/L 71             Results from last 7 days   Lab Units 12/05/19  0356   PROTIME seconds 12 6   INR  1 00   PTT seconds 24       Results from last 7 days   Lab Units 12/05/19  0356   LIPASE u/L 51*             Results from last 7 days   Lab Units 12/05/19  0430   CLARITY UA  Clear   COLOR UA  Yellow   SPEC GRAV UA  1 010   PH UA  5 5   GLUCOSE UA mg/dl Negative   KETONES UA mg/dl Negative   BLOOD UA  Negative   PROTEIN UA mg/dl Negative   NITRITE UA  Negative   BILIRUBIN UA  Negative   UROBILINOGEN UA E U /dl 0 2   LEUKOCYTES UA  Trace*   WBC UA /hpf 2-4*   RBC UA /hpf None Seen   BACTERIA UA /hpf None Seen   EPITHELIAL CELLS WET PREP /hpf None Seen     12/5   XR chest 1 view portable   Cardiomegaly    1  Patchy opacity within the left lung base, which may represent atelectasis or developing pneumonia  Findings are also seen on CT performed subsequently after this radiograph  2   Stable moderate cardiomegaly        CT chest abdomen pelvis wo contrast   Impression:         Nondisplaced fractures of the left transverse processes of L2 and L3   No other evidence of acute posttraumatic abnormality        Mild left basilar atelectasis versus infiltrate  · EKG documention: sinus rhythm, 1st AV block              Past Medical History:   Diagnosis Date    Anemia     last assessed - [id number]    Cancer [facility])     Depression     last assessed - [id number]    Glaucoma     Hypercalcemia     last assessed - [id number]    Hypertension     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    Macular degeneration     Nondisplaced fracture of base of fifth metacarpal bone, right hand, initial encounter for closed fracture 12/7/2018    Osteoarthritis     Plantar fasciitis of right foot     last assessed - [id number]    Stomach disorder     Superficial thrombophlebitis of leg     unspecified laterality; last assessed - [id number]    Trochanteric bursitis of left hip     last assessed - [id number]     Present on Admission:   Asymptomatic hypertensive urgency   Chronic kidney failure, stage 3 (moderate) ([facility])      Admitting Diagnosis: Back pain [M54 9]  Contusion of flank, initial encounter [S30 1XXA]  Fall from standing, initial encounter [W19  XXXA]  [facility] transverse process of lumbar [facility] [S32 009A]  Contusion of upper back, left, initial encounter [S20 222A]  Age/Sex: 80 y o  female  Admission Orders:  Spine brace  Bladder scan  scd  Scheduled Medications:    Medications:  acetaminophen 975 mg Oral Q8H [address]   atorvastatin 40 mg Oral Daily   dextran 70-hypromellose 1 drop Both Eyes Q12H [address]   losartan 50 mg Oral Daily   And      hydrochlorothiazide 12 5 mg Oral Daily   [START ON 12/6/2019] latanoprost 1 drop Both Eyes HS   lidocaine 1 patch Topical Once   lidocaine 1 patch Topical Daily   metoprolol succinate 50 mg Oral Daily   pantoprazole 40 mg Oral Early Morning     Continuous IV Infusions:     PRN Meds:    docusate sodium 100 mg Oral BID PRN   hydrALAZINE 5 mg Intravenous Q8H PRN   ondansetron 4 mg Intravenous Q6H PRN   oxyCODONE 2 5 mg Oral Q4H PRN   polyethylene glycol 17 g Oral Daily PRN     IP CONSULT TO ORTHOPEDIC SURGERY    Network Utilization Review Department  [email or web address]  ATTENTION: Please call with any questions or concerns to [phone] and carefully listen to the prompts so that you are directed to the right person  All voicemails are confidential   Tracie Chery all requests for admission clinical reviews, approved or denied determinations and any other requests to dedicated fax number below belonging to the campus where the patient is receiving treatment   List of dedicated fax numbers for the Facilities:  [address] [facility] [address] DENIALS (Administrative/Medical Necessity) [phone]   [address] [place] [address] (Maternity/NICU/Pediatrics) [phone]   Lisa List [phone]   Carlena Purchase [phone]   Khadijah Sanchez [phone]   [address] [facility] [address] [facility] [phone]   [facility]  [phone]   Mini Thornton [address] [facility] [address]  And [facility] [address] [facility] [phone]

## 2019-12-05 NOTE — PLAN OF CARE
Problem: PHYSICAL THERAPY ADULT  Goal: Performs mobility at highest level of function for planned discharge setting  See evaluation for individualized goals  Description  Treatment/Interventions: Functional transfer training, LE strengthening/ROM, Therapeutic exercise, Endurance training, Patient/family training, Equipment eval/education, Bed mobility, Gait training          See flowsheet documentation for full assessment, interventions and recommendations  Outcome: Progressing  Note:   Prognosis: Fair  Problem List: Decreased strength, Decreased range of motion, Decreased endurance, Impaired balance, Decreased mobility, Decreased safety awareness, Orthopedic restrictions, Pain  Assessment: Pt presents with fall and back pain that occurred 12/4 while she was trying to close her curtains  Dx: left transverse process fxs of L2 and L3, hypercalcemia, hypertensive urgency, and CKD  order placed for PT eval and tx, w/ activity order of WBAT B LEs and LSO for out of bed  Currently LSO brace is unavailable - contacted Dr Walter Galeana who stated pt is appropriate to mobilize w/o brace until it can be obtained  pt presents w/ comorbidities of anemia, glaucoma, HTN, OA, and right RANDALL and personal factors of advanced age, mobilizing w/ assistive device, limited home support, positive fall history and depression  pt presents w/ pain, weakness, decreased endurance, impaired balance, gait deviations, decreased safety awareness, orthopedic restrictions and fall risk  these impairments are evident in findings from physical examination (weakness and decreased ROM), mobility assessment (need for standby assist w/ all phases of mobility when usually mobilizing independently, tolerance to only 50 feet of ambulation and need for cueing for mobility technique), and Barthel Index: 70/100  pt needed input for task focus and mobility technique  pt is at risk for falls due to physical and safety awareness deficits   pt's clinical presentation is unstable/unpredictable (evident in need for assist w/ all phases of mobility when usually mobilizing independently, tolerance to only 50 feet of ambulation, pain impacting overall mobility status and need for input for mobility technique/safety)  pt needs inpatient PT tx to improve mobility deficits  discharge recommendation is for home PT to reduce fall risk and maximize level of functional independence  Pt would benefit from OT consult to address safety awareness and ADL adaptation for LE dressing/care  Recommendation: Home PT, OT consult          See flowsheet documentation for full assessment

## 2019-12-05 NOTE — ASSESSMENT & PLAN NOTE
· Status post fall 12/4 while trying to close the curtains  Reports falling onto left back  Denies LOC, head injury, CP, SOB  Takes plavix and aspirin for CAD/stent placement  · Lives in [facility] apartment  · CT chest abdomen pelvis:  Acute Nondisplaced fracture of left transverse process L2 to L3  Atelectasis vs infiltrate on CT scan - patient is without respiratory complains at this time  Appreciate assistance from Ortho and PT/OT  Patient's pain is much better today  She has a brace on and has done well with PT/OT    Will discharge home with home PT

## 2019-12-05 NOTE — CONSULTS
Orthopedics   Sarah Krysten 80 y o  female MRN: [id number]  Unit/Bed#: S -01      Chief Complaint:   Left-sided lumbar spine pain    HPI:   80 y  o female seen in consultation by orthopedics requested by SOLO Quintero for evaluation of patient's lumbar spine  Patient states she was trying to close recurrence last evening when she fell  She states she fell onto her left side  She denies hitting her head or loss of consciousness  After the fall she noted significant pain on the left side of the lumbar spine  She denies any radiation of the pain distally into the lower extremities  She does note left hip pain  She states she has chronic left hip pain and denies any increase in her left hip pain at this time  She denies any weakness or numbness and tingling in the lower extremities  Pain is worse with any movement  She denies bowel or bladder dysfunction  No fevers or chills  She does take Plavix for a history of CAD with stent placement      Review Of Systems:   · Skin:  See HPI  · Neuro: See HPI  · Musculoskeletal: See HPI  · 14 point review of systems negative except as stated above     Past Medical History:   Past Medical History:   Diagnosis Date    Anemia     last assessed - [id number]    Cancer [facility])     Depression     last assessed - [id number]    Glaucoma     Hypercalcemia     last assessed - [id number]    Hypertension     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    Macular degeneration     Nondisplaced fracture of base of fifth metacarpal bone, right hand, initial encounter for closed fracture 12/7/2018    Osteoarthritis     Plantar fasciitis of right foot     last assessed - 07Apr2017    Stomach disorder     Superficial thrombophlebitis of leg     unspecified laterality; last assessed - [id number]    Trochanteric bursitis of left hip     last assessed - 07Apr2017       Past Surgical History:   Past Surgical History:   Procedure Laterality Date    CORONARY STENT PLACEMENT      stent RCA    FL INJECTION LEFT HIP (NON ARTHROGRAM)  11/26/2018    FL INJECTION LEFT HIP (NON ARTHROGRAM)  3/4/2019    FL INJECTION LEFT HIP (NON ARTHROGRAM)  6/10/2019    FL INJECTION LEFT HIP (NON ARTHROGRAM)  9/24/2019    FOOT SURGERY      HIP SURGERY      HYSTERECTOMY      SKIN LESION EXCISION  11/2010    Face - BCC - nose    TOTAL ABDOMINAL HYSTERECTOMY W/ BILATERAL SALPINGOOPHORECTOMY      TOTAL HIP ARTHROPLASTY Right        Family History:  Family history reviewed and non-contributory  Family History   Problem Relation Age of Onset    Stroke Mother         cerebrovascular accident (CVA)    Coronary artery disease Mother         coronary disease    Stroke Father     Coronary artery disease Father         coronary disease    Stroke Brother     Kidney cancer Family        Social History:  Social History     Socioeconomic History    Marital status:       Spouse name: None    Number of children: None    Years of education: None    Highest education level: None   Occupational History    None   Social Needs    Financial resource strain: None    Food insecurity:     Worry: None     Inability: None    Transportation needs:     Medical: None     Non-medical: None   Tobacco Use    Smoking status: Never Smoker    Smokeless tobacco: Never Used   Substance and Sexual Activity    Alcohol use: Yes     Frequency: 2-4 times a month     Drinks per session: 1 or 2     Binge frequency: Never     Comment: occasional wine with dinner    Drug use: No    Sexual activity: Not Currently     Partners: Male     Birth control/protection: None   Lifestyle    Physical activity:     Days per week: None     Minutes per session: None    Stress: None   Relationships    Social connections:     Talks on phone: None     Gets together: None     Attends Taoism service: None     Active member of club or organization: None     Attends meetings of clubs or organizations: None     Relationship status: None    Intimate partner violence:     Fear of current or ex partner: None     Emotionally abused: None     Physically abused: None     Forced sexual activity: None   Other Topics Concern    None   Social History Narrative    Caffeine use       Allergies:    Allergies   Allergen Reactions    Other      Iv contrast  Adhesive tape    Iv Contrast [Iodinated Diagnostic Agents]     Ciprofloxacin      Patient does not remember           Labs:  0   Lab Value Date/Time    HCT 38 4 12/05/2019 0356    HCT 38 5 11/26/2019 0949    HCT 36 3 05/21/2018 0829    HCT 38 0 03/17/2017 0925    HCT 36 8 11/16/2016 0919    HCT 36 7 03/22/2016 0910    HCT 37 7 11/16/2015 0915    HGB 12 1 12/05/2019 0356    HGB 12 0 11/26/2019 0949    HGB 11 8 05/21/2018 0829    HGB 12 3 03/17/2017 0925    HGB 12 2 11/16/2016 0919    HGB 11 8 03/22/2016 0910    HGB 12 3 11/16/2015 0915    INR 1 00 12/05/2019 0356    WBC 7 30 12/05/2019 0356    WBC 5 90 11/26/2019 0949    WBC 6 4 05/21/2018 0829    WBC 5 45 03/17/2017 0925    WBC 6 1 11/16/2016 0919    WBC 5 4 03/22/2016 0910    WBC 4 93 11/16/2015 0915       Meds:    Current Facility-Administered Medications:     acetaminophen (TYLENOL) tablet 975 mg, 975 mg, Oral, Q8H [address], Hershall Manna, CRNP    atorvastatin (LIPITOR) tablet 40 mg, 40 mg, Oral, Daily, Hershall Manna, CRNP    dextran 70-hypromellose (GENTEAL TEARS) 0 1-0 3 % ophthalmic solution 1 drop, 1 drop, Both Eyes, Q12H [address], Hershall Manna, CRNP    docusate sodium (COLACE) capsule 100 mg, 100 mg, Oral, BID PRN, Hershall Manna, CRNP    hydrALAZINE (APRESOLINE) injection 5 mg, 5 mg, Intravenous, Q8H PRN, Hershall Manna, CRNP    losartan (COZAAR) tablet 50 mg, 50 mg, Oral, Daily **AND** hydrochlorothiazide (HYDRODIURIL) tablet 12 5 mg, 12 5 mg, Oral, Daily, SOLO Arzate    latanoprost (XALATAN) 0 005 % ophthalmic solution 1 drop, 1 drop, Both Eyes, HS, SOLO Arzate    lidocaine (LIDODERM) 5 % patch 1 patch, 1 patch, Topical, Once, [place] Junaid Garner MD    lidocaine (LIDODERM) 5 % patch 1 patch, 1 patch, Topical, Daily, Sherl Antu, CRNP    metoprolol succinate (TOPROL-XL) 24 hr tablet 50 mg, 50 mg, Oral, Daily, Sherl Antu, CRNP    ondansetron [facility]) injection 4 mg, 4 mg, Intravenous, Q6H PRN, Sherl Antu, CRNP    oxyCODONE (ROXICODONE) IR tablet 2 5 mg, 2 5 mg, Oral, Q4H PRN, Sherl Antu, CRNP    pantoprazole (PROTONIX) EC tablet 40 mg, 40 mg, Oral, Early Morning, Sherl Antu, CRNP    polyethylene glycol (MIRALAX) packet 17 g, 17 g, Oral, Daily PRN, Sherl Antu, CRNP      Physical Exam:   /90 (BP Location: Left arm)   Pulse 67   Temp 98 7 °F (37 1 °C)   Resp 18   Ht 5' 1" (1 549 m)   Wt 66 7 kg (147 lb 0 8 oz)   SpO2 96%   BMI 27 78 kg/m²   Gen: Alert and oriented to person, place, time  HEENT: EOMI, eyes clear, moist mucus membranes, hearing intact  Respiratory: Bilateral chest rise  No audible wheezing found  Cardiovascular: Regular Rate and Rhythm  Abdomen: soft nontender/nondistended  Musculoskeletal:  Lumbar spine:  · Skin intact  No gross deformity  No erythema or swelling  There is extensive ecchymosis on the left side of the lumbar spine and flank  · Tenderness to palpation midline of the lumbar spine and left paraspinal musculature  · Motor/sensation intact L2-S1 bilaterally with 5/5 strength  · Negative straight leg raise bilaterally  · 2+ DP pulse bilaterally    Radiology:   I personally reviewed the films  CT scan of the chest abdomen and pelvis reveals nondisplaced fractures of the transverse processes of L2 and L3     _*_*_*_*_*_*_*_*_*_*_*_*_*_*_*_*_*_*_*_*_*_*_*_*_*_*_*_*_*_*_*_*_*_*_*_*_*_*_*_*_*    Assessment:  80 y  o female with nondisplaced fractures of the transverse processes of L2 and L3 after mechanical fall    Plan:   · Recommend conservative management at this time  · LSO brace when out of bed  · Pain control  · WBAT b/l LEs  · PT/OT    · Follow-up with our spine specialist, Dr Kj Yan, in six weeks if pain persists    José Miguel Diss, [place]

## 2019-12-05 NOTE — ASSESSMENT & PLAN NOTE
· 183/75 on arrival to emergency department  Improved to 174/7    · Echocardiogram 2017:  LVEF 85%, grade 1 diastolic dysfunction, trace mitral regurgitation, mild tricuspid regurgitation, mild pulmonic regurgitation  · Continue home regimen:  Metoprolol succinate 50 mg daily, valsartan-HCTZ 80-12 5 daily  · PRN hydralazine for SBP >180 despite adequate pain control

## 2019-12-05 NOTE — PLAN OF CARE
Problem: Potential for Falls  Goal: Patient will remain free of falls  Description  INTERVENTIONS:  - Assess patient frequently for physical needs  -  Identify cognitive and physical deficits and behaviors that affect risk of falls    -  [place] fall precautions as indicated by assessment   - Educate patient/family on patient safety including physical limitations  - Instruct patient to call for assistance with activity based on assessment  - Modify environment to reduce risk of injury  - Consider OT/PT consult to assist with strengthening/mobility  Outcome: Progressing     Problem: Prexisting or High Potential for Compromised Skin Integrity  Goal: Skin integrity is maintained or improved  Description  INTERVENTIONS:  - Identify patients at risk for skin breakdown  - Assess and monitor skin integrity  - Assess and monitor nutrition and hydration status  - Monitor labs   - Assess for incontinence   - Turn and reposition patient  - Assist with mobility/ambulation  - Relieve pressure over bony prominences  - Avoid friction and shearing  - Provide appropriate hygiene as needed including keeping skin clean and dry  - Evaluate need for skin moisturizer/barrier cream  - Collaborate with interdisciplinary team   - Patient/family teaching  - Consider wound care consult   Outcome: Progressing     Problem: PAIN - ADULT  Goal: Verbalizes/displays adequate comfort level or baseline comfort level  Description  Interventions:  - Encourage patient to monitor pain and request assistance  - Assess pain using appropriate pain scale  - Administer analgesics based on type and severity of pain and evaluate response  - Implement non-pharmacological measures as appropriate and evaluate response  - Consider cultural and social influences on pain and pain management  - Notify physician/advanced practitioner if interventions unsuccessful or patient reports new pain  Outcome: Progressing     Problem: SAFETY ADULT  Goal: Maintain or return to baseline ADL function  Description  INTERVENTIONS:  -  Assess patient's ability to carry out ADLs; assess patient's baseline for ADL function and identify physical deficits which impact ability to perform ADLs (bathing, care of mouth/teeth, toileting, grooming, dressing, etc )  - Assess/evaluate cause of self-care deficits   - Assess range of motion  - Assess patient's mobility; develop plan if impaired  - Assess patient's need for assistive devices and provide as appropriate  - Encourage maximum independence but intervene and supervise when necessary  - Involve family in performance of ADLs  - Assess for home care needs following discharge   - Consider OT consult to assist with ADL evaluation and planning for discharge  - Provide patient education as appropriate  Outcome: Progressing  Goal: Maintain or return mobility status to optimal level  Description  INTERVENTIONS:  - Assess patient's baseline mobility status (ambulation, transfers, stairs, etc )    - Identify cognitive and physical deficits and behaviors that affect mobility  - Identify mobility aids required to assist with transfers and/or ambulation (gait belt, sit-to-stand, lift, walker, cane, etc )  - [place] fall precautions as indicated by assessment  - Record patient progress and toleration of activity level on Mobility SBAR; progress patient to next Phase/Stage  - Instruct patient to call for assistance with activity based on assessment  - Consider rehabilitation consult to assist with strengthening/weightbearing, etc   Outcome: Progressing     Problem: DISCHARGE PLANNING  Goal: Discharge to home or other facility with appropriate resources  Description  INTERVENTIONS:  - Identify barriers to discharge w/patient and caregiver  - Arrange for needed discharge resources and transportation as appropriate  - Identify discharge learning needs (meds, wound care, etc )  - Arrange for interpretive services to assist at discharge as needed  - Refer to Case Management Department for coordinating discharge planning if the patient needs post-hospital services based on physician/advanced practitioner order or complex needs related to functional status, cognitive ability, or social support system  Outcome: Progressing     Problem: Knowledge Deficit  Goal: Patient/family/caregiver demonstrates understanding of disease process, treatment plan, medications, and discharge instructions  Description  Complete learning assessment and assess knowledge base    Interventions:  - Provide teaching at level of understanding  - Provide teaching via preferred learning methods  Outcome: Progressing

## 2019-12-05 NOTE — ASSESSMENT & PLAN NOTE
· History of RCA stent in 2009  · On Plavix and aspirin, hold aspirin and plavix for today due to significant bruising  · Hgb stable, recheck in AM

## 2019-12-05 NOTE — ED PROVIDER NOTES
History  Chief Complaint   Patient presents with    Fall     Pt states she was trying to close her curtains at home causing her to fall around 1800  -head strike -LOC Reports falling onto the L side of her back +bruising +thinners Denies any difficulty urinating Denies any other complaints  Patient is a 80year old female who was trying to close her curtains last night and fell on the left side of her body  No LOC  No N/V  No dizziness prior to fall  No head injury  No headache or neck pain  (+) left sided upper and lower back pain  Patient is on plavix  Denies other injury  Declines pain medication  States she cannot take percocet  Is allergic to IV contrast dye  Was last seen in this ED on 12/6/18 for 5th metacarpal fx  [facility] website checked on this patient and last Rx filled was on 12/6/18 for percocet for 2 day supply  History provided by:  Patient and EMS personnel   used: No    Fall   Associated symptoms: back pain    Associated symptoms: no headaches, no nausea, no neck pain and no vomiting        Prior to Admission Medications   Prescriptions Last Dose Informant Patient Reported? Taking?    Biotin 1 MG CAPS  Self Yes No   Sig: Take by mouth   Multiple Vitamins-Minerals (ICAPS AREDS 2) CAPS  Self Yes No   Sig: Take by mouth   Omega-3 Fatty Acids (OMEGA-3 FISH OIL) 1200 MG CAPS  Self Yes No   Sig: Take by mouth   Polyvinyl Alcohol-Povidone PF (REFRESH) 1 4-0 6 % SOLN  Self Yes No   Sig: Apply to eye   RESTASIS 0 05 % ophthalmic emulsion  Self Yes No   Sig: instill 1 drop into both eyes twice a day   aspirin (ADULT ASPIRIN EC LOW STRENGTH) 81 mg EC tablet  Self Yes No   Sig: Take 1 tablet by mouth daily   atorvastatin (LIPITOR) 40 mg tablet  Self No No   Sig: TAKE 1 TABLET BY MOUTH EVERY DAY   cholecalciferol (VITAMIN D3) 1,000 units tablet  Self Yes No   Sig: Take 1 tablet by mouth daily   clopidogrel (PLAVIX) 75 mg tablet  Self No No   Sig: Take 1 tablet (75 mg total) by mouth daily   latanoprost (XALATAN) 0 005 % ophthalmic solution  Self Yes No   Sig: INSTILL 1 DROP INTO BOTH EYES EVERY DAY AS DIRECTED   metoprolol succinate (TOPROL-XL) 50 mg 24 hr tablet  Self No No   Sig: TAKE 1 TABLET BY MOUTH EVERY DAY   nitroglycerin (NITROSTAT) 0 4 mg SL tablet  Self Yes No   Sig: place 1 tablet under the tongue if needed EVERY 5 MINUTES UP TO 3 TIMES FOR CHEST PAIN   omeprazole (PriLOSEC) 20 mg delayed release capsule  Self No No   Sig: Take 1 capsule (20 mg total) by mouth daily for 180 days   solifenacin (VESICARE) 5 mg tablet  Self No No   Sig: Take 1 tablet (5 mg total) by mouth daily   valsartan-hydrochlorothiazide (DIOVAN-HCT) 80-12 5 MG per tablet  Self No No   Sig: Take 1 tablet by mouth daily      Facility-Administered Medications: None       Past Medical History:   Diagnosis Date    Anemia     last assessed - [id number]    Cancer [facility])     Depression     last assessed - [id number]    Glaucoma     Hypercalcemia     last assessed - [id number]    Hypertension     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    Macular degeneration     Nondisplaced fracture of base of fifth metacarpal bone, right hand, initial encounter for closed fracture 12/7/2018    Osteoarthritis     Plantar fasciitis of right foot     last assessed - [id number]    Stomach disorder     Superficial thrombophlebitis of leg     unspecified laterality; last assessed - [id number]    Trochanteric bursitis of left hip     last assessed - [id number]       Past Surgical History:   Procedure Laterality Date    CORONARY STENT PLACEMENT      stent RCA    FL INJECTION LEFT HIP (NON ARTHROGRAM)  11/26/2018    FL INJECTION LEFT HIP (NON ARTHROGRAM)  3/4/2019    FL INJECTION LEFT HIP (NON ARTHROGRAM)  6/10/2019    FL INJECTION LEFT HIP (NON ARTHROGRAM)  9/24/2019    FOOT SURGERY      HIP SURGERY      HYSTERECTOMY      SKIN LESION EXCISION  11/2010    Face - BCC - nose    TOTAL ABDOMINAL HYSTERECTOMY W/ BILATERAL SALPINGOOPHORECTOMY      TOTAL HIP ARTHROPLASTY Right        Family History   Problem Relation Age of Onset    Stroke Mother         cerebrovascular accident (CVA)    Coronary artery disease Mother         coronary disease    Stroke Father     Coronary artery disease Father         coronary disease    Stroke Brother     Kidney cancer Family      I have reviewed and agree with the history as documented  Social History     Tobacco Use    Smoking status: Never Smoker    Smokeless tobacco: Never Used   Substance Use Topics    Alcohol use: Yes     Frequency: 2-4 times a month     Drinks per session: 1 or 2     Binge frequency: Never     Comment: occasional wine with dinner    Drug use: No        Review of Systems   Gastrointestinal: Negative for nausea and vomiting  Musculoskeletal: Positive for back pain  Negative for neck pain  Neurological: Negative for dizziness and headaches  Hematological: Bruises/bleeds easily  All other systems reviewed and are negative  Physical Exam  Physical Exam   Constitutional: She is oriented to person, place, and time  She appears well-developed and well-nourished  She appears distressed (moderate)  HENT:   Head: Normocephalic and atraumatic  Mouth/Throat: Oropharynx is clear and moist    Eyes: Pupils are equal, round, and reactive to light  EOM are normal  No scleral icterus  Neck: Normal range of motion  No JVD present  No tracheal deviation present  Cardiovascular: Normal rate, regular rhythm and normal heart sounds  No murmur heard  Pulmonary/Chest: Effort normal and breath sounds normal  No stridor  No respiratory distress  She has no wheezes  She has no rales  She exhibits tenderness (left posterior)  Abdominal: Soft  Bowel sounds are normal  She exhibits no distension  There is no tenderness  There is no guarding  Musculoskeletal: She exhibits tenderness (left lateral posterior chest wall and left flank tenderness)   She exhibits no edema or deformity  No midline vertebral tenderness   Neurological: She is alert and oriented to person, place, and time  No focal deficits  Skin: Skin is warm and dry  (+) ecchymoses left flank and upper back laterally on left  Psychiatric: She has a normal mood and affect  Nursing note and vitals reviewed        Vital Signs  ED Triage Vitals [12/05/19 0318]   Temperature Pulse Respirations Blood Pressure SpO2   97 7 °F (36 5 °C) 64 16 (!) 183/75 97 %      Temp Source Heart Rate Source Patient Position - Orthostatic VS BP Location FiO2 (%)   Oral Monitor Sitting Left arm --      Pain Score       7           Vitals:    12/05/19 0318 12/05/19 0400 12/05/19 0505   BP: (!) 183/75 (!) 180/77 (!) 175/74   Pulse: 64 60 59   Patient Position - Orthostatic VS: Sitting Sitting Sitting         Visual Acuity      ED Medications  Medications   lidocaine (LIDODERM) 5 % patch 1 patch (has no administration in time range)       Diagnostic Studies  Results Reviewed     Procedure Component Value Units Date/Time    Urine Microscopic [[id number]]  (Abnormal) Collected:  12/05/19 0430    Lab Status:  Final result Specimen:  Urine, Clean Catch Updated:  12/05/19 0447     RBC, UA None Seen /hpf      WBC, UA 2-4 /hpf      Epithelial Cells None Seen /hpf      Bacteria, UA None Seen /hpf     Comprehensive metabolic panel [[id number]]  (Abnormal) Collected:  12/05/19 0356    Lab Status:  Final result Specimen:  Blood from Arm, Right Updated:  12/05/19 0431     Sodium 141 mmol/L      Potassium 3 8 mmol/L      Chloride 104 mmol/L      CO2 23 mmol/L      ANION GAP 14 mmol/L      BUN 24 mg/dL      Creatinine 1 28 mg/dL      Glucose 121 mg/dL      Calcium 10 7 mg/dL      AST 17 U/L      ALT 20 U/L      Alkaline Phosphatase 121 U/L      Total Protein 7 1 g/dL      Albumin 3 9 g/dL      Total Bilirubin 0 57 mg/dL      eGFR 36 ml/min/1 73sq m     Narrative:       [place] guidelines for Chronic Kidney Disease (CKD):      Stage 1 with normal or high GFR (GFR > 90 mL/min/1 73 square meters)    Stage 2 Mild CKD (GFR = 60-89 mL/min/1 73 square meters)    Stage 3A Moderate CKD (GFR = 45-59 mL/min/1 73 square meters)    Stage 3B Moderate CKD (GFR = 30-44 mL/min/1 73 square meters)    Stage 4 Severe CKD (GFR = 15-29 mL/min/1 73 square meters)    Stage 5 End Stage CKD (GFR <15 mL/min/1 73 square meters)  Note: GFR calculation is accurate only with a steady state creatinine    Lipase [[id number]]  (Abnormal) Collected:  12/05/19 0356    Lab Status:  Final result Specimen:  Blood from Arm, Right Updated:  12/05/19 0431     Lipase 51 u/L     CK Total with Reflex CKMB [[id number]]  (Normal) Collected:  12/05/19 0356    Lab Status:  Final result Specimen:  Blood from Arm, Right Updated:  12/05/19 0431     Total CK 71 U/L     Urine Macroscopic, POC [[id number]]  (Abnormal) Collected:  12/05/19 0430    Lab Status:  Final result Specimen:  Urine Updated:  12/05/19 0430     Color, UA Yellow     Clarity, UA Clear     pH, UA 5 5     Leukocytes, UA Trace     Nitrite, UA Negative     Protein, UA Negative mg/dl      Glucose, UA Negative mg/dl      Ketones, UA Negative mg/dl      Urobilinogen, UA 0 2 E U /dl      Bilirubin, UA Negative     Blood, UA Negative     Specific Gravity, UA 1 010    Narrative:       CLINITEK RESULT    Protime-INR [[id number]]  (Normal) Collected:  12/05/19 0356    Lab Status:  Final result Specimen:  Blood from Arm, Right Updated:  12/05/19 0420     Protime 12 6 seconds      INR 1 00    APTT [[id number]]  (Normal) Collected:  12/05/19 0356    Lab Status:  Final result Specimen:  Blood from Arm, Right Updated:  12/05/19 0420     PTT 24 seconds     CBC and differential [[id number]] Collected:  12/05/19 0356    Lab Status:  Final result Specimen:  Blood from Arm, Right Updated:  12/05/19 0411     WBC 7 30 Thousand/uL      RBC 4 15 Million/uL      Hemoglobin 12 1 g/dL      Hematocrit 38 4 %      MCV 93 fL      MCH 29 2 pg MCHC 31 5 g/dL      RDW 13 3 %      MPV 9 5 fL      Platelets 429 Thousands/uL      nRBC 0 /100 WBCs      Neutrophils Relative 73 %      Immat GRANS % 0 %      Lymphocytes Relative 15 %      Monocytes Relative 10 %      Eosinophils Relative 1 %      Basophils Relative 1 %      Neutrophils Absolute 5 39 Thousands/µL      Immature Grans Absolute 0 02 Thousand/uL      Lymphocytes Absolute 1 07 Thousands/µL      Monocytes Absolute 0 71 Thousand/µL      Eosinophils Absolute 0 07 Thousand/µL      Basophils Absolute 0 04 Thousands/µL                  XR chest 1 view portable   ED Interpretation by Darylene Knee, MD (12/05 8381)   Cardiomegaly read by me  CT chest abdomen pelvis wo contrast   ED Interpretation by Darylene Knee, MD (12/05 2862)   FINDINGS:      CHEST      LUNGS:  Patchy density at the left base may represent atelectasis or infiltrate   New 9 mm lobulated density in the posterior right upper lobe on image 3/43-46, 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  Bibiana Cuellar is no tracheal or endobronchial    lesion  PLEURA:  Unremarkable  HEART/GREAT VESSELS:  Normal heart size   Coronary artery calcifications noted   Normal caliber thoracic aorta with mild atherosclerotic calcifications  MEDIASTINUM AND BILLIE:  Unremarkable  CHEST WALL AND LOWER NECK:   Unremarkable  ABDOMEN   Absence of intravenous contrast enhancement limits evaluation of the solid abdominal viscera  LIVER/BILIARY TREE:  Unremarkable  GALLBLADDER:  No calcified gallstones  No pericholecystic inflammatory change  SPLEEN:  Unremarkable  PANCREAS:  Unremarkable  ADRENAL GLANDS:  Unremarkable  KIDNEYS/URETERS:  Partially exophytic fat density nodule measuring 2 7 x 2 7 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   Probable simple cyst at the interpolar left kidney measures 2 0 x 2 2 cm   No hydronephrosis or calculi        STOMACH AND BOWEL: Bibiana Cuellar is colonic diverticulosis without evidence of acute diverticulitis  APPENDIX:  No findings to suggest appendicitis  ABDOMINOPELVIC CAVITY:  No ascites or free intraperitoneal air  No lymphadenopathy  VESSELS:  Atherosclerotic changes are present   No evidence of aneurysm  PELVIS      REPRODUCTIVE ORGANS:  Patient is status post hysterectomy  URINARY BLADDER:  Unremarkable  ABDOMINAL WALL/INGUINAL REGIONS:  Unremarkable  OSSEOUS STRUCTURES:  Acute nondisplaced fractures of the left transverse processes of L2 and L3   Status post right hip arthroplasty  Impression:        Nondisplaced fractures of the left transverse processes of L2 and L3   No other evidence of acute posttraumatic abnormality  Mild left basilar atelectasis versus infiltrate  Colonic diverticulosis  Workstation performed: [id number]         Final Result by Selena Cummings MD (76/91 0171)      Nondisplaced fractures of the left transverse processes of L2 and L3  No other evidence of acute posttraumatic abnormality  Mild left basilar atelectasis versus infiltrate  Colonic diverticulosis  Workstation performed: [id number]                    Procedures  ECG 12 Lead Documentation Only  Date/Time: 12/5/2019 4:00 AM  Performed by: Celeste Haque MD  Authorized by: Celeste Haque MD     Indications / Diagnosis:  Trauma  ECG reviewed by me, the ED Provider: yes    Patient location:  ED  Previous ECG:     Previous ECG:  Unavailable  Rate:     ECG rate:  60    ECG rate assessment: normal    Rhythm:     Rhythm: sinus rhythm and A-V block    Ectopy:     Ectopy: none    QRS:     QRS axis:  Normal  Conduction:     Conduction: abnormal      Abnormal conduction: 1st degree and LPFB    ST segments:     ST segments:  Normal  T waves:     T waves: normal               ED Course  ED Course as of Dec 05 0614   Thu Dec 05, 2019   0501 Labs and CXR d/w patient        7066 CT d/w patient  MDM  Number of Diagnoses or Management Options  Diagnosis management comments: DDx including but not limited to: Doubt intracranial injury, concussion, cervical injury; intrathoracic injury including rib fx, rib contusion, PTX, pulmonary contusion, intraabdominal injury including retroperitoneal bleed, kidney injury; doubt extremity injury, metabolic abnormality or cardiac etiology  Amount and/or Complexity of Data Reviewed  Clinical lab tests: ordered and reviewed  Tests in the radiology section of CPT®: ordered and reviewed  Decide to obtain previous medical records or to obtain history from someone other than the patient: yes  Obtain history from someone other than the patient: yes  Review and summarize past medical records: yes  Independent visualization of images, tracings, or specimens: yes          Disposition  Final diagnoses:   Closed fracture of transverse process of lumbar vertebra, initial encounter ([place] [address] ) - L2 and L3   Contusion of flank, initial encounter   Contusion of upper back, left, initial encounter   Fall from standing, initial encounter     Time reflects when diagnosis was documented in both MDM as applicable and the Disposition within this note     Time User Action Codes Description Comment    12/5/2019  6:06 AM Nadir Buckley Add [S32 009A] Closed fracture of transverse process of lumbar vertebra, initial encounter ([facility] )     12/5/2019  6:06 AM Nadir Buckley Modify [S32 009A] Closed fracture of transverse process of lumbar vertebra, initial encounter ([place] [address] ) L2 and L3    12/5/2019  6:06 AM Nadir Buckley Add [S30 1XXA] Contusion of flank, initial encounter     12/5/2019  6:06 AM Nadir Buckley Add [S20 222A] Contusion of upper back, left, initial encounter     12/5/2019  6:07 AM Nadir Buckley Add [W02  XXXA] Fall from standing, initial encounter       ED Disposition     ED Disposition Condition Date/Time Comment    Admit Stable Thu Dec 5, 2019  6:14 AM Case was discussed with LU Cruz  and the patient's admission status was agreed to be Admission Status: observation status to the service of Dr Courtney Reza   Follow-up Information    None         Patient's Medications   Discharge Prescriptions    No medications on file     No discharge procedures on file      ED Provider  Electronically Signed by           Jonathan Dangelo MD  12/05/19 8111

## 2019-12-05 NOTE — ASSESSMENT & PLAN NOTE
· 183/75 on arrival to emergency department  Likely due to uncontrolled pain  Blood pressure now controlled after pain controlled  Continue losartan, Toprol-XL and hydrochlorothiazide

## 2019-12-05 NOTE — PHYSICAL THERAPY NOTE
PHYSICAL THERAPY TREATMENT NOTE    Patient Name: Geno Begum  WFIZV'H Date: 12/5/2019 12/05/19 7392   Pain Assessment   Pain Assessment 0-10   Pain Score   (no pain at rest, 1/10 w/ activity)   Pain Location   (low back)   Restrictions/Precautions   RLE Weight Bearing Per Order WBAT   LLE Weight Bearing Per Order WBAT   Braces or Orthoses Other (Comment)  (order placed for LSO for out of bed)   Other Precautions Chair Alarm; Bed Alarm; Fall Risk;Pain   General   Chart Reviewed Yes   Family/Caregiver Present No   Cognition   Arousal/Participation Alert; Cooperative   Attention Attends with cues to redirect   Orientation Level Oriented to person;Oriented to place;Oriented to time; Other (Comment)  (pt was identified w/ full name, birth date)   Following Commands Follows one step commands without difficulty   Subjective   Subjective pt agreed to participate in PT intervention  Transfers   Sit to Stand 6  Modified independent   Additional items Increased time required   Stand to Sit 6  Modified independent   Additional Comments timed up and go w/ roller walker: 26 38 seconds   Ambulation/Elevation   Gait pattern Foward flexed   Gait Assistance 5  Supervision   Additional items Verbal cues  (for walker positioning)   Assistive Device Rolling walker   Distance 100 feet  timed up and go x2  120 feet  seated rest breaks x 30 seconds to 1 minute each  (additional not possible due to fatigue)   Balance   Static Sitting Good   Dynamic Sitting Fair   Static Standing Fair   Ambulatory   (w/ roller walker)   Activity Tolerance   Activity Tolerance Patient limited by fatigue;Patient limited by pain   Nurse Made Aware spoke to Dr Aletha Jacobs NSG   Assessment   Prognosis Fair   Problem List Decreased strength;Decreased range of motion;Decreased endurance; Impaired balance;Decreased mobility; Decreased safety awareness;Orthopedic restrictions;Pain   Assessment Therapist introduced roller walker use w/ mobility to address impairments noted during evaluation  Pt was noted to have improvement in mobility status w/ use of roller walker w/ increased ambulation distance  Pt is at continued risk for falls per time to complete Timed Up and Go (using 13 5 seconds as fall risk cut score)  continued inpatient PT tx is indicated to reduce fall risk factors and progress mobility training as appropriate  Goals   Patient Goals go home   STG Expiration Date 12/15/19   Short Term Goal #1 ADDENDUM TO PRESENT GOALS: pt will complete timed up and go in 18 seconds or less to decrease risk for falling  pt will: Increase bilateral LE strength 1/2 grade to facilitate independent mobility, Perform all bed mobility tasks independently to decrease fall risk factors, Perform all transfers independently to improve independence, Ambulate 250 ft  with least restrictive assistive device independently w/o LOB, Increase ambulatory balance 1 grade to decrease risk for falls, Complete exercise program independently, Tolerate 3 hr OOB to faciliate upright tolerance and Improve Barthel Index score to 90 to facilitate independence   PT Treatment Day 1   Plan   Treatment/Interventions Functional transfer training;LE strengthening/ROM; Therapeutic exercise; Endurance training;Patient/family training;Equipment eval/education; Bed mobility;Gait training   Progress Progressing toward goals   PT Frequency 5x/wk   Recommendation   Recommendation Home PT;OT consult   Equipment Recommended Other (Comment)  (roller walker)     Skilled inpatient PT recommended while in hospital to progress pt toward treatment goals      Yola Thornton PT

## 2019-12-05 NOTE — PHYSICAL THERAPY NOTE
PHYSICAL THERAPY EVALUATION NOTE    Patient Name: Mandy Hernandez  UMTQD'Y Date: 12/5/2019  AGE:   80 y o  Mrn:   [id number]  ADMIT DX:  Back pain [M54 9]  Contusion of flank, initial encounter [S30 1XXA]  Fall from standing, initial encounter [W19  XXXA]  Closed [facility] process of lumbar [facility] [S32 009A]  Contusion of upper back, left, initial encounter [S20 222A]    Past Medical History:   Diagnosis Date    Anemia     last assessed - [id number]    Cancer [facility])     Depression     last assessed - [id number]    Glaucoma     Hypercalcemia     last assessed - [id number]    Hypertension     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    Macular degeneration     Nondisplaced fracture of base of fifth metacarpal bone, right hand, initial encounter for closed fracture 12/7/2018    Osteoarthritis     Plantar fasciitis of right foot     last assessed - [id number]    Stomach disorder     Superficial thrombophlebitis of leg     unspecified laterality; last assessed - [id number]    Trochanteric bursitis of left hip     last assessed - [id number]     Length Of Stay: 0  PHYSICAL THERAPY EVALUATION :    12/05/19 1738   Pain Assessment   Pain Assessment 0-10   Pain Score   (no pain at rest, 1/10 w/ activity)   Pain Location   (low back)   Home Living   Type of [address] [place] [address] One level;Elevator   Additional Comments lives alone  ambulates w/ cane  owns roller walker and rollator  independent w/ ADLs and driving  1 fall in last 6 months  Prior Function   Comments pt seen supine in bed  agreed to participate in PT eval  denied pain at rest, reports mild back pain and stiffness w/ movement  pt wants to go home     Restrictions/Precautions   RLE Weight Bearing Per Order WBAT   LLE Weight Bearing Per Order WBAT   Braces or Orthoses Other (Comment)  (order placed for LSO for out of bed) Other Precautions Chair Alarm; Bed Alarm; Fall Risk;Pain   General   Additional Pertinent History order placed for LSO for out of bed  LSO braces currently out of stock in closet and therapist is unable to reach [address] [place] [address] to have brace fitted  contacted Dr Jose Luis Hinton who stated pt is appropriate to mobilize w/o brace  Family/Caregiver Present No   Cognition   Arousal/Participation Alert   Orientation Level Oriented to person;Oriented to place;Oriented to time; Other (Comment)  (pt was identified w/ full name, birth date)   Following Commands Follows one step commands without difficulty   RUE Assessment   RUE Assessment X  (3+/5, shoulder 3-/5)   LUE Assessment   LUE Assessment WFL  (4-/5)   RLE Assessment   RLE Assessment WFL  (4-/5)   LLE Assessment   LLE Assessment WFL  (4-/5)   Coordination   Movements are Fluid and Coordinated 1   Light Touch   RLE Light Touch Grossly intact   LLE Light Touch Grossly intact   Bed Mobility   Supine to Sit 5  Supervision   Additional items Bedrails; Increased time required;Verbal cues;LE management  (for log roll technique)   Transfers   Sit to Stand 5  Supervision   Additional items Increased time required;Verbal cues  (for LE positioning)   Stand to Sit 5  Supervision   Additional items Verbal cues  (for body positioning, controlled descent)   Ambulation/Elevation   Gait pattern Decreased foot clearance; Short stride; Foward flexed; Excessively slow; Shuffling   Gait Assistance 5  Supervision   Additional items Verbal cues; Tactile cues  (for posture, full step length)   Assistive Device SPC   Distance 50 feet  (additional not possible due to fatigue)   Balance   Static Sitting Good   Dynamic Sitting Fair   Static Standing Fair -   Ambulatory Fair -  (w/ cane)   Activity Tolerance   Activity Tolerance Patient limited by fatigue;Patient limited by pain   Nurse Made Aware spoke to Dr Marialuisa Mosley NSG   Assessment   Prognosis Fair   Problem List Decreased strength;Decreased range of motion;Decreased endurance; Impaired balance;Decreased mobility; Decreased safety awareness;Orthopedic restrictions;Pain   Assessment Pt presents with fall and back pain that occurred 12/4 while she was trying to close her curtains  Dx: left transverse process fxs of L2 and L3, hypercalcemia, hypertensive urgency, and CKD  order placed for PT eval and tx, w/ activity order of WBAT B LEs and LSO for out of bed  Currently LSO brace is unavailable - contacted Dr Antonia Cha who stated pt is appropriate to mobilize w/o brace until it can be obtained  pt presents w/ comorbidities of anemia, glaucoma, HTN, OA, and right RANDALL and personal factors of advanced age, mobilizing w/ assistive device, limited home support, positive fall history and depression  pt presents w/ pain, weakness, decreased endurance, impaired balance, gait deviations, decreased safety awareness, orthopedic restrictions and fall risk  these impairments are evident in findings from physical examination (weakness and decreased ROM), mobility assessment (need for standby assist w/ all phases of mobility when usually mobilizing independently, tolerance to only 50 feet of ambulation and need for cueing for mobility technique), and Barthel Index: 70/100  pt needed input for task focus and mobility technique  pt is at risk for falls due to physical and safety awareness deficits  pt's clinical presentation is unstable/unpredictable (evident in need for assist w/ all phases of mobility when usually mobilizing independently, tolerance to only 50 feet of ambulation, pain impacting overall mobility status and need for input for mobility technique/safety)  pt needs inpatient PT tx to improve mobility deficits  discharge recommendation is for home PT to reduce fall risk and maximize level of functional independence  Pt would benefit from OT consult to address safety awareness and ADL adaptation for LE dressing/care     Goals   Patient Goals go home   STG Expiration Date 12/15/19   Short Term Goal #1 pt will: Increase bilateral LE strength 1/2 grade to facilitate independent mobility, Perform all bed mobility tasks independently to decrease fall risk factors, Perform all transfers independently to improve independence, Ambulate 250 ft  with least restrictive assistive device independently w/o LOB, Increase ambulatory balance 1 grade to decrease risk for falls, Complete exercise program independently, Tolerate 3 hr OOB to faciliate upright tolerance and Improve Barthel Index score to 90 to facilitate independence   Plan   Treatment/Interventions Functional transfer training;LE strengthening/ROM; Therapeutic exercise; Endurance training;Patient/family training;Equipment eval/education; Bed mobility;Gait training   PT Frequency 5x/wk   Recommendation   Recommendation Home PT;OT consult   Additional Comments therapist sent emails to Brian Zhu and Lexis Mas to have LSO brace obtained (too late in day to contact via phone)  Barthel Index   Feeding 10   Bathing 0   Grooming Score 5   Dressing Score 10   Bladder Score 10   Bowels Score 10   Toilet Use Score 10   Transfers (Bed/Chair) Score 15   Mobility (Level Surface) Score 0   Stairs Score 0   Barthel Index Score 70     Skilled PT recommended while in hospital and upon DC to progress pt toward treatment goals       Adelita Burns, PT

## 2019-12-05 NOTE — ED NOTES
[facility]  12/05/19 0810

## 2019-12-05 NOTE — ASSESSMENT & PLAN NOTE
· Baseline creatinine is around 1 2  Her creatinine did bump up to 1 48 today  I do not think this is significant enough to keep patient in the hospital   Will have her recheck a BMP as an outpatient

## 2019-12-05 NOTE — ASSESSMENT & PLAN NOTE
· Status post fall 12/4 while trying to close the curtains  Reports falling onto left back  Denies LOC, head injury, CP, SOB  Takes plavix and aspirin for CAD/stent placement  · Lives in [facility] apartment  · CT chest abdomen pelvis:  Acute Nondisplaced fracture of left transverse process L2 to L3  Atelectasis vs infiltrate on CT scan - patient is without respiratory complains at this time    · Plan  · Consult PT, consult Ortho  · LSO brace  · Geriatric pain management   · Incentive spirometer   · Hold plavix and aspirin at this time due to significant bruising

## 2019-12-06 VITALS
HEART RATE: 63 BPM | DIASTOLIC BLOOD PRESSURE: 65 MMHG | SYSTOLIC BLOOD PRESSURE: 141 MMHG | BODY MASS INDEX: 27.76 KG/M2 | HEIGHT: 61 IN | OXYGEN SATURATION: 97 % | WEIGHT: 147.05 LBS | RESPIRATION RATE: 19 BRPM | TEMPERATURE: 98.6 F

## 2019-12-06 PROBLEM — E83.52 HYPERCALCEMIA: Status: RESOLVED | Noted: 2019-12-05 | Resolved: 2019-12-06

## 2019-12-06 LAB
ALBUMIN SERPL BCP-MCNC: 3.2 G/DL (ref 3.5–5)
ALP SERPL-CCNC: 97 U/L (ref 46–116)
ALT SERPL W P-5'-P-CCNC: 17 U/L (ref 12–78)
ANION GAP SERPL CALCULATED.3IONS-SCNC: 11 MMOL/L (ref 4–13)
AST SERPL W P-5'-P-CCNC: 11 U/L (ref 5–45)
ATRIAL RATE: 64 BPM
BASOPHILS # BLD AUTO: 0.03 THOUSANDS/ΜL (ref 0–0.1)
BASOPHILS NFR BLD AUTO: 1 % (ref 0–1)
BILIRUB SERPL-MCNC: 0.6 MG/DL (ref 0.2–1)
BUN SERPL-MCNC: 31 MG/DL (ref 5–25)
CALCIUM SERPL-MCNC: 9.6 MG/DL (ref 8.3–10.1)
CHLORIDE SERPL-SCNC: 108 MMOL/L (ref 100–108)
CO2 SERPL-SCNC: 22 MMOL/L (ref 21–32)
CREAT SERPL-MCNC: 1.48 MG/DL (ref 0.6–1.3)
EOSINOPHIL # BLD AUTO: 0.15 THOUSAND/ΜL (ref 0–0.61)
EOSINOPHIL NFR BLD AUTO: 3 % (ref 0–6)
ERYTHROCYTE [DISTWIDTH] IN BLOOD BY AUTOMATED COUNT: 13.2 % (ref 11.6–15.1)
GFR SERPL CREATININE-BSD FRML MDRD: 30 ML/MIN/1.73SQ M
GLUCOSE SERPL-MCNC: 94 MG/DL (ref 65–140)
HCT VFR BLD AUTO: 33.5 % (ref 34.8–46.1)
HGB BLD-MCNC: 10.6 G/DL (ref 11.5–15.4)
IMM GRANULOCYTES # BLD AUTO: 0.02 THOUSAND/UL (ref 0–0.2)
IMM GRANULOCYTES NFR BLD AUTO: 0 % (ref 0–2)
LYMPHOCYTES # BLD AUTO: 1.6 THOUSANDS/ΜL (ref 0.6–4.47)
LYMPHOCYTES NFR BLD AUTO: 29 % (ref 14–44)
MCH RBC QN AUTO: 29.3 PG (ref 26.8–34.3)
MCHC RBC AUTO-ENTMCNC: 31.6 G/DL (ref 31.4–37.4)
MCV RBC AUTO: 93 FL (ref 82–98)
MONOCYTES # BLD AUTO: 0.59 THOUSAND/ΜL (ref 0.17–1.22)
MONOCYTES NFR BLD AUTO: 11 % (ref 4–12)
NEUTROPHILS # BLD AUTO: 3.19 THOUSANDS/ΜL (ref 1.85–7.62)
NEUTS SEG NFR BLD AUTO: 56 % (ref 43–75)
NRBC BLD AUTO-RTO: 0 /100 WBCS
P AXIS: 33 DEGREES
PLATELET # BLD AUTO: 174 THOUSANDS/UL (ref 149–390)
PMV BLD AUTO: 9.7 FL (ref 8.9–12.7)
POTASSIUM SERPL-SCNC: 4 MMOL/L (ref 3.5–5.3)
PR INTERVAL: 214 MS
PROT SERPL-MCNC: 6 G/DL (ref 6.4–8.2)
QRS AXIS: 114 DEGREES
QRSD INTERVAL: 76 MS
QT INTERVAL: 428 MS
QTC INTERVAL: 428 MS
RBC # BLD AUTO: 3.62 MILLION/UL (ref 3.81–5.12)
SODIUM SERPL-SCNC: 141 MMOL/L (ref 136–145)
T WAVE AXIS: 26 DEGREES
VENTRICULAR RATE: 60 BPM
WBC # BLD AUTO: 5.58 THOUSAND/UL (ref 4.31–10.16)

## 2019-12-06 PROCEDURE — 97760 ORTHOTIC MGMT&TRAING 1ST ENC: CPT

## 2019-12-06 PROCEDURE — 93010 ELECTROCARDIOGRAM REPORT: CPT | Performed by: INTERNAL MEDICINE

## 2019-12-06 PROCEDURE — 80053 COMPREHEN METABOLIC PANEL: CPT | Performed by: NURSE PRACTITIONER

## 2019-12-06 PROCEDURE — 99217 PR OBSERVATION CARE DISCHARGE MANAGEMENT: CPT | Performed by: FAMILY MEDICINE

## 2019-12-06 PROCEDURE — G8988 SELF CARE GOAL STATUS: HCPCS

## 2019-12-06 PROCEDURE — G8987 SELF CARE CURRENT STATUS: HCPCS

## 2019-12-06 PROCEDURE — 85025 COMPLETE CBC W/AUTO DIFF WBC: CPT | Performed by: NURSE PRACTITIONER

## 2019-12-06 PROCEDURE — 97110 THERAPEUTIC EXERCISES: CPT

## 2019-12-06 PROCEDURE — 97535 SELF CARE MNGMENT TRAINING: CPT

## 2019-12-06 PROCEDURE — 97166 OT EVAL MOD COMPLEX 45 MIN: CPT

## 2019-12-06 RX ADMIN — LATANOPROST 1 DROP: 50 SOLUTION OPHTHALMIC at 12:28

## 2019-12-06 RX ADMIN — ACETAMINOPHEN 975 MG: 325 TABLET, FILM COATED ORAL at 05:11

## 2019-12-06 RX ADMIN — HYDROCHLOROTHIAZIDE 12.5 MG: 12.5 TABLET ORAL at 08:49

## 2019-12-06 RX ADMIN — LOSARTAN POTASSIUM 50 MG: 50 TABLET, FILM COATED ORAL at 08:49

## 2019-12-06 RX ADMIN — PANTOPRAZOLE SODIUM 40 MG: 40 TABLET, DELAYED RELEASE ORAL at 05:11

## 2019-12-06 RX ADMIN — METOPROLOL SUCCINATE 50 MG: 50 TABLET, EXTENDED RELEASE ORAL at 08:49

## 2019-12-06 RX ADMIN — ATORVASTATIN CALCIUM 40 MG: 40 TABLET, FILM COATED ORAL at 08:49

## 2019-12-06 NOTE — DISCHARGE SUMMARY
Discharge- [facility] 1/22/1926, 80 y o  female MRN: [id number]    Unit/Bed#: S -01 Encounter: [id number]    Primary Care Provider: Rigo Maynard MD   Date and time admitted to hospital: 12/5/2019  3:13 AM      History of coronary artery stent placement  Assessment & Plan  · History of RCA stent in 2009  · Continue aspirin and Plavix  Chronic kidney failure, stage 3 (moderate) ([facility])  Assessment & Plan  · Baseline creatinine is around 1 2  Her creatinine did bump up to 1 48 today  I do not think this is significant enough to keep patient in the hospital   Will have her recheck a BMP as an outpatient  * Traumatic closed nondisplaced fracture of lumbar vertebra, initial encounter [facility])  Assessment & Plan  · Status post fall 12/4 while trying to close the curtains  Reports falling onto left back  Denies LOC, head injury, CP, SOB  Takes plavix and aspirin for CAD/stent placement  · Lives in [facility] apartment  · CT chest abdomen pelvis:  Acute Nondisplaced fracture of left transverse process L2 to L3  Atelectasis vs infiltrate on CT scan - patient is without respiratory complains at this time  Appreciate assistance from Ortho and PT/OT  Patient's pain is much better today  She has a brace on and has done well with PT/OT  Will discharge home with home PT  Hypercalcemiaresolved as of 12/6/2019  Assessment & Plan  · Calcium normal today  Asymptomatic hypertensive urgencyresolved as of 12/6/2019  Assessment & Plan  · 183/75 on arrival to emergency department  Likely due to uncontrolled pain  Blood pressure now controlled after pain controlled  Continue losartan, Toprol-XL and hydrochlorothiazide  Disposition:     Home    Reason for Admission:  Traumatic nondisplaced fracture of left transverse process of L2 and L3      Discharge Diagnoses:     Principal Problem:    Traumatic closed nondisplaced fracture of lumbar vertebra, initial encounter ([facility] )  Active Problems:    Chronic kidney failure, stage 3 (moderate) ([facility])    History of coronary artery stent placement  Resolved Problems:    Asymptomatic hypertensive urgency    Hypercalcemia      Consultations During Hospital Stay:  · Ortho, P T /OT    Procedures Performed:     · None    Significant Findings / Test Results:     XR chest 1 view portable [[id number]] Collected: 12/05/19 0825   Order Status: Completed Updated: 12/05/19 0828   Narrative:     CHEST     INDICATION:   posterior chest trauma  COMPARISON:  9/19/2012    EXAM PERFORMED/VIEWS:  BM CHEST PORTABLE      FINDINGS:    The aorta is atherosclerotic   Moderate cardiomegaly appears stable  There is patchy opacity within the left lung base   No pleural effusions or pneumothorax  Osseous structures appear within normal limits for patient age  Impression:         1   Patchy opacity within the left lung base, which may represent atelectasis or developing pneumonia   Findings are also seen on CT performed subsequently after this radiograph  2   Stable moderate cardiomegaly  Workstation performed: [id number]   CT chest abdomen pelvis wo contrast [[id number]] Collected: 12/05/19 0539   Order Status: Completed Updated: 12/05/19 0559   Narrative:     CT CHEST, ABDOMEN AND PELVIS WITHOUT IV CONTRAST    INDICATION:   trauma on left side with much bruising and is on plavix  COMPARISON:  Chest CT 1/20/2010  TECHNIQUE: CT examination of the chest, abdomen and pelvis was performed without intravenous contrast   Axial, sagittal, and coronal 2D reformatted images were created from the source data and submitted for interpretation  Radiation dose length product (DLP) for this visit: [id number] mGy-cm    This examination, like all CT scans performed in the [facility], was performed utilizing techniques to minimize radiation dose exposure, including the use of iterative   reconstruction and automated exposure control       Enteric contrast was administered  FINDINGS:    CHEST    LUNGS:  Patchy density at the left base may represent atelectasis or infiltrate   New 9 mm lobulated density in the posterior right upper lobe on image 3/43-46, 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  Stephanie Perkins is no tracheal or endobronchial   lesion  PLEURA:  Unremarkable  HEART/GREAT VESSELS:  Normal heart size   Coronary artery calcifications noted   Normal caliber thoracic aorta with mild atherosclerotic calcifications  MEDIASTINUM AND BILLIE:  Unremarkable  CHEST WALL AND LOWER NECK:   Unremarkable  ABDOMEN  Absence of intravenous contrast enhancement limits evaluation of the solid abdominal viscera  LIVER/BILIARY TREE:  Unremarkable  GALLBLADDER:  No calcified gallstones  No pericholecystic inflammatory change  SPLEEN:  Unremarkable  PANCREAS:  Unremarkable  ADRENAL GLANDS:  Unremarkable  KIDNEYS/URETERS:  Partially exophytic fat density nodule measuring 2 7 x 2 7 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   Probable simple cyst at the interpolar left kidney measures 2 0 x 2 2 cm   No hydronephrosis or calculi  STOMACH AND BOWEL: Stephanie Perkins is colonic diverticulosis without evidence of acute diverticulitis  APPENDIX:  No findings to suggest appendicitis  ABDOMINOPELVIC CAVITY:  No ascites or free intraperitoneal air  No lymphadenopathy  VESSELS:  Atherosclerotic changes are present   No evidence of aneurysm  PELVIS    REPRODUCTIVE ORGANS:  Patient is status post hysterectomy  URINARY BLADDER:  Unremarkable  ABDOMINAL WALL/INGUINAL REGIONS:  Unremarkable  OSSEOUS STRUCTURES:  Acute nondisplaced fractures of the left transverse processes of L2 and L3   Status post right hip arthroplasty  Impression:       Nondisplaced fractures of the left transverse processes of L2 and L3   No other evidence of acute posttraumatic abnormality      Mild left basilar atelectasis versus infiltrate  Colonic diverticulosis  ·     Incidental Findings:   · Patchy opacity within left lung base  Atelectasis versus pneumonia  Clinically doubt pneumonia  Test Results Pending at Discharge (will require follow up): · None     Outpatient Tests Requested:  · BMP    Complications:  None    Hospital Course:     Patient came to the emergency room after a mechanical fall  She fell when trying to close her curtains  She ended up suffering acute nondisplaced fractures of the transverse processes of L2 and L3  She was seen by Ortho who ordered an LSO brace for her  She was also seen by PT/OT  Her pain actually improved a whole lot and she worked well with PT/OT  She was then discharged home with home health  Of note, her creatinine did bump up from 1 2 weight upon admission to 1 48 on day of discharge  While I am not overly concerned about this I do think a repeat BMP should be done next week which has been ordered  Condition at Discharge: stable     Discharge Day Visit / Exam:     Subjective:  Patient seen and examined with daughter at bedside this morning  She is doing well  She has very little pain in her back and has been ambulating well with a walker  No other issues reported  Patient is agreeable to going home today  Vitals: Blood Pressure: 141/65 (12/06/19 0700)  Pulse: 63 (12/06/19 0700)  Temperature: 98 6 °F (37 °C) (12/06/19 0700)  Temp Source: Oral (12/06/19 0700)  Respirations: 19 (12/06/19 0700)  Height: 5' 1" (154 9 cm) (12/05/19 0756)  Weight - Scale: 66 7 kg (147 lb 0 8 oz) (12/05/19 0756)  SpO2: 97 % (12/06/19 0700)  Exam:   Physical Exam   Constitutional: She is oriented to person, place, and time  No distress  HENT:   Head: Normocephalic and atraumatic  Eyes: No scleral icterus  Pulmonary/Chest: No respiratory distress  Neurological: She is alert and oriented to person, place, and time  Psychiatric: She has a normal mood and affect   Her behavior is normal      Discussion with Family:  Discussed with daughter at bedside  Discharge instructions/Information to patient and family:   See after visit summary for information provided to patient and family  Provisions for Follow-Up Care:  See after visit summary for information related to follow-up care and any pertinent home health orders  Planned Readmission:  None     Discharge Statement:  I spent 30 minutes discharging the patient  This time was spent on the day of discharge  I had direct contact with the patient on the day of discharge  Greater than 50% of the total time was spent examining patient, answering all patient questions, arranging and discussing plan of care with patient as well as directly providing post-discharge instructions  Additional time then spent on discharge activities  Discharge Medications:  See after visit summary for reconciled discharge medications provided to patient and family        ** Please Note: This note has been constructed using a voice recognition system **

## 2019-12-06 NOTE — SOCIAL WORK
Cm contacted pt at home to discuss that A post acute care recommendation was made by your care team for [place] [address]  Discussed Freedom of Choice with patient and daughter  Pt would like to have these services  She requested [facility] be arranged for her  Referral has been arranged  Cm provided contact information for [facility] to daughter over the phone  No further cm interventions needed at this time

## 2019-12-06 NOTE — OCCUPATIONAL THERAPY NOTE
OccupationalTherapy Evaluation and Treatment Note     Patient Name: Mandy Hernandez  VJFDU'S Date: 12/6/2019  Problem List  Principal Problem:    Traumatic closed nondisplaced fracture of lumbar vertebra, initial encounter [facility])  Active Problems:    Chronic kidney failure, stage 3 (moderate) ([address] )    Asymptomatic hypertensive urgency    History of coronary artery stent placement    Hypercalcemia    Past Medical History  Past Medical History:   Diagnosis Date    Anemia     last assessed - [id number]    Cancer [facility])     Depression     last assessed - [id number]    Glaucoma     Hypercalcemia     last assessed - [id number]    Hypertension     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    Macular degeneration     Nondisplaced fracture of base of fifth metacarpal bone, right hand, initial encounter for closed fracture 12/7/2018    Osteoarthritis     Plantar fasciitis of right foot     last assessed - [id number]    Stomach disorder     Superficial thrombophlebitis of leg     unspecified laterality; last assessed - [id number]    Trochanteric bursitis of left hip     last assessed - [id number]     Past Surgical History  Past Surgical History:   Procedure Laterality Date    CORONARY STENT PLACEMENT      stent RCA    FL INJECTION LEFT HIP (NON ARTHROGRAM)  11/26/2018    FL INJECTION LEFT HIP (NON ARTHROGRAM)  3/4/2019    FL INJECTION LEFT HIP (NON ARTHROGRAM)  6/10/2019    FL INJECTION LEFT HIP (NON ARTHROGRAM)  9/24/2019    FOOT SURGERY      HIP SURGERY      HYSTERECTOMY      SKIN LESION EXCISION  11/2010    Face - BCC - nose    TOTAL ABDOMINAL HYSTERECTOMY W/ BILATERAL SALPINGOOPHORECTOMY      TOTAL HIP ARTHROPLASTY Right          12/06/19 0955   Note Type   Note type Eval/Treat   Restrictions/Precautions   Weight Bearing Precautions Per Order Yes   RLE Weight Bearing Per Order WBAT   LLE Weight Bearing Per Order WBAT   Braces or Orthoses LSO  (LSO when OOB)   Other Precautions Chair Alarm; Bed Alarm; Fall Risk;Spinal precautions   Pain Assessment   Pain Assessment 0-10   Pain Score No Pain   Home Living   Type of Home Apartment   Home Layout One level;Elevator   Bathroom Shower/Tub Tub/shower unit   Bathroom Toilet Standard   Bathroom Equipment Hand-held shower; Shower chair;Grab bars in shower   [address] Cane;Walker; Other (Comment)  (rollator)   Additional Comments Pt lives in [facility] apartment  Prior Function   Level of [place] Independent with ADLs and functional mobility   Lives With Alone   ADL Assistance Independent   IADLs Needs assistance   Falls in the last 6 months 1 to 4   Vocational Retired   Comments Pt reports has a meal service  Pt dtr provides transportation  Pt has cleaning lady  Lifestyle   Autonomy Pt reports INDEP with ADLs, and some A with IADLs, Mod I with functional mobility using rollator occasionally   Reciprocal Relationships Pt reports dtr lives about 8 miles away and visits her often   Service to Others Pt is retired from [employer] enjoys participating in community activities  Psychosocial   Psychosocial (WDL) WDL   Subjective   Subjective Pt is very pleasant and cooperative   ADL   Eating Assistance 7  Independent   Grooming Assistance 5  Supervision/Setup   Grooming Deficit Standing with assistive device; Supervision/safety   UB Bathing Assistance 5  Supervision/Setup   UB Bathing Deficit Supervision/safety   LB Bathing Assistance 5  Supervision/Setup   LB Bathing Deficit Use of adaptive equipment;Verbal cueing  (simulated)   UB Dressing Assistance 5  Supervision/Setup   UB Dressing Deficit Setup   LB Dressing Assistance 4  Minimal Assistance   LB Dressing Deficit Use of adaptive equipment;Verbal cueing;Supervision/safety; Increased time to complete   Toileting Assistance  5  Supervision/Setup   Toileting Deficit Supervison/safety   Bed Mobility   Additional Comments Pt seated in b/s chair at start of session and agreeable to OT  Pt seated in b/s chair at end of session with all needs met, call bell within reach, and chair alarm active  Transfers   Sit to Stand 6  Modified independent   Additional items Increased time required   Stand to Sit 6  Modified independent   Additional items Increased time required   Toilet transfer 5  Supervision   Additional items Increased time required;Standard toilet  (grab bars)   Additional Comments RW for UE support   Functional Mobility   Functional Mobility 5  Supervision   Additional Comments Supervision using RW <> bathroom; no overt LOB noted   Balance   Static Sitting Good   Dynamic Sitting Fair +   Static Standing Fair +   Dynamic Standing Fair +   Ambulatory Fair +   Activity Tolerance   Activity Tolerance Patient limited by fatigue;Patient limited by pain   Nurse Made Aware RN Severo Riddle verbalized pt appropriate for OT eval  Notified of outcomes   RUE Assessment   RUE Assessment X   RUE Overall AROM   R Shoulder Flexion WFL   R Elbow Flexion WFL   R Wrist Flexion WFL   R Mass Grasp WFL   RUE Strength   RUE Overall Strength Deficits   R Shoulder Flexion 3/5   R Elbow Extension 3/5   LUE Assessment   LUE Assessment WFL   LUE Overall AROM   L Shoulder Flexion WFL   L Elbow Flexion WFL   L Wrist Flexion WFL   L Mass Grasp WFL   LUE Strength   LUE Overall Strength Deficits   L Shoulder Flexion 4/5   L Elbow Extension 4/5   Hand Function   Gross Motor Coordination Functional   Fine Motor Coordination Functional   Vision-Basic Assessment   Current Vision Wears glasses all the time   Visual History Macular degeneration  (R eye mostly)   Vision - Complex Assessment   Acuity Able to read clock/calendar on wall without difficulty; Able to read normal print without difficulty   Cognition   Overall Cognitive Status [facility]   Arousal/Participation Alert; Responsive;Arousable; Cooperative   Attention Attends with cues to redirect   Orientation Level Oriented X4   Memory Decreased recall of precautions  (started twisting at waist stating "it hurts only when I  ")   Following Commands Follows one step commands without difficulty   Comments Pt able to identify self by full name and birthdate  Pt required VCs to recall 2/3 back safety precautions  Assessment   Limitation Decreased Safe judgement during ADL;Decreased high-level ADLs; Decreased self-care trans;Decreased endurance;Decreased ADL status   Assessment Pt is a 80 y o  female seen for OT evaluation (time [id number]) s/p [facility] 12/5/2019 w/ Traumatic closed nondisplaced fracture of lumbar vertebra, initial encounter ([facility] [address] )  Comorbidities affecting pt's functional performance at time of assessment include: CKD3, asymptomatic hypertensive urgency, hx of coronary artery stent placement, hypercalcemia, hx of chronic R shoulder pain, hx of trochanteric bursitis of L hip  Evaluation required an expanded review of medical and/or therapy records and additional review of physical, cognitive, or psychosocial history related to current functional performance    Personal factors affecting pt at time of IE include:limited home support, difficulty performing ADLS, difficulty performing IADLS , compliance, health management  and environment  Prior to admission, pt was INDEP with ADLs, required A for IADLs, and was Mod I with functional mobility using rollator PRN  Upon evaluation: Pt requires SUP for grooming, UB ADLs, LB bathing, and toileting; Min A for LB dressing; Mod I for sit<>stand transfers with RW; and Sup for functional mobility using RW <> bathroom 2* the following deficits impacting occupational performance: weakness, decreased strength, decreased balance, decreased tolerance, impaired memory, orthopedic restrictions and decreased coping skills  Cognition appears to be [facility], however decreased recall of precautions noted - requiring VCs for recall  Vision is slightly impaired at baseline 2nd to macular degeneration in R eye  Development of pt POC requires clinical decision making of moderate analytic complexity  Minimal to moderate modification of tasks or assistance (e g , physical or verbal) is necessary to enable completion of evaluation component    Pt to benefit from continued skilled OT tx while in the hospital to address deficits as defined above and maximize level of functional independence w ADL's and functional mobility  Occupational Performance areas to address include: grooming, bathing/shower, toilet hygiene, dressing, medication management, socialization, health maintenance, functional mobility, community mobility, clothing management, meal prep, money management, household maintenance and social participation  From OT standpoint, recommendation at time of d/c would be home OT and supervision for showers  Goals   Patient Goals "to have a coffee break"   Plan   Treatment Interventions ADL retraining;Functional transfer training; Endurance training;Patient/family training;Equipment evaluation/education; Compensatory technique education;Continued evaluation; Activityengagement; Energy conservation   Goal Expiration Date 12/11/19   OT Frequency 2-3x/wk   Additional Treatment Session   Start Time 1010   End Time 1048   Treatment Assessment Patient participated in Skilled OT session (time 4778-3532) this date with interventions consisting of ADL retraining with the use of correct body mechanics, work simplification skill education, safety awareness and fall prevention technique education, long handled adaptive equipment training and maintaining back safety precautions  Reviewed back safety precautions, pt able to recall only 1/3  Educated pt on precautions again and reminded pt of mnemonic device to remember precautions "no BLT"  Pt able to verbalize at end of session  Notified RN to quiz pt when possible to facilitate continued recall   Additionally educated pt on back safety precautions: to avoid lifting >8lbs, and when lifting, 1st bringing item close to body  Pt agreeable  Pt inquired about how to retrieve items from bottom shelves in refrigerator and bathroom  Recommended pt has someone assist her in rearranging refrigerator to place frequently used items at waist level or higher to avoid bending  Pt agreeable  Additionally, pt reported concerns for completing LB ADLs  Educated pt on full hip kit for LB ADLs (elastic laces, LHSH, sock aid, reacher, dressing stick, LH sponge)  Educated on compensatory techniques to don shoes (pt states she wears mostly lace up shoes/sneakers) by re-lacing shoes with elastic laces, then using [address] and LH reacher to don lace up shoes in order to avoid bending   Pt agreeable  Pt requested purchasing full hip kit - this writer provided to pt  Patient continues to be functioning below baseline level, occupational performance remains limited secondary to factors listed above and increased risk for falls and injury  From OT standpoint, recommendation at time of d/c would be home OT  Patient to benefit from continued Occupational Therapy treatment while in the hospital to address deficits as defined above and maximize level of functional independence with ADLs and functional mobility      Recommendation   OT Discharge Recommendation Home OT   Equipment Recommended Bedside commode   OT - OK to Discharge   (once medically cleared)   Barthel Index   Feeding 10   Bathing 0   Grooming Score 5   Dressing Score 5   Bladder Score 10   Bowels Score 10   Toilet Use Score 10   Transfers (Bed/Chair) Score 15   Mobility (Level Surface) Score 10   Stairs Score 0   Barthel Index Score 75   Modified Amite Scale   Modified Amite Scale 2     Goals to be met within 5 days:    Pt will complete grooming/oral hygiene tasks independently while standing at sink    Pt will complete UB bathing/dressing independently while seated    Pt will complete LB bathing/dressing Mod I while seated with LHAE to comply with back safety precautions    Pt will improve functional activity tolerance while standing at sink to 10+ minutes in order to increase safety and independence during functional transfers and ADL tasks  Pt will improve dynamic sitting/standing balance to good to increase safety and independence during functional transfers and ADL and decrease risk for falls  Pt will increase independence during STS transfers with RW  Mod I     Pt will complete transfer to [facility] frame over toilet with Mod I     Pt will complete toileting tasks (clothing management up/down, hygiene) Mod I     Pt will complete tub/shower transfer without LOB with Supervision after set-up using shower chair and grab bars    Pt will attend to continued cognitive assessment 100% of the time in order to provide most appropriate recommendations for d/c plans  Pt will identify and implement at least 3 healthy coping strategies to increase participation in meaningful activities and decrease risk for readmission  Pt will demo light meal prep task with 100% recall of back safety precautions Mod I      Crista Noble, OTR/L

## 2019-12-06 NOTE — PHYSICAL THERAPY NOTE
PHYSICAL THERAPY TREATMENT NOTE    Patient Name: Catarina Morgan  JVGNC'R Date: 19 0842   Pain Assessment   Pain Assessment 0-10   Pain Score No Pain   Restrictions/Precautions   Weight Bearing Precautions Per Order Yes   RLE Weight Bearing Per Order WBAT   LLE Weight Bearing Per Order WBAT   Braces or Orthoses LSO   Other Precautions Chair Alarm; Bed Alarm;Spinal precautions   General   Chart Reviewed Yes   Response to Previous Treatment Patient with no complaints from previous session  Family/Caregiver Present No   Cognition   Arousal/Participation Alert; Cooperative   Orientation Level Oriented X4  (Pt identified by full name and )   Following Commands Follows one step commands without difficulty   Subjective   Subjective Pt supine in bed upon arrival  She is pleasant and agreeable to PT intervention  Bed Mobility   Rolling R 5  Supervision   Additional items Assist x 1;HOB elevated; Increased time required   Supine to Sit 5  Supervision   Additional items Bedrails; Increased time required   Sit to Supine Unable to assess  (Pt OOB in recliner chair at end of session)   Transfers   Sit to Stand 6  Modified independent   Stand to Sit 6  Modified independent   Ambulation/Elevation   Gait pattern [place] flexed   Gait Assistance 5  Supervision   Additional items Verbal cues   Assistive Device Rolling walker   Distance 120 ft   Balance   Static Sitting Good   Static Standing Fair -   Ambulatory Fair -  (w/ rolling walker)   Activity Tolerance   Activity Tolerance Patient limited by fatigue   Nurse Made Aware Spoke to Severo Riddle, RN   Exercises   Knee AROM Long Arc Quad Sitting;10 reps;AROM; Bilateral   Ankle Pumps Sitting;20 reps;AROM; Bilateral   Marching Sitting;5 reps;AROM; Bilateral   Assessment   Prognosis Fair   Problem List Decreased strength;Decreased range of motion;Decreased endurance; Impaired balance;Decreased mobility; Decreased safety awareness;Orthopedic restrictions;Pain   Assessment Pt tolerated treatment well  Therapist introduced pt to LSO, spent time demonstrating don/doff of LSO, reviewing precautions, when to don, etc  See orthotic note below  Regarding ambulation, pt was able to ambulate 120 ft w/ rolling walker w/ supervision in busy hallway  She was able to self-correct having RW out too far ahead of her  She was able to perform exercises after verbal and visual demonstration without issues  Goals   Patient Goals "get up and get moving"   [facility] Expiration Date 12/15/19   Short Term Goal #1 pt will complete timed up and go in 18 seconds or less to decrease risk for falling  pt will: Increase bilateral LE strength 1/2 grade to facilitate independent mobility, Perform all bed mobility tasks independently to decrease fall risk factors, Perform all transfers independently to improve independence, Ambulate 250 ft  with least restrictive assistive device independently w/o LOB, Increase ambulatory balance 1 grade to decrease risk for falls, Complete exercise program independently, Tolerate 3 hr OOB to faciliate upright tolerance and Improve Barthel Index score to 90 to facilitate independence   PT Treatment Day 2   Plan   Treatment/Interventions Functional transfer training;LE strengthening/ROM; Therapeutic exercise; Endurance training;Patient/family training;Equipment eval/education; Bed mobility;Gait training   Progress Progressing toward goals   PT Frequency 5x/wk   Recommendation   Recommendation Home PT   Equipment Recommended Other (Comment)  (Rolling walker)     Recommend upcoming sessions to continue therapeutic exercise, continue with education with LSO, gait training with rolling walker       Skilled PT is still recommended in order to progress patient toward [place] PT, DPT                     Orthotic Note      Date: 12/6/2019    Patient Name: Adolfo Russo     HPI/Orders: Nondisplaced fx of L2 - L3, LSO when OOB    Objective: Pt measured and fit for LSO high back  while patient in sitting, sized to a 3  After initial verbalization and demonstration, pt requires minimal assistance for donning /doffing brace at this time, as well as occasional verbal instruction  Pt verbalized good understanding  of donning/doffing brace, when to wear brace, as well as spinal precautions  Pt able to recall these back to therapist  Pt able to ask questions to therapist, and then verbalize understanding with answers provided  RN aware  Written handout provided, pt had no other questions at this time  Recommendations: Continue to educate pt on don/doffing brace, when to wear in order to demonstrate learning  Drake Meneses

## 2019-12-06 NOTE — PLAN OF CARE
Problem: PHYSICAL THERAPY ADULT  Goal: Performs mobility at highest level of function for planned discharge setting  See evaluation for individualized goals  Description  Treatment/Interventions: Functional transfer training, LE strengthening/ROM, Therapeutic exercise, Endurance training, Patient/family training, Equipment eval/education, Bed mobility, Gait training          See flowsheet documentation for full assessment, interventions and recommendations  Outcome: Progressing  Note:   Prognosis: Fair  Problem List: Decreased strength, Decreased range of motion, Decreased endurance, Impaired balance, Decreased mobility, Decreased safety awareness, Orthopedic restrictions, Pain  Assessment: Pt tolerated treatment well  Therapist introduced pt to LSO, spent time demonstrating don/doff of LSO, reviewing precautions, when to don, etc  See orthotic note below  Regarding ambulation, pt was able to ambulate 120 ft w/ rolling walker w/ supervision in busy hallway  She was able to self-correct having RW out too far ahead of her  She was able to perform exercises after verbal and visual demonstration without issues  Recommendation: Home PT          See flowsheet documentation for full assessment

## 2019-12-06 NOTE — UTILIZATION REVIEW
Continued Stay Review    Date: 12/6/2019                         Current Patient Class: OBS    Current Level of Care: med surg    HPI:89 y o  female initially admitted on 12/5/2019   Assessment/Plan: PT eval with skilled IP PT while in hospital & home PT  With OT consult    Pertinent Labs/Diagnostic Results:   Results from last 7 days   Lab Units 12/06/19  0519 12/05/19  0356   WBC Thousand/uL 5 58 7 30   HEMOGLOBIN g/dL 10 6* 12 1   HEMATOCRIT % 33 5* 38 4   PLATELETS Thousands/uL 174 205   NEUTROS ABS Thousands/µL 3 19 5 39         Results from last 7 days   Lab Units 12/06/19  0519 12/05/19  0356   SODIUM mmol/L 141 141   POTASSIUM mmol/L 4 0 3 8   CHLORIDE mmol/L 108 104   CO2 mmol/L 22 23   ANION GAP mmol/L 11 14*   BUN mg/dL 31* 24   CREATININE mg/dL 1 48* 1 28   EGFR ml/min/1 73sq m 30 36   CALCIUM mg/dL 9 6 10 7*     Results from last 7 days   Lab Units 12/06/19  0519 12/05/19  0356   AST U/L 11 17   ALT U/L 17 20   ALK PHOS U/L 97 121*   TOTAL PROTEIN g/dL 6 0* 7 1   ALBUMIN g/dL 3 2* 3 9   TOTAL BILIRUBIN mg/dL 0 60 0 57         Results from last 7 days   Lab Units 12/06/19  0519 12/05/19  0356   GLUCOSE RANDOM mg/dL 94 121           Results from last 7 days   Lab Units 12/05/19  0356   CK TOTAL U/L 71             Results from last 7 days   Lab Units 12/05/19  0356   PROTIME seconds 12 6   INR  1 00   PTT seconds 24       Results from last 7 days   Lab Units 12/05/19  0356   LIPASE u/L 51*             Results from last 7 days   Lab Units 12/05/19  0430   CLARITY UA  Clear   COLOR UA  Yellow   SPEC GRAV UA  1 010   PH UA  5 5   GLUCOSE UA mg/dl Negative   KETONES UA mg/dl Negative   BLOOD UA  Negative   PROTEIN UA mg/dl Negative   NITRITE UA  Negative   BILIRUBIN UA  Negative   UROBILINOGEN UA E U /dl 0 2   LEUKOCYTES UA  Trace*   WBC UA /hpf 2-4*   RBC UA /hpf None Seen   BACTERIA UA /hpf None Seen   EPITHELIAL CELLS WET PREP /hpf None Seen         Vital Signs:   12/06/19 0700  98 6 °F (37 °C)  63  19 141/65  97 %    Lying         Medications:   Scheduled Medications:    Medications:  acetaminophen 975 mg Oral Q8H [address]   atorvastatin 40 mg Oral Daily   dextran 70-hypromellose 1 drop Both Eyes Q12H XAVIER   losartan 50 mg Oral Daily   And      hydrochlorothiazide 12 5 mg Oral Daily   latanoprost 1 drop Both Eyes HS   lidocaine 1 patch Topical Once   metoprolol succinate 50 mg Oral Daily   pantoprazole 40 mg Oral Early Morning     Continuous IV Infusions:     PRN Meds:    docusate sodium 100 mg Oral BID PRN   hydrALAZINE 5 mg Intravenous Q8H PRN   ondansetron 4 mg Intravenous Q6H PRN   oxyCODONE 2 5 mg Oral Q4H PRN   polyethylene glycol 17 g Oral Daily PRN     Discharge Plan: TBD  Network Utilization Review Department  [email or web address]  ATTENTION: Please call with any questions or concerns to [phone] and carefully listen to the prompts so that you are directed to the right person  All voicemails are confidential   Karla Myers all requests for admission clinical reviews, approved or denied determinations and any other requests to dedicated fax number below belonging to the campus where the patient is receiving treatment   List of dedicated fax numbers for the Facilities:  [address] [facility] [address] DENIALS (Administrative/Medical Necessity) [phone]   [address] [place] [address] (Maternity/NICU/Pediatrics) [phone]   Bill Perez [phone]   Jackie Crocker [phone]   Alexis Webb [phone]   [address] [facility] 1525 [facility] [phone]   Michelle Jones [phone]   Cherelle Valadez 2000 [facility] [address] [facility] 1000 [facility] [phone]

## 2019-12-06 NOTE — PLAN OF CARE
Problem: OCCUPATIONAL THERAPY ADULT  Goal: Performs self-care activities at highest level of function for planned discharge setting  See evaluation for individualized goals  Description  Treatment Interventions: ADL retraining, Functional transfer training, Endurance training, Patient/family training, Equipment evaluation/education, Compensatory technique education, Continued evaluation, Activityengagement, Energy conservation  Equipment Recommended: Bedside commode       See flowsheet documentation for full assessment, interventions and recommendations  Note:   Limitation: Decreased Safe judgement during ADL, Decreased high-level ADLs, Decreased self-care trans, Decreased endurance, Decreased ADL status     Assessment: Pt is a 80 y o  female seen for OT evaluation (time 9453-0143) s/p admit to [facility] on 12/5/2019 w/ Traumatic closed nondisplaced fracture of lumbar vertebra, initial encounter ([facility] [address] )  Comorbidities affecting pt's functional performance at time of assessment include: CKD3, asymptomatic hypertensive urgency, hx of coronary artery stent placement, hypercalcemia, hx of chronic R shoulder pain, hx of trochanteric bursitis of L hip  Evaluation required an expanded review of medical and/or therapy records and additional review of physical, cognitive, or psychosocial history related to current functional performance    Personal factors affecting pt at time of IE include:limited home support, difficulty performing ADLS, difficulty performing IADLS , compliance, health management  and environment  Prior to admission, pt was INDEP with ADLs, required A for IADLs, and was Mod I with functional mobility using rollator PRN  Upon evaluation: Pt requires SUP for grooming, UB ADLs, LB bathing, and toileting; Min A for LB dressing;  Mod I for sit<>stand transfers with RW; and Sup for functional mobility using RW <> bathroom 2* the following deficits impacting occupational performance: weakness, decreased strength, decreased balance, decreased tolerance, impaired memory, orthopedic restrictions and decreased coping skills  Cognition appears to be [facility], however decreased recall of precautions noted - requiring VCs for recall  Vision is slightly impaired at baseline 2nd to macular degeneration in R eye  Development of pt POC requires clinical decision making of moderate analytic complexity  Minimal to moderate modification of tasks or assistance (e g , physical or verbal) is necessary to enable completion of evaluation component    Pt to benefit from continued skilled OT tx while in the hospital to address deficits as defined above and maximize level of functional independence w ADL's and functional mobility  Occupational Performance areas to address include: grooming, bathing/shower, toilet hygiene, dressing, medication management, socialization, health maintenance, functional mobility, community mobility, clothing management, meal prep, money management, household maintenance and social participation  From OT standpoint, recommendation at time of d/c would be home OT and supervision for showers  Tx assessment: Patient participated in Skilled OT session (time 6418-9438) this date with interventions consisting of ADL retraining with the use of correct body mechanics, work simplification skill education, safety awareness and fall prevention technique education, long handled adaptive equipment training and maintaining back safety precautions  Reviewed back safety precautions, pt able to recall only 1/3  Educated pt on precautions again and reminded pt of mnemonic device to remember precautions "no BLT"  Pt able to verbalize at end of session  Notified RN to quiz pt when possible to facilitate continued recall  Additionally educated pt on back safety precautions: to avoid lifting >8lbs, and when lifting, 1st bringing item close to body  Pt agreeable   Pt inquired about how to retrieve items from bottom shelves in refrigerator and bathroom  Recommended pt has someone assist her in rearranging refrigerator to place frequently used items at waist level or higher to avoid bending  Pt agreeable  Additionally, pt reported concerns for completing LB ADLs  Educated pt on full hip kit for LB ADLs (elastic laces, LHSH, sock aid, reacher, dressing stick, LH sponge)  Educated on compensatory techniques to don shoes (pt states she wears mostly lace up shoes/sneakers) by re-lacing shoes with elastic laces, then using [address] and LH reacher to don lace up shoes in order to avoid bending   Pt agreeable  Pt requested purchasing full hip kit - this writer provided to pt  Patient continues to be functioning below baseline level, occupational performance remains limited secondary to factors listed above and increased risk for falls and injury  From OT standpoint, recommendation at time of d/c would be home OT  Patient to benefit from continued Occupational Therapy treatment while in the hospital to address deficits as defined above and maximize level of functional independence with ADLs and functional mobility        OT Discharge Recommendation: Home OT  OT - OK to Discharge: (once medically cleared)

## 2019-12-06 NOTE — OCCUPATIONAL THERAPY NOTE
Occupational Therapy Cancellation Note        Patient Name: Shai Amos  HSWRL'P Date: 12/6/2019    OT orders received  Chart review completed  RN Bola Dean states pt appropriate for OT jhonny  Attempted to see pt, however pt declined to participate at this time due to wanting to eat breakfast first, as she had just finished working with PT  Notified RN  Will continue to follow and evaluate when agreeable and as schedule allows      JESSICA Mena/REGIS

## 2019-12-09 ENCOUNTER — TRANSITIONAL CARE MANAGEMENT (OUTPATIENT)
Dept: FAMILY MEDICINE CLINIC | Facility: CLINIC | Age: 84
End: 2019-12-09

## 2019-12-09 ENCOUNTER — APPOINTMENT (OUTPATIENT)
Dept: LAB | Facility: MEDICAL CENTER | Age: 84
End: 2019-12-09
Payer: MEDICARE

## 2019-12-09 DIAGNOSIS — N18.30 CHRONIC KIDNEY FAILURE, STAGE 3 (MODERATE) (HCC): ICD-10-CM

## 2019-12-09 LAB
ALBUMIN SERPL BCP-MCNC: 3.9 G/DL (ref 3.5–5)
ANION GAP SERPL CALCULATED.3IONS-SCNC: 3 MMOL/L (ref 4–13)
BUN SERPL-MCNC: 32 MG/DL (ref 5–25)
CALCIUM ALBUM COR SERPL-MCNC: 10.5 MG/DL (ref 8.3–10.1)
CALCIUM SERPL-MCNC: 10.4 MG/DL (ref 8.3–10.1)
CALCIUM SERPL-MCNC: 10.4 MG/DL (ref 8.3–10.1)
CHLORIDE SERPL-SCNC: 109 MMOL/L (ref 100–108)
CO2 SERPL-SCNC: 29 MMOL/L (ref 21–32)
CREAT SERPL-MCNC: 1.3 MG/DL (ref 0.6–1.3)
GFR SERPL CREATININE-BSD FRML MDRD: 35 ML/MIN/1.73SQ M
GLUCOSE P FAST SERPL-MCNC: 107 MG/DL (ref 65–99)
POTASSIUM SERPL-SCNC: 4.4 MMOL/L (ref 3.5–5.3)
SODIUM SERPL-SCNC: 141 MMOL/L (ref 136–145)

## 2019-12-09 PROCEDURE — 80048 BASIC METABOLIC PNL TOTAL CA: CPT

## 2019-12-09 PROCEDURE — 82040 ASSAY OF SERUM ALBUMIN: CPT

## 2019-12-09 PROCEDURE — 36415 COLL VENOUS BLD VENIPUNCTURE: CPT

## 2019-12-12 ENCOUNTER — TELEPHONE (OUTPATIENT)
Dept: FAMILY MEDICINE CLINIC | Facility: CLINIC | Age: 84
End: 2019-12-12

## 2019-12-12 ENCOUNTER — OFFICE VISIT (OUTPATIENT)
Dept: FAMILY MEDICINE CLINIC | Facility: CLINIC | Age: 84
End: 2019-12-12
Payer: MEDICARE

## 2019-12-12 VITALS
HEIGHT: 61 IN | WEIGHT: 142 LBS | SYSTOLIC BLOOD PRESSURE: 132 MMHG | BODY MASS INDEX: 26.81 KG/M2 | TEMPERATURE: 65.2 F | DIASTOLIC BLOOD PRESSURE: 64 MMHG | RESPIRATION RATE: 16 BRPM | HEART RATE: 72 BPM

## 2019-12-12 DIAGNOSIS — I10 ESSENTIAL HYPERTENSION: ICD-10-CM

## 2019-12-12 DIAGNOSIS — D64.9 NORMOCYTIC ANEMIA: ICD-10-CM

## 2019-12-12 DIAGNOSIS — S32.009A: Primary | ICD-10-CM

## 2019-12-12 DIAGNOSIS — E83.52 HYPERCALCEMIA: ICD-10-CM
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
## 2019-12-12 DIAGNOSIS — N18.30 STAGE 3 CHRONIC KIDNEY DISEASE (HCC): ICD-10-CM

## 2019-12-12 DIAGNOSIS — I25.10 ATHEROSCLEROSIS OF NATIVE CORONARY ARTERY OF NATIVE HEART WITHOUT ANGINA PECTORIS: ICD-10-CM

## 2019-12-12 PROCEDURE — 99496 TRANSJ CARE MGMT HIGH F2F 7D: CPT | Performed by: FAMILY MEDICINE

## 2019-12-12 NOTE — PROGRESS NOTES
TCM Call (since 11/11/2019)     Date and time call was made  12/9/2019 11:32 AM    Hospital care reviewed  Records reviewed    Patient was hospitialized at  [facility]    Date of Admission  12/05/19    Date of discharge  12/06/19    Diagnosis  traumatic closed nondisplaced fracture of lumbar vertebra    Disposition  Home    Were the patients medications reviewed and updated  Yes    Current Symptoms  None      TCM Call (since 11/11/2019)     Post hospital issues  None    Should patient be enrolled in anticoag monitoring? No    Scheduled for follow up? Yes    Did you obtain your prescribed medications  Yes    Do you need help managing your prescriptions or medications  No    Is transportation to your appointment needed  No    I have advised the patient to call PCP with any new or worsening symptoms  Helen Pineda, [address] [place] [address]    The type of support provided  Emotional; Financial; Physical    Do you have social support  Yes, as much as I need    Are you recieving any outpatient services  No    Are you recieving home care services  Yes    Types of home care services  Home PT    Are you using any community resources  No    Current waiver services  No    Have you fallen in the last 12 months  No    Interperter language line needed  No    Counseling  Family    Counseling topics  Prognosis; Diagnostic results; patient and family education            Assessment/Plan:       Diagnoses and all orders for this visit:    Traumatic closed nondisplaced fracture of lumbar vertebra, initial encounter ([facility] [address] )    Fall, initial encounter    Stage 3 chronic kidney disease ([facility] [address] )    Normocytic anemia  -     CBC and differential  -     Protein electrophoresis, serum    Hypercalcemia  -     Basic metabolic panel;  Future    Essential hypertension    Atherosclerosis of native coronary artery of native heart without angina pectoris          Continue with current medications  Repeat BMP and CBC in one month  Awaiting PT evaluation  Brace back x 6 weeks  Extra strength Tylenol 500 mg 3 times a day prn  Patient ID: Perfecto Rodriguez is a 80 y o  female  Followup visit  Here with her daughter  TCM s/p admission after a fall  Patient was trying to close her curtains when she fell backwards  No LOC  Chest x-ray patchy opacity within the left lung base which may represent atelectasis or developing pneumonia  Stable moderate cardiomegaly  CT scan chest,abdomen and pelvis Nondisplaced fractures of the left transverse processes of L2 and L3  No other evidence of acute posttraumatic abnormality  Mild left basilar atelectasis versus infiltrate  Colonic diverticulosis  To whom 0 7 x 2 7 cm in for a polar right kidney angiomyolipoma  Probable simple cyst at the interpolar left kidney  No hydronephrosis or calculi  New 9 mm lobulated density in the posterior right upper lobe compatible with a small AVM  Coronary artery calcifications noted  Admission labs chemistry profile random blood glucose 121  Electrolytes normal except for calcium 10  7  creatinine 1 28 on repeat 1 48  Repeat calcium 9 6  LFTs normal except for alkaline phosphatase 121 on repeat 97  Total protein low at 6 0 albumin 3 2 CBC normal hemoglobin 12 1 on repeat 10 6  OUutpatient BMP creatinine 1 30  GFR 36  Electrolytes normal except for calcium 10 4 and chloride 109  Medications reviewed  Patient is using as needed Extra-strength Tylenol for pain  She was discharged with a back brace  She was evaluated by visiting nurses and is scheduled to be seen by PT  she has both a cane and walker for ambulation  No longer driving  Patient lives alone in a [facility] apartment  Independent with ADLs and most of her IADLs  Need some assist with grocery shopping  She eats TV dinners or pre cook meals  Her daughter feels there has been some decline  Forgetful    Hypertension blood pressures have been stable on Valsartan HCTZ 80/12 5 daily and Metoprolol ER 50 mg daily  05/2018 creatinine 1 22  GFR 45  electrolytes normal  Hgb 11 8  Hyperlipidemia and CAD on Atorvastatin 40 mg daily  Lipid profile 05/2018 cholesterol 188  TGs 128  HDL 50  IXS28  FBS 93  LFTs normal        The following portions of the patient's history were reviewed and updated as appropriate: allergies, current medications, past family history, past medical history, past social history, past surgical history and problem list     Review of Systems   Constitutional: Negative for appetite change, chills, fatigue, fever and unexpected weight change  HENT: Positive for hearing loss  Negative for congestion, ear pain, rhinorrhea, sore throat and trouble swallowing  Eyes: Negative for visual disturbance  ARMD and glaucoma  Respiratory: Negative for cough, shortness of breath and wheezing  No orthopnea or PND   Cardiovascular: Negative for chest pain, palpitations and leg swelling  Cardiology evaluation 5/2015 for exertional dyspnea and palpitations  Holter monitor showed normal sinus rhythm  139 single PVCs  No sustained ventricular rhythm  rare PACs, consisting of 183 single PACs  3 were noted in bigeminy  10 noted in couplets  no sustained supraventricular rhythm  No significant pauses or high grade AV block  Echocardiogram showed normal left ventricular function, ejection fraction 35% grade 1 diastolic dysfunction  No significant valvular abnormalities  chronic swelling right lower leg  s/p resection of leiomyosarcoma  Repeat echocardiogram 09/2017 normal left ventricular systolic function  EF 60%  No regional wall motion abnormalities  Right ventricle normal  Trace MR  No pericardial effusion  Gastrointestinal: Negative for abdominal pain, blood in stool, constipation, diarrhea, nausea and vomiting  GERD stable on Omeprazole  no reflux  no dysphagia  Endocrine:        Past history of mildly elevated Ca++  05/2018 Ca++ 10 0   03/2017 Ca++ 9 4  11/2016 Ca++ 10 4  03/2016 Ca 10 4  11/2015 Ca++ 9 8  07/2015 10 4  01/2015 10 5 no change from 09/2014  SPEP normal  intact PTH normal  hypothyroidism  05/2018 TSH 3 84  3/2017 TSH 5 310  Positive thyroid microsomal antibody  patient was started on Levothyroxine 25 mcg daily  05/2017  in August She stop levothyroxine when she started itching  No rash  repeat TSH 11/2017 4 46  Genitourinary: Negative for difficulty urinating, dysuria, frequency and hematuria  OAB stable on Vesicare  nocturia x 1  followed by urology  right renal angiomyolipoma  renal u/s 09/2015   Musculoskeletal: Positive for gait problem  Negative for arthralgias and myalgias  Recurrent left hip pain  OA left hip  She is followed by orthopedics  X-rays of left hip showed moderate to severe osteoarthritis  03/2019 left hip intra articular steroid injection with symptom improvement   10/2019 left hip bursa injection  OA right shoulder  X-rays of right shoulder showed mild glenohumeral osteoarthritis and mild right AC joint osteoarthritis  She completed a course of physical therapy  Skin: Negative for rash  Neurological: Negative for dizziness and headaches  ER visit for fall 12/2018  No LOC  12/2018 CT scan of head no acute intracranial abnormality  Decreased attenuation noted in the periventricular and subcortical white matter is demonstrating moderate microangiopathic change  episode of slurred speech 10/2015 no recurrence  on ASA 81 mg daily and Plavix  she refused work up  Hematological: Negative for adenopathy  Does not bruise/bleed easily  On ASA and Plavix  Psychiatric/Behavioral: Negative for dysphoric mood and sleep disturbance  Objective:      /64   Pulse 72   Temp (!) 65 2 °F (18 4 °C)   Resp 16   Ht 5' 1" (1 549 m)   Wt 64 4 kg (142 lb)   BMI 26 83 kg/m²          Physical Exam   Constitutional: She is oriented to person, place, and time  She appears well-developed and well-nourished  No distress  HENT:   Head: Atraumatic  Mouth/Throat: Oropharynx is clear and moist and mucous membranes are normal  No oral lesions  Normal dentition  Eyes: Pupils are equal, round, and reactive to light  Conjunctivae and EOM are normal  No scleral icterus  Neck: No JVD present  Carotid bruit is not present  No tracheal deviation present  No thyroid mass and no thyromegaly present  Cardiovascular: Normal rate, regular rhythm and normal heart sounds  Exam reveals no gallop  No murmur heard  Pulmonary/Chest: Effort normal and breath sounds normal  No respiratory distress  She has no wheezes  She has no rales  Abdominal: Soft  Bowel sounds are normal  She exhibits no distension, no abdominal bruit and no mass  There is no hepatosplenomegaly  There is no tenderness  There is no rebound and no guarding  Musculoskeletal: She exhibits edema  She exhibits no tenderness  No tenderness palpating along lumbar spine or paraspinal areas  Negative SLR  Chronic edema right ankle   Lymphadenopathy:     She has no cervical adenopathy  Neurological: She is alert and oriented to person, place, and time  She has normal strength  She displays normal reflexes  No cranial nerve deficit  Gait normal    Skin: No rash noted  No cyanosis  Nails show no clubbing  Psychiatric: She has a normal mood and affect  Nursing note and vitals reviewed  Procedure: Ct Chest Abdomen Pelvis Wo Contrast    Result Date: 12/5/2019  Narrative: CT CHEST, ABDOMEN AND PELVIS WITHOUT IV CONTRAST INDICATION:   trauma on left side with much bruising and is on plavix  COMPARISON:  Chest CT 1/20/2010  TECHNIQUE: CT examination of the chest, abdomen and pelvis was performed without intravenous contrast   Axial, sagittal, and coronal 2D reformatted images were created from the source data and submitted for interpretation  Radiation dose length product (DLP) for this visit:  471 mGy-cm   This examination, like all CT scans performed in the [facility], was performed utilizing techniques to minimize radiation dose exposure, including the use of iterative reconstruction and automated exposure control  Enteric contrast was administered  FINDINGS: CHEST LUNGS:  Patchy density at the left base may represent atelectasis or infiltrate  New 9 mm lobulated density in the posterior right upper lobe on image 3/43-46, 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  There is no tracheal or endobronchial lesion  PLEURA:  Unremarkable  HEART/GREAT VESSELS:  Normal heart size  Coronary artery calcifications noted  Normal caliber thoracic aorta with mild atherosclerotic calcifications  MEDIASTINUM AND BILLIE:  Unremarkable  CHEST WALL AND LOWER NECK:   Unremarkable  ABDOMEN Absence of intravenous contrast enhancement limits evaluation of the solid abdominal viscera  LIVER/BILIARY TREE:  Unremarkable  GALLBLADDER:  No calcified gallstones  No pericholecystic inflammatory change  SPLEEN:  Unremarkable  PANCREAS:  Unremarkable  ADRENAL GLANDS:  Unremarkable  KIDNEYS/URETERS:  Partially exophytic fat density nodule measuring 2 7 x 2 7 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  Probable simple cyst at the interpolar left kidney measures 2 0 x 2 2 cm  No hydronephrosis or calculi  STOMACH AND BOWEL:  There is colonic diverticulosis without evidence of acute diverticulitis  APPENDIX:  No findings to suggest appendicitis  ABDOMINOPELVIC CAVITY:  No ascites or free intraperitoneal air  No lymphadenopathy  VESSELS:  Atherosclerotic changes are present  No evidence of aneurysm  PELVIS REPRODUCTIVE ORGANS:  Patient is status post hysterectomy  URINARY BLADDER:  Unremarkable  ABDOMINAL WALL/INGUINAL REGIONS:  Unremarkable  OSSEOUS STRUCTURES:  Acute nondisplaced fractures of the left transverse processes of L2 and L3  Status post right hip arthroplasty       Impression: Nondisplaced fractures of the left transverse processes of L2 and L3  No other evidence of acute posttraumatic abnormality  Mild left basilar atelectasis versus infiltrate  Colonic diverticulosis  Workstation performed: [id number]     Procedure: Xr Chest 1 View Portable    Result Date: 12/5/2019  Narrative: CHEST INDICATION:   posterior chest trauma  COMPARISON:  9/19/2012 EXAM PERFORMED/VIEWS:  XR CHEST PORTABLE FINDINGS: The aorta is atherosclerotic  Moderate cardiomegaly appears stable  There is patchy opacity within the left lung base  No pleural effusions or pneumothorax  Osseous structures appear within normal limits for patient age  Impression: 1  Patchy opacity within the left lung base, which may represent atelectasis or developing pneumonia  Findings are also seen on CT performed subsequently after this radiograph  2   Stable moderate cardiomegaly   Workstation performed: [id number]         Recent Results (from the past 336 hour(s))   CBC and differential    Collection Time: 12/05/19  3:56 AM   Result Value Ref Range    WBC 7 30 4 31 - 10 16 Thousand/uL    RBC 4 15 3 81 - 5 12 Million/uL    Hemoglobin 12 1 11 5 - 15 4 g/dL    Hematocrit 38 4 34 8 - 46 1 %    MCV 93 82 - 98 fL    MCH 29 2 26 8 - 34 3 pg    MCHC 31 5 31 4 - 37 4 g/dL    RDW 13 3 11 6 - 15 1 %    MPV 9 5 8 9 - 12 7 fL    Platelets 506 413 - 411 Thousands/uL    nRBC 0 /100 WBCs    Neutrophils Relative 73 43 - 75 %    Immat GRANS % 0 0 - 2 %    Lymphocytes Relative 15 14 - 44 %    Monocytes Relative 10 4 - 12 %    Eosinophils Relative 1 0 - 6 %    Basophils Relative 1 0 - 1 %    Neutrophils Absolute 5 39 1 85 - 7 62 Thousands/µL    Immature Grans Absolute 0 02 0 00 - 0 20 Thousand/uL    Lymphocytes Absolute 1 07 0 60 - 4 47 Thousands/µL    Monocytes Absolute 0 71 0 17 - 1 22 Thousand/µL    Eosinophils Absolute 0 07 0 00 - 0 61 Thousand/µL    Basophils Absolute 0 04 0 00 - 0 10 Thousands/µL   Protime-INR    Collection Time: 12/05/19 3:56 AM   Result Value Ref Range    Protime 12 6 11 6 - 14 5 seconds    INR 1 00 0 84 - 1 19   APTT    Collection Time: 12/05/19  3:56 AM   Result Value Ref Range    PTT 24 23 - 37 seconds   Comprehensive metabolic panel    Collection Time: 12/05/19  3:56 AM   Result Value Ref Range    Sodium 141 136 - 145 mmol/L    Potassium 3 8 3 5 - 5 3 mmol/L    Chloride 104 100 - 108 mmol/L    CO2 23 21 - 32 mmol/L    ANION GAP 14 (H) 4 - 13 mmol/L    BUN 24 5 - 25 mg/dL    Creatinine 1 28 0 60 - 1 30 mg/dL    Glucose 121 65 - 140 mg/dL    Calcium 10 7 (H) 8 3 - 10 1 mg/dL    AST 17 5 - 45 U/L    ALT 20 12 - 78 U/L    Alkaline Phosphatase 121 (H) 46 - 116 U/L    Total Protein 7 1 6 4 - 8 2 g/dL    Albumin 3 9 3 5 - 5 0 g/dL    Total Bilirubin 0 57 0 20 - 1 00 mg/dL    eGFR 36 ml/min/1 73sq m   Type and screen    Collection Time: 12/05/19  3:56 AM   Result Value Ref Range    ABO Grouping O     Rh Factor Negative     Antibody Screen Negative     Specimen Expiration Date 20191208    Lipase    Collection Time: 12/05/19  3:56 AM   Result Value Ref Range    Lipase 51 (L) 73 - 393 u/L   CK Total with Reflex CKMB    Collection Time: 12/05/19  3:56 AM   Result Value Ref Range    Total CK 71 26 - 192 U/L   ECG 12 lead    Collection Time: 12/05/19  3:58 AM   Result Value Ref Range    Ventricular Rate 60 BPM    Atrial Rate 64 BPM    NE Interval 214 ms    QRSD Interval 76 ms    QT Interval 428 ms    QTC Interval 428 ms    P Axis 33 degrees    QRS Axis 114 degrees    T Wave Axis 26 degrees   Urine Macroscopic, POC    Collection Time: 12/05/19  4:30 AM   Result Value Ref Range    Color, UA Yellow     Clarity, UA Clear     pH, UA 5 5 4 5 - 8 0    Leukocytes, UA Trace (A) Negative    Nitrite, UA Negative Negative    Protein, UA Negative Negative mg/dl    Glucose, UA Negative Negative mg/dl    Ketones, UA Negative Negative mg/dl    Urobilinogen, UA 0 2 0 2, 1 0 E U /dl E U /dl    Bilirubin, UA Negative Negative    Blood, UA Negative Negative Specific Swat[place], UA 1 010 1 003 - 1 030   Urine Microscopic    Collection Time: 12/05/19  4:30 AM   Result Value Ref Range    RBC, UA None Seen None Seen, 0-5 /hpf    WBC, UA 2-4 (A) None Seen, 0-5, 5-55, 5-65 /hpf    Epithelial Cells None Seen None Seen, Occasional /hpf    Bacteria, UA None Seen None Seen, Occasional /hpf   Comprehensive metabolic panel    Collection Time: 12/06/19  5:19 AM   Result Value Ref Range    Sodium 141 136 - 145 mmol/L    Potassium 4 0 3 5 - 5 3 mmol/L    Chloride 108 100 - 108 mmol/L    CO2 22 21 - 32 mmol/L    ANION GAP 11 4 - 13 mmol/L    BUN 31 (H) 5 - 25 mg/dL    Creatinine 1 48 (H) 0 60 - 1 30 mg/dL    Glucose 94 65 - 140 mg/dL    Calcium 9 6 8 3 - 10 1 mg/dL    AST 11 5 - 45 U/L    ALT 17 12 - 78 U/L    Alkaline Phosphatase 97 46 - 116 U/L    Total Protein 6 0 (L) 6 4 - 8 2 g/dL    Albumin 3 2 (L) 3 5 - 5 0 g/dL    Total Bilirubin 0 60 0 20 - 1 00 mg/dL    eGFR 30 ml/min/1 73sq m   CBC and differential    Collection Time: 12/06/19  5:19 AM   Result Value Ref Range    WBC 5 58 4 31 - 10 16 Thousand/uL    RBC 3 62 (L) 3 81 - 5 12 Million/uL    Hemoglobin 10 6 (L) 11 5 - 15 4 g/dL    Hematocrit 33 5 (L) 34 8 - 46 1 %    MCV 93 82 - 98 fL    MCH 29 3 26 8 - 34 3 pg    MCHC 31 6 31 4 - 37 4 g/dL    RDW 13 2 11 6 - 15 1 %    MPV 9 7 8 9 - 12 7 fL    Platelets 427 813 - 672 Thousands/uL    nRBC 0 /100 WBCs    Neutrophils Relative 56 43 - 75 %    Immat GRANS % 0 0 - 2 %    Lymphocytes Relative 29 14 - 44 %    Monocytes Relative 11 4 - 12 %    Eosinophils Relative 3 0 - 6 %    Basophils Relative 1 0 - 1 %    Neutrophils Absolute 3 19 1 85 - 7 62 Thousands/µL    Immature Grans Absolute 0 02 0 00 - 0 20 Thousand/uL    Lymphocytes Absolute 1 60 0 60 - 4 47 Thousands/µL    Monocytes Absolute 0 59 0 17 - 1 22 Thousand/µL    Eosinophils Absolute 0 15 0 00 - 0 61 Thousand/µL    Basophils Absolute 0 03 0 00 - 0 10 Thousands/µL   Basic metabolic panel    Collection Time: 12/09/19 10:02 AM   Result Value Ref Range    Sodium 141 136 - 145 mmol/L    Potassium 4 4 3 5 - 5 3 mmol/L    Chloride 109 (H) 100 - 108 mmol/L    CO2 29 21 - 32 mmol/L    ANION GAP 3 (L) 4 - 13 mmol/L    BUN 32 (H) 5 - 25 mg/dL    Creatinine 1 30 0 60 - 1 30 mg/dL    Glucose, Fasting 107 (H) 65 - 99 mg/dL    Calcium 10 4 (H) 8 3 - 10 1 mg/dL    eGFR 35 ml/min/1 73sq m   CA / ALB with Ca correction    Collection Time: 12/09/19 10:02 AM   Result Value Ref Range    Albumin 3 9 3 5 - 5 0 g/dL    Corrected Calcium 10 5 (H) 8 3 - 10 1 mg/dL    CALCIUM FOR CA/ALB 10 4 (H) 8 3 - 10 1 mg/dL

## 2019-12-12 NOTE — TELEPHONE ENCOUNTER
Patient's daughter informed----- Message from Gini Crooks MD sent at 12/12/2019 12:00 PM EST -----  Call patient I would like to have repeat labs in one month

## 2019-12-30 ENCOUNTER — HOSPITAL ENCOUNTER (OUTPATIENT)
Dept: RADIOLOGY | Facility: HOSPITAL | Age: 84
Discharge: HOME | End: 2019-12-30
Attending: ORTHOPAEDIC SURGERY
Payer: MEDICARE

## 2019-12-30 DIAGNOSIS — M25.552 LEFT HIP PAIN: ICD-10-CM

## 2019-12-30 DIAGNOSIS — M16.12 ARTHRITIS OF LEFT HIP: ICD-10-CM

## 2019-12-30 PROCEDURE — 77002 NEEDLE LOCALIZATION BY XRAY: CPT

## 2019-12-30 PROCEDURE — 20610 DRAIN/INJ JOINT/BURSA W/O US: CPT

## 2019-12-30 RX ORDER — METHYLPREDNISOLONE ACETATE 80 MG/ML
80 INJECTION, SUSPENSION INTRA-ARTICULAR; INTRALESIONAL; INTRAMUSCULAR; SOFT TISSUE
Status: COMPLETED | OUTPATIENT
Start: 2019-12-30 | End: 2019-12-30

## 2019-12-30 RX ORDER — BUPIVACAINE HYDROCHLORIDE 2.5 MG/ML
10 INJECTION, SOLUTION EPIDURAL; INFILTRATION; INTRACAUDAL
Status: COMPLETED | OUTPATIENT
Start: 2019-12-30 | End: 2019-12-30

## 2019-12-30 RX ORDER — LIDOCAINE HYDROCHLORIDE 10 MG/ML
10 INJECTION, SOLUTION EPIDURAL; INFILTRATION; INTRACAUDAL; PERINEURAL
Status: COMPLETED | OUTPATIENT
Start: 2019-12-30 | End: 2019-12-30

## 2019-12-30 RX ADMIN — METHYLPREDNISOLONE ACETATE 80 MG: 80 INJECTION, SUSPENSION INTRA-ARTICULAR; INTRALESIONAL; INTRAMUSCULAR; SOFT TISSUE at 10:24

## 2019-12-30 RX ADMIN — LIDOCAINE HYDROCHLORIDE 8 ML: 10 INJECTION, SOLUTION EPIDURAL; INFILTRATION; INTRACAUDAL; PERINEURAL at 10:24

## 2019-12-30 RX ADMIN — BUPIVACAINE HYDROCHLORIDE 2 ML: 2.5 INJECTION, SOLUTION EPIDURAL; INFILTRATION; INTRACAUDAL; PERINEURAL at 10:23

## 2019-12-30 RX ADMIN — IOHEXOL 3 ML: 300 INJECTION, SOLUTION INTRAVENOUS at 10:23

## 2020-01-08 ENCOUNTER — TELEPHONE (OUTPATIENT)
Dept: OBGYN CLINIC | Facility: HOSPITAL | Age: 85
End: 2020-01-08

## 2020-01-08 NOTE — TELEPHONE ENCOUNTER
Jose F Burks is calling on behalf of her mother Brennan Mcgregor  Jenise Ahn saw Brennan Mcgregor in the hospital 12/5  She would like a call back regarding the brace that her mother is wearing  Jose F Burks feels that her mother doesn't necessarily need an appointment to see Dr Ashley Aguilera  Her mother has a 3 month follow-up Lt hip appointment with Dr Omero Beavers 1/24 in [place], which her hip is doing well  Phone [phone]

## 2020-01-08 NOTE — TELEPHONE ENCOUNTER
Please call the patient's daughter back  Let her know that  She was only supposed to see Dr Yung Lion if she is still having back pain after 6 weeks,  Due to the fractures was recommended she wear the back brace for 6 weeks however    If she is doing well then she does need see Dr Yung Lion,  She can follow up with Dr Margarita Staples on 01/24 as scheduled

## 2020-01-18 DIAGNOSIS — I10 ESSENTIAL HYPERTENSION: ICD-10-CM

## 2020-01-19 RX ORDER — ATENOLOL 25 MG/1
TABLET ORAL
Qty: 30 TABLET | Refills: 5 | Status: SHIPPED | OUTPATIENT
Start: 2020-01-19 | End: 2020-01-21 | Stop reason: SDUPTHER

## 2020-01-21 DIAGNOSIS — I10 ESSENTIAL HYPERTENSION: ICD-10-CM

## 2020-01-21 RX ORDER — ATENOLOL 25 MG/1
TABLET ORAL
Qty: 30 TABLET | Refills: 5 | Status: SHIPPED | OUTPATIENT
Start: 2020-01-21 | End: 2020-01-24

## 2020-01-21 NOTE — TELEPHONE ENCOUNTER
Please resend the Atenolol to [facility]  Pharmacist deleted it in error  Please include instructions for the medication  There was no instructions on the previous one

## 2020-01-24 ENCOUNTER — OFFICE VISIT (OUTPATIENT)
Dept: OBGYN CLINIC | Facility: MEDICAL CENTER | Age: 85
End: 2020-01-24
Payer: MEDICARE

## 2020-01-24 VITALS
DIASTOLIC BLOOD PRESSURE: 61 MMHG | BODY MASS INDEX: 26.47 KG/M2 | SYSTOLIC BLOOD PRESSURE: 174 MMHG | WEIGHT: 140.2 LBS | HEART RATE: 61 BPM | HEIGHT: 61 IN

## 2020-01-24 DIAGNOSIS — M70.62 TROCHANTERIC BURSITIS OF LEFT HIP: Primary | ICD-10-CM

## 2020-01-24 DIAGNOSIS — I10 ESSENTIAL HYPERTENSION: ICD-10-CM

## 2020-01-24 DIAGNOSIS — I25.10 CORONARY ARTERY DISEASE INVOLVING NATIVE CORONARY ARTERY OF NATIVE HEART WITHOUT ANGINA PECTORIS: Primary | ICD-10-CM

## 2020-01-24 PROCEDURE — 20610 DRAIN/INJ JOINT/BURSA W/O US: CPT | Performed by: ORTHOPAEDIC SURGERY

## 2020-01-24 PROCEDURE — 99213 OFFICE O/P EST LOW 20 MIN: CPT | Performed by: ORTHOPAEDIC SURGERY

## 2020-01-24 RX ORDER — LIDOCAINE HYDROCHLORIDE 10 MG/ML
2 INJECTION, SOLUTION INFILTRATION; PERINEURAL
Status: COMPLETED | OUTPATIENT
Start: 2020-01-24 | End: 2020-01-24

## 2020-01-24 RX ORDER — BETAMETHASONE SODIUM PHOSPHATE AND BETAMETHASONE ACETATE 3; 3 MG/ML; MG/ML
12 INJECTION, SUSPENSION INTRA-ARTICULAR; INTRALESIONAL; INTRAMUSCULAR; SOFT TISSUE
Status: COMPLETED | OUTPATIENT
Start: 2020-01-24 | End: 2020-01-24

## 2020-01-24 RX ORDER — BUPIVACAINE HYDROCHLORIDE 2.5 MG/ML
2 INJECTION, SOLUTION INFILTRATION; PERINEURAL
Status: COMPLETED | OUTPATIENT
Start: 2020-01-24 | End: 2020-01-24

## 2020-01-24 RX ORDER — METOPROLOL SUCCINATE 50 MG/1
50 TABLET, EXTENDED RELEASE ORAL DAILY
Qty: 30 TABLET | Refills: 5 | Status: SHIPPED | OUTPATIENT
Start: 2020-01-24 | End: 2020-07-02

## 2020-01-24 RX ADMIN — LIDOCAINE HYDROCHLORIDE 2 ML: 10 INJECTION, SOLUTION INFILTRATION; PERINEURAL at 10:53

## 2020-01-24 RX ADMIN — BETAMETHASONE SODIUM PHOSPHATE AND BETAMETHASONE ACETATE 12 MG: 3; 3 INJECTION, SUSPENSION INTRA-ARTICULAR; INTRALESIONAL; INTRAMUSCULAR; SOFT TISSUE at 10:53

## 2020-01-24 RX ADMIN — BUPIVACAINE HYDROCHLORIDE 2 ML: 2.5 INJECTION, SOLUTION INFILTRATION; PERINEURAL at 10:53

## 2020-01-24 NOTE — PROGRESS NOTES
89 y o female with history of left trochanteric bursitis presenting for follow-up  At her last visit in October she received a steroid injection to her left trochanteric bursa which gave her good pain relief  She has since had recurrence of her pain which is located over the lateral aspect of the left proximal thigh is not radiating made worse with with direct contact area when lying on the left side  She did sustain a fall this past December and was briefly admitted to the hospital and diagnosed with a transverse process fracture at L2 and L3 on the left  She is given a brace in the hospital's continue to wear this  Today she describes no back pain, left groin pain, or numbness and tingling  She had a cortisone injection to her left hip 3 weeks ago is still experiencing good relief of her hip arthritis symptoms      Review of Systems  Review of systems negative unless otherwise specified in HPI    Past Medical History  Past Medical History:   Diagnosis Date    Anemia     last assessed - [id number]    Cancer [facility])     Depression     last assessed - [id number]    Glaucoma     Hypercalcemia     last assessed - [id number]    Hypertension     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    Macular degeneration     Nondisplaced fracture of base of fifth metacarpal bone, right hand, initial encounter for closed fracture 12/7/2018    Osteoarthritis     Plantar fasciitis of right foot     last assessed - 07Apr2017    Stomach disorder     Superficial thrombophlebitis of leg     unspecified laterality; last assessed - [id number]    Trochanteric bursitis of left hip     last assessed - 07Apr2017       Past Surgical History  Past Surgical History:   Procedure Laterality Date    CORONARY STENT PLACEMENT      stent RCA    FL INJECTION LEFT HIP (NON ARTHROGRAM)  11/26/2018    FL INJECTION LEFT HIP (NON ARTHROGRAM)  3/4/2019    FL INJECTION LEFT HIP (NON ARTHROGRAM)  6/10/2019    FL INJECTION LEFT HIP (NON ARTHROGRAM)  9/24/2019    FL INJECTION LEFT HIP (NON ARTHROGRAM)  12/30/2019    FOOT SURGERY      HIP SURGERY      HYSTERECTOMY      SKIN LESION EXCISION  11/2010    Face - BCC - nose    TOTAL ABDOMINAL HYSTERECTOMY W/ BILATERAL SALPINGOOPHORECTOMY      TOTAL HIP ARTHROPLASTY Right        Current Medications  Current Outpatient Medications on File Prior to Visit   Medication Sig Dispense Refill    aspirin (ADULT ASPIRIN EC LOW STRENGTH) 81 mg EC tablet Take 1 tablet by mouth daily      atenolol (TENORMIN) 25 mg tablet 1 tablet daily  30 tablet 5    atorvastatin (LIPITOR) 40 mg tablet TAKE 1 TABLET BY MOUTH EVERY DAY 90 tablet 3    Biotin 1 MG CAPS Take by mouth      cholecalciferol (VITAMIN D3) 1,000 units tablet Take 1 tablet by mouth daily      clopidogrel (PLAVIX) 75 mg tablet Take 1 tablet (75 mg total) by mouth daily 90 tablet 3    latanoprost (XALATAN) 0 005 % ophthalmic solution INSTILL 1 DROP INTO BOTH EYES EVERY DAY AS DIRECTED  4    Multiple Vitamins-Minerals (ICAPS AREDS 2) CAPS Take by mouth      nitroglycerin (NITROSTAT) 0 4 mg SL tablet place 1 tablet under the tongue if needed EVERY 5 MINUTES UP TO 3 TIMES FOR CHEST PAIN  0    Omega-3 Fatty Acids (OMEGA-3 FISH OIL) 1200 MG CAPS Take by mouth      omeprazole (PriLOSEC) 20 mg delayed release capsule Take 1 capsule (20 mg total) by mouth daily for 180 days 90 capsule 1    Polyvinyl Alcohol-Povidone PF (REFRESH) 1 4-0 6 % SOLN Apply to eye      RESTASIS 0 05 % ophthalmic emulsion instill 1 drop into both eyes twice a day  0    solifenacin (VESICARE) 5 mg tablet Take 1 tablet (5 mg total) by mouth daily 90 tablet 1    valsartan-hydrochlorothiazide (DIOVAN-HCT) 80-12 5 MG per tablet Take 1 tablet by mouth daily 90 tablet 3     No current facility-administered medications on file prior to visit          Recent Labs [facility])  0   Lab Value Date/Time    HCT 33 5 (L) 12/06/2019 0519    HCT 36 8 11/16/2016 0919 HGB 10 6 (L) 12/06/2019 0519    HGB 12 2 11/16/2016 0919    WBC 5 58 12/06/2019 0519    WBC 6 1 11/16/2016 0919    INR 1 00 12/05/2019 0356    GLUCOSE 96 11/16/2015 0915         Physical exam  · General: Awake, Alert, Oriented  · Eyes: Pupils equal, round and reactive to light  · Heart: regular rate and rhythm  · Lungs: No audible wheezing  · Abdomen: soft  Left lower extremity  · Skin intact  · Tender over the trochanteric flare   · Nonpainful hip flexion and internal rotation  · 5/5 hip flexor/extensor, abduction, and adduction strength  · Sensation intact in s/s/sp/dp/t  · Motor intact fhl/ehl/ta/gsc  · Leg warm and well perfused  Back exam:  Skin intact  Nontender palpation over the thoracic and lumbar spine or the paraspinal muscles  Motor sensation grossly intact L3-S1      Imaging  Images were personally reviewed with the patient    Previous CT of the chest abdomen pelvis shows an was placed for transverse process fractures of L2 and L3 on the left    Procedure  Large joint arthrocentesis: L greater trochanteric bursa  Date/Time: 1/24/2020 10:53 AM  Consent given by: patient  Site marked: site marked  Timeout: Immediately prior to procedure a time out was called to verify the correct patient, procedure, equipment, support staff and site/side marked as required   Supporting Documentation  Indications: pain   Procedure Details  Location: hip - L greater trochanteric bursa  Preparation: Patient was prepped and draped in the usual sterile fashion  Needle size: 22 G  Ultrasound guidance: no  Approach: lateral  Medications administered: 2 mL bupivacaine 0 25 %; 12 mg betamethasone acetate-betamethasone sodium phosphate 6 (3-3) mg/mL; 2 mL lidocaine 1 %    Patient tolerance: patient tolerated the procedure well with no immediate complications  Dressing:  Sterile dressing applied            Assessment/Plan:   80 y  o female with left greater trochanteric bursitis, left hip arthritis and healed fractures of the left L2 and 3 transverse process    · Injection of steroid medicine was administered into the left trochanteric bursa as outlined above  · She may discontinue use of her LSO brace  · She began to experience left groin pain secondary to arthritis she may call the office and we will schedule a fluoroscopically guided steroid injection  · Follow up in 3 months

## 2020-01-24 NOTE — TELEPHONE ENCOUNTER
Spoke with patient's daughter, states that they did not  RX for Atenolol because patient has never taken it   Metoprolol Succinate 50 mg daily sent to [facility]

## 2020-01-24 NOTE — TELEPHONE ENCOUNTER
Daughter is questioning the Atenolol prescription that was called in for her mother  She thought Metoprolol was being called in  I told her that medication isn't on the med list  She states she has an empty bottle of Metoprolol

## 2020-02-14 ENCOUNTER — TELEPHONE (OUTPATIENT)
Dept: UROLOGY | Facility: MEDICAL CENTER | Age: 85
End: 2020-02-14

## 2020-02-14 DIAGNOSIS — R35.0 URINARY FREQUENCY: Primary | ICD-10-CM

## 2020-02-14 NOTE — TELEPHONE ENCOUNTER
I would recommend transitioning to Myrbetriq  I have placed an order for this and sent to her preferred pharmacy  I did get an alert that this may be cost prohibitive as it estimated the cost as $141 for a 30 day supply  I am not sure how accurate this is  I would be very cautious in initiating anticholinergics in a patient that is 80years old  If this medication is cost prohibitive, I would recommend a trial of trospium

## 2020-02-14 NOTE — TELEPHONE ENCOUNTER
Patient managed by Dr Peter Ray, last seen in the [facility] office on 6/11/29 by Salma Hernandez PA-C  Patient with history of urgency and urge incontinence  At time of office visit patient doing well with Vesicare, was instructed to follow up with PCP for refills and follow up with urology as needed  No documentation of patient taking anything else previously for urinary symptoms  Please review and advise on alternative, given insurance no longer covering Vesicare

## 2020-02-14 NOTE — TELEPHONE ENCOUNTER
Patient seen by Blake Roberts in [facility]  , managed by Dr Bonita Robledo    Patient calling with concerns as her Insurance company has advised they will no longer cover solifenacin (VESICARE) 5 mg tablet     Asking for a call back to discuss how to proceed      Patient can be reached at [phone]

## 2020-02-14 NOTE — TELEPHONE ENCOUNTER
Called and spoke with patient at this time  Informed her of the myrbetriq and possibility of that being cost prohibitive  Patient reports she thinks she has tried that before and it prevented her from urinating so it was stopped  RN inquired if patient needed a catheter  She denies needing a catheter but reports she thinks she had issue voiding on it  Reviewed trospium and the risk of that medication causing confusion  Patient reports she will try myrbetriq and if she decides to switch to trospium she will call back  Advised we are closed on the weekend but we can coordinate a medication change on Monday if necessary  Patient verbalized understanding

## 2020-02-17 DIAGNOSIS — I49.3 PVCS (PREMATURE VENTRICULAR CONTRACTIONS): Primary | ICD-10-CM

## 2020-02-17 DIAGNOSIS — R35.0 URINARY FREQUENCY: Primary | ICD-10-CM

## 2020-02-17 RX ORDER — TROSPIUM CHLORIDE 20 MG/1
20 TABLET, FILM COATED ORAL 2 TIMES DAILY
Qty: 60 TABLET | Refills: 2 | Status: SHIPPED | OUTPATIENT
Start: 2020-02-17 | End: 2020-05-11

## 2020-02-17 RX ORDER — NITROGLYCERIN 0.4 MG/1
0.4 TABLET SUBLINGUAL
Qty: 10 TABLET | Refills: 3 | Status: SHIPPED | OUTPATIENT
Start: 2020-02-17 | End: 2021-07-12

## 2020-02-17 NOTE — TELEPHONE ENCOUNTER
Called and left message for patient that trospium was sent in to pharmacy for her  In message stated that she needs to call the office back to schedule a follow up in 4-6 weeks to evaluate efficacy and PVR  Office number was left in the message  When patients calls back please scheduled a follow up in 6-8 weeks with an AP

## 2020-02-17 NOTE — TELEPHONE ENCOUNTER
Called and spoke to patient  Asked patient if she picked up the Myrbetriq and she stated no she did not  Patient stated the only reason she has not picked it up was not because of the cost, it was the fact that she could not urinate when she was on this previously and is reluctant to try it again  Patient is asking if there is an alternative to the medication that she could try  Explained to patient that this note will be sent to the PA for further review and someone from the office will call back with recommendations  Patient had no further questions and will be waiting to hear back from us

## 2020-02-17 NOTE — TELEPHONE ENCOUNTER
Daughter requested refill on     NITROGLYCERIN 0 4 MG   Place 1 tablet under tongue if needed every 5 minutes up tp 3 times for chest pain    [facility]

## 2020-02-20 DIAGNOSIS — K21.9 GASTROESOPHAGEAL REFLUX DISEASE WITHOUT ESOPHAGITIS: ICD-10-CM

## 2020-02-20 RX ORDER — OMEPRAZOLE 20 MG/1
CAPSULE, DELAYED RELEASE ORAL
Qty: 90 CAPSULE | Refills: 1 | Status: SHIPPED | OUTPATIENT
Start: 2020-02-20 | End: 2020-08-21

## 2020-03-25 ENCOUNTER — TELEMEDICINE (OUTPATIENT)
Dept: UROLOGY | Facility: CLINIC | Age: 85
End: 2020-03-25
Payer: MEDICARE

## 2020-03-25 DIAGNOSIS — R35.0 URINARY FREQUENCY: Primary | ICD-10-CM

## 2020-03-25 PROCEDURE — 3077F SYST BP >= 140 MM HG: CPT | Performed by: PHYSICIAN ASSISTANT

## 2020-03-25 PROCEDURE — 3078F DIAST BP <80 MM HG: CPT | Performed by: PHYSICIAN ASSISTANT

## 2020-03-25 PROCEDURE — 1036F TOBACCO NON-USER: CPT | Performed by: PHYSICIAN ASSISTANT

## 2020-03-25 PROCEDURE — 4040F PNEUMOC VAC/ADMIN/RCVD: CPT | Performed by: PHYSICIAN ASSISTANT

## 2020-03-25 PROCEDURE — G2012 BRIEF CHECK IN BY MD/QHP: HCPCS | Performed by: PHYSICIAN ASSISTANT

## 2020-03-25 NOTE — PROGRESS NOTES
Virtual Regular Visit    Problem List Items Addressed This Visit     None               Reason for visit is overactive bladder follow-up after transitioning from VESIcare to trospium    Encounter provider Lizbeth Claudio PA-C    Provider located at [address] [place] [id number]      Recent Visits  No visits were found meeting these conditions  Showing recent visits within past 7 days and meeting all other requirements     Future Appointments  No visits were found meeting these conditions  Showing future appointments within next 150 days and meeting all other requirements        After connecting through [employer], the patient was identified by name and date of birth  Emelina Oshea was informed that this is a telemedicine visit and that the visit is being conducted through telephone which may not be secure and therefore, might not be HIPAA-compliant  My office door was closed  No one else was in the room  She acknowledged consent and understanding of privacy and security of the video platform  The patient has agreed to participate and understands they can discontinue the visit at any time  Shadia Coello is a 80 y o  female patient with a history of overactive bladder she was previously treated with VESIcare  Due to a change in formulary, her insurance company discontinued this medication in February  We tried to initiate the patient on Myrbetriq, unfortunately this was cost prohibitive  She was transition to trospium and reports that she started this medication after running out of VESIcare on 03/20/2020  At the same time she discontinued drinking coffee and substituted with tea as she was told that tea is less likely to be a bladder irritant  She reports that she is doing very well in regards to urination at this time and has no complaints        Past Medical History:   Diagnosis Date    Anemia last assessed - [id number]    Cancer [facility])     Depression     last assessed - [id number]    Glaucoma     Hypercalcemia     last assessed - [id number]    Hypertension     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    Macular degeneration     Nondisplaced fracture of base of fifth metacarpal bone, right hand, initial encounter for closed fracture 12/7/2018    Osteoarthritis     Plantar fasciitis of right foot     last assessed - [id number]    Stomach disorder     Superficial thrombophlebitis of leg     unspecified laterality; last assessed - [id number]    Trochanteric bursitis of left hip     last assessed - [id number]       Past Surgical History:   Procedure Laterality Date    CORONARY STENT PLACEMENT      stent RCA    FL INJECTION LEFT HIP (NON ARTHROGRAM)  11/26/2018    FL INJECTION LEFT HIP (NON ARTHROGRAM)  3/4/2019    FL INJECTION LEFT HIP (NON ARTHROGRAM)  6/10/2019    FL INJECTION LEFT HIP (NON ARTHROGRAM)  9/24/2019    FL INJECTION LEFT HIP (NON ARTHROGRAM)  12/30/2019    FOOT SURGERY      HIP SURGERY      HYSTERECTOMY      SKIN LESION EXCISION  11/2010    Face - BCC - nose    TOTAL ABDOMINAL HYSTERECTOMY W/ BILATERAL SALPINGOOPHORECTOMY      TOTAL HIP ARTHROPLASTY Right        Current Outpatient Medications   Medication Sig Dispense Refill    aspirin (ADULT ASPIRIN EC LOW STRENGTH) 81 mg EC tablet Take 1 tablet by mouth daily      atorvastatin (LIPITOR) 40 mg tablet TAKE 1 TABLET BY MOUTH EVERY DAY 90 tablet 3    Biotin 1 MG CAPS Take by mouth      cholecalciferol (VITAMIN D3) 1,000 units tablet Take 1 tablet by mouth daily      clopidogrel (PLAVIX) 75 mg tablet Take 1 tablet (75 mg total) by mouth daily 90 tablet 3    latanoprost (XALATAN) 0 005 % ophthalmic solution INSTILL 1 DROP INTO BOTH EYES EVERY DAY AS DIRECTED  4    metoprolol succinate (TOPROL-XL) 50 mg 24 hr tablet Take 1 tablet (50 mg total) by mouth daily 30 tablet 5    Multiple Vitamins-Minerals (ICAPS AREDS 2) CAPS Take by mouth      nitroglycerin (NITROSTAT) 0 4 mg SL tablet Place 1 tablet (0 4 mg total) under the tongue every 5 (five) minutes as needed for chest pain 10 tablet 3    Omega-3 Fatty Acids (OMEGA-3 FISH OIL) 1200 MG CAPS Take by mouth      omeprazole (PriLOSEC) 20 mg delayed release capsule TAKE 1 CAPSULE BY MOUTH DAILY  DAYS 90 capsule 1    Polyvinyl Alcohol-Povidone PF (REFRESH) 1 4-0 6 % SOLN Apply to eye      RESTASIS 0 05 % ophthalmic emulsion instill 1 drop into both eyes twice a day  0    trospium chloride (SANCTURA) 20 mg tablet Take 1 tablet (20 mg total) by mouth 2 (two) times a day 60 tablet 2    valsartan-hydrochlorothiazide (DIOVAN-HCT) 80-12 5 MG per tablet Take 1 tablet by mouth daily 90 tablet 3     No current facility-administered medications for this visit  Allergies   Allergen Reactions    Iv Contrast [Iodinated Diagnostic Agents]     Lactose GI Intolerance    Other      Iv contrast  Adhesive tape    Ciprofloxacin      Patient does not remember       Review of Systems   Constitutional: Negative for chills and fever  HENT: Negative  Eyes: Negative  Respiratory: Negative for shortness of breath  Cardiovascular: Negative for chest pain  Gastrointestinal: Negative for constipation, diarrhea, nausea and vomiting  Genitourinary: Negative for difficulty urinating, dyspareunia, dysuria, enuresis, flank pain, frequency, hematuria and urgency  Musculoskeletal: Negative  I spent 10 minutes with the patient during this visit  Patient is doing well in regards to her lower urinary tract symptoms on trospium  We reviewed the side effect profile  She will plan to return for follow-up in approximately 6 months  If doing well at that time, with a normal PVR, the patient could follow up in the future on an as-needed basis and can get her medication refills through her primary care provider, Dr Cesario Galindo

## 2020-04-16 DIAGNOSIS — I25.10 CORONARY ARTERY DISEASE INVOLVING NATIVE CORONARY ARTERY OF NATIVE HEART WITHOUT ANGINA PECTORIS: ICD-10-CM

## 2020-04-16 RX ORDER — CLOPIDOGREL BISULFATE 75 MG/1
75 TABLET ORAL DAILY
Qty: 90 TABLET | Refills: 3 | Status: SHIPPED | OUTPATIENT
Start: 2020-04-16 | End: 2020-11-04

## 2020-05-09 DIAGNOSIS — R35.0 URINARY FREQUENCY: ICD-10-CM

## 2020-05-11 RX ORDER — TROSPIUM CHLORIDE 20 MG/1
TABLET, FILM COATED ORAL
Qty: 180 TABLET | Refills: 0 | Status: SHIPPED | OUTPATIENT
Start: 2020-05-11 | End: 2020-09-30 | Stop reason: SINTOL

## 2020-05-18 ENCOUNTER — APPOINTMENT (OUTPATIENT)
Dept: LAB | Facility: MEDICAL CENTER | Age: 85
End: 2020-05-18
Payer: MEDICARE

## 2020-05-18 DIAGNOSIS — E83.52 HYPERCALCEMIA: ICD-10-CM

## 2020-05-18 LAB
ALBUMIN SERPL BCP-MCNC: 4 G/DL (ref 3.5–5)
ANION GAP SERPL CALCULATED.3IONS-SCNC: 5 MMOL/L (ref 4–13)
BASOPHILS # BLD AUTO: 0.03 THOUSANDS/ΜL (ref 0–0.1)
BASOPHILS NFR BLD AUTO: 1 % (ref 0–1)
BUN SERPL-MCNC: 29 MG/DL (ref 5–25)
CALCIUM ALBUM COR SERPL-MCNC: 10.5 MG/DL (ref 8.3–10.1)
CALCIUM SERPL-MCNC: 10.5 MG/DL (ref 8.3–10.1)
CALCIUM SERPL-MCNC: 10.5 MG/DL (ref 8.3–10.1)
CHLORIDE SERPL-SCNC: 108 MMOL/L (ref 100–108)
CO2 SERPL-SCNC: 26 MMOL/L (ref 21–32)
CREAT SERPL-MCNC: 1.19 MG/DL (ref 0.6–1.3)
EOSINOPHIL # BLD AUTO: 0.09 THOUSAND/ΜL (ref 0–0.61)
EOSINOPHIL NFR BLD AUTO: 2 % (ref 0–6)
ERYTHROCYTE [DISTWIDTH] IN BLOOD BY AUTOMATED COUNT: 13.4 % (ref 11.6–15.1)
GFR SERPL CREATININE-BSD FRML MDRD: 39 ML/MIN/1.73SQ M
GLUCOSE P FAST SERPL-MCNC: 108 MG/DL (ref 65–99)
HCT VFR BLD AUTO: 37.8 % (ref 34.8–46.1)
HGB BLD-MCNC: 12 G/DL (ref 11.5–15.4)
IMM GRANULOCYTES # BLD AUTO: 0.01 THOUSAND/UL (ref 0–0.2)
IMM GRANULOCYTES NFR BLD AUTO: 0 % (ref 0–2)
LYMPHOCYTES # BLD AUTO: 1.21 THOUSANDS/ΜL (ref 0.6–4.47)
LYMPHOCYTES NFR BLD AUTO: 23 % (ref 14–44)
MCH RBC QN AUTO: 29.3 PG (ref 26.8–34.3)
MCHC RBC AUTO-ENTMCNC: 31.7 G/DL (ref 31.4–37.4)
MCV RBC AUTO: 92 FL (ref 82–98)
MONOCYTES # BLD AUTO: 0.39 THOUSAND/ΜL (ref 0.17–1.22)
MONOCYTES NFR BLD AUTO: 7 % (ref 4–12)
NEUTROPHILS # BLD AUTO: 3.56 THOUSANDS/ΜL (ref 1.85–7.62)
NEUTS SEG NFR BLD AUTO: 67 % (ref 43–75)
NRBC BLD AUTO-RTO: 0 /100 WBCS
PLATELET # BLD AUTO: 232 THOUSANDS/UL (ref 149–390)
PMV BLD AUTO: 10 FL (ref 8.9–12.7)
POTASSIUM SERPL-SCNC: 3.9 MMOL/L (ref 3.5–5.3)
RBC # BLD AUTO: 4.1 MILLION/UL (ref 3.81–5.12)
SODIUM SERPL-SCNC: 139 MMOL/L (ref 136–145)
WBC # BLD AUTO: 5.29 THOUSAND/UL (ref 4.31–10.16)

## 2020-05-18 PROCEDURE — 84165 PROTEIN E-PHORESIS SERUM: CPT | Performed by: FAMILY MEDICINE

## 2020-05-18 PROCEDURE — 36415 COLL VENOUS BLD VENIPUNCTURE: CPT | Performed by: FAMILY MEDICINE

## 2020-05-18 PROCEDURE — 85025 COMPLETE CBC W/AUTO DIFF WBC: CPT | Performed by: FAMILY MEDICINE

## 2020-05-18 PROCEDURE — 82040 ASSAY OF SERUM ALBUMIN: CPT

## 2020-05-18 PROCEDURE — 80048 BASIC METABOLIC PNL TOTAL CA: CPT

## 2020-05-18 PROCEDURE — 84165 PROTEIN E-PHORESIS SERUM: CPT | Performed by: PATHOLOGY

## 2020-05-19 LAB
ALBUMIN SERPL ELPH-MCNC: 4.22 G/DL (ref 3.5–5)
ALBUMIN SERPL ELPH-MCNC: 63 % (ref 52–65)
ALPHA1 GLOB SERPL ELPH-MCNC: 0.32 G/DL (ref 0.1–0.4)
ALPHA1 GLOB SERPL ELPH-MCNC: 4.8 % (ref 2.5–5)
ALPHA2 GLOB SERPL ELPH-MCNC: 0.81 G/DL (ref 0.4–1.2)
ALPHA2 GLOB SERPL ELPH-MCNC: 12.1 % (ref 7–13)
BETA GLOB ABNORMAL SERPL ELPH-MCNC: 0.38 G/DL (ref 0.4–0.8)
BETA1 GLOB SERPL ELPH-MCNC: 5.6 % (ref 5–13)
BETA2 GLOB SERPL ELPH-MCNC: 4.8 % (ref 2–8)
BETA2+GAMMA GLOB SERPL ELPH-MCNC: 0.32 G/DL (ref 0.2–0.5)
GAMMA GLOB ABNORMAL SERPL ELPH-MCNC: 0.65 G/DL (ref 0.5–1.6)
GAMMA GLOB SERPL ELPH-MCNC: 9.7 % (ref 12–22)
IGG/ALB SER: 1.7 {RATIO} (ref 1.1–1.8)
PROT PATTERN SERPL ELPH-IMP: ABNORMAL
PROT SERPL-MCNC: 6.7 G/DL (ref 6.4–8.2)

## 2020-05-20 ENCOUNTER — OFFICE VISIT (OUTPATIENT)
Dept: FAMILY MEDICINE CLINIC | Facility: CLINIC | Age: 85
End: 2020-05-20
Payer: MEDICARE

## 2020-05-20 VITALS
DIASTOLIC BLOOD PRESSURE: 83 MMHG | TEMPERATURE: 97.9 F | SYSTOLIC BLOOD PRESSURE: 152 MMHG | WEIGHT: 137 LBS | HEIGHT: 61 IN | BODY MASS INDEX: 25.86 KG/M2

## 2020-05-20 DIAGNOSIS — E03.9 ACQUIRED HYPOTHYROIDISM: ICD-10-CM

## 2020-05-20 DIAGNOSIS — Z95.5 HISTORY OF CORONARY ARTERY STENT PLACEMENT: ICD-10-CM

## 2020-05-20 DIAGNOSIS — N18.30 CHRONIC KIDNEY FAILURE, STAGE 3 (MODERATE) (HCC): ICD-10-CM

## 2020-05-20 DIAGNOSIS — I87.2 VENOUS INSUFFICIENCY (CHRONIC) (PERIPHERAL): ICD-10-CM

## 2020-05-20 DIAGNOSIS — M15.9 GENERALIZED OSTEOARTHRITIS OF MULTIPLE SITES: ICD-10-CM

## 2020-05-20 DIAGNOSIS — I10 ESSENTIAL HYPERTENSION: Primary | ICD-10-CM

## 2020-05-20 DIAGNOSIS — I49.3 PVCS (PREMATURE VENTRICULAR CONTRACTIONS): ICD-10-CM

## 2020-05-20 DIAGNOSIS — I25.10 ATHEROSCLEROSIS OF NATIVE CORONARY ARTERY OF NATIVE HEART WITHOUT ANGINA PECTORIS: ICD-10-CM

## 2020-05-20 DIAGNOSIS — E78.2 MIXED HYPERLIPIDEMIA: ICD-10-CM

## 2020-05-20 PROBLEM — M70.62 TROCHANTERIC BURSITIS OF LEFT HIP: Status: RESOLVED | Noted: 2018-05-11 | Resolved: 2020-05-20

## 2020-05-20 PROCEDURE — 1160F RVW MEDS BY RX/DR IN RCRD: CPT | Performed by: FAMILY MEDICINE

## 2020-05-20 PROCEDURE — 1036F TOBACCO NON-USER: CPT | Performed by: FAMILY MEDICINE

## 2020-05-20 PROCEDURE — 3079F DIAST BP 80-89 MM HG: CPT | Performed by: FAMILY MEDICINE

## 2020-05-20 PROCEDURE — 3077F SYST BP >= 140 MM HG: CPT | Performed by: FAMILY MEDICINE

## 2020-05-20 PROCEDURE — 99214 OFFICE O/P EST MOD 30 MIN: CPT | Performed by: FAMILY MEDICINE

## 2020-05-20 PROCEDURE — 3008F BODY MASS INDEX DOCD: CPT | Performed by: FAMILY MEDICINE

## 2020-05-20 PROCEDURE — 4040F PNEUMOC VAC/ADMIN/RCVD: CPT | Performed by: FAMILY MEDICINE

## 2020-05-28 ENCOUNTER — TELEPHONE (OUTPATIENT)
Dept: OBGYN CLINIC | Facility: MEDICAL CENTER | Age: 85
End: 2020-05-28

## 2020-06-19 ENCOUNTER — OFFICE VISIT (OUTPATIENT)
Dept: OBGYN CLINIC | Facility: MEDICAL CENTER | Age: 85
End: 2020-06-19
Payer: MEDICARE

## 2020-06-19 VITALS
BODY MASS INDEX: 25.6 KG/M2 | HEIGHT: 61 IN | HEART RATE: 75 BPM | WEIGHT: 135.6 LBS | SYSTOLIC BLOOD PRESSURE: 178 MMHG | DIASTOLIC BLOOD PRESSURE: 74 MMHG

## 2020-06-19 DIAGNOSIS — M16.12 PRIMARY OSTEOARTHRITIS OF LEFT HIP: ICD-10-CM

## 2020-06-19 DIAGNOSIS — M70.62 TROCHANTERIC BURSITIS OF LEFT HIP: Primary | ICD-10-CM

## 2020-06-19 DIAGNOSIS — M46.1 SACROILIITIS (HCC): ICD-10-CM

## 2020-06-19 PROCEDURE — 3078F DIAST BP <80 MM HG: CPT | Performed by: ORTHOPAEDIC SURGERY

## 2020-06-19 PROCEDURE — 20610 DRAIN/INJ JOINT/BURSA W/O US: CPT | Performed by: ORTHOPAEDIC SURGERY

## 2020-06-19 PROCEDURE — 4040F PNEUMOC VAC/ADMIN/RCVD: CPT | Performed by: ORTHOPAEDIC SURGERY

## 2020-06-19 PROCEDURE — 3008F BODY MASS INDEX DOCD: CPT | Performed by: ORTHOPAEDIC SURGERY

## 2020-06-19 PROCEDURE — 3077F SYST BP >= 140 MM HG: CPT | Performed by: ORTHOPAEDIC SURGERY

## 2020-06-19 PROCEDURE — 1036F TOBACCO NON-USER: CPT | Performed by: ORTHOPAEDIC SURGERY

## 2020-06-19 PROCEDURE — 1160F RVW MEDS BY RX/DR IN RCRD: CPT | Performed by: ORTHOPAEDIC SURGERY

## 2020-06-19 PROCEDURE — 99213 OFFICE O/P EST LOW 20 MIN: CPT | Performed by: ORTHOPAEDIC SURGERY

## 2020-06-19 RX ORDER — BETAMETHASONE SODIUM PHOSPHATE AND BETAMETHASONE ACETATE 3; 3 MG/ML; MG/ML
12 INJECTION, SUSPENSION INTRA-ARTICULAR; INTRALESIONAL; INTRAMUSCULAR; SOFT TISSUE
Status: COMPLETED | OUTPATIENT
Start: 2020-06-19 | End: 2020-06-19

## 2020-06-19 RX ORDER — BUPIVACAINE HYDROCHLORIDE 2.5 MG/ML
2 INJECTION, SOLUTION INFILTRATION; PERINEURAL
Status: COMPLETED | OUTPATIENT
Start: 2020-06-19 | End: 2020-06-19

## 2020-06-19 RX ORDER — LIDOCAINE HYDROCHLORIDE 10 MG/ML
2 INJECTION, SOLUTION INFILTRATION; PERINEURAL
Status: COMPLETED | OUTPATIENT
Start: 2020-06-19 | End: 2020-06-19

## 2020-06-19 RX ADMIN — BETAMETHASONE SODIUM PHOSPHATE AND BETAMETHASONE ACETATE 12 MG: 3; 3 INJECTION, SUSPENSION INTRA-ARTICULAR; INTRALESIONAL; INTRAMUSCULAR; SOFT TISSUE at 10:45

## 2020-06-19 RX ADMIN — BUPIVACAINE HYDROCHLORIDE 2 ML: 2.5 INJECTION, SOLUTION INFILTRATION; PERINEURAL at 10:45

## 2020-06-19 RX ADMIN — LIDOCAINE HYDROCHLORIDE 2 ML: 10 INJECTION, SOLUTION INFILTRATION; PERINEURAL at 10:45

## 2020-07-02 DIAGNOSIS — I10 ESSENTIAL HYPERTENSION: ICD-10-CM

## 2020-07-02 DIAGNOSIS — I25.10 CORONARY ARTERY DISEASE INVOLVING NATIVE CORONARY ARTERY OF NATIVE HEART WITHOUT ANGINA PECTORIS: ICD-10-CM

## 2020-07-02 RX ORDER — METOPROLOL SUCCINATE 50 MG/1
TABLET, EXTENDED RELEASE ORAL
Qty: 90 TABLET | Refills: 1 | Status: SHIPPED | OUTPATIENT
Start: 2020-07-02 | End: 2020-11-04

## 2020-07-06 ENCOUNTER — HOSPITAL ENCOUNTER (OUTPATIENT)
Dept: RADIOLOGY | Facility: HOSPITAL | Age: 85
Discharge: HOME | End: 2020-07-06
Attending: ORTHOPAEDIC SURGERY
Payer: MEDICARE

## 2020-07-06 ENCOUNTER — TRANSCRIBE ORDERS (OUTPATIENT)
Dept: RADIOLOGY | Facility: HOSPITAL | Age: 85
End: 2020-07-06

## 2020-07-06 DIAGNOSIS — M16.12 PRIMARY OSTEOARTHRITIS OF LEFT HIP: ICD-10-CM

## 2020-07-06 PROCEDURE — 77002 NEEDLE LOCALIZATION BY XRAY: CPT

## 2020-07-06 PROCEDURE — 20610 DRAIN/INJ JOINT/BURSA W/O US: CPT

## 2020-07-06 RX ORDER — METHYLPREDNISOLONE ACETATE 80 MG/ML
80 INJECTION, SUSPENSION INTRA-ARTICULAR; INTRALESIONAL; INTRAMUSCULAR; SOFT TISSUE
Status: COMPLETED | OUTPATIENT
Start: 2020-07-06 | End: 2020-07-06

## 2020-07-06 RX ORDER — BUPIVACAINE HYDROCHLORIDE 2.5 MG/ML
10 INJECTION, SOLUTION EPIDURAL; INFILTRATION; INTRACAUDAL
Status: COMPLETED | OUTPATIENT
Start: 2020-07-06 | End: 2020-07-06

## 2020-07-06 RX ORDER — LIDOCAINE HYDROCHLORIDE 10 MG/ML
5 INJECTION, SOLUTION EPIDURAL; INFILTRATION; INTRACAUDAL; PERINEURAL
Status: COMPLETED | OUTPATIENT
Start: 2020-07-06 | End: 2020-07-06

## 2020-07-06 RX ADMIN — IOHEXOL 3 ML: 300 INJECTION, SOLUTION INTRAVENOUS at 12:30

## 2020-07-06 RX ADMIN — LIDOCAINE HYDROCHLORIDE 2 ML: 10 INJECTION, SOLUTION EPIDURAL; INFILTRATION; INTRACAUDAL; PERINEURAL at 12:30

## 2020-07-06 RX ADMIN — BUPIVACAINE HYDROCHLORIDE 2 ML: 2.5 INJECTION, SOLUTION EPIDURAL; INFILTRATION; INTRACAUDAL at 12:30

## 2020-07-06 RX ADMIN — METHYLPREDNISOLONE ACETATE 80 MG: 80 INJECTION, SUSPENSION INTRA-ARTICULAR; INTRALESIONAL; INTRAMUSCULAR; SOFT TISSUE at 12:30

## 2020-08-02 DIAGNOSIS — I10 HYPERTENSION, UNSPECIFIED TYPE: ICD-10-CM

## 2020-08-03 RX ORDER — VALSARTAN AND HYDROCHLOROTHIAZIDE 80; 12.5 MG/1; MG/1
TABLET, FILM COATED ORAL
Qty: 90 TABLET | Refills: 3 | Status: SHIPPED | OUTPATIENT
Start: 2020-08-03 | End: 2021-04-18

## 2020-08-21 DIAGNOSIS — K21.9 GASTROESOPHAGEAL REFLUX DISEASE WITHOUT ESOPHAGITIS: ICD-10-CM

## 2020-08-21 RX ORDER — OMEPRAZOLE 20 MG/1
CAPSULE, DELAYED RELEASE ORAL
Qty: 90 CAPSULE | Refills: 1 | Status: SHIPPED | OUTPATIENT
Start: 2020-08-21 | End: 2021-02-03

## 2020-08-25 ENCOUNTER — APPOINTMENT (OUTPATIENT)
Dept: LAB | Facility: MEDICAL CENTER | Age: 85
End: 2020-08-25
Payer: MEDICARE

## 2020-08-25 LAB
ALBUMIN SERPL BCP-MCNC: 3.7 G/DL (ref 3.5–5)
ALP SERPL-CCNC: 123 U/L (ref 46–116)
ALT SERPL W P-5'-P-CCNC: 21 U/L (ref 12–78)
ANION GAP SERPL CALCULATED.3IONS-SCNC: 5 MMOL/L (ref 4–13)
AST SERPL W P-5'-P-CCNC: 20 U/L (ref 5–45)
BASOPHILS # BLD AUTO: 0.03 THOUSANDS/ΜL (ref 0–0.1)
BASOPHILS NFR BLD AUTO: 1 % (ref 0–1)
BILIRUB SERPL-MCNC: 0.58 MG/DL (ref 0.2–1)
BUN SERPL-MCNC: 26 MG/DL (ref 5–25)
CALCIUM SERPL-MCNC: 10.1 MG/DL (ref 8.3–10.1)
CHLORIDE SERPL-SCNC: 110 MMOL/L (ref 100–108)
CHOLEST SERPL-MCNC: 165 MG/DL (ref 50–200)
CO2 SERPL-SCNC: 26 MMOL/L (ref 21–32)
CREAT SERPL-MCNC: 1.2 MG/DL (ref 0.6–1.3)
EOSINOPHIL # BLD AUTO: 0.12 THOUSAND/ΜL (ref 0–0.61)
EOSINOPHIL NFR BLD AUTO: 2 % (ref 0–6)
ERYTHROCYTE [DISTWIDTH] IN BLOOD BY AUTOMATED COUNT: 13.3 % (ref 11.6–15.1)
GFR SERPL CREATININE-BSD FRML MDRD: 39 ML/MIN/1.73SQ M
GLUCOSE P FAST SERPL-MCNC: 98 MG/DL (ref 65–99)
HCT VFR BLD AUTO: 37.1 % (ref 34.8–46.1)
HDLC SERPL-MCNC: 40 MG/DL
HGB BLD-MCNC: 11.6 G/DL (ref 11.5–15.4)
IMM GRANULOCYTES # BLD AUTO: 0.01 THOUSAND/UL (ref 0–0.2)
IMM GRANULOCYTES NFR BLD AUTO: 0 % (ref 0–2)
LDLC SERPL CALC-MCNC: 97 MG/DL (ref 0–100)
LYMPHOCYTES # BLD AUTO: 1.28 THOUSANDS/ΜL (ref 0.6–4.47)
LYMPHOCYTES NFR BLD AUTO: 22 % (ref 14–44)
MCH RBC QN AUTO: 29.2 PG (ref 26.8–34.3)
MCHC RBC AUTO-ENTMCNC: 31.3 G/DL (ref 31.4–37.4)
MCV RBC AUTO: 94 FL (ref 82–98)
MONOCYTES # BLD AUTO: 0.52 THOUSAND/ΜL (ref 0.17–1.22)
MONOCYTES NFR BLD AUTO: 9 % (ref 4–12)
NEUTROPHILS # BLD AUTO: 3.98 THOUSANDS/ΜL (ref 1.85–7.62)
NEUTS SEG NFR BLD AUTO: 66 % (ref 43–75)
NONHDLC SERPL-MCNC: 125 MG/DL
NRBC BLD AUTO-RTO: 0 /100 WBCS
PLATELET # BLD AUTO: 241 THOUSANDS/UL (ref 149–390)
PMV BLD AUTO: 10 FL (ref 8.9–12.7)
POTASSIUM SERPL-SCNC: 4 MMOL/L (ref 3.5–5.3)
PROT SERPL-MCNC: 7.2 G/DL (ref 6.4–8.2)
RBC # BLD AUTO: 3.97 MILLION/UL (ref 3.81–5.12)
SODIUM SERPL-SCNC: 141 MMOL/L (ref 136–145)
TRIGL SERPL-MCNC: 139 MG/DL
TSH SERPL DL<=0.05 MIU/L-ACNC: 3.4 UIU/ML (ref 0.36–3.74)
WBC # BLD AUTO: 5.94 THOUSAND/UL (ref 4.31–10.16)

## 2020-08-25 PROCEDURE — 84443 ASSAY THYROID STIM HORMONE: CPT | Performed by: FAMILY MEDICINE

## 2020-08-25 PROCEDURE — 85025 COMPLETE CBC W/AUTO DIFF WBC: CPT | Performed by: FAMILY MEDICINE

## 2020-08-25 PROCEDURE — 80061 LIPID PANEL: CPT | Performed by: FAMILY MEDICINE

## 2020-08-25 PROCEDURE — 36415 COLL VENOUS BLD VENIPUNCTURE: CPT | Performed by: FAMILY MEDICINE

## 2020-08-25 PROCEDURE — 80053 COMPREHEN METABOLIC PANEL: CPT | Performed by: FAMILY MEDICINE

## 2020-09-01 ENCOUNTER — OFFICE VISIT (OUTPATIENT)
Dept: FAMILY MEDICINE CLINIC | Facility: CLINIC | Age: 85
End: 2020-09-01
Payer: MEDICARE

## 2020-09-01 VITALS
HEART RATE: 80 BPM | BODY MASS INDEX: 25.7 KG/M2 | RESPIRATION RATE: 16 BRPM | SYSTOLIC BLOOD PRESSURE: 140 MMHG | TEMPERATURE: 97 F | OXYGEN SATURATION: 96 % | DIASTOLIC BLOOD PRESSURE: 72 MMHG | WEIGHT: 136 LBS

## 2020-09-01 DIAGNOSIS — E03.9 ACQUIRED HYPOTHYROIDISM: ICD-10-CM

## 2020-09-01 DIAGNOSIS — I25.10 ATHEROSCLEROSIS OF NATIVE CORONARY ARTERY OF NATIVE HEART WITHOUT ANGINA PECTORIS: ICD-10-CM

## 2020-09-01 DIAGNOSIS — E78.2 MIXED HYPERLIPIDEMIA: ICD-10-CM

## 2020-09-01 DIAGNOSIS — N18.30 CHRONIC KIDNEY FAILURE, STAGE 3 (MODERATE) (HCC): ICD-10-CM

## 2020-09-01 DIAGNOSIS — M15.9 GENERALIZED OSTEOARTHRITIS OF MULTIPLE SITES: ICD-10-CM

## 2020-09-01 DIAGNOSIS — Z23 NEED FOR VACCINATION: ICD-10-CM

## 2020-09-01 DIAGNOSIS — I10 ESSENTIAL HYPERTENSION: Primary | ICD-10-CM

## 2020-09-01 DIAGNOSIS — I89.0 LYMPHEDEMA OF RIGHT LOWER EXTREMITY: ICD-10-CM

## 2020-09-01 DIAGNOSIS — Z95.5 HISTORY OF CORONARY ARTERY STENT PLACEMENT: ICD-10-CM

## 2020-09-01 PROBLEM — S32.009A: Status: RESOLVED | Noted: 2019-12-05 | Resolved: 2020-09-01

## 2020-09-01 PROCEDURE — 99214 OFFICE O/P EST MOD 30 MIN: CPT | Performed by: FAMILY MEDICINE

## 2020-09-01 PROCEDURE — G0008 ADMIN INFLUENZA VIRUS VAC: HCPCS

## 2020-09-01 PROCEDURE — 90662 IIV NO PRSV INCREASED AG IM: CPT

## 2020-09-01 NOTE — PROGRESS NOTES
Assessment/Plan:     Diagnoses and all orders for this visit:    Essential hypertension    Mixed hyperlipidemia    Chronic kidney failure, stage 3 (moderate) (HCC)    Atherosclerosis of native coronary artery of native heart without angina pectoris    History of coronary artery stent placement    Generalized osteoarthritis of multiple sites    Lymphedema of right lower extremity    Acquired hypothyroidism    Need for vaccination  -     influenza vaccine, high-dose, PF 0 7 mL (FLUZONE HIGH-DOSE)    Other orders  -     Cancel: TDAP VACCINE GREATER THAN OR EQUAL TO 8YO IM          Continue with current medications  Flu vaccine today  Office visit 4 months  Compression stockings for right lower extremity lymphedema  I recommended a follow-up with Cardiology  Falls Plan of Care: Balance, strength, and gait training instructions were provided  Patient ID: Eloina Jaramillo is a 80 y o  female  Followup visit  Here with her daughter Paloma Gonzalez  Medications reviewed  Hypertension blood pressures have been stable on Valsartan HCTZ 80/12 5 daily and Metoprolol ER 50 mg daily  05/2020 creatinine 1 19  GFR 39  Electrolytes normal   Hgb 11 6  05/2020 SPEP no monoclonal bands  Hyperlipidemia and CAD on Atorvastatin 40 mg daily  Lipid profile 08/2020 cholesterol 165  TGs 191   HDL 40  LDL 97  Her daughter feels there has been a decline in her memory and reported this prior to visit-did not want to mention in front of her mother  Patient no longer driving  Patient lives alone in a senior high rise apartment  Independent with ADLs and most of her IADLs  Assist with grocery shopping  Daughter pays her bills  She eats TV dinners or pre cook meals         Lab Results   Component Value Date    WBC 5 94 08/25/2020    HGB 11 6 08/25/2020    HCT 37 1 08/25/2020    MCV 94 08/25/2020     08/25/2020     Lab Results   Component Value Date     11/16/2016    SODIUM 141 08/25/2020    K 4 0 08/25/2020     (H) 08/25/2020    CO2 26 08/25/2020    ANIONGAP 6 11/16/2015    AGAP 5 08/25/2020    BUN 26 (H) 08/25/2020    CREATININE 1 20 08/25/2020    GLUC 94 12/06/2019    GLUF 98 08/25/2020    CALCIUM 10 1 08/25/2020    AST 20 08/25/2020    ALT 21 08/25/2020    ALKPHOS 123 (H) 08/25/2020    PROT 6 8 11/16/2016    TP 7 2 08/25/2020    BILITOT 0 6 11/16/2016    TBILI 0 58 08/25/2020    EGFR 39 08/25/2020       Lab Results   Component Value Date    CHOLESTEROL 165 08/25/2020    CHOLESTEROL 179 11/26/2019    CHOLESTEROL 188 05/21/2018     Lab Results   Component Value Date    HDL 40 08/25/2020    HDL 53 11/26/2019    HDL 50 (L) 05/21/2018     Lab Results   Component Value Date    TRIG 139 08/25/2020    TRIG 131 11/26/2019    TRIG 128 05/21/2018     Lab Results   Component Value Date    LDLCALC 97 08/25/2020         Lab Results   Component Value Date    [id number] 3 400 08/25/2020     Lab Results   Component Value Date    SPEP  05/18/2020     No monoclonal bands noted  Reviewed by: Shiv Crawley MD ([id number]) **Electronic Signature**         The following portions of the patient's history were reviewed and updated as appropriate: allergies, current medications, past family history, past medical history, past social history, past surgical history and problem list     Review of Systems   Constitutional: Negative for appetite change, chills, fatigue, fever and unexpected weight change  HENT: Positive for hearing loss  Negative for congestion, ear pain, rhinorrhea, sore throat and trouble swallowing  Eyes: Negative for visual disturbance  ARMD and glaucoma  Respiratory: Negative for cough, shortness of breath and wheezing  No orthopnea or PND   Cardiovascular: Positive for leg swelling  Negative for chest pain and palpitations  Two recent episodes of fleeting nonexertional left-sided chest pain  12/2019 CT scan chest mild left basilar atelectasis versus infiltrate  Normal heart size    Coronary artery calcifications  Normal caliber thoracic aorta with mild atherosclerotic calcifications  9 mm lobulated density in the posterior right upper lobe 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  No tracheal or endobronchial lesions  12/2019 EKG normal sinus rhythm  No acute changes  First-degree AV block  Left posterior fascicular block Cardiology evaluation 5/2015 for exertional dyspnea and palpitations  Holter monitor showed normal sinus rhythm  139 single PVCs  No sustained ventricular rhythm  rare PACs, consisting of 183 single PACs  3 were noted in bigeminy  10 noted in couplets  no sustained supraventricular rhythm  No significant pauses or high grade AV block  Echocardiogram showed normal left ventricular function, ejection fraction 04% grade 1 diastolic dysfunction  No significant valvular abnormalities  Chronic swelling right lower leg  s/p resection of leiomyosarcoma 2002  Repeat echocardiogram 09/2017 normal left ventricular systolic function  EF 60%  No regional wall motion abnormalities  Right ventricle normal  Trace MR  No pericardial effusion  Gastrointestinal: Negative for abdominal pain, blood in stool, constipation, diarrhea, nausea and vomiting  GERD stable on Omeprazole  no reflux  no dysphagia  Endocrine:        See HPI  history of mildly elevated Ca++  08/2020 Ca++ 10/ 1   05/2020 Ca++ 10 5   05/2018 Ca++ 10 0   03/2017 Ca++ 9 4  11/2016 Ca++ 10 4  03/2016 Ca 10 4  11/2015 Ca++ 9 8  07/2015 10 4  01/2015 10 5  no change from 09/2014  SPEP normal  intact PTH normal  Hypothyroidism no longer on medication  08/2020 TSH 3 400   3/2017 TSH 5 310  Positive thyroid microsomal antibody  Patient was started on Levothyroxine 25 mcg daily in 05/2017 August 2017 she stopped levothyroxine when she started itching  No rash  Genitourinary: Negative for difficulty urinating, dysuria and frequency  OAB she was switched to Trospium from [facility] 03/2020  No improvement  nocturia x 1  followed by urology  right renal angiomyolipoma  renal u/s 09/2015   Musculoskeletal: Positive for arthralgias and gait problem (Patient uses a cane or walker for ambulation)  Negative for myalgias  Recurrent left hip pain  OA left hip  She is followed by orthopedics  X-rays of left hip showed moderate to severe osteoarthritis  7/2020 left hip intra-articular steroid injection  10/2019 left hip bursa injection  OA right shoulder  X-rays of right shoulder showed mild glenohumeral osteoarthritis and mild right AC joint osteoarthritis  She completed a course of physical therapy  12/2019 admission after a fall  Patient was trying to close her curtains when she fell backwards  No LOC  CT scan chest,abdomen and pelvis nondisplaced fractures of the left transverse processes of L2 and L3  No other evidence of acute posttraumatic abnormality  She has both a cane and walker for ambulation  Skin: Negative for rash  Neurological: Negative for dizziness and headaches  ER visit for fall 12/2018  No LOC  12/2018 CT scan of head no acute intracranial abnormality  Decreased attenuation noted in the periventricular and subcortical white matter is demonstrating moderate microangiopathic change  episode of slurred speech 10/2015 no recurrence  on ASA 81 mg daily and Plavix  she refused work up  Hematological: Negative for adenopathy  Does not bruise/bleed easily  On ASA and Plavix  Psychiatric/Behavioral: Negative for dysphoric mood and sleep disturbance  Objective:      /72 (BP Location: Left arm, Patient Position: Sitting, Cuff Size: Large)   Pulse 80   Temp (!) 97 °F (36 1 °C)   Resp 16   Wt 61 7 kg (136 lb)   SpO2 96%   BMI 25 70 kg/m²          Physical Exam  Vitals signs and nursing note reviewed  Constitutional:       General: She is not in acute distress  Appearance: She is well-developed  HENT:      Mouth/Throat:      Mouth: No oral lesions  Dentition: Normal dentition  Eyes:      General: No scleral icterus  Conjunctiva/sclera: Conjunctivae normal       Pupils: Pupils are equal, round, and reactive to light  Neck:      Thyroid: No thyroid mass or thyromegaly  Vascular: No carotid bruit or JVD  Trachea: No tracheal deviation  Cardiovascular:      Rate and Rhythm: Normal rate and regular rhythm  Heart sounds: Normal heart sounds  No murmur  No gallop  Pulmonary:      Effort: Pulmonary effort is normal  No respiratory distress  Breath sounds: Normal breath sounds  No wheezing or rales  Abdominal:      General: Bowel sounds are normal  There is no distension or abdominal bruit  Palpations: Abdomen is soft  There is no mass  Tenderness: There is no abdominal tenderness  There is no guarding or rebound  Musculoskeletal: Normal range of motion  General: No deformity  Right lower leg: Edema (Lymphedema right lower leg over  right medial ankle  No right calf swelling or tenderness  Negative David sign) present  Lymphadenopathy:      Cervical: No cervical adenopathy  Skin:     Findings: No rash  Nails: There is no clubbing  Neurological:      Mental Status: She is alert and oriented to person, place, and time  Cranial Nerves: No cranial nerve deficit  Psychiatric:         Mood and Affect: Mood normal          Speech: Speech normal          Behavior: Behavior normal          Cognition and Memory: She exhibits impaired recent memory

## 2020-09-17 ENCOUNTER — OFFICE VISIT (OUTPATIENT)
Dept: OBGYN CLINIC | Facility: HOSPITAL | Age: 85
End: 2020-09-17
Payer: MEDICARE

## 2020-09-17 VITALS
HEART RATE: 76 BPM | BODY MASS INDEX: 25.68 KG/M2 | SYSTOLIC BLOOD PRESSURE: 160 MMHG | DIASTOLIC BLOOD PRESSURE: 68 MMHG | HEIGHT: 61 IN | WEIGHT: 136 LBS

## 2020-09-17 DIAGNOSIS — M70.62 GREATER TROCHANTERIC BURSITIS OF LEFT HIP: Primary | ICD-10-CM

## 2020-09-17 PROCEDURE — 99212 OFFICE O/P EST SF 10 MIN: CPT | Performed by: PHYSICIAN ASSISTANT

## 2020-09-17 PROCEDURE — 20610 DRAIN/INJ JOINT/BURSA W/O US: CPT | Performed by: PHYSICIAN ASSISTANT

## 2020-09-17 RX ORDER — LIDOCAINE HYDROCHLORIDE 10 MG/ML
2 INJECTION, SOLUTION INFILTRATION; PERINEURAL
Status: COMPLETED | OUTPATIENT
Start: 2020-09-17 | End: 2020-09-17

## 2020-09-17 RX ORDER — BETAMETHASONE SODIUM PHOSPHATE AND BETAMETHASONE ACETATE 3; 3 MG/ML; MG/ML
6 INJECTION, SUSPENSION INTRA-ARTICULAR; INTRALESIONAL; INTRAMUSCULAR; SOFT TISSUE
Status: COMPLETED | OUTPATIENT
Start: 2020-09-17 | End: 2020-09-17

## 2020-09-17 RX ADMIN — BETAMETHASONE SODIUM PHOSPHATE AND BETAMETHASONE ACETATE 6 MG: 3; 3 INJECTION, SUSPENSION INTRA-ARTICULAR; INTRALESIONAL; INTRAMUSCULAR; SOFT TISSUE at 15:05

## 2020-09-17 RX ADMIN — LIDOCAINE HYDROCHLORIDE 2 ML: 10 INJECTION, SOLUTION INFILTRATION; PERINEURAL at 15:05

## 2020-09-17 NOTE — PROGRESS NOTES
Subjective;    80year-old patient with history for lateral based hip pain consistent with greater trochanteric bursitis  She achieves symptomatic relief by periodic injections  She presents today accompanied by a younger adult female individual in follow-up  Additionally she has had sacroiliac pain in the past    She is also an individual it is a right total hip recipient 2001,     And degenerative osteoarthritis of her left hip for which she receives injections fluoroscopically administered      Past Medical History:   Diagnosis Date    Anemia     last assessed - [id number]    Cancer 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    right lower extremity    Macular degeneration     Nondisplaced fracture of base of fifth metacarpal bone, right hand, initial encounter for closed fracture 12/7/2018    Osteoarthritis     Plantar fasciitis of right foot     last assessed - 07Apr2017    Stomach disorder     Superficial thrombophlebitis of leg     unspecified laterality; last assessed - [id number]    [facility] closed nondisplaced [facility] of [facility] 12/5/2019    Trochanteric bursitis of left hip     last assessed - 07Apr2017       Past Surgical History:   Procedure Laterality Date    CORONARY STENT PLACEMENT      stent RCA    FL INJECTION LEFT HIP (NON ARTHROGRAM)  11/26/2018    FL INJECTION LEFT HIP (NON ARTHROGRAM)  3/4/2019    FL INJECTION LEFT HIP (NON ARTHROGRAM)  6/10/2019    FL INJECTION LEFT HIP (NON ARTHROGRAM)  9/24/2019    FL INJECTION LEFT HIP (NON ARTHROGRAM)  12/30/2019    FL INJECTION LEFT HIP (NON ARTHROGRAM)  7/6/2020    FOOT SURGERY      HIP SURGERY      HYSTERECTOMY      SKIN LESION EXCISION  11/2010    Face - BCC - nose    TOTAL ABDOMINAL HYSTERECTOMY W/ BILATERAL SALPINGOOPHORECTOMY      TOTAL HIP ARTHROPLASTY Right        Family History   Problem Relation Age of Onset    Stroke Mother         cerebrovascular accident (CVA)    Coronary artery disease Mother         coronary disease    Stroke Father     Coronary artery disease Father         coronary disease    Stroke Brother     Kidney cancer Family        Social History     Tobacco Use    Smoking status: Never Smoker    Smokeless tobacco: Never Used   Substance Use Topics    Alcohol use: Yes     Frequency: 2-4 times a month     Drinks per session: 1 or 2     Binge frequency: Never     Comment: occasional wine with dinner    Drug use: No     Exam;    Adult female who has point focal pain to palpation of the left hip lateral thigh area of the greater trochanteric bursa overlying the proximal femur and the trochanteric flare    Large joint arthrocentesis: L greater trochanteric bursa  Date/Time: 9/17/2020 3:05 PM  Consent given by: patient  Procedure Details  Location: hip - L greater trochanteric bursa  Needle size: 22 G  Approach: lateral  Medications administered: 2 mL lidocaine 1 %; 6 mg betamethasone acetate-betamethasone sodium phosphate 6 (3-3) mg/mL    Patient tolerance: patient tolerated the procedure well with no immediate complications  Dressing:  Sterile dressing applied      Impression; Left lateral hip pain  Left greater trochanteric bursitis    Plan;    Patient underwent injection of steroid to the left greater trochanteric bursa  We will see her in follow-up in 3 additional months    For left hip pain fluoroscopic guided injection of steroid can be administered in the future,-I need only receive a call from the patient and can put it in electronically    Her exam was reviewed by and plan formulated by the attending surgeon it was my privilege to assist him in its delivery

## 2020-09-29 NOTE — PROGRESS NOTES
Assessment and plan:       1  Nocturia  - Due to side effects I have recommended discontinuation of trospium   - I have recommended dietary and behavioral modifications including avoiding hydration 2 hours prior to bed, elevation of legs in the evening, and avoidance of bladder irritants in the afternoon and evening   - Will have office contact in approximately 6 weeks for symptom reassessment  Bridgett Meneses PA-C      Chief Complaint     Chief Complaint   Patient presents with    Overactive Bladder         History of Present Illness     Jayne Conn is a 80 y o  female patient known to our service for history of overactive bladder  She has previously treated with VESIcare but due to change in her insurance formulary she was discontinued from this medication and initiated on Myrbetriq  Unfortunately, this was cost prohibitive and she was transition then to trospium  At the same time she started taking the trospium she also discontinued drinking coffee and substituted this with tea  She was last seen approximately 6 months ago and reported doing very well with no lower urinary tract complaints  Patient reports she has been having very bothersome dry mouth and constipation  Her daughter also reports she has been having a slight increase in her confusion  She also recalls that when she previously took Myrbetriq she was unable to urinate for a few days  Her PVR today is slightly elevated at 136 mL but she reports that she last urinated prior to leaving for her appointment today  Her most bothersome symptoms are nocturia 3 times nightly  She is not have urinary symptoms during the day  She is able to wear just a panty liner during the day but does endorse using a pad overnight  She does endorse drinking mostly coffee throughout the day and into the evening        Laboratory     Lab Results   Component Value Date    CREATININE 1 20 08/25/2020       No results found for: PSA    Recent Results (from the past 1 hour(s))   POCT Measure PVR    Collection Time: 09/30/20  1:48 PM   Result Value Ref Range    POST-VOID RESIDUAL VOLUME, ML  mL         Review of Systems     Review of Systems   Constitutional: Negative for chills and fever  HENT: Negative  Eyes: Negative  Respiratory: Negative for cough and shortness of breath  Cardiovascular: Negative for chest pain  Gastrointestinal: Negative for constipation, diarrhea, nausea and vomiting  Endocrine: Negative  Genitourinary: Positive for frequency (At night)  Negative for difficulty urinating, dyspareunia, dysuria, enuresis, flank pain and urgency  Musculoskeletal: Positive for back pain ( recent fall)  Skin: Negative  Allergies     Allergies   Allergen Reactions    Iv Contrast [Iodinated Diagnostic Agents]     Lactose GI Intolerance    Other      Iv contrast  Adhesive tape    Ciprofloxacin      Patient does not remember       Physical Exam     Physical Exam  Vitals signs and nursing note reviewed  Constitutional:       General: She is not in acute distress  Appearance: Normal appearance  She is well-developed  She is not ill-appearing, toxic-appearing or diaphoretic  HENT:      Head: Normocephalic and atraumatic  Right Ear: External ear normal       Left Ear: External ear normal    Eyes:      General: No scleral icterus  Right eye: No discharge  Left eye: No discharge  Cardiovascular:      Rate and Rhythm: Normal rate  Pulmonary:      Effort: Pulmonary effort is normal    Musculoskeletal:      Comments: Ambulates with a 4 wheel walker   Skin:     General: Skin is warm and dry  Neurological:      Mental Status: She is alert and oriented to person, place, and time  Psychiatric:         Mood and Affect: Mood normal          Behavior: Behavior normal          Thought Content:  Thought content normal          Judgment: Judgment normal            Vital Signs Vitals:    09/30/20 1340   Pulse: 73   Temp: (!) 97 4 °F (36 3 °C)   Weight: 61 8 kg (136 lb 3 2 oz)   Height: 5' 1" (1 549 m)         Current Medications       Current Outpatient Medications:     aspirin (ADULT ASPIRIN EC LOW STRENGTH) 81 mg EC tablet, Take 1 tablet by mouth daily, Disp: , Rfl:     atorvastatin (LIPITOR) 40 mg tablet, TAKE 1 TABLET BY MOUTH EVERY DAY, Disp: 90 tablet, Rfl: 3    Biotin 1 MG CAPS, Take by mouth, Disp: , Rfl:     cholecalciferol (VITAMIN D3) 1,000 units tablet, Take 1 tablet by mouth daily, Disp: , Rfl:     clopidogrel (PLAVIX) 75 mg tablet, Take 1 tablet (75 mg total) by mouth daily, Disp: 90 tablet, Rfl: 3    latanoprost (XALATAN) 0 005 % ophthalmic solution, INSTILL 1 DROP INTO BOTH EYES EVERY DAY AS DIRECTED, Disp: , Rfl: 4    metoprolol succinate (TOPROL-XL) 50 mg 24 hr tablet, TAKE 1 TABLET BY MOUTH EVERY DAY, Disp: 90 tablet, Rfl: 1    Multiple Vitamins-Minerals (ICAPS AREDS 2) CAPS, Take by mouth, Disp: , Rfl:     nitroglycerin (NITROSTAT) 0 4 mg SL tablet, Place 1 tablet (0 4 mg total) under the tongue every 5 (five) minutes as needed for chest pain, Disp: 10 tablet, Rfl: 3    Omega-3 Fatty Acids (OMEGA-3 FISH OIL) 1200 MG CAPS, Take by mouth, Disp: , Rfl:     omeprazole (PriLOSEC) 20 mg delayed release capsule, TAKE 1 CAPSULE BY MOUTH DAILY  DAYS, Disp: 90 capsule, Rfl: 1    Polyvinyl Alcohol-Povidone PF (REFRESH) 1 4-0 6 % SOLN, Apply to eye, Disp: , Rfl:     RESTASIS 0 05 % ophthalmic emulsion, instill 1 drop into both eyes twice a day, Disp: , Rfl: 0    trospium chloride (SANCTURA) 20 mg tablet, TAKE 1 TABLET BY MOUTH TWICE A DAY, Disp: 180 tablet, Rfl: 0    valsartan-hydrochlorothiazide (DIOVAN-HCT) 80-12 5 MG per tablet, TAKE 1 TABLET BY MOUTH EVERY DAY, Disp: 90 tablet, Rfl: 3      Active Problems     Patient Active Problem List   Diagnosis    Chronic right shoulder pain    Trochanteric bursitis of left hip    Acquired valgus deformity of knee, left    Angiomyolipoma of kidney    Atherosclerotic heart disease of native coronary artery without angina pectoris    Chronic kidney failure, stage 3 (moderate) (HCC)    Gastroesophageal reflux disease    Generalized osteoarthritis of multiple sites    Hypothyroidism    Mixed hyperlipidemia    Primary osteoarthritis of left hip    PVCs (premature ventricular contractions)    Tear of right rotator cuff    Venous insufficiency (chronic) (peripheral)    History of coronary artery stent placement    [facility]ili[facility] ([facility] )    Essential hypertension    Lymphedema of right lower extremity         Past Medical History     Past Medical History:   Diagnosis Date    Anemia     last assessed - [id number]    Cancer [facility])     Depression     last assessed - [id number]    Glaucoma     Hypercalcemia     last assessed - [id number]    Hypertension     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    right lower extremity    Macular degeneration     Nondisplaced fracture of base of fifth metacarpal bone, right hand, initial encounter for closed fracture 12/7/2018    Osteoarthritis     Plantar fasciitis of right foot     last assessed - [id number]    Stomach disorder     Superficial thrombophlebitis of leg     unspecified laterality; last assessed - [id number]    Traumatic closed nondisplaced fracture of lumbar vertebra, [facility] encounter ([facility] ) 12/5/2019    Trochanteric bursitis of left hip     last assessed - [id number]         Surgical History     Past Surgical History:   Procedure Laterality Date    CORONARY STENT PLACEMENT      stent RCA    FL INJECTION LEFT HIP (NON ARTHROGRAM)  11/26/2018    FL INJECTION LEFT HIP (NON ARTHROGRAM)  3/4/2019    FL INJECTION LEFT HIP (NON ARTHROGRAM)  6/10/2019    FL INJECTION LEFT HIP (NON ARTHROGRAM)  9/24/2019    FL INJECTION LEFT HIP (NON ARTHROGRAM)  12/30/2019    FL INJECTION LEFT HIP (NON ARTHROGRAM)  7/6/2020    FOOT SURGERY      HIP SURGERY      HYSTERECTOMY      SKIN LESION EXCISION  11/2010    Face - BCC - nose    TOTAL ABDOMINAL HYSTERECTOMY W/ BILATERAL SALPINGOOPHORECTOMY      TOTAL HIP ARTHROPLASTY Right          Family History     Family History   Problem Relation Age of Onset    Stroke Mother         cerebrovascular accident (CVA)    Coronary artery disease Mother         coronary disease    Stroke Father     Coronary artery disease Father         coronary disease    Stroke Brother     Kidney cancer Family          Social History     Social History       Radiology

## 2020-09-30 ENCOUNTER — OFFICE VISIT (OUTPATIENT)
Dept: UROLOGY | Facility: CLINIC | Age: 85
End: 2020-09-30
Payer: MEDICARE

## 2020-09-30 VITALS — WEIGHT: 136.2 LBS | BODY MASS INDEX: 25.71 KG/M2 | HEIGHT: 61 IN | TEMPERATURE: 97.4 F | HEART RATE: 73 BPM

## 2020-09-30 DIAGNOSIS — R35.0 URINARY FREQUENCY: Primary | ICD-10-CM

## 2020-09-30 LAB — POST-VOID RESIDUAL VOLUME, ML POC: 136 ML

## 2020-09-30 PROCEDURE — 99213 OFFICE O/P EST LOW 20 MIN: CPT | Performed by: PHYSICIAN ASSISTANT

## 2020-09-30 PROCEDURE — 51798 US URINE CAPACITY MEASURE: CPT | Performed by: PHYSICIAN ASSISTANT

## 2020-10-06 DIAGNOSIS — E78.00 HYPERCHOLESTEREMIA: ICD-10-CM

## 2020-10-06 RX ORDER — ATORVASTATIN CALCIUM 40 MG/1
TABLET, FILM COATED ORAL
Qty: 90 TABLET | Refills: 3 | Status: SHIPPED | OUTPATIENT
Start: 2020-10-06 | End: 2021-04-18

## 2020-10-15 ENCOUNTER — OFFICE VISIT (OUTPATIENT)
Dept: CARDIOLOGY CLINIC | Facility: CLINIC | Age: 85
End: 2020-10-15
Payer: MEDICARE

## 2020-10-15 ENCOUNTER — TELEPHONE (OUTPATIENT)
Dept: CARDIOLOGY CLINIC | Facility: CLINIC | Age: 85
End: 2020-10-15

## 2020-10-15 VITALS
BODY MASS INDEX: 25.58 KG/M2 | DIASTOLIC BLOOD PRESSURE: 66 MMHG | HEIGHT: 61 IN | OXYGEN SATURATION: 97 % | WEIGHT: 135.5 LBS | TEMPERATURE: 97.3 F | HEART RATE: 60 BPM | SYSTOLIC BLOOD PRESSURE: 160 MMHG

## 2020-10-15 DIAGNOSIS — I10 ESSENTIAL (PRIMARY) HYPERTENSION: Primary | ICD-10-CM

## 2020-10-15 DIAGNOSIS — I49.3 PVC (PREMATURE VENTRICULAR CONTRACTION): ICD-10-CM

## 2020-10-15 DIAGNOSIS — I25.10 CORONARY ARTERIOSCLEROSIS: ICD-10-CM

## 2020-10-15 DIAGNOSIS — R07.9 CHEST PAIN, UNSPECIFIED TYPE: ICD-10-CM

## 2020-10-15 DIAGNOSIS — E78.00 PURE HYPERCHOLESTEROLEMIA: ICD-10-CM

## 2020-10-15 PROCEDURE — 99214 OFFICE O/P EST MOD 30 MIN: CPT | Performed by: INTERNAL MEDICINE

## 2020-10-15 PROCEDURE — 93000 ELECTROCARDIOGRAM COMPLETE: CPT | Performed by: INTERNAL MEDICINE

## 2020-11-04 DIAGNOSIS — I25.10 CORONARY ARTERY DISEASE INVOLVING NATIVE CORONARY ARTERY OF NATIVE HEART WITHOUT ANGINA PECTORIS: ICD-10-CM

## 2020-11-04 DIAGNOSIS — I10 ESSENTIAL HYPERTENSION: ICD-10-CM

## 2020-11-04 RX ORDER — METOPROLOL SUCCINATE 50 MG/1
TABLET, EXTENDED RELEASE ORAL
Qty: 90 TABLET | Refills: 1 | Status: SHIPPED | OUTPATIENT
Start: 2020-11-04 | End: 2021-03-26

## 2020-11-04 RX ORDER — CLOPIDOGREL BISULFATE 75 MG/1
TABLET ORAL
Qty: 90 TABLET | Refills: 3 | Status: SHIPPED | OUTPATIENT
Start: 2020-11-04 | End: 2022-01-03 | Stop reason: SDUPTHER

## 2020-12-15 ENCOUNTER — TELEPHONE (OUTPATIENT)
Dept: CARDIOLOGY CLINIC | Facility: CLINIC | Age: 85
End: 2020-12-15

## 2020-12-15 ENCOUNTER — OFFICE VISIT (OUTPATIENT)
Dept: OBGYN CLINIC | Facility: HOSPITAL | Age: 85
End: 2020-12-15
Payer: MEDICARE

## 2020-12-15 VITALS
HEIGHT: 61 IN | DIASTOLIC BLOOD PRESSURE: 76 MMHG | WEIGHT: 134.6 LBS | SYSTOLIC BLOOD PRESSURE: 185 MMHG | BODY MASS INDEX: 25.41 KG/M2 | HEART RATE: 69 BPM

## 2020-12-15 DIAGNOSIS — M16.12 PRIMARY OSTEOARTHRITIS OF LEFT HIP: Primary | ICD-10-CM

## 2020-12-15 DIAGNOSIS — M70.62 GREATER TROCHANTERIC BURSITIS OF LEFT HIP: ICD-10-CM

## 2020-12-15 PROCEDURE — 99213 OFFICE O/P EST LOW 20 MIN: CPT | Performed by: ORTHOPAEDIC SURGERY

## 2020-12-15 PROCEDURE — 20610 DRAIN/INJ JOINT/BURSA W/O US: CPT | Performed by: ORTHOPAEDIC SURGERY

## 2020-12-15 RX ORDER — BUPIVACAINE HYDROCHLORIDE 2.5 MG/ML
2 INJECTION, SOLUTION INFILTRATION; PERINEURAL
Status: COMPLETED | OUTPATIENT
Start: 2020-12-15 | End: 2020-12-15

## 2020-12-15 RX ORDER — LIDOCAINE HYDROCHLORIDE 10 MG/ML
2 INJECTION, SOLUTION INFILTRATION; PERINEURAL
Status: COMPLETED | OUTPATIENT
Start: 2020-12-15 | End: 2020-12-15

## 2020-12-15 RX ORDER — BETAMETHASONE SODIUM PHOSPHATE AND BETAMETHASONE ACETATE 3; 3 MG/ML; MG/ML
12 INJECTION, SUSPENSION INTRA-ARTICULAR; INTRALESIONAL; INTRAMUSCULAR; SOFT TISSUE
Status: COMPLETED | OUTPATIENT
Start: 2020-12-15 | End: 2020-12-15

## 2020-12-15 RX ADMIN — LIDOCAINE HYDROCHLORIDE 2 ML: 10 INJECTION, SOLUTION INFILTRATION; PERINEURAL at 11:44

## 2020-12-15 RX ADMIN — BETAMETHASONE SODIUM PHOSPHATE AND BETAMETHASONE ACETATE 12 MG: 3; 3 INJECTION, SUSPENSION INTRA-ARTICULAR; INTRALESIONAL; INTRAMUSCULAR; SOFT TISSUE at 11:44

## 2020-12-15 RX ADMIN — BUPIVACAINE HYDROCHLORIDE 2 ML: 2.5 INJECTION, SOLUTION INFILTRATION; PERINEURAL at 11:44

## 2020-12-28 ENCOUNTER — HOSPITAL ENCOUNTER (OUTPATIENT)
Dept: RADIOLOGY | Facility: HOSPITAL | Age: 85
Discharge: HOME | End: 2020-12-28
Attending: ORTHOPAEDIC SURGERY
Payer: MEDICARE

## 2020-12-28 DIAGNOSIS — M16.12 PRIMARY OSTEOARTHRITIS OF LEFT HIP: ICD-10-CM

## 2020-12-28 PROCEDURE — 77002 NEEDLE LOCALIZATION BY XRAY: CPT

## 2020-12-28 PROCEDURE — 20610 DRAIN/INJ JOINT/BURSA W/O US: CPT

## 2020-12-28 RX ORDER — BUPIVACAINE HYDROCHLORIDE 2.5 MG/ML
10 INJECTION, SOLUTION EPIDURAL; INFILTRATION; INTRACAUDAL
Status: COMPLETED | OUTPATIENT
Start: 2020-12-28 | End: 2020-12-28

## 2020-12-28 RX ORDER — METHYLPREDNISOLONE ACETATE 80 MG/ML
80 INJECTION, SUSPENSION INTRA-ARTICULAR; INTRALESIONAL; INTRAMUSCULAR; SOFT TISSUE
Status: COMPLETED | OUTPATIENT
Start: 2020-12-28 | End: 2020-12-28

## 2020-12-28 RX ORDER — LIDOCAINE HYDROCHLORIDE 10 MG/ML
10 INJECTION, SOLUTION EPIDURAL; INFILTRATION; INTRACAUDAL; PERINEURAL
Status: COMPLETED | OUTPATIENT
Start: 2020-12-28 | End: 2020-12-28

## 2020-12-28 RX ADMIN — METHYLPREDNISOLONE ACETATE 80 MG: 80 INJECTION, SUSPENSION INTRA-ARTICULAR; INTRALESIONAL; INTRAMUSCULAR; SOFT TISSUE at 11:31

## 2020-12-28 RX ADMIN — BUPIVACAINE HYDROCHLORIDE 2 ML: 2.5 INJECTION, SOLUTION EPIDURAL; INFILTRATION; INTRACAUDAL; PERINEURAL at 11:31

## 2020-12-28 RX ADMIN — LIDOCAINE HYDROCHLORIDE 8 ML: 10 INJECTION, SOLUTION EPIDURAL; INFILTRATION; INTRACAUDAL at 11:31

## 2020-12-28 RX ADMIN — IOHEXOL 2 ML: 300 INJECTION, SOLUTION INTRAVENOUS at 11:31

## 2021-01-03 NOTE — PROGRESS NOTES
Assessment/Plan:     Diagnoses and all orders for this visit:    Essential hypertension  -     Comprehensive metabolic panel  -     CBC and differential    Chronic kidney failure, stage 3 (moderate)    Mixed hyperlipidemia  -     TSH, 3rd generation with Free T4 reflex  -     Lipid panel    PVCs (premature ventricular contractions)    Atherosclerosis of native coronary artery of native heart without angina pectoris    Gastroesophageal reflux disease without esophagitis    Generalized osteoarthritis of multiple sites    Primary osteoarthritis of left hip    MCI (mild cognitive impairment)    Medicare annual wellness visit, subsequent           Continue with current medications  Monitor BP at home  Stay hydrated  Avoid NSAIDs  Office visit 4 months with repeat labs at that time  Up-to-date with flu vaccine  Follow-up with urology/Cardiology     Patient ID: Nelson Vilchis is a 80 y o  female  Followup visit  Here with her daughter Jose F Burks  Medications reviewed  Hypertension blood pressures on Valsartan HCTZ 80/12 5 daily and Metoprolol ER 50 mg daily  She has had a number of recent elevated BP readings  08/2020 creatinine 1 20  GFR 39  Electrolytes normal except for chloride 110  Hgb 11 6   05/2020 creatinine 1 19  GFR 39  Electrolytes normal   Hgb 11 6 05/2020 SPEP no monoclonal bands  Hyperlipidemia and CAD on Atorvastatin 40 mg daily  Lipid profile 08/2020 cholesterol 165  TGs 139   HDL 40  LDL 97  LFTs normal except for alkaline phosphatase               Recent Results (from the past 3360 hour(s))   Comprehensive metabolic panel    Collection Time: 08/25/20  9:41 AM   Result Value Ref Range    Sodium 141 136 - 145 mmol/L    Potassium 4 0 3 5 - 5 3 mmol/L    Chloride 110 (H) 100 - 108 mmol/L    CO2 26 21 - 32 mmol/L    ANION GAP 5 4 - 13 mmol/L    BUN 26 (H) 5 - 25 mg/dL    Creatinine 1 20 0 60 - 1 30 mg/dL    Glucose, Fasting 98 65 - 99 mg/dL    Calcium 10 1 8 3 - 10 1 mg/dL    AST 20 5 - 45 U/L    ALT 21 12 - 78 U/L    Alkaline Phosphatase 123 (H) 46 - 116 U/L    Total Protein 7 2 6 4 - 8 2 g/dL    Albumin 3 7 3 5 - 5 0 g/dL    Total Bilirubin 0 58 0 20 - 1 00 mg/dL    eGFR 39 ml/min/1 73sq m   CBC and differential    Collection Time: 08/25/20  9:41 AM   Result Value Ref Range    WBC 5 94 4 31 - 10 16 Thousand/uL    RBC 3 97 3 81 - 5 12 Million/uL    Hemoglobin 11 6 11 5 - 15 4 g/dL    Hematocrit 37 1 34 8 - 46 1 %    MCV 94 82 - 98 fL    MCH 29 2 26 8 - 34 3 pg    MCHC 31 3 (L) 31 4 - 37 4 g/dL    RDW 13 3 11 6 - 15 1 %    MPV 10 0 8 9 - 12 7 fL    Platelets 273 216 - 605 Thousands/uL    nRBC 0 /100 WBCs    Neutrophils Relative 66 43 - 75 %    Immat GRANS % 0 0 - 2 %    Lymphocytes Relative 22 14 - 44 %    Monocytes Relative 9 4 - 12 %    Eosinophils Relative 2 0 - 6 %    Basophils Relative 1 0 - 1 %    Neutrophils Absolute 3 98 1 85 - 7 62 Thousands/µL    Immature Grans Absolute 0 01 0 00 - 0 20 Thousand/uL    Lymphocytes Absolute 1 28 0 60 - 4 47 Thousands/µL    Monocytes Absolute 0 52 0 17 - 1 22 Thousand/µL    Eosinophils Absolute 0 12 0 00 - 0 61 Thousand/µL    Basophils Absolute 0 03 0 00 - 0 10 Thousands/µL   Lipid panel    Collection Time: 08/25/20  9:41 AM   Result Value Ref Range    Cholesterol 165 50 - 200 mg/dL    Triglycerides 139 <=150 mg/dL    HDL, Direct 40 >=40 mg/dL    LDL Calculated 97 0 - 100 mg/dL    Non-HDL-Chol (CHOL-HDL) 125 mg/dl   TSH, 3rd generation with Free T4 reflex    Collection Time: 08/25/20  9:41 AM   Result Value Ref Range    TSH 3RD GENERATON 3 400 0 358 - 3 740 uIU/mL   POCT Measure PVR    Collection Time: 09/30/20  1:48 PM   Result Value Ref Range    POST-VOID RESIDUAL VOLUME, ML  mL             The following portions of the patient's history were reviewed and updated as appropriate: allergies, current medications, past family history, past medical history, past social history, past surgical history and problem list     Review of Systems   Constitutional: Negative for appetite change, chills, fatigue, fever and unexpected weight change  HENT: Positive for hearing loss  Negative for congestion, ear pain, rhinorrhea, sore throat and trouble swallowing  Eyes: Negative for visual disturbance  ARMD and glaucoma  Respiratory: Negative for cough, shortness of breath and wheezing  No orthopnea or PND   Cardiovascular: Positive for palpitations  Negative for chest pain and leg swelling  Intermittent palpitations  No SL nitro use  12/2019 CT scan chest mild left basilar atelectasis versus infiltrate  Normal heart size  Coronary artery calcifications  Normal caliber thoracic aorta with mild atherosclerotic calcifications  9 mm lobulated density in the posterior right upper lobe 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  No tracheal or endobronchial lesions  12/2019 EKG normal sinus rhythm  No acute changes  First-degree AV block  Left posterior fascicular block Cardiology evaluation 5/2015 for exertional dyspnea and palpitations  Holter monitor showed normal sinus rhythm  139 single PVCs  No sustained ventricular rhythm  rare PACs, consisting of 183 single PACs  3 were noted in bigeminy  10 noted in couplets  no sustained supraventricular rhythm  No significant pauses or high grade AV block  Echocardiogram showed normal left ventricular function, ejection fraction 20% grade 1 diastolic dysfunction  No significant valvular abnormalities  Chronic swelling right lower leg  s/p resection of leiomyosarcoma 2002  Repeat echocardiogram 09/2017 normal left ventricular systolic function  EF 60%  No regional wall motion abnormalities  Right ventricle normal  Trace MR  No pericardial effusion  Gastrointestinal: Negative for abdominal pain, blood in stool, constipation, diarrhea, nausea and vomiting  GERD stable on Omeprazole  no reflux  no dysphagia  Endocrine:        Hypothyroidism no longer on medication    08/2020 TSH 3 400   3/2017 TSH 5 310  Positive thyroid microsomal antibody  Patient was started on Levothyroxine 25 mcg daily in 05/2017 August 2017 she stopped levothyroxine when she started itching  No rash  History of mildly elevated Ca++  08/2020 Ca++ 10 1   05/2020 Ca++ 10 5   05/2018 Ca++ 10 0   03/2017 Ca++ 9 4  11/2016 Ca++ 10 4  03/2016 Ca 10 4  11/2015 Ca++ 9 8  07/2015 10 4  01/2015 10 5  no change from 09/2014  SPEP normal  intact PTH normal     Genitourinary: Negative for difficulty urinating, dysuria and frequency  OAB no longer Trospium stopped due to constipation  Previously on Vesicare- No improvement  Nocturia x 1  She is followed by urology  09/2020   right renal angiomyolipoma  renal u/s 09/2015   Musculoskeletal: Positive for arthralgias and gait problem (Patient uses a cane or walker for ambulation)  Negative for myalgias  Chronic left hip pain  OA left hip  She is followed by orthopedics  X-rays of left hip showed moderate to severe osteoarthritis  12/2020 left hip intra-articular steroid injection and left hip bursa injection  OA right shoulder  X-rays of right shoulder showed mild glenohumeral osteoarthritis and mild right AC joint osteoarthritis  She completed a course of physical therapy  12/2019 admission after a fall  Patient was trying to close her curtains when she fell backwards  No LOC  CT scan chest,abdomen and pelvis nondisplaced fractures of the left transverse processes of L2 and L3  No other evidence of acute posttraumatic abnormality  She has both a cane and walker for ambulation  Skin: Negative for rash  Neurological: Negative for dizziness and headaches  Her daughter feels there has been a decline in her memory  Patient no longer driving  Patient lives alone in a [facility] apartment  Independent with ADLs and most of her IADLs  Assist with grocery shopping  Daughter pays her bills  She eats TV dinners or pre cook meals  ER visit for fall 12/2018  No LOC  12/2018 CT scan of head no acute intracranial abnormality  Decreased attenuation noted in the periventricular and subcortical white matter is demonstrating moderate microangiopathic change  episode of slurred speech 10/2015 no recurrence  on ASA 81 mg daily and Plavix  she refused work up  Hematological: Negative for adenopathy  Bruises/bleeds easily  On ASA and Plavix  Psychiatric/Behavioral: Negative for dysphoric mood and sleep disturbance  Objective:      /66   Pulse 72   Temp (!) 96 4 °F (35 8 °C)   Resp 18   Ht 5' 1" (1 549 m)   Wt 60 3 kg (133 lb)   BMI 25 13 kg/m²     BP Readings from Last 3 Encounters:   01/04/21 144/66   12/15/20 (!) 185/76   10/15/20 160/66       Wt Readings from Last 3 Encounters:   01/04/21 60 3 kg (133 lb)   12/15/20 61 1 kg (134 lb 9 6 oz)   10/15/20 61 5 kg (135 lb 8 oz)        Physical Exam  Vitals signs and nursing note reviewed  Constitutional:       General: She is not in acute distress  Appearance: She is well-developed  HENT:      Right Ear: Tympanic membrane and ear canal normal       Left Ear: Tympanic membrane and ear canal normal       Mouth/Throat:      Mouth: No oral lesions  Dentition: Normal dentition  Eyes:      General: No scleral icterus  Extraocular Movements: Extraocular movements intact  Conjunctiva/sclera: Conjunctivae normal       Pupils: Pupils are equal, round, and reactive to light  Neck:      Thyroid: No thyroid mass or thyromegaly  Vascular: No carotid bruit or JVD  Trachea: No tracheal deviation  Cardiovascular:      Rate and Rhythm: Normal rate and regular rhythm  Heart sounds: Normal heart sounds  No murmur  No gallop  Pulmonary:      Effort: Pulmonary effort is normal  No respiratory distress  Breath sounds: Normal breath sounds  No wheezing or rales  Abdominal:      General: Bowel sounds are normal  There is no distension or abdominal bruit        Palpations: Abdomen is soft  There is no hepatomegaly, splenomegaly or mass  Tenderness: There is no abdominal tenderness  There is no guarding or rebound  Musculoskeletal:         General: No deformity  Right lower leg: Edema present  Left lower leg: No edema  Comments:   Chronic lymphedema right lower leg over right medial ankle  Lymphadenopathy:      Cervical: No cervical adenopathy  Upper Body:      Right upper body: No supraclavicular adenopathy  Left upper body: No supraclavicular adenopathy  Skin:     Findings: No rash  Nails: There is no clubbing  Neurological:      General: No focal deficit present  Mental Status: She is alert and oriented to person, place, and time  Cranial Nerves: No cranial nerve deficit  Psychiatric:         Mood and Affect: Mood normal          Speech: Speech normal          Behavior: Behavior normal          Cognition and Memory: She exhibits impaired recent memory

## 2021-01-04 ENCOUNTER — OFFICE VISIT (OUTPATIENT)
Dept: FAMILY MEDICINE CLINIC | Facility: CLINIC | Age: 86
End: 2021-01-04
Payer: MEDICARE

## 2021-01-04 VITALS
HEIGHT: 61 IN | HEART RATE: 72 BPM | TEMPERATURE: 96.4 F | RESPIRATION RATE: 18 BRPM | SYSTOLIC BLOOD PRESSURE: 144 MMHG | BODY MASS INDEX: 25.11 KG/M2 | DIASTOLIC BLOOD PRESSURE: 66 MMHG | WEIGHT: 133 LBS

## 2021-01-04 DIAGNOSIS — G31.84 MCI (MILD COGNITIVE IMPAIRMENT): ICD-10-CM

## 2021-01-04 DIAGNOSIS — M15.9 GENERALIZED OSTEOARTHRITIS OF MULTIPLE SITES: ICD-10-CM

## 2021-01-04 DIAGNOSIS — I10 ESSENTIAL HYPERTENSION: Primary | ICD-10-CM

## 2021-01-04 DIAGNOSIS — I25.10 ATHEROSCLEROSIS OF NATIVE CORONARY ARTERY OF NATIVE HEART WITHOUT ANGINA PECTORIS: ICD-10-CM

## 2021-01-04 DIAGNOSIS — M16.12 PRIMARY OSTEOARTHRITIS OF LEFT HIP: ICD-10-CM

## 2021-01-04 DIAGNOSIS — E78.2 MIXED HYPERLIPIDEMIA: ICD-10-CM

## 2021-01-04 DIAGNOSIS — I49.3 PVCS (PREMATURE VENTRICULAR CONTRACTIONS): ICD-10-CM

## 2021-01-04 DIAGNOSIS — N18.30 CHRONIC KIDNEY FAILURE, STAGE 3 (MODERATE) (HCC): ICD-10-CM

## 2021-01-04 DIAGNOSIS — K21.9 GASTROESOPHAGEAL REFLUX DISEASE WITHOUT ESOPHAGITIS: ICD-10-CM

## 2021-01-04 DIAGNOSIS — Z00.00 MEDICARE ANNUAL WELLNESS VISIT, SUBSEQUENT: ICD-10-CM

## 2021-01-04 PROBLEM — E03.9 HYPOTHYROIDISM: Status: RESOLVED | Noted: 2017-11-30 | Resolved: 2021-01-04

## 2021-01-04 PROCEDURE — G0438 PPPS, INITIAL VISIT: HCPCS | Performed by: FAMILY MEDICINE

## 2021-01-04 PROCEDURE — 1123F ACP DISCUSS/DSCN MKR DOCD: CPT | Performed by: FAMILY MEDICINE

## 2021-01-04 PROCEDURE — 99214 OFFICE O/P EST MOD 30 MIN: CPT | Performed by: FAMILY MEDICINE

## 2021-01-04 NOTE — PROGRESS NOTES
Assessment and Plan:     Problem List Items Addressed This Visit        Digestive    Gastroesophageal reflux disease       Cardiovascular and Mediastinum    Atherosclerotic heart disease of native coronary artery without angina pectoris    PVCs (premature ventricular contractions)    Essential hypertension - Primary    Relevant Orders    Comprehensive metabolic panel    CBC and differential       Musculoskeletal and Integument    Generalized osteoarthritis of multiple sites    Primary osteoarthritis of left hip       Genitourinary    Chronic kidney failure, stage 3 (moderate)       Other    Mixed hyperlipidemia    Relevant Orders    TSH, 3rd generation with Free T4 reflex    Lipid panel      Other Visit Diagnoses     MCI (mild cognitive impairment)        Medicare annual wellness visit, subsequent              Falls Plan of Care: balance, strength, and gait training instructions were provided  Recommended assistive device to help with gait and balance  Preventive health issues were discussed with patient, and age appropriate screening tests were ordered as noted in patient's After Visit Summary  Personalized health advice and appropriate referrals for health education or preventive services given if needed, as noted in patient's After Visit Summary       History of Present Illness:     Patient presents for Medicare Annual Wellness visit    Patient Care Team:  Rigo Maynard MD as PCP - MD Jaquelin Workman MD (Orthopedic Surgery)  Ulisses Burks MD (Urology)  Luciano Bhatti MD (Ophthalmology)     Problem List:     Patient Active Problem List   Diagnosis    Chronic right shoulder pain    Greater trochanteric bursitis of left hip    Acquired valgus deformity of knee, left    Angiomyolipoma of kidney    Atherosclerotic heart disease of native coronary artery without angina pectoris    Chronic kidney failure, stage 3 (moderate)    Gastroesophageal reflux disease    Generalized osteoarthritis of multiple sites    Mixed hyperlipidemia    Primary osteoarthritis of left hip    PVCs (premature ventricular contractions)    Tear of right rotator cuff    Venous insufficiency (chronic) (peripheral)    History of coronary artery stent placement    Sacroiliitis (HCC)    Essential hypertension    Lymphedema of right lower extremity      Past Medical and Surgical History:     Past Medical History:   Diagnosis Date    Anemia     last assessed - [id number]    Cancer [facility])     Depression     last assessed - [id number]    Glaucoma     Hypercalcemia     last assessed - [id number]    Hypertension     Hypothyroidism 11/30/2017    Leiomyosarcoma ([facility] [address] ) 2002    right lower extremity    Macular degeneration     Nondisplaced fracture of base of fifth metacarpal bone, right hand, initial encounter for closed fracture 12/7/2018    Osteoarthritis     Plantar fasciitis of right foot     last assessed - [id number]    Stomach disorder     Superficial thrombophlebitis of leg     unspecified laterality; last assessed - [id number]    Traumatic closed nondisplaced fracture of lumbar vertebra, initial encounter ([facility] [address] ) 12/5/2019    Trochanteric bursitis of left hip     last assessed - [id number]     Past Surgical History:   Procedure Laterality Date    CORONARY STENT PLACEMENT      stent RCA    FL INJECTION LEFT HIP (NON ARTHROGRAM)  11/26/2018    FL INJECTION LEFT HIP (NON ARTHROGRAM)  3/4/2019    FL INJECTION LEFT HIP (NON ARTHROGRAM)  6/10/2019    FL INJECTION LEFT HIP (NON ARTHROGRAM)  9/24/2019    FL INJECTION LEFT HIP (NON ARTHROGRAM)  12/30/2019    FL INJECTION LEFT HIP (NON ARTHROGRAM)  7/6/2020    FL INJECTION LEFT HIP (NON ARTHROGRAM)  12/28/2020    FOOT SURGERY      HIP SURGERY      HYSTERECTOMY      SKIN LESION EXCISION  11/2010    Face - BCC - nose    TOTAL ABDOMINAL HYSTERECTOMY W/ BILATERAL SALPINGOOPHORECTOMY      TOTAL HIP ARTHROPLASTY Right       Family History:     Family History   Problem Relation Age of Onset    Stroke Mother         cerebrovascular accident (CVA)    Coronary artery disease Mother         coronary disease    Stroke Father     Coronary artery disease Father         coronary disease    Stroke Brother     Kidney cancer Family       Social History:     E-Cigarette/Vaping    E-Cigarette Use Never User      E-Cigarette/Vaping Substances    Nicotine No     THC No     CBD No     Flavoring No     Other No     Unknown No      Social History     Socioeconomic History    Marital status:       Spouse name: None    Number of children: None    Years of education: None    Highest education level: None   Occupational History    None   Social Needs    Financial resource strain: None    Food insecurity     Worry: None     Inability: None    Transportation needs     Medical: None     Non-medical: None   Tobacco Use    Smoking status: Never Smoker    Smokeless tobacco: Never Used   Substance and Sexual Activity    Alcohol use: Yes     Frequency: 2-4 times a month     Drinks per session: 1 or 2     Binge frequency: Never     Comment: occasional wine with dinner    Drug use: No    Sexual activity: None   Lifestyle    Physical activity     Days per week: None     Minutes per session: None    Stress: None   Relationships    Social connections     Talks on phone: None     Gets together: None     Attends Congregational service: None     Active member of club or organization: None     Attends meetings of clubs or organizations: None     Relationship status: None    Intimate partner violence     Fear of current or ex partner: None     Emotionally abused: None     Physically abused: None     Forced sexual activity: None   Other Topics Concern    None   Social History Narrative    Caffeine use      Medications and Allergies:     Current Outpatient Medications   Medication Sig Dispense Refill    aspirin (ADULT ASPIRIN EC LOW STRENGTH) 81 mg EC tablet Take 1 tablet by mouth daily      atorvastatin (LIPITOR) 40 mg tablet TAKE 1 TABLET BY MOUTH EVERY DAY 90 tablet 3    Biotin 1 MG CAPS Take by mouth      cholecalciferol (VITAMIN D3) 1,000 units tablet Take 1 tablet by mouth daily      clopidogrel (PLAVIX) 75 mg tablet TAKE 1 TABLET BY MOUTH EVERY DAY 90 tablet 3    latanoprost (XALATAN) 0 005 % ophthalmic solution INSTILL 1 DROP INTO BOTH EYES EVERY DAY AS DIRECTED  4    metoprolol succinate (TOPROL-XL) 50 mg 24 hr tablet TAKE 1 TABLET BY MOUTH EVERY DAY 90 tablet 1    Multiple Vitamins-Minerals (ICAPS AREDS 2) CAPS Take by mouth      nitroglycerin (NITROSTAT) 0 4 mg SL tablet Place 1 tablet (0 4 mg total) under the tongue every 5 (five) minutes as needed for chest pain 10 tablet 3    Omega-3 Fatty Acids (OMEGA-3 FISH OIL) 1200 MG CAPS Take by mouth      omeprazole (PriLOSEC) 20 mg delayed release capsule TAKE 1 CAPSULE BY MOUTH DAILY  DAYS 90 capsule 1    Polyvinyl Alcohol-Povidone PF (REFRESH) 1 4-0 6 % SOLN Apply to eye      RESTASIS 0 05 % ophthalmic emulsion instill 1 drop into both eyes twice a day  0    valsartan-hydrochlorothiazide (DIOVAN-HCT) 80-12 5 MG per tablet TAKE 1 TABLET BY MOUTH EVERY DAY 90 tablet 3     No current facility-administered medications for this visit        Allergies   Allergen Reactions    Iv Contrast [Iodinated Diagnostic Agents]     Lactose GI Intolerance    Other      Iv contrast  Adhesive tape    Ciprofloxacin      Patient does not remember      Immunizations:     Immunization History   Administered Date(s) Administered    INFLUENZA 11/18/2015, 11/21/2016, 11/30/2017    Influenza Split High Dose Preservative Free IM 11/19/2012, 10/03/2013, 09/24/2014, 11/18/2015, 11/21/2016, 11/30/2017    Influenza, high dose seasonal 0 7 mL 12/03/2018, 11/04/2019, 09/01/2020    Influenza, seasonal, injectable 10/24/2011    Pneumococcal Conjugate 13-Valent 11/18/2015    Pneumococcal Polysaccharide PPV23 01/02/2008      Health Maintenance: There are no preventive care reminders to display for this patient  There are no preventive care reminders to display for this patient  Medicare Health Risk Assessment:     /66   Pulse 72   Temp (!) 96 4 °F (35 8 °C)   Resp 18   Ht 5' 1" (1 549 m)   Wt 60 3 kg (133 lb)   BMI 25 13 kg/m²      Deanna Guido is here for her Subsequent Wellness visit  Last Medicare Wellness visit information reviewed, patient interviewed and updates made to the record today  Health Risk Assessment:   Patient rates overall health as fair  Patient feels that their physical health rating is slightly worse  Eyesight was rated as slightly worse  Hearing was rated as same  Patient feels that their emotional and mental health rating is same  Pain experienced in the last 7 days has been a lot  Patient's pain rating has been 5/10  Patient states that she has experienced no weight loss or gain in last 6 months  Depression Screening:   PHQ-2 Score: 2      Fall Risk Screening: In the past year, patient has experienced: history of falling in past year    Number of falls: 1  Injured during fall?: Yes    Feels unsteady when standing or walking?: Yes    Worried about falling?: Yes      Urinary Incontinence Screening:   Patient has leaked urine accidently in the last six months  UI no longer medication  Patient seen and evaluated by Urology     Home Safety:  Patient has trouble with stairs inside or outside of their home  Patient has working smoke alarms and has no working carbon monoxide detector  Home safety hazards include: none  Nutrition:   Current diet is Regular and Limited junk food  Medications:   Patient is currently taking over-the-counter supplements  OTC medications include: see medication list  Patient is able to manage medications       Activities of Daily Living (ADLs)/Instrumental Activities of Daily Living (IADLs):   Walk and transfer into and out of bed and chair?: Yes  Dress and groom yourself?: Yes    Bathe or shower yourself?: Yes    Feed yourself? Yes  Do your laundry/housekeeping?: Yes  Manage your money, pay your bills and track your expenses?: No  Make your own meals?: Yes    Do your own shopping?: No    Previous Hospitalizations:   Any hospitalizations or ED visits within the last 12 months?: Yes    How many hospitalizations have you had in the last year?: 1-2    Advance Care Planning:   Living will: Yes    Advanced directive: Yes      Cognitive Screening:   Provider or family/friend/caregiver concerned regarding cognition?: Yes    Cognition Comments: MCI     PREVENTIVE SCREENINGS      Cardiovascular Screening:    General: Screening Not Indicated and History Lipid Disorder      Diabetes Screening:     General: Screening Current      Colorectal Cancer Screening:     General: Screening Not Indicated      Breast Cancer Screening:     General: Risks and Benefits Discussed and Patient Declines      Cervical Cancer Screening:    General: Screening Not Indicated      Osteoporosis Screening:    General: Screening Not Indicated      Abdominal Aortic Aneurysm (AAA) Screening:        General: Screening Not Indicated      Lung Cancer Screening:     General: Screening Not Indicated      Hepatitis C Screening:    General: Screening Not Indicated    Other Counseling Topics:   Calcium and vitamin D intake and regular weightbearing exercise         Merlinda Gambles, MD

## 2021-01-04 NOTE — PATIENT INSTRUCTIONS
Medicare Preventive Visit Patient Instructions  Thank you for completing your Welcome to Medicare Visit or Medicare Annual Wellness Visit today  Your next wellness visit will be due in one year (1/4/2022)  The screening/preventive services that you may require over the next 5-10 years are detailed below  Some tests may not apply to you based off risk factors and/or age  Screening tests ordered at today's visit but not completed yet may show as past due  Also, please note that scanned in results may not display below  Preventive Screenings:  Service Recommendations Previous Testing/Comments   Colorectal Cancer Screening  * Colonoscopy    * Fecal Occult Blood Test (FOBT)/Fecal Immunochemical Test (FIT)  * Fecal DNA/Cologuard Test  * Flexible Sigmoidoscopy Age: 54-65 years old   Colonoscopy: every 10 years (may be performed more frequently if at higher risk)  OR  FOBT/FIT: every 1 year  OR  Cologuard: every 3 years  OR  Sigmoidoscopy: every 5 years  Screening may be recommended earlier than age 48 if at higher risk for colorectal cancer  Also, an individualized decision between you and your healthcare provider will decide whether screening between the ages of 74-80 would be appropriate  Colonoscopy: Not on file  FOBT/FIT: Not on file  Cologuard: Not on file  Sigmoidoscopy: Not on file    Screening Not Indicated     Breast Cancer Screening Age: 36 years old  Frequency: every 1-2 years  Not required if history of left and right mastectomy Mammogram: 05/15/2017       Cervical Cancer Screening Between the ages of 21-29, pap smear recommended once every 3 years  Between the ages of 33-67, can perform pap smear with HPV co-testing every 5 years     Recommendations may differ for women with a history of total hysterectomy, cervical cancer, or abnormal pap smears in past  Pap Smear: Not on file    Screening Not Indicated   Hepatitis C Screening Once for adults born between 1945 and 1965  More frequently in patients at high risk for Hepatitis C Hep C Antibody: Not on file       Diabetes Screening 1-2 times per year if you're at risk for diabetes or have pre-diabetes Fasting glucose: 98 mg/dL   A1C: No results in last 5 years    Screening Current   Cholesterol Screening Once every 5 years if you don't have a lipid disorder  May order more often based on risk factors  Lipid panel: 08/25/2020    Screening Not Indicated  History Lipid Disorder     Other Preventive Screenings Covered by Medicare:  1  Abdominal Aortic Aneurysm (AAA) Screening: covered once if your at risk  You're considered to be at risk if you have a family history of AAA  2  Lung Cancer Screening: covers low dose CT scan once per year if you meet all of the following conditions: (1) Age 50-69; (2) No signs or symptoms of lung cancer; (3) Current smoker or have quit smoking within the last 15 years; (4) You have a tobacco smoking history of at least 30 pack years (packs per day multiplied by number of years you smoked); (5) You get a written order from a healthcare provider  3  Glaucoma Screening: covered annually if you're considered high risk: (1) You have diabetes OR (2) Family history of glaucoma OR (3)  aged 48 and older OR (3)  American aged 72 and older  3  Osteoporosis Screening: covered every 2 years if you meet one of the following conditions: (1) You're estrogen deficient and at risk for osteoporosis based off medical history and other findings; (2) Have a vertebral abnormality; (3) On glucocorticoid therapy for more than 3 months; (4) Have primary hyperparathyroidism; (5) On osteoporosis medications and need to assess response to drug therapy  · Last bone density test (DXA Scan): Not on file  5  HIV Screening: covered annually if you're between the age of 12-76  Also covered annually if you are younger than 13 and older than 72 with risk factors for HIV infection   For pregnant patients, it is covered up to 3 times per pregnancy  Immunizations:  Immunization Recommendations   Influenza Vaccine Annual influenza vaccination during flu season is recommended for all persons aged >= 6 months who do not have contraindications   Pneumococcal Vaccine (Prevnar and Pneumovax)  * Prevnar = PCV13  * Pneumovax = PPSV23   Adults 25-60 years old: 1-3 doses may be recommended based on certain risk factors  Adults 72 years old: Prevnar (PCV13) vaccine recommended followed by Pneumovax (PPSV23) vaccine  If already received PPSV23 since turning 65, then PCV13 recommended at least one year after PPSV23 dose  Hepatitis B Vaccine 3 dose series if at intermediate or high risk (ex: diabetes, end stage renal disease, liver disease)   Tetanus (Td) Vaccine - COST NOT COVERED BY MEDICARE PART B Following completion of primary series, a booster dose should be given every 10 years to maintain immunity against tetanus  Td may also be given as tetanus wound prophylaxis  Tdap Vaccine - COST NOT COVERED BY MEDICARE PART B Recommended at least once for all adults  For pregnant patients, recommended with each pregnancy  Shingles Vaccine (Shingrix) - COST NOT COVERED BY MEDICARE PART B  2 shot series recommended in those aged 48 and above     Health Maintenance Due:  There are no preventive care reminders to display for this patient  Immunizations Due:      Topic Date Due    DTaP,Tdap,and Td Vaccines (1 - Tdap) 01/22/1947     Advance Directives   What are advance directives? Advance directives are legal documents that state your wishes and plans for medical care  These plans are made ahead of time in case you lose your ability to make decisions for yourself  Advance directives can apply to any medical decision, such as the treatments you want, and if you want to donate organs  What are the types of advance directives? There are many types of advance directives, and each state has rules about how to use them   You may choose a combination of any of the following:  · Living will: This is a written record of the treatment you want  You can also choose which treatments you do not want, which to limit, and which to stop at a certain time  This includes surgery, medicine, IV fluid, and tube feedings  · Durable power of  for healthcare [facility]): This is a written record that states who you want to make healthcare choices for you when you are unable to make them for yourself  This person, called a proxy, is usually a family member or a friend  You may choose more than 1 proxy  · Do not resuscitate (DNR) order:  A DNR order is used in case your heart stops beating or you stop breathing  It is a request not to have certain forms of treatment, such as CPR  A DNR order may be included in other types of advance directives  · Medical directive: This covers the care that you want if you are in a coma, near death, or unable to make decisions for yourself  You can list the treatments you want for each condition  Treatment may include pain medicine, surgery, blood transfusions, dialysis, IV or tube feedings, and a ventilator (breathing machine)  · Values history: This document has questions about your views, beliefs, and how you feel and think about life  This information can help others choose the care that you would choose  Why are advance directives important? An advance directive helps you control your care  Although spoken wishes may be used, it is better to have your wishes written down  Spoken wishes can be misunderstood, or not followed  Treatments may be given even if you do not want them  An advance directive may make it easier for your family to make difficult choices about your care  Fall Prevention    Fall prevention  includes ways to make your home and other areas safer  It also includes ways you can move more carefully to prevent a fall   Health conditions that cause changes in your blood pressure, vision, or muscle strength and coordination may increase your risk for falls  Medicines may also increase your risk for falls if they make you dizzy, weak, or sleepy  Fall prevention tips:   · Stand or sit up slowly  · Use assistive devices as directed  · Wear shoes that fit well and have soles that   · Wear a personal alarm  · Stay active  · Manage your medical conditions  Home Safety Tips:  · Add items to prevent falls in the bathroom  · Keep paths clear  · Install bright lights in your home  · Keep items you use often on shelves within reach  · Paint or place reflective tape on the edges of your stairs  Urinary Incontinence   Urinary incontinence (UI)  is when you lose control of your bladder  UI develops because your bladder cannot store or empty urine properly  The 3 most common types of UI are stress incontinence, urge incontinence, or both  Medicines:   · May be given to help strengthen your bladder control  Report any side effects of medication to your healthcare provider  Do pelvic muscle exercises often:  Your pelvic muscles help you stop urinating  Squeeze these muscles tight for 5 seconds, then relax for 5 seconds  Gradually work up to squeezing for 10 seconds  Do 3 sets of 15 repetitions a day, or as directed  This will help strengthen your pelvic muscles and improve bladder control  Train your bladder:  Go to the bathroom at set times, such as every 2 hours, even if you do not feel the urge to go  You can also try to hold your urine when you feel the urge to go  For example, hold your urine for 5 minutes when you feel the urge to go  As that becomes easier, hold your urine for 10 minutes  Self-care:   · Keep a UI record  Write down how often you leak urine and how much you leak  Make a note of what you were doing when you leaked urine  · Drink liquids as directed  You may need to limit the amount of liquid you drink to help control your urine leakage  Do not drink any liquid right before you go to bed  Limit or do not have drinks that contain caffeine or alcohol  · Prevent constipation  Eat a variety of high-fiber foods  Good examples are high-fiber cereals, beans, vegetables, and whole-grain breads  Walking is the best way to trigger your intestines to have a bowel movement  · Exercise regularly and maintain a healthy weight  Weight loss and exercise will decrease pressure on your bladder and help you control your leakage  · Use a catheter as directed  to help empty your bladder  A catheter is a tiny, plastic tube that is put into your bladder to drain your urine  · Go to behavior therapy as directed  Behavior therapy may be used to help you learn to control your urge to urinate  Weight Management   Why it is important to manage your weight:  Being overweight increases your risk of health conditions such as heart disease, high blood pressure, type 2 diabetes, and certain types of cancer  It can also increase your risk for osteoarthritis, sleep apnea, and other respiratory problems  Aim for a slow, steady weight loss  Even a small amount of weight loss can lower your risk of health problems  How to lose weight safely:  A safe and healthy way to lose weight is to eat fewer calories and get regular exercise  You can lose up about 1 pound a week by decreasing the number of calories you eat by 500 calories each day  Healthy meal plan for weight management:  A healthy meal plan includes a variety of foods, contains fewer calories, and helps you stay healthy  A healthy meal plan includes the following:  · Eat whole-grain foods more often  A healthy meal plan should contain fiber  Fiber is the part of grains, fruits, and vegetables that is not broken down by your body  Whole-grain foods are healthy and provide extra fiber in your diet  Some examples of whole-grain foods are whole-wheat breads and pastas, oatmeal, brown rice, and bulgur  · Eat a variety of vegetables every day    Include dark, leafy greens such as spinach, kale, kieran greens, and mustard greens  Eat yellow and orange vegetables such as carrots, sweet potatoes, and winter squash  · Eat a variety of fruits every day  Choose fresh or canned fruit (canned in its own juice or light syrup) instead of juice  Fruit juice has very little or no fiber  · Eat low-fat dairy foods  Drink fat-free (skim) milk or 1% milk  Eat fat-free yogurt and low-fat cottage cheese  Try low-fat cheeses such as mozzarella and other reduced-fat cheeses  · Choose meat and other protein foods that are low in fat  Choose beans or other legumes such as split peas or lentils  Choose fish, skinless poultry (chicken or turkey), or lean cuts of red meat (beef or pork)  Before you cook meat or poultry, cut off any visible fat  · Use less fat and oil  Try baking foods instead of frying them  Add less fat, such as margarine, sour cream, regular salad dressing and mayonnaise to foods  Eat fewer high-fat foods  Some examples of high-fat foods include french fries, doughnuts, ice cream, and cakes  · Eat fewer sweets  Limit foods and drinks that are high in sugar  This includes candy, cookies, regular soda, and sweetened drinks  Exercise:  Exercise at least 30 minutes per day on most days of the week  Some examples of exercise include walking, biking, dancing, and swimming  You can also fit in more physical activity by taking the stairs instead of the elevator or parking farther away from stores  Ask your healthcare provider about the best exercise plan for you  © Copyright [employer] 2018 Information is for End User's use only and may not be sold, redistributed or otherwise used for commercial purposes   All illustrations and images included in CareNotes® are the copyrighted property of A [employer] A M [employer]  or [address] [place] [address] [employer]

## 2021-01-20 DIAGNOSIS — Z23 ENCOUNTER FOR IMMUNIZATION: ICD-10-CM

## 2021-01-31 ENCOUNTER — IMMUNIZATIONS (OUTPATIENT)
Dept: FAMILY MEDICINE CLINIC | Facility: HOSPITAL | Age: 86
End: 2021-01-31

## 2021-01-31 DIAGNOSIS — Z23 ENCOUNTER FOR IMMUNIZATION: Primary | ICD-10-CM

## 2021-01-31 PROCEDURE — 91300 SARS-COV-2 / COVID-19 MRNA VACCINE (PFIZER-BIONTECH) 30 MCG: CPT

## 2021-01-31 PROCEDURE — 0001A SARS-COV-2 / COVID-19 MRNA VACCINE (PFIZER-BIONTECH) 30 MCG: CPT

## 2021-02-03 DIAGNOSIS — K21.9 GASTROESOPHAGEAL REFLUX DISEASE WITHOUT ESOPHAGITIS: ICD-10-CM

## 2021-02-03 RX ORDER — OMEPRAZOLE 20 MG/1
CAPSULE, DELAYED RELEASE ORAL
Qty: 90 CAPSULE | Refills: 1 | Status: SHIPPED | OUTPATIENT
Start: 2021-02-03 | End: 2021-08-06

## 2021-02-20 ENCOUNTER — IMMUNIZATIONS (OUTPATIENT)
Dept: FAMILY MEDICINE CLINIC | Facility: HOSPITAL | Age: 86
End: 2021-02-20

## 2021-02-20 DIAGNOSIS — Z23 ENCOUNTER FOR IMMUNIZATION: Primary | ICD-10-CM

## 2021-02-20 PROCEDURE — 0002A SARS-COV-2 / COVID-19 MRNA VACCINE (PFIZER-BIONTECH) 30 MCG: CPT

## 2021-02-20 PROCEDURE — 91300 SARS-COV-2 / COVID-19 MRNA VACCINE (PFIZER-BIONTECH) 30 MCG: CPT

## 2021-03-16 ENCOUNTER — OFFICE VISIT (OUTPATIENT)
Dept: OBGYN CLINIC | Facility: HOSPITAL | Age: 86
End: 2021-03-16
Payer: MEDICARE

## 2021-03-16 VITALS
DIASTOLIC BLOOD PRESSURE: 69 MMHG | BODY MASS INDEX: 24.73 KG/M2 | WEIGHT: 131 LBS | HEART RATE: 62 BPM | HEIGHT: 61 IN | SYSTOLIC BLOOD PRESSURE: 164 MMHG

## 2021-03-16 DIAGNOSIS — M70.62 GREATER TROCHANTERIC BURSITIS OF LEFT HIP: ICD-10-CM

## 2021-03-16 DIAGNOSIS — M16.12 PRIMARY OSTEOARTHRITIS OF LEFT HIP: Primary | ICD-10-CM

## 2021-03-16 PROCEDURE — 99213 OFFICE O/P EST LOW 20 MIN: CPT | Performed by: ORTHOPAEDIC SURGERY

## 2021-03-16 PROCEDURE — 20610 DRAIN/INJ JOINT/BURSA W/O US: CPT | Performed by: ORTHOPAEDIC SURGERY

## 2021-03-16 RX ORDER — LIDOCAINE HYDROCHLORIDE 10 MG/ML
2 INJECTION, SOLUTION INFILTRATION; PERINEURAL
Status: COMPLETED | OUTPATIENT
Start: 2021-03-16 | End: 2021-03-16

## 2021-03-16 RX ORDER — BETAMETHASONE SODIUM PHOSPHATE AND BETAMETHASONE ACETATE 3; 3 MG/ML; MG/ML
12 INJECTION, SUSPENSION INTRA-ARTICULAR; INTRALESIONAL; INTRAMUSCULAR; SOFT TISSUE
Status: COMPLETED | OUTPATIENT
Start: 2021-03-16 | End: 2021-03-16

## 2021-03-16 RX ORDER — BUPIVACAINE HYDROCHLORIDE 2.5 MG/ML
2 INJECTION, SOLUTION INFILTRATION; PERINEURAL
Status: COMPLETED | OUTPATIENT
Start: 2021-03-16 | End: 2021-03-16

## 2021-03-16 RX ADMIN — BETAMETHASONE SODIUM PHOSPHATE AND BETAMETHASONE ACETATE 12 MG: 3; 3 INJECTION, SUSPENSION INTRA-ARTICULAR; INTRALESIONAL; INTRAMUSCULAR; SOFT TISSUE at 11:10

## 2021-03-16 RX ADMIN — LIDOCAINE HYDROCHLORIDE 2 ML: 10 INJECTION, SOLUTION INFILTRATION; PERINEURAL at 11:10

## 2021-03-16 RX ADMIN — BUPIVACAINE HYDROCHLORIDE 2 ML: 2.5 INJECTION, SOLUTION INFILTRATION; PERINEURAL at 11:10

## 2021-03-16 NOTE — PROGRESS NOTES
Assessment:  1  Primary osteoarthritis of left hip  FL injection left hip (non arthrogram)   2  Greater trochanteric bursitis of left hip         Plan:  The patient was provided with left trochanteric bursa steroid injection  The patient tolerated the procedure well  She should schedule left IA hip injection under imaging  The patient should follow up in 3 months  To do next visit:  Return in about 3 months (around 6/16/2021)  The above stated was discussed in layman's terms and the patient expressed understanding  All questions were answered to the patient's satisfaction  Scribe Attestation    I,:  Karyle Ma am acting as a scribe while in the presence of the attending physician :       I,:  Terry Felder MD personally performed the services described in this documentation    as scribed in my presence :             Subjective:   Mandy Hernandez is a 80 y o  female who presents for follow up of left hip  She is s/p left trochanteric bursa and left IA hip steroid injections with benefit from each, 12/2020  Today she complains of left lateral hip and left groin pain  Rolling in bed and prolonged walking aggravates while rest alleviates          Review of systems negative unless otherwise specified in HPI    Past Medical History:   Diagnosis Date    Anemia     last assessed - [id number]    Cancer [facility])     Depression     last assessed - [id number]    Glaucoma     Hypercalcemia     last assessed - [id number]    Hypertension     Hypothyroidism 11/30/2017    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    Macular degeneration     Nondisplaced fracture of base of fifth metacarpal bone, right hand, initial encounter for closed fracture 12/7/2018    Osteoarthritis     Plantar fasciitis of right foot     last assessed - [id number]    Stomach disorder     Superficial thrombophlebitis of leg     unspecified laterality; last assessed - [id number]    Traumatic closed nondisplaced fracture of lumbar vertebra, initial encounter ([facility] [address] ) 12/5/2019    Trochanteric bursitis of left hip     last assessed - [id number]       Past Surgical History:   Procedure Laterality Date    CORONARY STENT PLACEMENT      stent RCA    FL INJECTION LEFT HIP (NON ARTHROGRAM)  11/26/2018    FL INJECTION LEFT HIP (NON ARTHROGRAM)  3/4/2019    FL INJECTION LEFT HIP (NON ARTHROGRAM)  6/10/2019    FL INJECTION LEFT HIP (NON ARTHROGRAM)  9/24/2019    FL INJECTION LEFT HIP (NON ARTHROGRAM)  12/30/2019    FL INJECTION LEFT HIP (NON ARTHROGRAM)  7/6/2020    FL INJECTION LEFT HIP (NON ARTHROGRAM)  12/28/2020    FOOT SURGERY      HIP SURGERY      HYSTERECTOMY      SKIN LESION EXCISION  11/2010    Face - BCC - nose    TOTAL ABDOMINAL HYSTERECTOMY W/ BILATERAL SALPINGOOPHORECTOMY      TOTAL HIP ARTHROPLASTY Right        Family History   Problem Relation Age of Onset    Stroke Mother         cerebrovascular accident (CVA)    Coronary artery disease Mother         coronary disease    Stroke Father     Coronary artery disease Father         coronary disease    Stroke Brother     Kidney cancer Family        Social History     Occupational History    Not on file   Tobacco Use    Smoking status: Never Smoker    Smokeless tobacco: Never Used   Substance and Sexual Activity    Alcohol use: Yes     Frequency: 2-4 times a month     Drinks per session: 1 or 2     Binge frequency: Never     Comment: occasional wine with dinner    Drug use: No    Sexual activity: Not on file         Current Outpatient Medications:     aspirin (ADULT ASPIRIN EC LOW STRENGTH) 81 mg EC tablet, Take 1 tablet by mouth daily, Disp: , Rfl:     atorvastatin (LIPITOR) 40 mg tablet, TAKE 1 TABLET BY MOUTH EVERY DAY, Disp: 90 tablet, Rfl: 3    Biotin 1 MG CAPS, Take by mouth, Disp: , Rfl:     cholecalciferol (VITAMIN D3) 1,000 units tablet, Take 1 tablet by mouth daily, Disp: , Rfl:     clopidogrel (PLAVIX) 75 mg tablet, TAKE 1 TABLET BY MOUTH EVERY DAY, Disp: 90 tablet, Rfl: 3    latanoprost (XALATAN) 0 005 % ophthalmic solution, INSTILL 1 DROP INTO BOTH EYES EVERY DAY AS DIRECTED, Disp: , Rfl: 4    metoprolol succinate (TOPROL-XL) 50 mg 24 hr tablet, TAKE 1 TABLET BY MOUTH EVERY DAY, Disp: 90 tablet, Rfl: 1    Multiple Vitamins-Minerals (ICAPS AREDS 2) CAPS, Take by mouth, Disp: , Rfl:     nitroglycerin (NITROSTAT) 0 4 mg SL tablet, Place 1 tablet (0 4 mg total) under the tongue every 5 (five) minutes as needed for chest pain, Disp: 10 tablet, Rfl: 3    Omega-3 Fatty Acids (OMEGA-3 FISH OIL) 1200 MG CAPS, Take by mouth, Disp: , Rfl:     omeprazole (PriLOSEC) 20 mg delayed release capsule, TAKE 1 CAPSULE BY MOUTH DAILY  DAYS, Disp: 90 capsule, Rfl: 1    Polyvinyl Alcohol-Povidone PF (REFRESH) 1 4-0 6 % SOLN, Apply to eye, Disp: , Rfl:     RESTASIS 0 05 % ophthalmic emulsion, instill 1 drop into both eyes twice a day, Disp: , Rfl: 0    valsartan-hydrochlorothiazide (DIOVAN-HCT) 80-12 5 MG per tablet, TAKE 1 TABLET BY MOUTH EVERY DAY, Disp: 90 tablet, Rfl: 3    Allergies   Allergen Reactions    Iv Contrast [Iodinated Diagnostic Agents]     Lactose GI Intolerance    Other      Iv contrast  Adhesive tape    Ciprofloxacin      Patient does not remember            Vitals:    03/16/21 1037   BP: (!) 182/72   Pulse: 66       Objective:  Physical exam  · General: Awake, Alert, Oriented  · Eyes: Pupils equal, round and reactive to light  · Heart: regular rate and rhythm  · Lungs: No audible wheezing  · Abdomen: soft                    Ortho Exam  Left hip:  TTP over greater trochanter  Apprehension with ROM  Groin pain with IR  ER with flexion  Calf compartments soft and supple  Sensation intact  Toes are warm sensate and mobile      Diagnostics, reviewed and taken today if performed as documented:    None performed     Procedures, if performed today:    Large joint arthrocentesis: L greater trochanteric bursa  Universal Protocol:  Consent: Verbal consent obtained  Risks and benefits: risks, benefits and alternatives were discussed  Consent given by: patient  Time out: Immediately prior to procedure a "time out" was called to verify the correct patient, procedure, equipment, support staff and site/side marked as required  Timeout called at: 3/16/2021 11:08 AM   Patient understanding: patient states understanding of the procedure being performed  Site marked: the operative site was marked  Patient identity confirmed: verbally with patient    Supporting Documentation  Indications: pain   Procedure Details  Location: hip - L greater trochanteric bursa  Needle size: 22 G  Ultrasound guidance: no  Approach: lateral  Medications administered: 12 mg betamethasone acetate-betamethasone sodium phosphate 6 (3-3) mg/mL; 2 mL bupivacaine 0 25 %; 2 mL lidocaine 1 %    Patient tolerance: patient tolerated the procedure well with no immediate complications  Dressing:  Sterile dressing applied               Portions of the record may have been created with voice recognition software  Occasional wrong word or "sound a like" substitutions may have occurred due to the inherent limitations of voice recognition software  Read the chart carefully and recognize, using context, where substitutions have occurred

## 2021-03-26 DIAGNOSIS — I10 ESSENTIAL HYPERTENSION: ICD-10-CM

## 2021-03-26 DIAGNOSIS — I25.10 CORONARY ARTERY DISEASE INVOLVING NATIVE CORONARY ARTERY OF NATIVE HEART WITHOUT ANGINA PECTORIS: ICD-10-CM

## 2021-03-26 RX ORDER — METOPROLOL SUCCINATE 50 MG/1
TABLET, EXTENDED RELEASE ORAL
Qty: 90 TABLET | Refills: 1 | Status: SHIPPED | OUTPATIENT
Start: 2021-03-26 | End: 2021-10-19

## 2021-04-05 NOTE — NURSING NOTE
Call placed to patient to discuss upcoming left hip injection at [address] [place] [address] Radiology  Allergies reviewed  Patient states that she has an allergy to IV contrast in which she previously developed hives  Verified with patient current anticoagulation medication of plavix 75 mg daily and ASA 81 mg daily, but not required to stop per Periprocedural Management of Coagulation Status and Hemostasis Risk in Percutaneous Image Guided Procedures  Pre procedure instructions including diet, taking own medications and need for  discussed with patient  Patient verbalizes understanding  Per patient has had this procedure before and does not have any further questions at this time  Patient reminded of the location, date and time of the expected procedure  Contact number provided in case patient has any further questions

## 2021-04-12 ENCOUNTER — HOSPITAL ENCOUNTER (OUTPATIENT)
Dept: RADIOLOGY | Facility: HOSPITAL | Age: 86
Discharge: HOME | End: 2021-04-12
Attending: ORTHOPAEDIC SURGERY | Admitting: ORTHOPAEDIC SURGERY
Payer: MEDICARE

## 2021-04-12 DIAGNOSIS — M16.12 PRIMARY OSTEOARTHRITIS OF LEFT HIP: ICD-10-CM

## 2021-04-12 PROCEDURE — 77002 NEEDLE LOCALIZATION BY XRAY: CPT

## 2021-04-12 PROCEDURE — 20610 DRAIN/INJ JOINT/BURSA W/O US: CPT

## 2021-04-12 RX ORDER — METHYLPREDNISOLONE ACETATE 80 MG/ML
80 INJECTION, SUSPENSION INTRA-ARTICULAR; INTRALESIONAL; INTRAMUSCULAR; SOFT TISSUE
Status: COMPLETED | OUTPATIENT
Start: 2021-04-12 | End: 2021-04-12

## 2021-04-12 RX ORDER — BUPIVACAINE HYDROCHLORIDE 2.5 MG/ML
10 INJECTION, SOLUTION EPIDURAL; INFILTRATION; INTRACAUDAL
Status: COMPLETED | OUTPATIENT
Start: 2021-04-12 | End: 2021-04-12

## 2021-04-12 RX ORDER — LIDOCAINE HYDROCHLORIDE 10 MG/ML
10 INJECTION, SOLUTION EPIDURAL; INFILTRATION; INTRACAUDAL; PERINEURAL
Status: COMPLETED | OUTPATIENT
Start: 2021-04-12 | End: 2021-04-12

## 2021-04-12 RX ADMIN — METHYLPREDNISOLONE ACETATE 1 MG: 80 INJECTION, SUSPENSION INTRA-ARTICULAR; INTRALESIONAL; INTRAMUSCULAR; SOFT TISSUE at 11:15

## 2021-04-12 RX ADMIN — LIDOCAINE HYDROCHLORIDE 7 ML: 10 INJECTION, SOLUTION EPIDURAL; INFILTRATION; INTRACAUDAL at 11:15

## 2021-04-12 RX ADMIN — BUPIVACAINE HYDROCHLORIDE 2 ML: 2.5 INJECTION, SOLUTION EPIDURAL; INFILTRATION; INTRACAUDAL; PERINEURAL at 11:15

## 2021-04-12 RX ADMIN — IOHEXOL 2 ML: 300 INJECTION, SOLUTION INTRAVENOUS at 11:15

## 2021-04-18 DIAGNOSIS — E78.00 HYPERCHOLESTEREMIA: ICD-10-CM

## 2021-04-18 DIAGNOSIS — I10 HYPERTENSION, UNSPECIFIED TYPE: ICD-10-CM

## 2021-04-18 RX ORDER — VALSARTAN AND HYDROCHLOROTHIAZIDE 80; 12.5 MG/1; MG/1
TABLET, FILM COATED ORAL
Qty: 90 TABLET | Refills: 3 | Status: SHIPPED | OUTPATIENT
Start: 2021-04-18 | End: 2022-04-08 | Stop reason: SDUPTHER

## 2021-04-18 RX ORDER — ATORVASTATIN CALCIUM 40 MG/1
TABLET, FILM COATED ORAL
Qty: 90 TABLET | Refills: 3 | Status: SHIPPED | OUTPATIENT
Start: 2021-04-18 | End: 2022-04-08 | Stop reason: SDUPTHER

## 2021-04-30 NOTE — PROGRESS NOTES
Cardiology Follow Up    Eloina Jaramillo  1/22/1926  [id number]  [id number]  [phone]  [phone]    1  Essential (primary) hypertension     2  Pure hypercholesterolemia     3  PVC (premature ventricular contraction)     4  Coronary arteriosclerosis         Interval History: Patient here for follow-up visit  She has a history of hypertension and PVCs  She has prior history of CAD with RCA stent placed in 2009 at [facility]  Echocardiogram done September 2017 demonstrated preserved LV systolic function  Grade 1 diastolic dysfunction was suggested  There was no significant valvular heart disease  Lipid profile done August 2020 demonstrated total cholesterol of 165 with an HDL of 40 and a calculated LDL of 97  In general she has been well although her mobility is significantly limited now and she uses a walker  She is here today with her daughter who used to work with Dr Aniya Johnson  Patient has been well  She has had no chest pain or significant dyspnea  Her vital signs are stable  Patient brought in a list of her blood pressures today which seem well controlled  She occasionally gets a warm sensation over her breast   It does not sound like angina  I will check an echocardiogram to compared to a prior study      Patient Active Problem List   Diagnosis    Chronic right shoulder pain    Greater trochanteric bursitis of left hip    Acquired valgus deformity of knee, left    Angiomyolipoma of kidney    Atherosclerotic heart disease of native coronary artery without angina pectoris    Chronic kidney failure, stage 3 (moderate) (HCC)    Gastroesophageal reflux disease    Generalized osteoarthritis of multiple sites    Mixed hyperlipidemia    Primary osteoarthritis of left hip    PVCs (premature ventricular contractions)    Tear of right rotator cuff    Venous insufficiency (chronic) (peripheral)  History of coronary artery stent placement    [facility] ([email or web address] )    Essential hypertension    Lymphedema of right lower extremity    Stage 3b chronic kidney disease ([email or web address] )     Past Medical History:   Diagnosis Date    Anemia     last assessed - [id number]    Cancer [facility])     Depression     last assessed - [id number]    Glaucoma     Hypercalcemia     last assessed - [id number]    Hypertension     Hypothyroidism 11/30/2017    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    Macular degeneration     Nondisplaced fracture of base of fifth metacarpal bone, right hand, initial encounter for closed fracture 12/7/2018    Osteoarthritis     Plantar fasciitis of right foot     last assessed - [id number]    Stomach disorder     Superficial thrombophlebitis of leg     unspecified laterality; last assessed - [id number]    Traumatic closed nondisplaced fracture of lumbar vertebra, initial encounter ([place] [address] ) 12/5/2019    Trochanteric bursitis of left hip     last assessed - [id number]     Social History     Socioeconomic History    Marital status:       Spouse name: Not on file    Number of children: Not on file    Years of education: Not on file    Highest education level: Not on file   Occupational History    Not on file   Social Needs    Financial resource strain: Not on file    Food insecurity     Worry: Not on file     Inability: Not on file    Transportation needs     Medical: Not on file     Non-medical: Not on file   Tobacco Use    Smoking status: Never Smoker    Smokeless tobacco: Never Used   Substance and Sexual Activity    Alcohol use: Yes     Frequency: 2-4 times a month     Drinks per session: 1 or 2     Binge frequency: Never     Comment: occasional wine with dinner    Drug use: No    Sexual activity: Not on file   Lifestyle    Physical activity     Days per week: Not on file     Minutes per session: Not on file    Stress: Not on file   Relationships    Social connections Talks on phone: Not on file     Gets together: Not on file     Attends Spiritism service: Not on file     Active member of club or organization: Not on file     Attends meetings of clubs or organizations: Not on file     Relationship status: Not on file    Intimate partner violence     Fear of current or ex partner: Not on file     Emotionally abused: Not on file     Physically abused: Not on file     Forced sexual activity: Not on file   Other Topics Concern    Not on file   Social History Narrative    Caffeine use      Family History   Problem Relation Age of Onset    Stroke Mother         cerebrovascular accident (CVA)    Coronary artery disease Mother         coronary disease    Stroke Father     Coronary artery disease Father         coronary disease    Stroke Brother     Kidney cancer Family      Past Surgical History:   Procedure Laterality Date    CORONARY STENT PLACEMENT      stent RCA    FL INJECTION LEFT HIP (NON ARTHROGRAM)  11/26/2018    FL INJECTION LEFT HIP (NON ARTHROGRAM)  3/4/2019    FL INJECTION LEFT HIP (NON ARTHROGRAM)  6/10/2019    FL INJECTION LEFT HIP (NON ARTHROGRAM)  9/24/2019    FL INJECTION LEFT HIP (NON ARTHROGRAM)  12/30/2019    FL INJECTION LEFT HIP (NON ARTHROGRAM)  7/6/2020    FL INJECTION LEFT HIP (NON ARTHROGRAM)  12/28/2020    FL INJECTION LEFT HIP (NON ARTHROGRAM)  4/12/2021    FOOT SURGERY      HIP SURGERY      HYSTERECTOMY      SKIN LESION EXCISION  11/2010    Face - BCC - nose    TOTAL ABDOMINAL HYSTERECTOMY W/ BILATERAL SALPINGOOPHORECTOMY      TOTAL HIP ARTHROPLASTY Right        Current Outpatient Medications:     aspirin (ADULT ASPIRIN EC LOW STRENGTH) 81 mg EC tablet, Take 1 tablet by mouth daily, Disp: , Rfl:     atorvastatin (LIPITOR) 40 mg tablet, TAKE 1 TABLET BY MOUTH EVERY DAY, Disp: 90 tablet, Rfl: 3    Biotin 1 MG CAPS, Take by mouth, Disp: , Rfl:     cholecalciferol (VITAMIN D3) 1,000 units tablet, Take 1 tablet by mouth daily, Disp: , Rfl:     clopidogrel (PLAVIX) 75 mg tablet, TAKE 1 TABLET BY MOUTH EVERY DAY, Disp: 90 tablet, Rfl: 3    latanoprost (XALATAN) 0 005 % ophthalmic solution, INSTILL 1 DROP INTO BOTH EYES EVERY DAY AS DIRECTED, Disp: , Rfl: 4    metoprolol succinate (TOPROL-XL) 50 mg 24 hr tablet, TAKE 1 TABLET BY MOUTH EVERY DAY, Disp: 90 tablet, Rfl: 1    Multiple Vitamins-Minerals (ICAPS AREDS 2) CAPS, Take by mouth, Disp: , Rfl:     nitroglycerin (NITROSTAT) 0 4 mg SL tablet, Place 1 tablet (0 4 mg total) under the tongue every 5 (five) minutes as needed for chest pain, Disp: 10 tablet, Rfl: 3    Omega-3 Fatty Acids (OMEGA-3 FISH OIL) 1200 MG CAPS, Take by mouth, Disp: , Rfl:     omeprazole (PriLOSEC) 20 mg delayed release capsule, TAKE 1 CAPSULE BY MOUTH DAILY  DAYS, Disp: 90 capsule, Rfl: 1    Polyvinyl Alcohol-Povidone PF (REFRESH) 1 4-0 6 % SOLN, Apply to eye, Disp: , Rfl:     RESTASIS 0 05 % ophthalmic emulsion, instill 1 drop into both eyes twice a day, Disp: , Rfl: 0    valsartan-hydrochlorothiazide (DIOVAN-HCT) 80-12 5 MG per tablet, TAKE 1 TABLET BY MOUTH EVERY DAY, Disp: 90 tablet, Rfl: 3  Allergies   Allergen Reactions    Iv Contrast [Iodinated Diagnostic Agents]     Lactose - Food Allergy GI Intolerance    Other      Iv contrast  Adhesive tape    Ciprofloxacin      Patient does not remember       Labs:not applicable  Imaging: Fl Injection Left Hip (non Arthrogram)    Result Date: 4/12/2021  Narrative: LEFT HIP INJECTION INDICATION:  Chronic left hip pain  Patient presents with prescription for left hip steroid injection  COMPARISON:  Left hip steroid injection 12/28/2020, 7/6/2020, 12/30/2019  IMAGES:  3 FLUOROSCOPY TIME:  0 1 MFT FINDINGS: After the risks and benefits of the procedure were thoroughly explained, informed consent was obtained  The patient was prepped and draped in the usual sterile fashion  1% lidocaine solution was utilized for local anesthesia    Intermittent fluoroscopy was utilized for placement a 20 gauge  3 5 inch spinal needle within the left hip joint  After positioning was confirmed with 3 mL of Omnipaque 300,(patient has previously tolerated Omni 300 without reaction ) a solution comprised of 1 mL 80 mg/mL Depomedrol, 2 mL of 0 25% Sensorcaine and 2 mL 1% Xylocaine  was injected into the left hip joint  The patient tolerated the procedure well  There were no complications  Impression: Technically successful left hip steroid injection  Procedure was performed by MARVA Huff PA-C under the direct supervision of  Dr Timbo Contreras  Workstation performed: [id number]       Review of Systems:  Review of Systems   All other systems reviewed and are negative  Physical Exam:  /52 (BP Location: Left arm, Patient Position: Sitting, Cuff Size: Standard)   Pulse 70   Ht 5' 1" (1 549 m)   Wt 58 kg (127 lb 14 4 oz)   BMI 24 17 kg/m²   Physical Exam  Vitals signs reviewed  Constitutional:       Appearance: She is well-developed  HENT:      Head: Normocephalic and atraumatic  Eyes:      Conjunctiva/sclera: Conjunctivae normal       Pupils: Pupils are equal, round, and reactive to light  Neck:      Musculoskeletal: Normal range of motion and neck supple  Cardiovascular:      Rate and Rhythm: Normal rate  Heart sounds: Murmur present  Pulmonary:      Effort: Pulmonary effort is normal       Breath sounds: Normal breath sounds  Skin:     General: Skin is warm and dry  Neurological:      Mental Status: She is alert and oriented to person, place, and time  Discussion/Summary:I will continue the patient's current medical regimen  The patient appears well compensated  I have asked the patient to call if there is a problem in the interim otherwise I will see the patient in six months time  The patient is due for an echo prior to the next visit to assess LV wall thickness and systolic function

## 2021-05-10 ENCOUNTER — OFFICE VISIT (OUTPATIENT)
Dept: CARDIOLOGY CLINIC | Facility: CLINIC | Age: 86
End: 2021-05-10
Payer: MEDICARE

## 2021-05-10 ENCOUNTER — OFFICE VISIT (OUTPATIENT)
Dept: FAMILY MEDICINE CLINIC | Facility: CLINIC | Age: 86
End: 2021-05-10
Payer: MEDICARE

## 2021-05-10 VITALS
HEART RATE: 72 BPM | DIASTOLIC BLOOD PRESSURE: 74 MMHG | RESPIRATION RATE: 16 BRPM | HEIGHT: 61 IN | TEMPERATURE: 96.6 F | SYSTOLIC BLOOD PRESSURE: 132 MMHG | WEIGHT: 128 LBS | BODY MASS INDEX: 24.17 KG/M2

## 2021-05-10 VITALS
DIASTOLIC BLOOD PRESSURE: 52 MMHG | BODY MASS INDEX: 24.15 KG/M2 | HEART RATE: 70 BPM | SYSTOLIC BLOOD PRESSURE: 140 MMHG | HEIGHT: 61 IN | WEIGHT: 127.9 LBS

## 2021-05-10 DIAGNOSIS — E78.2 MIXED HYPERLIPIDEMIA: ICD-10-CM

## 2021-05-10 DIAGNOSIS — E78.00 PURE HYPERCHOLESTEROLEMIA: ICD-10-CM

## 2021-05-10 DIAGNOSIS — I87.2 VENOUS INSUFFICIENCY (CHRONIC) (PERIPHERAL): ICD-10-CM

## 2021-05-10 DIAGNOSIS — N18.32 STAGE 3B CHRONIC KIDNEY DISEASE (HCC): ICD-10-CM

## 2021-05-10 DIAGNOSIS — I89.0 LYMPHEDEMA OF RIGHT LOWER EXTREMITY: ICD-10-CM

## 2021-05-10 DIAGNOSIS — I25.10 CORONARY ARTERIOSCLEROSIS: ICD-10-CM

## 2021-05-10 DIAGNOSIS — I10 ESSENTIAL HYPERTENSION: Primary | ICD-10-CM

## 2021-05-10 DIAGNOSIS — K21.9 GASTROESOPHAGEAL REFLUX DISEASE WITHOUT ESOPHAGITIS: ICD-10-CM

## 2021-05-10 DIAGNOSIS — I10 ESSENTIAL (PRIMARY) HYPERTENSION: Primary | ICD-10-CM

## 2021-05-10 DIAGNOSIS — I49.3 PVC (PREMATURE VENTRICULAR CONTRACTION): ICD-10-CM

## 2021-05-10 PROCEDURE — 99214 OFFICE O/P EST MOD 30 MIN: CPT | Performed by: FAMILY MEDICINE

## 2021-05-10 PROCEDURE — 99214 OFFICE O/P EST MOD 30 MIN: CPT | Performed by: INTERNAL MEDICINE

## 2021-05-10 NOTE — PROGRESS NOTES
Assessment/Plan:     Diagnoses and all orders for this visit:    Essential hypertension    Stage 3b chronic kidney disease ([address] )    Mixed hyperlipidemia    Gastroesophageal reflux disease without esophagitis    Venous insufficiency (chronic) (peripheral)    Lymphedema of right lower extremity          Continue with current medications  She is due for lab work  She is scheduled to see Cardiology today  Follow-up with Podiatry regarding right toe pain  OV 4 months  Patient ID: Spring Best is a 80 y o  female  Cc follow up visit for chronic medical problems       Followup visit  Here with her daughter Artur Cheney  Medications reviewed  No recent labs  Hypertension blood pressures have been stable on Valsartan HCTZ 80/12 5 daily and Metoprolol ER 50 mg daily  08/2020 creatinine 1 20  GFR 39  Electrolytes normal except for chloride 110  Hgb 11 6 05/2020 creatinine 1 19  GFR 39  Electrolytes normal   Hgb 11 6 05/2020 SPEP no monoclonal bands  Hyperlipidemia and CAD on Atorvastatin 40 mg daily  Lipid profile 08/2020 cholesterol 165  TGs 139   HDL 40  LDL 97  LFTs normal except for alkaline phosphatase  Patient no longer driving  Patient lives alone in a [facility] apartment  Independent with ADLs and most of her IADLs  Assist with grocery shopping  Daughter pays her bills  She eats TV dinners or pre cook meals      The following portions of the patient's history were reviewed and updated as appropriate: allergies, current medications, past family history, past medical history, past social history, past surgical history and problem list     Review of Systems   Constitutional: Negative for appetite change, chills, fatigue, fever and unexpected weight change  HENT: Positive for hearing loss  Negative for congestion, ear pain, rhinorrhea, sore throat and trouble swallowing  Eyes: Negative for visual disturbance  ARMD and glaucoma     Respiratory: Negative for cough, shortness of breath and wheezing  No orthopnea or PND   Cardiovascular: Negative for chest pain, palpitations and leg swelling  CAD followed by Cardiology  12/2019 CT scan chest mild left basilar atelectasis versus infiltrate  Normal heart size  Coronary artery calcifications  Normal caliber thoracic aorta with mild atherosclerotic calcifications  9 mm lobulated density in the posterior right upper lobe 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  No tracheal or endobronchial lesions  12/2019 EKG normal sinus rhythm  No acute changes  First-degree AV block  Left posterior fascicular block  Cardiology evaluation 5/2015 for exertional dyspnea and palpitations  Holter monitor showed normal sinus rhythm  139 single PVCs  No sustained ventricular rhythm  rare PACs, consisting of 183 single PACs  3 were noted in bigeminy  10 noted in couplets  no sustained supraventricular rhythm  No significant pauses or high grade AV block  Echocardiogram showed normal left ventricular function, ejection fraction 17% grade 1 diastolic dysfunction  No significant valvular abnormalities  Chronic swelling right lower leg  s/p resection of leiomyosarcoma 2002  Repeat echocardiogram 09/2017 normal left ventricular systolic function  EF 60%  No regional wall motion abnormalities  Right ventricle normal  Trace MR  No pericardial effusion  Gastrointestinal: Negative for abdominal pain, blood in stool, constipation, diarrhea, nausea and vomiting  GERD stable on Omeprazole  no reflux  no dysphagia  Endocrine:        Hypothyroidism no longer on medication  08/2020 TSH 3 400   3/2017 TSH 5 310  Positive thyroid microsomal antibody  Patient was started on Levothyroxine 25 mcg daily in 05/2017 August 2017 she stopped levothyroxine when she started itching  No rash  History of mildly elevated Ca++  08/2020 Ca++ 10 1   05/2020 Ca++ 10 5   05/2018 Ca++ 10 0   03/2017 Ca++ 9 4  11/2016 Ca++ 10 4  03/2016 Ca 10 4  11/2015 Ca++ 9 8  07/2015 10 4  01/2015 10 5  no change from 09/2014  SPEP normal  intact PTH normal     Genitourinary: Negative for difficulty urinating, dysuria and frequency  OAB no longer Trospium stopped due to constipation  Previously on Vesicare- No improvement  Nocturia x 1  She is followed by urology  09/2020   right renal angiomyolipoma  renal u/s 09/2015   Musculoskeletal: Positive for arthralgias (Recent issue with right great toe pain no trauma or injury  No history of gout) and gait problem (Patient uses a cane or walker for ambulation)  Negative for myalgias  Chronic left hip pain  OA left hip  She is followed by orthopedics  X-rays of left hip showed moderate to severe osteoarthritis  04/2021 left hip intra-articular steroid injection OA right shoulder  X-rays of right shoulder showed mild glenohumeral osteoarthritis and mild right AC joint osteoarthritis  She completed a course of physical therapy  12/2019 admission after a fall  Patient was trying to close her curtains when she fell backwards  No LOC  CT scan chest,abdomen and pelvis nondisplaced fractures of the left transverse processes of L2 and L3  No other evidence of acute posttraumatic abnormality  She has both a cane and walker for ambulation  Skin: Negative for rash  Neurological: Negative for dizziness and headaches  ER visit for fall 12/2018  No LOC  12/2018 CT scan of head no acute intracranial abnormality  Decreased attenuation noted in the periventricular and subcortical white matter is demonstrating moderate microangiopathic change  episode of slurred speech 10/2015 no recurrence  on ASA 81 mg daily and Plavix  she refused work up  Hematological: Negative for adenopathy  Does not bruise/bleed easily  On ASA and Plavix  Psychiatric/Behavioral: Negative for dysphoric mood and sleep disturbance           Objective:      /74 (BP Location: Right arm, Patient Position: Sitting, Cuff Size: Large) Pulse 72   Temp (!) 96 6 °F (35 9 °C)   Resp 16   Ht 5' 1" (1 549 m)   Wt 58 1 kg (128 lb)   BMI 24 19 kg/m²     BP Readings from Last 3 Encounters:   05/10/21 132/74   03/16/21 164/69   01/04/21 144/66       Wt Readings from Last 3 Encounters:   05/10/21 58 1 kg (128 lb)   03/16/21 59 4 kg (131 lb)   01/04/21 60 3 kg (133 lb)          Physical Exam  Vitals signs and nursing note reviewed  Constitutional:       General: She is not in acute distress  Appearance: She is well-developed  HENT:      Right Ear: Tympanic membrane and ear canal normal       Left Ear: Tympanic membrane and ear canal normal    Eyes:      General: No scleral icterus  Extraocular Movements: Extraocular movements intact  Conjunctiva/sclera: Conjunctivae normal       Pupils: Pupils are equal, round, and reactive to light  Neck:      Thyroid: No thyroid mass or thyromegaly  Vascular: No carotid bruit or JVD  Trachea: No tracheal deviation  Cardiovascular:      Rate and Rhythm: Normal rate and regular rhythm  Heart sounds: Normal heart sounds  No murmur  No gallop  Pulmonary:      Effort: Pulmonary effort is normal  No respiratory distress  Breath sounds: Normal breath sounds  No wheezing or rales  Musculoskeletal:      Right lower leg: Edema (Chronic edema right ankle) present  Left lower leg: No edema  Comments: Inspection of right great toe normal   No tenderness no redness or warmth  Mild ingrowing of right great toenail  No skin redness or drainage   Lymphadenopathy:      Cervical: No cervical adenopathy  Upper Body:      Right upper body: No supraclavicular adenopathy  Left upper body: No supraclavicular adenopathy  Skin:     Findings: No rash  Nails: There is no clubbing  Neurological:      General: No focal deficit present  Mental Status: She is alert     Psychiatric:         Mood and Affect: Mood normal          Cognition and Memory: She exhibits impaired recent memory

## 2021-05-10 NOTE — PATIENT INSTRUCTIONS
I will continue the patient's present medical regimen  The patient appears well compensated  I have asked the patient to call if there is a problem in the interim otherwise I will see the patient in six months time  Will check an echocardiogram prior to the next visit

## 2021-06-15 ENCOUNTER — OFFICE VISIT (OUTPATIENT)
Dept: OBGYN CLINIC | Facility: HOSPITAL | Age: 86
End: 2021-06-15
Payer: MEDICARE

## 2021-06-15 ENCOUNTER — HOSPITAL ENCOUNTER (OUTPATIENT)
Dept: RADIOLOGY | Facility: HOSPITAL | Age: 86
Discharge: HOME | End: 2021-06-15
Attending: ORTHOPAEDIC SURGERY
Payer: MEDICARE

## 2021-06-15 VITALS
WEIGHT: 129 LBS | DIASTOLIC BLOOD PRESSURE: 72 MMHG | HEART RATE: 65 BPM | SYSTOLIC BLOOD PRESSURE: 158 MMHG | BODY MASS INDEX: 24.35 KG/M2 | HEIGHT: 61 IN

## 2021-06-15 DIAGNOSIS — M79.605 PAIN OF LEFT LOWER EXTREMITY: Primary | ICD-10-CM

## 2021-06-15 DIAGNOSIS — M46.1 SACROILIITIS (HCC): ICD-10-CM

## 2021-06-15 DIAGNOSIS — M70.62 TROCHANTERIC BURSITIS OF LEFT HIP: ICD-10-CM

## 2021-06-15 DIAGNOSIS — M79.605 PAIN OF LEFT LOWER EXTREMITY: ICD-10-CM

## 2021-06-15 PROCEDURE — 99212 OFFICE O/P EST SF 10 MIN: CPT | Performed by: PHYSICIAN ASSISTANT

## 2021-06-15 PROCEDURE — 73590 X-RAY EXAM OF LOWER LEG: CPT

## 2021-06-15 PROCEDURE — 20610 DRAIN/INJ JOINT/BURSA W/O US: CPT | Performed by: PHYSICIAN ASSISTANT

## 2021-06-15 RX ORDER — BETAMETHASONE SODIUM PHOSPHATE AND BETAMETHASONE ACETATE 3; 3 MG/ML; MG/ML
6 INJECTION, SUSPENSION INTRA-ARTICULAR; INTRALESIONAL; INTRAMUSCULAR; SOFT TISSUE
Status: COMPLETED | OUTPATIENT
Start: 2021-06-15 | End: 2021-06-15

## 2021-06-15 RX ORDER — LIDOCAINE HYDROCHLORIDE 10 MG/ML
2 INJECTION, SOLUTION INFILTRATION; PERINEURAL
Status: COMPLETED | OUTPATIENT
Start: 2021-06-15 | End: 2021-06-15

## 2021-06-15 RX ADMIN — LIDOCAINE HYDROCHLORIDE 2 ML: 10 INJECTION, SOLUTION INFILTRATION; PERINEURAL at 12:05

## 2021-06-15 RX ADMIN — BETAMETHASONE SODIUM PHOSPHATE AND BETAMETHASONE ACETATE 6 MG: 3; 3 INJECTION, SUSPENSION INTRA-ARTICULAR; INTRALESIONAL; INTRAMUSCULAR; SOFT TISSUE at 12:05

## 2021-06-15 NOTE — PROGRESS NOTES
Subjective;     19-year-old female patient well known to the practice  She has a history of established osteoarthritis of the left hip and left hip greater trochanteric bursitis  She achieves symptomatic improvement by injections either to the bursa or the hip joint  this is a patient with a history of leiomyosarcoma right lower extremity  She reports new onset of mid kidney half pain increase in soft tissue density medially and pain with standing of the mid calf without history of injury or incident to the left lower extremity  due to her past medical and surgical history for the right lower extremity and out of respect to this new onset of pain left lower extremity x-rays were ordered at this time two views tib-fib left lower extremity      Past Medical History:   Diagnosis Date    Anemia     last assessed - [id number]    Cancer [facility])     Depression     last assessed - [id number]    Glaucoma     Hypercalcemia     last assessed - [id number]    Hypertension     Hypothyroidism 11/30/2017    [facility]omy[facility] [address] ) 2002    right lower extremity    Macular degeneration     Nondisplaced fracture of base of fifth metacarpal bone, right hand, initial encounter for closed fracture 12/7/2018    Osteoarthritis     Plantar fasciitis of right foot     last assessed - [id number]    Stomach disorder     Superficial thrombophlebitis of leg     unspecified laterality; last assessed - [id number]    Traumatic closed nondisplaced fracture of lumbar vertebra, initial encounter ([facility] ) 12/5/2019    Trochanteric bursitis of left hip     last assessed - 07Apr2017       Past Surgical History:   Procedure Laterality Date    CORONARY STENT PLACEMENT      stent RCA    FL INJECTION LEFT HIP (NON ARTHROGRAM)  11/26/2018    FL INJECTION LEFT HIP (NON ARTHROGRAM)  3/4/2019    FL INJECTION LEFT HIP (NON ARTHROGRAM)  6/10/2019    FL INJECTION LEFT HIP (NON ARTHROGRAM)  9/24/2019    FL INJECTION LEFT HIP (NON ARTHROGRAM) 12/30/2019    FL INJECTION LEFT HIP (NON ARTHROGRAM)  7/6/2020    FL INJECTION LEFT HIP (NON ARTHROGRAM)  12/28/2020    FL INJECTION LEFT HIP (NON ARTHROGRAM)  4/12/2021    FOOT SURGERY      HIP SURGERY      HYSTERECTOMY      SKIN LESION EXCISION  11/2010    Face - BCC - nose    TOTAL ABDOMINAL HYSTERECTOMY W/ BILATERAL SALPINGOOPHORECTOMY      TOTAL HIP ARTHROPLASTY Right        Family History   Problem Relation Age of Onset    Stroke Mother         cerebrovascular accident (CVA)    Coronary artery disease Mother         coronary disease    Stroke Father     Coronary artery disease Father         coronary disease    Stroke Brother     Kidney cancer Family        Social History     Tobacco Use    Smoking status: Never Smoker    Smokeless tobacco: Never Used   Vaping Use    Vaping Use: Never used   Substance Use Topics    Alcohol use: Yes     Comment: occasional wine with dinner    Drug use: No      exam;     patient has predictable pain with internal and external rotation of her left hip secondary to arthritic changes  Patient also has discomfort lateral to the hip in the region focal to the greater troch bursa   she has no left knee pain   she does have irregular soft tissue density or increase medial calf left leg compared to the medial calf right leg   she does have discomfort to light and deep palpation of this region from proximal 3rd of the calf or shin to the distal 3rd    motor finds hip flexors knee extensors TA EHL and peroneals intact in symmetrical to the right      x-rays;   Two views left tib-fib do not show any cortical erosion, stippling, or disruption of the medullary canal     Large joint arthrocentesis: L greater trochanteric bursa  Universal Protocol:  Consent given by: patient    Procedure Details  Location: hip - L greater trochanteric bursa  Needle size: 22 G  Approach: lateral  Medications administered: 2 mL lidocaine 1 %; 6 mg betamethasone acetate-betamethasone sodium phosphate 6 (3-3) mg/mL    Patient tolerance: patient tolerated the procedure well with no immediate complications  Dressing:  Sterile dressing applied       impression;     left hip pain   left hip arthritis   left greater trochanteric bursitis     left shin pain,? Etiology, negative x-rays     plan;     the x-rays were shared with the patient, and her family member     she was provided a greater troch bursa injection left hip    we will order yet an at additional left hip fluoroscopic guided injection     we will see her in follow-up in 3 additional months

## 2021-07-06 NOTE — NURSING NOTE
Call placed to patient to discuss upcoming appointment at [facility] radiology department, spoke with patients daughter and complete consultation with patient  Patient is having Left hip injection utilizing fluoroscopy  Reviewed  patient's allergies, current anticoagulant medication of ASA 81 mg and Plavix present per patient but not required to stop per periprocedural management of coagulation status and hemostasis risk in percutaneous image guided procedure guidelines, Patient has had procedure in the past, no questions when asked if any questions or concerns  Reminded patient of location and time expected for procedure, Patient expressed understanding by verbalizing and repeating instructions

## 2021-07-12 ENCOUNTER — HOSPITAL ENCOUNTER (OUTPATIENT)
Dept: RADIOLOGY | Facility: HOSPITAL | Age: 86
Discharge: HOME | End: 2021-07-12
Admitting: RADIOLOGY
Payer: MEDICARE

## 2021-07-12 DIAGNOSIS — I49.3 PVCS (PREMATURE VENTRICULAR CONTRACTIONS): ICD-10-CM

## 2021-07-12 DIAGNOSIS — M79.605 PAIN OF LEFT LOWER EXTREMITY: ICD-10-CM

## 2021-07-12 PROCEDURE — 77002 NEEDLE LOCALIZATION BY XRAY: CPT

## 2021-07-12 PROCEDURE — 20610 DRAIN/INJ JOINT/BURSA W/O US: CPT

## 2021-07-12 RX ORDER — BUPIVACAINE HYDROCHLORIDE 2.5 MG/ML
20 INJECTION, SOLUTION EPIDURAL; INFILTRATION; INTRACAUDAL
Status: DISCONTINUED | OUTPATIENT
Start: 2021-07-12 | End: 2021-07-13 | Stop reason: HOSPADM

## 2021-07-12 RX ORDER — NITROGLYCERIN 0.4 MG/1
0.4 TABLET SUBLINGUAL
Qty: 25 TABLET | Refills: 3 | Status: SHIPPED | OUTPATIENT
Start: 2021-07-12

## 2021-07-12 RX ORDER — LIDOCAINE HYDROCHLORIDE 10 MG/ML
10 INJECTION, SOLUTION EPIDURAL; INFILTRATION; INTRACAUDAL; PERINEURAL
Status: DISCONTINUED | OUTPATIENT
Start: 2021-07-12 | End: 2021-07-13 | Stop reason: HOSPADM

## 2021-07-12 RX ORDER — METHYLPREDNISOLONE ACETATE 80 MG/ML
80 INJECTION, SUSPENSION INTRA-ARTICULAR; INTRALESIONAL; INTRAMUSCULAR; SOFT TISSUE
Status: DISCONTINUED | OUTPATIENT
Start: 2021-07-12 | End: 2021-07-13 | Stop reason: HOSPADM

## 2021-07-12 RX ADMIN — IOHEXOL 1 ML: 300 INJECTION, SOLUTION INTRAVENOUS at 12:30

## 2021-07-19 ENCOUNTER — HOSPITAL ENCOUNTER (OUTPATIENT)
Dept: NON INVASIVE DIAGNOSTICS | Facility: CLINIC | Age: 86
Discharge: HOME | End: 2021-07-19
Payer: MEDICARE

## 2021-07-19 DIAGNOSIS — I25.10 CORONARY ARTERIOSCLEROSIS: ICD-10-CM

## 2021-07-19 DIAGNOSIS — E78.00 PURE HYPERCHOLESTEROLEMIA: ICD-10-CM

## 2021-07-19 DIAGNOSIS — I49.3 PVC (PREMATURE VENTRICULAR CONTRACTION): ICD-10-CM

## 2021-07-19 DIAGNOSIS — I10 ESSENTIAL (PRIMARY) HYPERTENSION: ICD-10-CM

## 2021-07-19 PROCEDURE — 93306 TTE W/DOPPLER COMPLETE: CPT | Performed by: INTERNAL MEDICINE

## 2021-07-19 PROCEDURE — 93306 TTE W/DOPPLER COMPLETE: CPT

## 2021-08-06 DIAGNOSIS — K21.9 GASTROESOPHAGEAL REFLUX DISEASE WITHOUT ESOPHAGITIS: ICD-10-CM

## 2021-08-06 RX ORDER — OMEPRAZOLE 20 MG/1
CAPSULE, DELAYED RELEASE ORAL
Qty: 90 CAPSULE | Refills: 1 | Status: SHIPPED | OUTPATIENT
Start: 2021-08-06 | End: 2022-03-28 | Stop reason: SDUPTHER

## 2021-09-09 ENCOUNTER — APPOINTMENT (OUTPATIENT)
Dept: LAB | Facility: MEDICAL CENTER | Age: 86
End: 2021-09-09
Payer: MEDICARE

## 2021-09-09 LAB
ALBUMIN SERPL BCP-MCNC: 3.6 G/DL (ref 3.5–5)
ALP SERPL-CCNC: 138 U/L (ref 46–116)
ALT SERPL W P-5'-P-CCNC: 24 U/L (ref 12–78)
ANION GAP SERPL CALCULATED.3IONS-SCNC: 1 MMOL/L (ref 4–13)
AST SERPL W P-5'-P-CCNC: 19 U/L (ref 5–45)
BASOPHILS # BLD AUTO: 0.05 THOUSANDS/ΜL (ref 0–0.1)
BASOPHILS NFR BLD AUTO: 1 % (ref 0–1)
BILIRUB SERPL-MCNC: 0.46 MG/DL (ref 0.2–1)
BUN SERPL-MCNC: 19 MG/DL (ref 5–25)
CALCIUM SERPL-MCNC: 10.5 MG/DL (ref 8.3–10.1)
CHLORIDE SERPL-SCNC: 110 MMOL/L (ref 100–108)
CHOLEST SERPL-MCNC: 169 MG/DL (ref 50–200)
CO2 SERPL-SCNC: 29 MMOL/L (ref 21–32)
CREAT SERPL-MCNC: 0.97 MG/DL (ref 0.6–1.3)
EOSINOPHIL # BLD AUTO: 0.09 THOUSAND/ΜL (ref 0–0.61)
EOSINOPHIL NFR BLD AUTO: 1 % (ref 0–6)
ERYTHROCYTE [DISTWIDTH] IN BLOOD BY AUTOMATED COUNT: 13.7 % (ref 11.6–15.1)
GFR SERPL CREATININE-BSD FRML MDRD: 50 ML/MIN/1.73SQ M
GLUCOSE P FAST SERPL-MCNC: 98 MG/DL (ref 65–99)
HCT VFR BLD AUTO: 37.6 % (ref 34.8–46.1)
HDLC SERPL-MCNC: 49 MG/DL
HGB BLD-MCNC: 11.9 G/DL (ref 11.5–15.4)
IMM GRANULOCYTES # BLD AUTO: 0.04 THOUSAND/UL (ref 0–0.2)
IMM GRANULOCYTES NFR BLD AUTO: 1 % (ref 0–2)
LDLC SERPL CALC-MCNC: 97 MG/DL (ref 0–100)
LYMPHOCYTES # BLD AUTO: 1.25 THOUSANDS/ΜL (ref 0.6–4.47)
LYMPHOCYTES NFR BLD AUTO: 18 % (ref 14–44)
MCH RBC QN AUTO: 29 PG (ref 26.8–34.3)
MCHC RBC AUTO-ENTMCNC: 31.6 G/DL (ref 31.4–37.4)
MCV RBC AUTO: 92 FL (ref 82–98)
MONOCYTES # BLD AUTO: 0.51 THOUSAND/ΜL (ref 0.17–1.22)
MONOCYTES NFR BLD AUTO: 7 % (ref 4–12)
NEUTROPHILS # BLD AUTO: 5.02 THOUSANDS/ΜL (ref 1.85–7.62)
NEUTS SEG NFR BLD AUTO: 72 % (ref 43–75)
NONHDLC SERPL-MCNC: 120 MG/DL
NRBC BLD AUTO-RTO: 0 /100 WBCS
PLATELET # BLD AUTO: 245 THOUSANDS/UL (ref 149–390)
PMV BLD AUTO: 9.9 FL (ref 8.9–12.7)
POTASSIUM SERPL-SCNC: 4 MMOL/L (ref 3.5–5.3)
PROT SERPL-MCNC: 7.1 G/DL (ref 6.4–8.2)
RBC # BLD AUTO: 4.11 MILLION/UL (ref 3.81–5.12)
SODIUM SERPL-SCNC: 140 MMOL/L (ref 136–145)
TRIGL SERPL-MCNC: 117 MG/DL
TSH SERPL DL<=0.05 MIU/L-ACNC: 2.83 UIU/ML (ref 0.36–3.74)
WBC # BLD AUTO: 6.96 THOUSAND/UL (ref 4.31–10.16)

## 2021-09-09 PROCEDURE — 85025 COMPLETE CBC W/AUTO DIFF WBC: CPT | Performed by: FAMILY MEDICINE

## 2021-09-09 PROCEDURE — 36415 COLL VENOUS BLD VENIPUNCTURE: CPT | Performed by: FAMILY MEDICINE

## 2021-09-09 PROCEDURE — 80053 COMPREHEN METABOLIC PANEL: CPT | Performed by: FAMILY MEDICINE

## 2021-09-09 PROCEDURE — 80061 LIPID PANEL: CPT | Performed by: FAMILY MEDICINE

## 2021-09-09 PROCEDURE — 84443 ASSAY THYROID STIM HORMONE: CPT | Performed by: FAMILY MEDICINE

## 2021-09-12 NOTE — PROGRESS NOTES
Assessment/Plan:     Diagnoses and all orders for this visit:    Essential hypertension  -     CBC and differential  -     Comprehensive metabolic panel    Stage 3b chronic kidney disease (HCC)    Atherosclerosis of native coronary artery of native heart without angina pectoris    Hypercholesteremia    Hypercalcemia  -     PTH, intact    Elevated alkaline phosphatase level  -     Gamma GT; Future    Lymphedema of right lower extremity    Venous insufficiency (chronic) (peripheral)    Generalized osteoarthritis of multiple sites    Need for vaccination  -     influenza vaccine, high-dose, PF 0 7 mL (FLUZONE HIGH-DOSE)    Other orders  -     olopatadine (Pataday) 0 1 % ophthalmic solution           continue with current medications  ES Tylenol 500 mg 2 tablets up to 3 times a day for chronic pain issues  Office visit 4 months with repeat labs at that time  Follow-up with orthopedic surgery this week  Flu vaccine today     Patient ID: Giorgio De Paz is a 80 y o  female  Cc follow up visit for chronic medical problems       Followup visit  Here with her daughter Dara Fernández  Medications reviewed  09/2021 see note  Hypertension blood pressures have been stable on Valsartan HCTZ 80/12 5 daily and Metoprolol ER 50 mg daily  09/2021 creatinine 0 97  GFR 50  08/2020 creatinine 1 20  GFR 39  Electrolytes normal except for chloride 110 and Ca++ 10 5  Hgb 11 9   05/2020 creatinine 1 19  GFR 39  05/2020 SPEP no monoclonal bands  Hyperlipidemia and CAD on Atorvastatin 40 mg daily  Lipid profile 09/2021 cholesterol 169  TGs 117   HDL 49  LDL 97  LFTs normal except for alkaline phosphatase 138  Patient no longer driving  Patient lives alone in a senior high rise apartment  Independent with ADLs and most of her IADLs  Assist with grocery shopping  Daughter pays her bills    She eats TV dinners or pre cook meals      Recent Results (from the past 672 hour(s))   TSH, 3rd generation with Free T4 reflex    Collection Time: 09/09/21 9:45 AM   Result Value Ref Range    TSH 3RD GENERATON 2 830 0 358 - 3 740 uIU/mL   Comprehensive metabolic panel    Collection Time: 09/09/21  9:45 AM   Result Value Ref Range    Sodium 140 136 - 145 mmol/L    Potassium 4 0 3 5 - 5 3 mmol/L    Chloride 110 (H) 100 - 108 mmol/L    CO2 29 21 - 32 mmol/L    ANION GAP 1 (L) 4 - 13 mmol/L    BUN 19 5 - 25 mg/dL    Creatinine 0 97 0 60 - 1 30 mg/dL    Glucose, Fasting 98 65 - 99 mg/dL    Calcium 10 5 (H) 8 3 - 10 1 mg/dL    AST 19 5 - 45 U/L    ALT 24 12 - 78 U/L    Alkaline Phosphatase 138 (H) 46 - 116 U/L    Total Protein 7 1 6 4 - 8 2 g/dL    Albumin 3 6 3 5 - 5 0 g/dL    Total Bilirubin 0 46 0 20 - 1 00 mg/dL    eGFR 50 ml/min/1 73sq m   CBC and differential    Collection Time: 09/09/21  9:45 AM   Result Value Ref Range    WBC 6 96 4 31 - 10 16 Thousand/uL    RBC 4 11 3 81 - 5 12 Million/uL    Hemoglobin 11 9 11 5 - 15 4 g/dL    Hematocrit 37 6 34 8 - 46 1 %    MCV 92 82 - 98 fL    MCH 29 0 26 8 - 34 3 pg    MCHC 31 6 31 4 - 37 4 g/dL    RDW 13 7 11 6 - 15 1 %    MPV 9 9 8 9 - 12 7 fL    Platelets 307 010 - 569 Thousands/uL    nRBC 0 /100 WBCs    Neutrophils Relative 72 43 - 75 %    Immat GRANS % 1 0 - 2 %    Lymphocytes Relative 18 14 - 44 %    Monocytes Relative 7 4 - 12 %    Eosinophils Relative 1 0 - 6 %    Basophils Relative 1 0 - 1 %    Neutrophils Absolute 5 02 1 85 - 7 62 Thousands/µL    Immature Grans Absolute 0 04 0 00 - 0 20 Thousand/uL    Lymphocytes Absolute 1 25 0 60 - 4 47 Thousands/µL    Monocytes Absolute 0 51 0 17 - 1 22 Thousand/µL    Eosinophils Absolute 0 09 0 00 - 0 61 Thousand/µL    Basophils Absolute 0 05 0 00 - 0 10 Thousands/µL   Lipid panel    Collection Time: 09/09/21  9:45 AM   Result Value Ref Range    Cholesterol 169 50 - 200 mg/dL    Triglycerides 117 <=150 mg/dL    HDL, Direct 49 >=40 mg/dL    LDL Calculated 97 0 - 100 mg/dL    Non-HDL-Chol (CHOL-HDL) 120 mg/dl         The following portions of the patient's history were reviewed and updated as appropriate: allergies, current medications, past family history, past medical history, past social history, past surgical history and problem list     Review of Systems   Constitutional: Negative for appetite change, chills, fatigue, fever and unexpected weight change  HENT: Positive for hearing loss  Negative for congestion, ear pain, rhinorrhea, sore throat and trouble swallowing  Eyes: Negative for visual disturbance  ARMD and glaucoma  Respiratory: Negative for cough, shortness of breath and wheezing  No orthopnea or PND   Cardiovascular: Negative for chest pain, palpitations and leg swelling  CAD followed by Cardiology  07/2021 echocardiogram normal left ventricular systolic function  EF 55 %  Grade 1 diastolic dysfunction  Trace MR  Moderate TR with mild pulmonary hypertension  No pericardial effusion  Aortic root normal size  12/2019 CT scan chest mild left basilar atelectasis versus infiltrate  Normal heart size  Coronary artery calcifications  Normal caliber thoracic aorta with mild atherosclerotic calcifications  9 mm lobulated density in the posterior right upper lobe 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  No tracheal or endobronchial lesions  12/2019 EKG normal sinus rhythm  No acute changes  First-degree AV block  Left posterior fascicular block  Cardiology evaluation 5/2015 for exertional dyspnea and palpitations  Holter monitor showed normal sinus rhythm  139 single PVCs  No sustained ventricular rhythm  rare PACs, consisting of 183 single PACs  3 were noted in bigeminy  10 noted in couplets  no sustained supraventricular rhythm  No significant pauses or high grade AV block  Echocardiogram showed normal left ventricular function, ejection fraction 31% grade 1 diastolic dysfunction  No significant valvular abnormalities  Chronic swelling right lower leg  s/p resection of leiomyosarcoma 2002    Repeat echocardiogram 09/2017 normal left ventricular systolic function  EF 60%  No regional wall motion abnormalities  Right ventricle normal  Trace MR  No pericardial effusion  Gastrointestinal: Negative for abdominal pain, blood in stool, constipation, diarrhea, nausea and vomiting  GERD stable on Omeprazole  no reflux  no dysphagia  Lab Results       Component                Value               Date                       ALT                      24                  09/09/2021                 AST                      19                  09/09/2021                 ALKPHOS                  138 (H)             09/09/2021                 BILITOT                  0 6                 11/16/2016              Alkaline Phosphatase       Date                     Value               Ref Range           Status                09/09/2021               138 (H)             46 - 116 U/L        Final                 08/25/2020               123 (H)             46 - 116 U/L        Final                 12/06/2019               97                  46 - 116 U/L        Final                 05/21/2018               124                 33 - 130 U/L        Final                 11/16/2016               108                 33 - 130 U/L        Final                 03/22/2016               94                  33 - 130 U/L        Final                 11/16/2015               118 (H)             46 - 116 U/L        Final                   Endocrine:        Hypothyroidism no longer on medication  09/2021 TSH 2 830   08/2020 TSH 3 400   3/2017 TSH 5 310  Positive thyroid microsomal antibody  Patient was started on Levothyroxine 25 mcg daily in 05/2017 August 2017 she stopped levothyroxine when she started itching  No rash  History of mildly elevated Ca++  09/2021 Ca++ 10 5  08/2020 Ca++ 10 1   05/2020 Ca++ 10 5   05/2018 Ca++ 10 0   03/2017 Ca++ 9 4  11/2016 Ca++ 10 4  03/2016 Ca 10 4  11/2015 Ca++ 9 8  07/2015 10 4  01/2015 10 5  no change from 09/2014   SPEP normal  intact PTH normal      Lab Results       Component                Value               Date                       [id number]             2 830               09/09/2021               Genitourinary: Negative for difficulty urinating, dysuria and frequency  OAB no longer Trospium stopped due to constipation  Previously on Vesicare- No improvement  Nocturia x 1  She is followed by urology  09/2020   right renal angiomyolipoma  renal u/s 09/2015   Musculoskeletal: Positive for arthralgias and gait problem (Patient uses a cane or walker for ambulation)  Negative for myalgias  Chronic left hip pain  OA left hip  She is followed by Orthopedics  X-rays of left hip showed moderate to severe osteoarthritis  Periodic left hip intra-articular steroid injections and left trochanteric bursa injections  OA right shoulder  X-rays of right shoulder showed mild glenohumeral osteoarthritis and mild right AC joint osteoarthritis  She completed a course of physical therapy  12/2019 admission after a fall  Patient was trying to close her curtains when she fell backwards  No LOC  CT scan chest,abdomen and pelvis nondisplaced fractures of the left transverse processes of L2 and L3  No other evidence of acute posttraumatic abnormality  She has both a cane and walker for ambulation  Skin: Negative for rash  Neurological: Negative for dizziness and headaches  ER visit for fall 12/2018  No LOC  12/2018 CT scan of head no acute intracranial abnormality  Decreased attenuation noted in the periventricular and subcortical white matter is demonstrating moderate microangiopathic change  episode of slurred speech 10/2015 no recurrence  on ASA 81 mg daily and Plavix  she refused work up  Hematological: Negative for adenopathy  Does not bruise/bleed easily  On ASA and Plavix  Psychiatric/Behavioral: Negative for dysphoric mood and sleep disturbance           Objective:    /64   Pulse 76   Temp 97 5 °F (36 4 °C)   Resp 16   Ht 5' 1" (1 549 m)   Wt 58 1 kg (128 lb)   BMI 24 19 kg/m²      BP Readings from Last 3 Encounters:   09/13/21 138/64   06/15/21 158/72   05/10/21 140/52     Wt Readings from Last 3 Encounters:   09/13/21 58 1 kg (128 lb)   06/15/21 58 5 kg (129 lb)   05/10/21 58 kg (127 lb 14 4 oz)              Physical Exam  Vitals and nursing note reviewed  Constitutional:       General: She is not in acute distress  Appearance: She is well-developed  HENT:      Right Ear: Tympanic membrane and ear canal normal       Left Ear: Tympanic membrane and ear canal normal    Eyes:      General: No scleral icterus  Extraocular Movements: Extraocular movements intact  Conjunctiva/sclera: Conjunctivae normal       Pupils: Pupils are equal, round, and reactive to light  Neck:      Thyroid: No thyroid mass or thyromegaly  Vascular: No carotid bruit or JVD  Trachea: No tracheal deviation  Cardiovascular:      Rate and Rhythm: Normal rate and regular rhythm  Heart sounds: Normal heart sounds  No murmur heard  No gallop  Pulmonary:      Effort: Pulmonary effort is normal  No respiratory distress  Breath sounds: Normal breath sounds  No wheezing or rales  Musculoskeletal:      Right lower leg: Edema present  Left lower leg: No edema  Comments: Chronic right medial ankle swelling from prior cancer surgery   Lymphadenopathy:      Cervical: No cervical adenopathy  Upper Body:      Right upper body: No supraclavicular adenopathy  Left upper body: No supraclavicular adenopathy  Skin:     Findings: No rash  Nails: There is no clubbing  Neurological:      General: No focal deficit present  Mental Status: She is alert and oriented to person, place, and time  Psychiatric:         Mood and Affect: Mood normal          Cognition and Memory: She exhibits impaired recent memory

## 2021-09-13 ENCOUNTER — OFFICE VISIT (OUTPATIENT)
Dept: FAMILY MEDICINE CLINIC | Facility: CLINIC | Age: 86
End: 2021-09-13
Payer: MEDICARE

## 2021-09-13 VITALS
BODY MASS INDEX: 24.17 KG/M2 | HEIGHT: 61 IN | HEART RATE: 76 BPM | WEIGHT: 128 LBS | RESPIRATION RATE: 16 BRPM | TEMPERATURE: 97.5 F | DIASTOLIC BLOOD PRESSURE: 64 MMHG | SYSTOLIC BLOOD PRESSURE: 138 MMHG

## 2021-09-13 DIAGNOSIS — I87.2 VENOUS INSUFFICIENCY (CHRONIC) (PERIPHERAL): ICD-10-CM

## 2021-09-13 DIAGNOSIS — I25.10 ATHEROSCLEROSIS OF NATIVE CORONARY ARTERY OF NATIVE HEART WITHOUT ANGINA PECTORIS: ICD-10-CM

## 2021-09-13 DIAGNOSIS — E78.00 HYPERCHOLESTEREMIA: ICD-10-CM

## 2021-09-13 DIAGNOSIS — M15.9 GENERALIZED OSTEOARTHRITIS OF MULTIPLE SITES: ICD-10-CM

## 2021-09-13 DIAGNOSIS — E83.52 HYPERCALCEMIA: ICD-10-CM

## 2021-09-13 DIAGNOSIS — N18.32 STAGE 3B CHRONIC KIDNEY DISEASE (HCC): ICD-10-CM

## 2021-09-13 DIAGNOSIS — Z23 NEED FOR VACCINATION: ICD-10-CM

## 2021-09-13 DIAGNOSIS — R74.8 ELEVATED ALKALINE PHOSPHATASE LEVEL: ICD-10-CM

## 2021-09-13 DIAGNOSIS — I10 ESSENTIAL HYPERTENSION: Primary | ICD-10-CM

## 2021-09-13 DIAGNOSIS — I89.0 LYMPHEDEMA OF RIGHT LOWER EXTREMITY: ICD-10-CM

## 2021-09-13 PROCEDURE — 99214 OFFICE O/P EST MOD 30 MIN: CPT | Performed by: FAMILY MEDICINE

## 2021-09-13 PROCEDURE — 90662 IIV NO PRSV INCREASED AG IM: CPT

## 2021-09-13 PROCEDURE — G0008 ADMIN INFLUENZA VIRUS VAC: HCPCS

## 2021-09-13 RX ORDER — OLOPATADINE HYDROCHLORIDE 1 MG/ML
SOLUTION OPHTHALMIC
COMMUNITY
Start: 2021-04-01

## 2021-09-15 NOTE — PROGRESS NOTES
9/16/2021      Chief Complaint   Patient presents with    Nocturia     Assessment and Plan    1  Urinary incontinence  2  Overactive bladder  - Failed several anticholinergics secondary to side effects and insurance coverage/cost  Continued bothersome symptoms, would like to restart medication    - Will restart Myrbetriq 25 mg qd  - Recommend continued dietary and behavioral modifications including  avoiding constipation, and limiting bladder irritants  - Discussed additional treatment options bladder botox and PTNS  She declines at this time  - Will f/u in 3 months for symptom reassessment  History of Present Illness  Robel Stoddard is a 80 y o  female here for follow up evaluation of  overactive bladder and nocturia  Previously patient was managed on VESIcare however due to insurance formulary this was discontinued and she was transitioned to [facility] unfortunately this was also cost prohibitive  She was then transitioned to trospium which caused side effects of dry mouth, constipation, and slight increased confusion  This was discontinued  She has been managed on behavioral and dietary modifications  She states she uses a panty liner during the day and uses a pad overnight  She reports continued bothersome symptoms of urgency, frequency, and urinary incontinence  She is interested in getting back on some sort of treatment  PVR=0 mL     Review of Systems   Constitutional: Negative for chills and fever  Respiratory: Negative for shortness of breath  Cardiovascular: Negative for chest pain  Gastrointestinal: Negative for abdominal pain  Genitourinary: Positive for frequency and urgency  Negative for difficulty urinating, dysuria, flank pain and hematuria  Neurological: Negative for dizziness                    Past Medical History  Past Medical History:   Diagnosis Date    Anemia     last assessed - [id number]    Cancer [facility])     Depression     last assessed - [id number]    Glaucoma     Hypercalcemia     last assessed - [id number]    Hypertension     Hypothyroidism 11/30/2017    Leiomyosarcoma ([facility] [address] ) 2002    right lower extremity    Macular degeneration     Nondisplaced fracture of base of fifth metacarpal bone, right hand, initial encounter for closed fracture 12/7/2018    Osteoarthritis     Plantar fasciitis of right foot     last assessed - [id number]    Stomach disorder     Superficial thrombophlebitis of leg     unspecified laterality; last assessed - [id number]    Traumatic closed nondisplaced fracture of lumbar vertebra, initial encounter ([email or web address] ) 12/5/2019    Trochanteric bursitis of left hip     last assessed - [id number]       Past Social History  Past Surgical History:   Procedure Laterality Date    CORONARY STENT PLACEMENT      stent RCA    FL INJECTION LEFT HIP (NON ARTHROGRAM)  11/26/2018    FL INJECTION LEFT HIP (NON ARTHROGRAM)  3/4/2019    FL INJECTION LEFT HIP (NON ARTHROGRAM)  6/10/2019    FL INJECTION LEFT HIP (NON ARTHROGRAM)  9/24/2019    FL INJECTION LEFT HIP (NON ARTHROGRAM)  12/30/2019    FL INJECTION LEFT HIP (NON ARTHROGRAM)  7/6/2020    FL INJECTION LEFT HIP (NON ARTHROGRAM)  12/28/2020    FL INJECTION LEFT HIP (NON ARTHROGRAM)  4/12/2021    FL INJECTION LEFT HIP (NON ARTHROGRAM)  7/12/2021    FOOT SURGERY      HIP SURGERY      HYSTERECTOMY      SKIN LESION EXCISION  11/2010    Face - BCC - nose    TOTAL ABDOMINAL HYSTERECTOMY W/ BILATERAL SALPINGOOPHORECTOMY      TOTAL HIP ARTHROPLASTY Right      Social History     Tobacco Use   Smoking Status Never Smoker   Smokeless Tobacco Never Used       Past Family History  Family History   Problem Relation Age of Onset    Stroke Mother         cerebrovascular accident (CVA)    Coronary artery disease Mother         coronary disease    Stroke Father     Coronary artery disease Father         coronary disease    Stroke Brother     Kidney cancer Family        Past Social history  Social History Socioeconomic History    Marital status:      Spouse name: Not on file    Number of children: Not on file    Years of education: Not on file    Highest education level: Not on file   Occupational History    Not on file   Tobacco Use    Smoking status: Never Smoker    Smokeless tobacco: Never Used   Vaping Use    Vaping Use: Never used   Substance and Sexual Activity    Alcohol use: Yes     Comment: occasional wine with dinner    Drug use: No    Sexual activity: Not on file   Other Topics Concern    Not on file   Social History Narrative    Caffeine use     Social Determinants of Health     Financial Resource Strain:     Difficulty of Paying Living Expenses:    Food Insecurity:     Worried About Running Out of Food in the Last Year:     [address] in the Last Year:    Transportation Needs:     Lack of Transportation (Medical):      Lack of Transportation (Non-Medical):    Physical Activity:     Days of Exercise per Week:     Minutes of Exercise per Session:    Stress:     Feeling of Stress :    Social Connections:     Frequency of Communication with Friends and Family:     Frequency of Social Gatherings with Friends and Family:     Attends [employer] Services:     Active Member of Clubs or Organizations:     Attends Club or Organization Meetings:     Marital Status:    Intimate Partner Violence:     Fear of Current or Ex-Partner:     Emotionally Abused:     Physically Abused:     Sexually Abused:        Current Medications  Current Outpatient Medications   Medication Sig Dispense Refill    aspirin (ADULT ASPIRIN EC LOW STRENGTH) 81 mg EC tablet Take 1 tablet by mouth daily      atorvastatin (LIPITOR) 40 mg tablet TAKE 1 TABLET BY MOUTH EVERY DAY 90 tablet 3    Biotin 1 MG CAPS Take by mouth      cholecalciferol (VITAMIN D3) 1,000 units tablet Take 1 tablet by mouth daily      clopidogrel (PLAVIX) 75 mg tablet TAKE 1 TABLET BY MOUTH EVERY DAY 90 tablet 3    latanoprost (XALATAN) 0 005 % ophthalmic solution INSTILL 1 DROP INTO BOTH EYES EVERY DAY AS DIRECTED  4    metoprolol succinate (TOPROL-XL) 50 mg 24 hr tablet TAKE 1 TABLET BY MOUTH EVERY DAY 90 tablet 1    Multiple Vitamins-Minerals (ICAPS AREDS 2) CAPS Take by mouth      nitroglycerin (NITROSTAT) 0 4 mg SL tablet PLACE 1 TABLET (0 4 MG TOTAL) UNDER THE TONGUE EVERY 5 (FIVE) MINUTES AS NEEDED FOR CHEST PAIN 25 tablet 3    olopatadine (Pataday) 0 1 % ophthalmic solution       Omega-3 Fatty Acids (OMEGA-3 FISH OIL) 1200 MG CAPS Take by mouth      omeprazole (PriLOSEC) 20 mg delayed release capsule TAKE 1 CAPSULE BY MOUTH DAILY  DAYS 90 capsule 1    Polyvinyl Alcohol-Povidone PF (REFRESH) 1 4-0 6 % SOLN Apply to eye      RESTASIS 0 05 % ophthalmic emulsion instill 1 drop into both eyes twice a day  0    valsartan-hydrochlorothiazide (DIOVAN-HCT) 80-12 5 MG per tablet TAKE 1 TABLET BY MOUTH EVERY DAY 90 tablet 3     No current facility-administered medications for this visit  Allergies  Allergies   Allergen Reactions    Iv Contrast [Iodinated Diagnostic Agents]     Lactose - Food Allergy GI Intolerance    Other      Iv contrast  Adhesive tape    Ciprofloxacin      Patient does not remember         The following portions of the patient's history were reviewed and updated as appropriate: allergies, current medications, past medical history, past social history, past surgical history and problem list       Vitals  Vitals:    09/16/21 0935   BP: 118/58   BP Location: Left arm   Patient Position: Sitting   Cuff Size: Adult   Pulse: 65   Weight: 58 1 kg (128 lb)   Height: 5' 1" (1 549 m)           Physical Exam  Physical Exam  Constitutional:       Appearance: Normal appearance  HENT:      Head: Normocephalic and atraumatic  Right Ear: External ear normal       Left Ear: External ear normal    Eyes:      General: No scleral icterus       Conjunctiva/sclera: Conjunctivae normal    Cardiovascular: Pulses: Normal pulses  Pulmonary:      Effort: Pulmonary effort is normal    Musculoskeletal:      Cervical back: Normal range of motion  Comments: Uses walker to ambulate   Neurological:      General: No focal deficit present  Mental Status: She is alert and oriented to person, place, and time  Psychiatric:         Mood and Affect: Mood normal          Behavior: Behavior normal          Thought Content:  Thought content normal          Judgment: Judgment normal            Results  Recent Results (from the past 1 hour(s))   POCT Measure PVR    Collection Time: 09/16/21  9:41 AM   Result Value Ref Range    POST-VOID RESIDUAL VOLUME, ML POC 0 mL   ]  No results found for: PSA  Lab Results   Component Value Date    GLUCOSE 96 11/16/2015    CALCIUM 10 5 (H) 09/09/2021     11/16/2016    K 4 0 09/09/2021    CO2 29 09/09/2021     (H) 09/09/2021    BUN 19 09/09/2021    CREATININE 0 97 09/09/2021     Lab Results   Component Value Date    WBC 6 96 09/09/2021    HGB 11 9 09/09/2021    HCT 37 6 09/09/2021    MCV 92 09/09/2021     09/09/2021           Orders  Orders Placed This Encounter   Procedures    POCT Measure PVR       Brennan Brown PA-C

## 2021-09-16 ENCOUNTER — OFFICE VISIT (OUTPATIENT)
Dept: UROLOGY | Facility: CLINIC | Age: 86
End: 2021-09-16
Payer: MEDICARE

## 2021-09-16 VITALS
WEIGHT: 128 LBS | BODY MASS INDEX: 24.17 KG/M2 | DIASTOLIC BLOOD PRESSURE: 58 MMHG | HEIGHT: 61 IN | SYSTOLIC BLOOD PRESSURE: 118 MMHG | HEART RATE: 65 BPM

## 2021-09-16 DIAGNOSIS — R35.0 URINARY FREQUENCY: Primary | ICD-10-CM

## 2021-09-16 LAB — POST-VOID RESIDUAL VOLUME, ML POC: 0 ML

## 2021-09-16 PROCEDURE — 51798 US URINE CAPACITY MEASURE: CPT | Performed by: PHYSICIAN ASSISTANT

## 2021-09-16 PROCEDURE — 99213 OFFICE O/P EST LOW 20 MIN: CPT | Performed by: PHYSICIAN ASSISTANT

## 2021-09-17 ENCOUNTER — OFFICE VISIT (OUTPATIENT)
Dept: OBGYN CLINIC | Facility: MEDICAL CENTER | Age: 86
End: 2021-09-17
Payer: MEDICARE

## 2021-09-17 VITALS
HEIGHT: 61 IN | WEIGHT: 128 LBS | HEART RATE: 65 BPM | BODY MASS INDEX: 24.17 KG/M2 | SYSTOLIC BLOOD PRESSURE: 164 MMHG | RESPIRATION RATE: 18 BRPM | DIASTOLIC BLOOD PRESSURE: 75 MMHG

## 2021-09-17 DIAGNOSIS — M70.62 TROCHANTERIC BURSITIS OF LEFT HIP: Primary | ICD-10-CM

## 2021-09-17 PROCEDURE — 20610 DRAIN/INJ JOINT/BURSA W/O US: CPT | Performed by: ORTHOPAEDIC SURGERY

## 2021-09-17 PROCEDURE — 99213 OFFICE O/P EST LOW 20 MIN: CPT | Performed by: ORTHOPAEDIC SURGERY

## 2021-09-17 RX ORDER — BETAMETHASONE SODIUM PHOSPHATE AND BETAMETHASONE ACETATE 3; 3 MG/ML; MG/ML
12 INJECTION, SUSPENSION INTRA-ARTICULAR; INTRALESIONAL; INTRAMUSCULAR; SOFT TISSUE
Status: COMPLETED | OUTPATIENT
Start: 2021-09-17 | End: 2021-09-17

## 2021-09-17 RX ORDER — BUPIVACAINE HYDROCHLORIDE 2.5 MG/ML
2 INJECTION, SOLUTION INFILTRATION; PERINEURAL
Status: COMPLETED | OUTPATIENT
Start: 2021-09-17 | End: 2021-09-17

## 2021-09-17 RX ORDER — LIDOCAINE HYDROCHLORIDE 10 MG/ML
2 INJECTION, SOLUTION INFILTRATION; PERINEURAL
Status: COMPLETED | OUTPATIENT
Start: 2021-09-17 | End: 2021-09-17

## 2021-09-17 RX ADMIN — BETAMETHASONE SODIUM PHOSPHATE AND BETAMETHASONE ACETATE 12 MG: 3; 3 INJECTION, SUSPENSION INTRA-ARTICULAR; INTRALESIONAL; INTRAMUSCULAR; SOFT TISSUE at 11:51

## 2021-09-17 RX ADMIN — BUPIVACAINE HYDROCHLORIDE 2 ML: 2.5 INJECTION, SOLUTION INFILTRATION; PERINEURAL at 11:51

## 2021-09-17 RX ADMIN — LIDOCAINE HYDROCHLORIDE 2 ML: 10 INJECTION, SOLUTION INFILTRATION; PERINEURAL at 11:51

## 2021-09-17 NOTE — PROGRESS NOTES
Assessment:   Diagnosis ICD-10-CM Associated Orders   1  Trochanteric bursitis of left hip  M70 62        Plan:    -   On exam was the patient's pain is on the lateral aspect of left eye today  - Non operative treatments were discussed with the patient included localized cortisone injection into the left trochanteric bursa  Patient wishes to proceed with the bursa injection today, which he tolerated well  - Explained to the patient that she is not up for a another left intra-articular hip injection until another month from now  If she needs that she can call in for a script to be placed in prior to her setting up the injection  To do next visit:  Return in about 3 months (around 12/17/2021) for left trochanteric bursitis  The above stated was discussed in layman's terms and the patient expressed understanding  All questions were answered to the patient's satisfaction  Scribe Attestation    I,:  Sheba Mcmahan am acting as a scribe while in the presence of the attending physician :       I,:  Micheal Deshpande MD personally performed the services described in this documentation    as scribed in my presence :             Subjective:   Rajendra Eugene is a 80 y o  female who presents  Today for 3 month follow-up for her left greater trochanteric bursitis  Her last visit on 06/15/2021, an injection was provided for the patient that gave her pain relief for about 1 month  She also had a left intra-articular hip injection on 07/12/2021  Notes about month of relief  She states that she has groin pain and lateral thigh pain today  She is accompanied with her daughter  She ambulates with a roller walker today  Review of systems negative unless otherwise specified in HPI  Review of Systems   Constitutional: Negative for chills, fever and unexpected weight change  HENT: Negative for hearing loss, nosebleeds and sore throat  Eyes: Negative for pain, redness and visual disturbance  Respiratory: Negative for cough, shortness of breath and wheezing  Cardiovascular: Negative for chest pain, palpitations and leg swelling  Gastrointestinal: Negative for abdominal pain and nausea  Genitourinary: Negative for dyspareunia, dysuria and frequency  Skin: Negative for rash and wound  Neurological: Negative for dizziness, numbness and headaches  Psychiatric/Behavioral: Negative for decreased concentration and suicidal ideas  The patient is not nervous/anxious          Past Medical History:   Diagnosis Date    Anemia     last assessed - [id number]    [facility])     Depression     last assessed - [id number]    Glaucoma     Hypercalcemia     last assessed - [id number]    Hypertension     Hypothyroidism 11/30/2017    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    Macular degeneration     Nondisplaced fracture of base of fifth metacarpal bone, right hand, initial encounter for closed fracture 12/7/2018    Osteoarthritis     Plantar fasciitis of right foot     last assessed - [id number]    Stomach disorder     Superficial thrombophlebitis of leg     unspecified laterality; last assessed - [id number]    Traumatic closed nondisplaced fracture of lumbar vertebra, initial encounter ([facility] ) 12/5/2019    Trochanteric bursitis of left hip     last assessed - [id number]       Past Surgical History:   Procedure Laterality Date    CORONARY STENT PLACEMENT      stent RCA    FL INJECTION LEFT HIP (NON ARTHROGRAM)  11/26/2018    FL INJECTION LEFT HIP (NON ARTHROGRAM)  3/4/2019    FL INJECTION LEFT HIP (NON ARTHROGRAM)  6/10/2019    FL INJECTION LEFT HIP (NON ARTHROGRAM)  9/24/2019    FL INJECTION LEFT HIP (NON ARTHROGRAM)  12/30/2019    FL INJECTION LEFT HIP (NON ARTHROGRAM)  7/6/2020    FL INJECTION LEFT HIP (NON ARTHROGRAM)  12/28/2020    FL INJECTION LEFT HIP (NON ARTHROGRAM)  4/12/2021    FL INJECTION LEFT HIP (NON ARTHROGRAM)  7/12/2021    FOOT SURGERY      HIP SURGERY      HYSTERECTOMY      SKIN LESION EXCISION  11/2010    Face - BCC - nose    TOTAL ABDOMINAL HYSTERECTOMY W/ BILATERAL SALPINGOOPHORECTOMY      TOTAL HIP ARTHROPLASTY Right        Family History   Problem Relation Age of Onset    Stroke Mother         cerebrovascular accident (CVA)    Coronary artery disease Mother         coronary disease    Stroke Father     Coronary artery disease Father         coronary disease    Stroke Brother     Kidney cancer Family        Social History     Occupational History    Not on file   Tobacco Use    Smoking status: Never Smoker    Smokeless tobacco: Never Used   Vaping Use    Vaping Use: Never used   Substance and Sexual Activity    Alcohol use: Yes     Comment: occasional wine with dinner    Drug use: No    Sexual activity: Not on file         Current Outpatient Medications:     aspirin (ADULT ASPIRIN EC LOW STRENGTH) 81 mg EC tablet, Take 1 tablet by mouth daily, Disp: , Rfl:     atorvastatin (LIPITOR) 40 mg tablet, TAKE 1 TABLET BY MOUTH EVERY DAY, Disp: 90 tablet, Rfl: 3    Biotin 1 MG CAPS, Take by mouth, Disp: , Rfl:     cholecalciferol (VITAMIN D3) 1,000 units tablet, Take 1 tablet by mouth daily, Disp: , Rfl:     clopidogrel (PLAVIX) 75 mg tablet, TAKE 1 TABLET BY MOUTH EVERY DAY, Disp: 90 tablet, Rfl: 3    latanoprost (XALATAN) 0 005 % ophthalmic solution, INSTILL 1 DROP INTO BOTH EYES EVERY DAY AS DIRECTED, Disp: , Rfl: 4    metoprolol succinate (TOPROL-XL) 50 mg 24 hr tablet, TAKE 1 TABLET BY MOUTH EVERY DAY, Disp: 90 tablet, Rfl: 1    Mirabegron ER 25 MG TB24, Take 25 mg by mouth daily, Disp: 30 tablet, Rfl: 2    Multiple Vitamins-Minerals (ICAPS AREDS 2) CAPS, Take by mouth, Disp: , Rfl:     nitroglycerin (NITROSTAT) 0 4 mg SL tablet, PLACE 1 TABLET (0 4 MG TOTAL) UNDER THE TONGUE EVERY 5 (FIVE) MINUTES AS NEEDED FOR CHEST PAIN, Disp: 25 tablet, Rfl: 3    olopatadine (Pataday) 0 1 % ophthalmic solution, , Disp: , Rfl:     Omega-3 Fatty Acids (OMEGA-3 FISH OIL) 1200 MG CAPS, Take by mouth, Disp: , Rfl:     omeprazole (PriLOSEC) 20 mg delayed release capsule, TAKE 1 CAPSULE BY MOUTH DAILY  DAYS, Disp: 90 capsule, Rfl: 1    Polyvinyl Alcohol-Povidone PF (REFRESH) 1 4-0 6 % SOLN, Apply to eye, Disp: , Rfl:     RESTASIS 0 05 % ophthalmic emulsion, instill 1 drop into both eyes twice a day, Disp: , Rfl: 0    valsartan-hydrochlorothiazide (DIOVAN-HCT) 80-12 5 MG per tablet, TAKE 1 TABLET BY MOUTH EVERY DAY, Disp: 90 tablet, Rfl: 3    Allergies   Allergen Reactions    Iv Contrast [Iodinated Diagnostic Agents]     Lactose - Food Allergy GI Intolerance    Other      Iv contrast  Adhesive tape    Ciprofloxacin      Patient does not remember            Vitals:    09/17/21 1120   BP: 164/75   Pulse: 65   Resp: 18       Objective:                    Left Hip Exam     Tenderness   The patient is experiencing tenderness in the lateral     Range of Motion   Flexion: 80 (With pain)   External rotation: 20   Internal rotation: 5     Muscle Strength   Abduction: 4/5   Adduction: 4/5   Flexion: 4/5     Other   Erythema: absent  Sensation: normal  Pulse: present    Comments:    Calf soft not tender            Diagnostics, reviewed and taken today if performed as documented:    None performed      The attending physician has personally reviewed the pertinent films in PACS and interpretation is as follows:      Procedures, if performed today:    Large joint arthrocentesis: L greater trochanteric bursa  Universal Protocol:  Consent: Verbal consent obtained  Risks and benefits: risks, benefits and alternatives were discussed  Consent given by: patient  Time out: Immediately prior to procedure a "time out" was called to verify the correct patient, procedure, equipment, support staff and site/side marked as required    Timeout called at: 9/17/2021 11:48 AM   Patient understanding: patient states understanding of the procedure being performed  Site marked: the operative site was marked  Patient identity confirmed: verbally with patient    Supporting Documentation  Indications: pain   Procedure Details  Location: hip - L greater trochanteric bursa  Preparation: Patient was prepped and draped in the usual sterile fashion  Needle size: 22 G  Ultrasound guidance: no  Approach: anterolateral  Medications administered: 2 mL lidocaine 1 %; 2 mL bupivacaine 0 25 %; 12 mg betamethasone acetate-betamethasone sodium phosphate 6 (3-3) mg/mL    Patient tolerance: patient tolerated the procedure well with no immediate complications  Dressing:  Sterile dressing applied            Portions of the record may have been created with voice recognition software  Occasional wrong word or "sound a like" substitutions may have occurred due to the inherent limitations of voice recognition software  Read the chart carefully and recognize, using context, where substitutions have occurred

## 2021-10-09 ENCOUNTER — IMMUNIZATIONS (OUTPATIENT)
Dept: FAMILY MEDICINE CLINIC | Facility: HOSPITAL | Age: 86
End: 2021-10-09

## 2021-10-09 DIAGNOSIS — Z23 ENCOUNTER FOR IMMUNIZATION: Primary | ICD-10-CM

## 2021-10-09 PROCEDURE — 91300 SARS-COV-2 / COVID-19 MRNA VACCINE (PFIZER-BIONTECH) 30 MCG: CPT

## 2021-10-09 PROCEDURE — 0001A SARS-COV-2 / COVID-19 MRNA VACCINE (PFIZER-BIONTECH) 30 MCG: CPT

## 2021-10-19 DIAGNOSIS — I25.10 CORONARY ARTERY DISEASE INVOLVING NATIVE CORONARY ARTERY OF NATIVE HEART WITHOUT ANGINA PECTORIS: ICD-10-CM

## 2021-10-19 DIAGNOSIS — I10 ESSENTIAL HYPERTENSION: ICD-10-CM

## 2021-10-19 RX ORDER — METOPROLOL SUCCINATE 50 MG/1
TABLET, EXTENDED RELEASE ORAL
Qty: 90 TABLET | Refills: 1 | Status: SHIPPED | OUTPATIENT
Start: 2021-10-19 | End: 2022-04-08 | Stop reason: SDUPTHER

## 2021-10-29 ENCOUNTER — TELEPHONE (OUTPATIENT)
Dept: OBGYN CLINIC | Facility: HOSPITAL | Age: 86
End: 2021-10-29

## 2021-11-11 ENCOUNTER — TELEPHONE (OUTPATIENT)
Dept: OBGYN CLINIC | Facility: HOSPITAL | Age: 86
End: 2021-11-11

## 2021-11-15 ENCOUNTER — HOSPITAL ENCOUNTER (OUTPATIENT)
Dept: RADIOLOGY | Facility: HOSPITAL | Age: 86
Discharge: HOME | End: 2021-11-15
Attending: ORTHOPAEDIC SURGERY
Payer: MEDICARE

## 2021-11-15 DIAGNOSIS — M16.12 PRIMARY OSTEOARTHRITIS OF LEFT HIP: ICD-10-CM

## 2021-11-15 PROCEDURE — 20610 DRAIN/INJ JOINT/BURSA W/O US: CPT

## 2021-11-15 PROCEDURE — 77002 NEEDLE LOCALIZATION BY XRAY: CPT

## 2021-11-15 RX ORDER — BUPIVACAINE HYDROCHLORIDE 2.5 MG/ML
10 INJECTION, SOLUTION EPIDURAL; INFILTRATION; INTRACAUDAL
Status: COMPLETED | OUTPATIENT
Start: 2021-11-15 | End: 2021-11-15

## 2021-11-15 RX ORDER — LIDOCAINE HYDROCHLORIDE 10 MG/ML
10 INJECTION, SOLUTION EPIDURAL; INFILTRATION; INTRACAUDAL; PERINEURAL
Status: COMPLETED | OUTPATIENT
Start: 2021-11-15 | End: 2021-11-15

## 2021-11-15 RX ORDER — METHYLPREDNISOLONE ACETATE 80 MG/ML
80 INJECTION, SUSPENSION INTRA-ARTICULAR; INTRALESIONAL; INTRAMUSCULAR; SOFT TISSUE
Status: COMPLETED | OUTPATIENT
Start: 2021-11-15 | End: 2021-11-15

## 2021-11-15 RX ADMIN — BUPIVACAINE HYDROCHLORIDE 3 ML: 2.5 INJECTION, SOLUTION EPIDURAL; INFILTRATION; INTRACAUDAL; PERINEURAL at 12:39

## 2021-11-15 RX ADMIN — METHYLPREDNISOLONE ACETATE 80 MG: 80 INJECTION, SUSPENSION INTRA-ARTICULAR; INTRALESIONAL; INTRAMUSCULAR; SOFT TISSUE at 12:40

## 2021-11-15 RX ADMIN — IOHEXOL 2 ML: 300 INJECTION, SOLUTION INTRAVENOUS at 12:39

## 2021-11-15 RX ADMIN — LIDOCAINE HYDROCHLORIDE 6 ML: 10 INJECTION, SOLUTION EPIDURAL; INFILTRATION; INTRACAUDAL; PERINEURAL at 12:39

## 2021-11-19 DIAGNOSIS — R35.0 URINARY FREQUENCY: ICD-10-CM

## 2021-11-19 RX ORDER — MIRABEGRON 25 MG/1
TABLET, FILM COATED, EXTENDED RELEASE ORAL
Qty: 30 TABLET | Refills: 2 | Status: SHIPPED | OUTPATIENT
Start: 2021-11-19 | End: 2021-12-22 | Stop reason: SDUPTHER

## 2021-11-24 ENCOUNTER — APPOINTMENT (EMERGENCY)
Dept: RADIOLOGY | Facility: HOSPITAL | Age: 86
DRG: 683 | End: 2021-11-24
Payer: MEDICARE

## 2021-11-24 ENCOUNTER — HOSPITAL ENCOUNTER (INPATIENT)
Facility: HOSPITAL | Age: 86
LOS: 2 days | Discharge: HOME HEALTH | DRG: 683 | End: 2021-11-26
Attending: EMERGENCY MEDICINE | Admitting: INTERNAL MEDICINE
Payer: MEDICARE

## 2021-11-24 ENCOUNTER — APPOINTMENT (EMERGENCY)
Dept: CT IMAGING | Facility: HOSPITAL | Age: 86
DRG: 683 | End: 2021-11-24
Payer: MEDICARE

## 2021-11-24 DIAGNOSIS — M79.602 LEFT ARM PAIN: ICD-10-CM

## 2021-11-24 DIAGNOSIS — N17.9 AKI (ACUTE KIDNEY INJURY) (HCC): Primary | ICD-10-CM

## 2021-11-24 DIAGNOSIS — R53.1 LEFT-SIDED WEAKNESS: ICD-10-CM
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
## 2021-11-24 DIAGNOSIS — M25.562 LEFT KNEE PAIN: ICD-10-CM

## 2021-11-24 DIAGNOSIS — D72.829 LEUKOCYTOSIS: ICD-10-CM

## 2021-11-24 DIAGNOSIS — D72.825 BANDEMIA: ICD-10-CM
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
## 2021-11-24 DIAGNOSIS — M25.552 LEFT HIP PAIN: ICD-10-CM

## 2021-11-24 PROBLEM — R77.2 ELEVATED ALPHA FETOPROTEIN: Status: ACTIVE | Noted: 2021-11-24

## 2021-11-24 PROBLEM — E87.20 LACTIC ACID ACIDOSIS: Status: ACTIVE | Noted: 2021-11-24

## 2021-11-24 PROBLEM — R11.10 VOMITING AND DIARRHEA: Status: ACTIVE | Noted: 2021-11-24

## 2021-11-24 PROBLEM — R19.7 VOMITING AND DIARRHEA: Status: ACTIVE | Noted: 2021-11-24

## 2021-11-24 PROBLEM — R74.8 ELEVATED ALKALINE PHOSPHATASE LEVEL: Status: ACTIVE | Noted: 2021-11-24

## 2021-11-24 PROBLEM — E87.2 LACTIC ACID ACIDOSIS: Status: ACTIVE | Noted: 2021-11-24

## 2021-11-24 LAB
25(OH)D3 SERPL-MCNC: 29 NG/ML (ref 30–100)
2HR DELTA HS TROPONIN: 7 NG/L
4HR DELTA HS TROPONIN: 7 NG/L
ANION GAP SERPL CALCULATED.3IONS-SCNC: 16 MMOL/L (ref 4–13)
ATRIAL RATE: 90 BPM
ATRIAL RATE: 94 BPM
BASOPHILS # BLD MANUAL: 0 THOUSAND/UL (ref 0–0.1)
BASOPHILS NFR MAR MANUAL: 0 % (ref 0–1)
BUN SERPL-MCNC: 43 MG/DL (ref 5–25)
CALCIUM SERPL-MCNC: 10.9 MG/DL (ref 8.3–10.1)
CARDIAC TROPONIN I PNL SERPL HS: 12 NG/L
CARDIAC TROPONIN I PNL SERPL HS: 19 NG/L
CARDIAC TROPONIN I PNL SERPL HS: 19 NG/L
CHLORIDE SERPL-SCNC: 101 MMOL/L (ref 100–108)
CK SERPL-CCNC: 107 U/L (ref 26–192)
CO2 SERPL-SCNC: 22 MMOL/L (ref 21–32)
CREAT SERPL-MCNC: 1.6 MG/DL (ref 0.6–1.3)
EOSINOPHIL # BLD MANUAL: 0 THOUSAND/UL (ref 0–0.4)
EOSINOPHIL NFR BLD MANUAL: 0 % (ref 0–6)
ERYTHROCYTE [DISTWIDTH] IN BLOOD BY AUTOMATED COUNT: 13.9 % (ref 11.6–15.1)
GFR SERPL CREATININE-BSD FRML MDRD: 27 ML/MIN/1.73SQ M
GGT SERPL-CCNC: 16 U/L (ref 5–85)
GLUCOSE SERPL-MCNC: 151 MG/DL (ref 65–140)
HCT VFR BLD AUTO: 41.5 % (ref 34.8–46.1)
HGB BLD-MCNC: 12.7 G/DL (ref 11.5–15.4)
LACTATE SERPL-SCNC: 1.7 MMOL/L (ref 0.5–2)
LACTATE SERPL-SCNC: 2.3 MMOL/L (ref 0.5–2)
LACTATE SERPL-SCNC: 2.4 MMOL/L (ref 0.5–2)
LACTATE SERPL-SCNC: 2.5 MMOL/L (ref 0.5–2)
LYMPHOCYTES # BLD AUTO: 0.21 THOUSAND/UL (ref 0.6–4.47)
LYMPHOCYTES # BLD AUTO: 2 % (ref 14–44)
MCH RBC QN AUTO: 28.7 PG (ref 26.8–34.3)
MCHC RBC AUTO-ENTMCNC: 30.6 G/DL (ref 31.4–37.4)
MCV RBC AUTO: 94 FL (ref 82–98)
MONOCYTES # BLD AUTO: 0.41 THOUSAND/UL (ref 0–1.22)
MONOCYTES NFR BLD: 4 % (ref 4–12)
NEUTROPHILS # BLD MANUAL: 9.67 THOUSAND/UL (ref 1.85–7.62)
NEUTS BAND NFR BLD MANUAL: 25 % (ref 0–8)
NEUTS SEG NFR BLD AUTO: 69 % (ref 43–75)
P AXIS: 43 DEGREES
P AXIS: 66 DEGREES
PLATELET # BLD AUTO: 213 THOUSANDS/UL (ref 149–390)
PLATELET # BLD AUTO: 225 THOUSANDS/UL (ref 149–390)
PLATELET BLD QL SMEAR: ADEQUATE
PMV BLD AUTO: 10.4 FL (ref 8.9–12.7)
PMV BLD AUTO: 9.6 FL (ref 8.9–12.7)
POTASSIUM SERPL-SCNC: 4.2 MMOL/L (ref 3.5–5.3)
PR INTERVAL: 184 MS
PR INTERVAL: 188 MS
PROCALCITONIN SERPL-MCNC: 0.84 NG/ML
PTH-INTACT SERPL-MCNC: 95.5 PG/ML (ref 18.4–80.1)
QRS AXIS: 124 DEGREES
QRS AXIS: 124 DEGREES
QRSD INTERVAL: 72 MS
QRSD INTERVAL: 74 MS
QT INTERVAL: 336 MS
QT INTERVAL: 352 MS
QTC INTERVAL: 402 MS
QTC INTERVAL: 429 MS
RBC # BLD AUTO: 4.43 MILLION/UL (ref 3.81–5.12)
RBC MORPH BLD: NORMAL
SODIUM SERPL-SCNC: 139 MMOL/L (ref 136–145)
T WAVE AXIS: 41 DEGREES
T WAVE AXIS: 82 DEGREES
VENTRICULAR RATE: 86 BPM
VENTRICULAR RATE: 89 BPM
WBC # BLD AUTO: 10.29 THOUSAND/UL (ref 4.31–10.16)

## 2021-11-24 PROCEDURE — 85007 BL SMEAR W/DIFF WBC COUNT: CPT

## 2021-11-24 PROCEDURE — 82306 VITAMIN D 25 HYDROXY: CPT | Performed by: INTERNAL MEDICINE

## 2021-11-24 PROCEDURE — 72170 X-RAY EXAM OF PELVIS: CPT

## 2021-11-24 PROCEDURE — 83605 ASSAY OF LACTIC ACID: CPT | Performed by: INTERNAL MEDICINE

## 2021-11-24 PROCEDURE — 70450 CT HEAD/BRAIN W/O DYE: CPT

## 2021-11-24 PROCEDURE — 73060 X-RAY EXAM OF HUMERUS: CPT

## 2021-11-24 PROCEDURE — 85027 COMPLETE CBC AUTOMATED: CPT

## 2021-11-24 PROCEDURE — 73552 X-RAY EXAM OF FEMUR 2/>: CPT

## 2021-11-24 PROCEDURE — 80048 BASIC METABOLIC PNL TOTAL CA: CPT

## 2021-11-24 PROCEDURE — 83605 ASSAY OF LACTIC ACID: CPT

## 2021-11-24 PROCEDURE — 93005 ELECTROCARDIOGRAM TRACING: CPT

## 2021-11-24 PROCEDURE — 99223 1ST HOSP IP/OBS HIGH 75: CPT | Performed by: INTERNAL MEDICINE

## 2021-11-24 PROCEDURE — 85049 AUTOMATED PLATELET COUNT: CPT | Performed by: INTERNAL MEDICINE

## 2021-11-24 PROCEDURE — 36415 COLL VENOUS BLD VENIPUNCTURE: CPT

## 2021-11-24 PROCEDURE — 99285 EMERGENCY DEPT VISIT HI MDM: CPT

## 2021-11-24 PROCEDURE — 82977 ASSAY OF GGT: CPT | Performed by: INTERNAL MEDICINE

## 2021-11-24 PROCEDURE — 84145 PROCALCITONIN (PCT): CPT

## 2021-11-24 PROCEDURE — 93010 ELECTROCARDIOGRAM REPORT: CPT | Performed by: INTERNAL MEDICINE

## 2021-11-24 PROCEDURE — 82550 ASSAY OF CK (CPK): CPT

## 2021-11-24 PROCEDURE — 84484 ASSAY OF TROPONIN QUANT: CPT | Performed by: INTERNAL MEDICINE

## 2021-11-24 PROCEDURE — 99285 EMERGENCY DEPT VISIT HI MDM: CPT | Performed by: EMERGENCY MEDICINE

## 2021-11-24 PROCEDURE — 96360 HYDRATION IV INFUSION INIT: CPT

## 2021-11-24 PROCEDURE — 87040 BLOOD CULTURE FOR BACTERIA: CPT

## 2021-11-24 PROCEDURE — 84484 ASSAY OF TROPONIN QUANT: CPT

## 2021-11-24 PROCEDURE — G1004 CDSM NDSC: HCPCS

## 2021-11-24 PROCEDURE — 83970 ASSAY OF PARATHORMONE: CPT | Performed by: INTERNAL MEDICINE

## 2021-11-24 RX ORDER — ACETAMINOPHEN 325 MG/1
975 TABLET ORAL ONCE
Status: COMPLETED | OUTPATIENT
Start: 2021-11-24 | End: 2021-11-24

## 2021-11-24 RX ORDER — ATORVASTATIN CALCIUM 40 MG/1
40 TABLET, FILM COATED ORAL DAILY
Status: DISCONTINUED | OUTPATIENT
Start: 2021-11-24 | End: 2021-11-26 | Stop reason: HOSPADM

## 2021-11-24 RX ORDER — OXYBUTYNIN CHLORIDE 5 MG/1
5 TABLET, EXTENDED RELEASE ORAL DAILY
Status: DISCONTINUED | OUTPATIENT
Start: 2021-11-24 | End: 2021-11-26 | Stop reason: HOSPADM

## 2021-11-24 RX ORDER — ACETAMINOPHEN 325 MG/1
650 TABLET ORAL EVERY 6 HOURS PRN
Status: DISCONTINUED | OUTPATIENT
Start: 2021-11-24 | End: 2021-11-26 | Stop reason: HOSPADM

## 2021-11-24 RX ORDER — KETOTIFEN FUMARATE 0.35 MG/ML
1 SOLUTION/ DROPS OPHTHALMIC 2 TIMES DAILY
Status: DISCONTINUED | OUTPATIENT
Start: 2021-11-24 | End: 2021-11-26 | Stop reason: HOSPADM

## 2021-11-24 RX ORDER — NITROGLYCERIN 0.4 MG/1
0.4 TABLET SUBLINGUAL
Status: DISCONTINUED | OUTPATIENT
Start: 2021-11-24 | End: 2021-11-26 | Stop reason: HOSPADM

## 2021-11-24 RX ORDER — HEPARIN SODIUM 5000 [USP'U]/ML
5000 INJECTION, SOLUTION INTRAVENOUS; SUBCUTANEOUS EVERY 8 HOURS SCHEDULED
Status: DISCONTINUED | OUTPATIENT
Start: 2021-11-24 | End: 2021-11-26 | Stop reason: HOSPADM

## 2021-11-24 RX ORDER — METOPROLOL SUCCINATE 50 MG/1
50 TABLET, EXTENDED RELEASE ORAL DAILY
Status: DISCONTINUED | OUTPATIENT
Start: 2021-11-24 | End: 2021-11-26 | Stop reason: HOSPADM

## 2021-11-24 RX ORDER — HYDROMORPHONE HCL/PF 1 MG/ML
0.2 SYRINGE (ML) INJECTION ONCE
Status: DISCONTINUED | OUTPATIENT
Start: 2021-11-24 | End: 2021-11-26 | Stop reason: HOSPADM

## 2021-11-24 RX ORDER — CLOPIDOGREL BISULFATE 75 MG/1
75 TABLET ORAL DAILY
Status: DISCONTINUED | OUTPATIENT
Start: 2021-11-24 | End: 2021-11-26 | Stop reason: HOSPADM

## 2021-11-24 RX ORDER — LATANOPROST 50 UG/ML
1 SOLUTION/ DROPS OPHTHALMIC
Status: DISCONTINUED | OUTPATIENT
Start: 2021-11-24 | End: 2021-11-26 | Stop reason: HOSPADM

## 2021-11-24 RX ORDER — CALCIUM CARBONATE 200(500)MG
1000 TABLET,CHEWABLE ORAL DAILY PRN
Status: DISCONTINUED | OUTPATIENT
Start: 2021-11-24 | End: 2021-11-26 | Stop reason: HOSPADM

## 2021-11-24 RX ORDER — DOCUSATE SODIUM 100 MG/1
100 CAPSULE, LIQUID FILLED ORAL 2 TIMES DAILY PRN
Status: DISCONTINUED | OUTPATIENT
Start: 2021-11-24 | End: 2021-11-26 | Stop reason: HOSPADM

## 2021-11-24 RX ORDER — PANTOPRAZOLE SODIUM 20 MG/1
20 TABLET, DELAYED RELEASE ORAL
Status: DISCONTINUED | OUTPATIENT
Start: 2021-11-24 | End: 2021-11-26 | Stop reason: HOSPADM

## 2021-11-24 RX ORDER — ONDANSETRON 2 MG/ML
4 INJECTION INTRAMUSCULAR; INTRAVENOUS EVERY 6 HOURS PRN
Status: DISCONTINUED | OUTPATIENT
Start: 2021-11-24 | End: 2021-11-26 | Stop reason: HOSPADM

## 2021-11-24 RX ORDER — ASPIRIN 81 MG/1
81 TABLET ORAL DAILY
Status: DISCONTINUED | OUTPATIENT
Start: 2021-11-24 | End: 2021-11-26 | Stop reason: HOSPADM

## 2021-11-24 RX ORDER — SODIUM CHLORIDE 9 MG/ML
75 INJECTION, SOLUTION INTRAVENOUS CONTINUOUS
Status: DISCONTINUED | OUTPATIENT
Start: 2021-11-24 | End: 2021-11-26

## 2021-11-24 RX ORDER — POLYVINYL ALCOHOL 14 MG/ML
1 SOLUTION/ DROPS OPHTHALMIC
Status: DISCONTINUED | OUTPATIENT
Start: 2021-11-24 | End: 2021-11-26 | Stop reason: HOSPADM

## 2021-11-24 RX ADMIN — SODIUM CHLORIDE 250 ML: 0.9 INJECTION, SOLUTION INTRAVENOUS at 05:22

## 2021-11-24 RX ADMIN — ATORVASTATIN CALCIUM 40 MG: 40 TABLET, FILM COATED ORAL at 10:29

## 2021-11-24 RX ADMIN — HEPARIN SODIUM 5000 UNITS: 5000 INJECTION INTRAVENOUS; SUBCUTANEOUS at 22:14

## 2021-11-24 RX ADMIN — PANTOPRAZOLE SODIUM 20 MG: 20 TABLET, DELAYED RELEASE ORAL at 10:29

## 2021-11-24 RX ADMIN — SODIUM CHLORIDE 50 ML/HR: 0.9 INJECTION, SOLUTION INTRAVENOUS at 07:40

## 2021-11-24 RX ADMIN — HEPARIN SODIUM 5000 UNITS: 5000 INJECTION INTRAVENOUS; SUBCUTANEOUS at 17:10

## 2021-11-24 RX ADMIN — KETOTIFEN FUMARATE 1 DROP: 0.35 SOLUTION/ DROPS OPHTHALMIC at 10:28

## 2021-11-24 RX ADMIN — CLOPIDOGREL BISULFATE 75 MG: 75 TABLET ORAL at 10:36

## 2021-11-24 RX ADMIN — ASPIRIN 81 MG: 81 TABLET, COATED ORAL at 10:29

## 2021-11-24 RX ADMIN — HEPARIN SODIUM 5000 UNITS: 5000 INJECTION INTRAVENOUS; SUBCUTANEOUS at 10:32

## 2021-11-24 RX ADMIN — ACETAMINOPHEN 975 MG: 325 TABLET, FILM COATED ORAL at 08:07

## 2021-11-24 RX ADMIN — KETOTIFEN FUMARATE 1 DROP: 0.35 SOLUTION/ DROPS OPHTHALMIC at 17:10

## 2021-11-24 RX ADMIN — LATANOPROST 1 DROP: 50 SOLUTION OPHTHALMIC at 23:10

## 2021-11-25 PROBLEM — R53.1 LEFT-SIDED WEAKNESS: Status: ACTIVE | Noted: 2021-11-25

## 2021-11-25 LAB
ANION GAP SERPL CALCULATED.3IONS-SCNC: 11 MMOL/L (ref 4–13)
BACTERIA UR QL AUTO: NORMAL /HPF
BILIRUB UR QL STRIP: NEGATIVE
BUN SERPL-MCNC: 34 MG/DL (ref 5–25)
CALCIUM SERPL-MCNC: 9.1 MG/DL (ref 8.3–10.1)
CHLORIDE SERPL-SCNC: 107 MMOL/L (ref 100–108)
CLARITY UR: CLEAR
CO2 SERPL-SCNC: 23 MMOL/L (ref 21–32)
COLOR UR: YELLOW
CREAT SERPL-MCNC: 1.25 MG/DL (ref 0.6–1.3)
ERYTHROCYTE [DISTWIDTH] IN BLOOD BY AUTOMATED COUNT: 14.3 % (ref 11.6–15.1)
GFR SERPL CREATININE-BSD FRML MDRD: 37 ML/MIN/1.73SQ M
GLUCOSE SERPL-MCNC: 94 MG/DL (ref 65–140)
GLUCOSE UR STRIP-MCNC: NEGATIVE MG/DL
HCT VFR BLD AUTO: 34.9 % (ref 34.8–46.1)
HGB BLD-MCNC: 10.7 G/DL (ref 11.5–15.4)
HGB UR QL STRIP.AUTO: NEGATIVE
KETONES UR STRIP-MCNC: ABNORMAL MG/DL
LEUKOCYTE ESTERASE UR QL STRIP: ABNORMAL
MAGNESIUM SERPL-MCNC: 1.8 MG/DL (ref 1.6–2.6)
MCH RBC QN AUTO: 28.4 PG (ref 26.8–34.3)
MCHC RBC AUTO-ENTMCNC: 30.7 G/DL (ref 31.4–37.4)
MCV RBC AUTO: 93 FL (ref 82–98)
NITRITE UR QL STRIP: NEGATIVE
NON-SQ EPI CELLS URNS QL MICRO: NORMAL /HPF
PH UR STRIP.AUTO: 5 [PH]
PLATELET # BLD AUTO: 193 THOUSANDS/UL (ref 149–390)
PMV BLD AUTO: 9.5 FL (ref 8.9–12.7)
POTASSIUM SERPL-SCNC: 3.5 MMOL/L (ref 3.5–5.3)
PROCALCITONIN SERPL-MCNC: 0.68 NG/ML
PROT UR STRIP-MCNC: NEGATIVE MG/DL
RBC # BLD AUTO: 3.77 MILLION/UL (ref 3.81–5.12)
RBC #/AREA URNS AUTO: NORMAL /HPF
SODIUM SERPL-SCNC: 141 MMOL/L (ref 136–145)
SP GR UR STRIP.AUTO: 1.02 (ref 1–1.03)
UROBILINOGEN UR QL STRIP.AUTO: 0.2 E.U./DL
WBC # BLD AUTO: 6.05 THOUSAND/UL (ref 4.31–10.16)
WBC #/AREA URNS AUTO: NORMAL /HPF

## 2021-11-25 PROCEDURE — 84145 PROCALCITONIN (PCT): CPT | Performed by: STUDENT IN AN ORGANIZED HEALTH CARE EDUCATION/TRAINING PROGRAM

## 2021-11-25 PROCEDURE — 99232 SBSQ HOSP IP/OBS MODERATE 35: CPT | Performed by: PHYSICIAN ASSISTANT

## 2021-11-25 PROCEDURE — 83735 ASSAY OF MAGNESIUM: CPT | Performed by: STUDENT IN AN ORGANIZED HEALTH CARE EDUCATION/TRAINING PROGRAM

## 2021-11-25 PROCEDURE — 85027 COMPLETE CBC AUTOMATED: CPT | Performed by: STUDENT IN AN ORGANIZED HEALTH CARE EDUCATION/TRAINING PROGRAM

## 2021-11-25 PROCEDURE — 80048 BASIC METABOLIC PNL TOTAL CA: CPT | Performed by: STUDENT IN AN ORGANIZED HEALTH CARE EDUCATION/TRAINING PROGRAM

## 2021-11-25 PROCEDURE — 81001 URINALYSIS AUTO W/SCOPE: CPT | Performed by: INTERNAL MEDICINE

## 2021-11-25 RX ADMIN — METOPROLOL SUCCINATE 50 MG: 50 TABLET, EXTENDED RELEASE ORAL at 09:38

## 2021-11-25 RX ADMIN — ASPIRIN 81 MG: 81 TABLET, COATED ORAL at 09:38

## 2021-11-25 RX ADMIN — KETOTIFEN FUMARATE 1 DROP: 0.35 SOLUTION/ DROPS OPHTHALMIC at 09:38

## 2021-11-25 RX ADMIN — KETOTIFEN FUMARATE 1 DROP: 0.35 SOLUTION/ DROPS OPHTHALMIC at 18:17

## 2021-11-25 RX ADMIN — PANTOPRAZOLE SODIUM 20 MG: 20 TABLET, DELAYED RELEASE ORAL at 05:23

## 2021-11-25 RX ADMIN — CLOPIDOGREL BISULFATE 75 MG: 75 TABLET ORAL at 09:38

## 2021-11-25 RX ADMIN — LATANOPROST 1 DROP: 50 SOLUTION OPHTHALMIC at 21:56

## 2021-11-25 RX ADMIN — ATORVASTATIN CALCIUM 40 MG: 40 TABLET, FILM COATED ORAL at 09:38

## 2021-11-25 RX ADMIN — HEPARIN SODIUM 5000 UNITS: 5000 INJECTION INTRAVENOUS; SUBCUTANEOUS at 21:56

## 2021-11-25 RX ADMIN — SODIUM CHLORIDE 75 ML/HR: 0.9 INJECTION, SOLUTION INTRAVENOUS at 09:46

## 2021-11-25 RX ADMIN — OXYBUTYNIN 5 MG: 5 TABLET, FILM COATED, EXTENDED RELEASE ORAL at 09:38

## 2021-11-25 RX ADMIN — HEPARIN SODIUM 5000 UNITS: 5000 INJECTION INTRAVENOUS; SUBCUTANEOUS at 13:55

## 2021-11-25 RX ADMIN — HEPARIN SODIUM 5000 UNITS: 5000 INJECTION INTRAVENOUS; SUBCUTANEOUS at 05:23

## 2021-11-26 ENCOUNTER — APPOINTMENT (INPATIENT)
Dept: MRI IMAGING | Facility: HOSPITAL | Age: 86
DRG: 683 | End: 2021-11-26
Payer: MEDICARE

## 2021-11-26 VITALS
SYSTOLIC BLOOD PRESSURE: 160 MMHG | HEIGHT: 61 IN | DIASTOLIC BLOOD PRESSURE: 65 MMHG | BODY MASS INDEX: 23.15 KG/M2 | RESPIRATION RATE: 18 BRPM | OXYGEN SATURATION: 95 % | WEIGHT: 122.6 LBS | HEART RATE: 65 BPM | TEMPERATURE: 98.1 F

## 2021-11-26 PROBLEM — R11.10 VOMITING AND DIARRHEA: Status: RESOLVED | Noted: 2021-11-24 | Resolved: 2021-11-26

## 2021-11-26 PROBLEM — N17.9 ACUTE KIDNEY INJURY (HCC): Status: RESOLVED | Noted: 2021-11-24 | Resolved: 2021-11-26

## 2021-11-26 PROBLEM — E87.2 LACTIC ACID ACIDOSIS: Status: RESOLVED | Noted: 2021-11-24 | Resolved: 2021-11-26

## 2021-11-26 PROBLEM — R19.7 VOMITING AND DIARRHEA: Status: RESOLVED | Noted: 2021-11-24 | Resolved: 2021-11-26

## 2021-11-26 PROBLEM — E87.20 LACTIC ACID ACIDOSIS: Status: RESOLVED | Noted: 2021-11-24 | Resolved: 2021-11-26

## 2021-11-26 PROBLEM — E83.52 HYPERCALCEMIA: Status: RESOLVED | Noted: 2019-12-05 | Resolved: 2021-11-26

## 2021-11-26 PROCEDURE — 99239 HOSP IP/OBS DSCHRG MGMT >30: CPT | Performed by: PHYSICIAN ASSISTANT

## 2021-11-26 PROCEDURE — 70551 MRI BRAIN STEM W/O DYE: CPT

## 2021-11-26 PROCEDURE — 97163 PT EVAL HIGH COMPLEX 45 MIN: CPT

## 2021-11-26 PROCEDURE — RECHECK: Performed by: PHYSICIAN ASSISTANT

## 2021-11-26 PROCEDURE — G1004 CDSM NDSC: HCPCS

## 2021-11-26 RX ADMIN — CLOPIDOGREL BISULFATE 75 MG: 75 TABLET ORAL at 08:06

## 2021-11-26 RX ADMIN — HEPARIN SODIUM 5000 UNITS: 5000 INJECTION INTRAVENOUS; SUBCUTANEOUS at 05:00

## 2021-11-26 RX ADMIN — METOPROLOL SUCCINATE 50 MG: 50 TABLET, EXTENDED RELEASE ORAL at 08:06

## 2021-11-26 RX ADMIN — ASPIRIN 81 MG: 81 TABLET, COATED ORAL at 08:06

## 2021-11-26 RX ADMIN — ATORVASTATIN CALCIUM 40 MG: 40 TABLET, FILM COATED ORAL at 08:06

## 2021-11-26 RX ADMIN — PANTOPRAZOLE SODIUM 20 MG: 20 TABLET, DELAYED RELEASE ORAL at 05:00

## 2021-11-26 RX ADMIN — KETOTIFEN FUMARATE 1 DROP: 0.35 SOLUTION/ DROPS OPHTHALMIC at 08:07

## 2021-11-26 RX ADMIN — OXYBUTYNIN 5 MG: 5 TABLET, FILM COATED, EXTENDED RELEASE ORAL at 08:06

## 2021-11-29 ENCOUNTER — TRANSITIONAL CARE MANAGEMENT (OUTPATIENT)
Dept: FAMILY MEDICINE CLINIC | Facility: CLINIC | Age: 86
End: 2021-11-29

## 2021-11-29 ENCOUNTER — TELEPHONE (OUTPATIENT)
Dept: FAMILY MEDICINE CLINIC | Facility: CLINIC | Age: 86
End: 2021-11-29

## 2021-11-29 LAB
BACTERIA BLD CULT: NORMAL
BACTERIA BLD CULT: NORMAL

## 2021-12-01 ENCOUNTER — TELEPHONE (OUTPATIENT)
Dept: FAMILY MEDICINE CLINIC | Facility: CLINIC | Age: 86
End: 2021-12-01

## 2021-12-06 ENCOUNTER — TELEPHONE (OUTPATIENT)
Dept: OBGYN CLINIC | Facility: MEDICAL CENTER | Age: 86
End: 2021-12-06

## 2021-12-08 ENCOUNTER — OFFICE VISIT (OUTPATIENT)
Dept: FAMILY MEDICINE CLINIC | Facility: CLINIC | Age: 86
End: 2021-12-08
Payer: MEDICARE

## 2021-12-08 VITALS
BODY MASS INDEX: 23.6 KG/M2 | RESPIRATION RATE: 16 BRPM | SYSTOLIC BLOOD PRESSURE: 132 MMHG | DIASTOLIC BLOOD PRESSURE: 80 MMHG | HEART RATE: 78 BPM | HEIGHT: 61 IN | WEIGHT: 125 LBS | TEMPERATURE: 96 F

## 2021-12-08 DIAGNOSIS — N17.9 AKI (ACUTE KIDNEY INJURY) (HCC): ICD-10-CM

## 2021-12-08 DIAGNOSIS — K21.9 GASTROESOPHAGEAL REFLUX DISEASE WITHOUT ESOPHAGITIS: ICD-10-CM

## 2021-12-08 DIAGNOSIS — E78.2 MIXED HYPERLIPIDEMIA: ICD-10-CM

## 2021-12-08 DIAGNOSIS — R74.8 ELEVATED ALKALINE PHOSPHATASE LEVEL: ICD-10-CM

## 2021-12-08 DIAGNOSIS — N25.81 HYPERPARATHYROIDISM, SECONDARY RENAL (HCC): ICD-10-CM

## 2021-12-08 DIAGNOSIS — E83.52 HYPERCALCEMIA: ICD-10-CM

## 2021-12-08 DIAGNOSIS — M16.12 PRIMARY OSTEOARTHRITIS OF LEFT HIP: ICD-10-CM

## 2021-12-08 DIAGNOSIS — I10 ESSENTIAL HYPERTENSION: ICD-10-CM

## 2021-12-08 DIAGNOSIS — N18.32 STAGE 3B CHRONIC KIDNEY DISEASE (HCC): ICD-10-CM

## 2021-12-08 DIAGNOSIS — I49.3 PVCS (PREMATURE VENTRICULAR CONTRACTIONS): ICD-10-CM

## 2021-12-08 DIAGNOSIS — R26.2 AMBULATORY DYSFUNCTION: ICD-10-CM
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
## 2021-12-08 DIAGNOSIS — I25.10 ATHEROSCLEROSIS OF NATIVE CORONARY ARTERY OF NATIVE HEART WITHOUT ANGINA PECTORIS: ICD-10-CM

## 2021-12-08 PROBLEM — R53.1 LEFT-SIDED WEAKNESS: Status: RESOLVED | Noted: 2021-11-25 | Resolved: 2021-12-08

## 2021-12-08 PROCEDURE — 99495 TRANSJ CARE MGMT MOD F2F 14D: CPT | Performed by: FAMILY MEDICINE

## 2021-12-17 ENCOUNTER — OFFICE VISIT (OUTPATIENT)
Dept: OBGYN CLINIC | Facility: MEDICAL CENTER | Age: 86
End: 2021-12-17
Payer: MEDICARE

## 2021-12-17 VITALS
DIASTOLIC BLOOD PRESSURE: 82 MMHG | HEIGHT: 60 IN | SYSTOLIC BLOOD PRESSURE: 129 MMHG | HEART RATE: 78 BPM | WEIGHT: 124 LBS | BODY MASS INDEX: 24.35 KG/M2 | RESPIRATION RATE: 17 BRPM

## 2021-12-17 DIAGNOSIS — M70.62 TROCHANTERIC BURSITIS OF LEFT HIP: Primary | ICD-10-CM

## 2021-12-17 PROCEDURE — 20610 DRAIN/INJ JOINT/BURSA W/O US: CPT | Performed by: ORTHOPAEDIC SURGERY

## 2021-12-17 PROCEDURE — 99213 OFFICE O/P EST LOW 20 MIN: CPT | Performed by: ORTHOPAEDIC SURGERY

## 2021-12-17 RX ORDER — BUPIVACAINE HYDROCHLORIDE 2.5 MG/ML
2 INJECTION, SOLUTION INFILTRATION; PERINEURAL
Status: COMPLETED | OUTPATIENT
Start: 2021-12-17 | End: 2021-12-17

## 2021-12-17 RX ORDER — LIDOCAINE HYDROCHLORIDE 10 MG/ML
2 INJECTION, SOLUTION INFILTRATION; PERINEURAL
Status: COMPLETED | OUTPATIENT
Start: 2021-12-17 | End: 2021-12-17

## 2021-12-17 RX ORDER — ACETAMINOPHEN 500 MG
500 TABLET ORAL EVERY 6 HOURS PRN
COMMUNITY

## 2021-12-17 RX ORDER — BETAMETHASONE SODIUM PHOSPHATE AND BETAMETHASONE ACETATE 3; 3 MG/ML; MG/ML
12 INJECTION, SUSPENSION INTRA-ARTICULAR; INTRALESIONAL; INTRAMUSCULAR; SOFT TISSUE
Status: COMPLETED | OUTPATIENT
Start: 2021-12-17 | End: 2021-12-17

## 2021-12-17 RX ADMIN — LIDOCAINE HYDROCHLORIDE 2 ML: 10 INJECTION, SOLUTION INFILTRATION; PERINEURAL at 15:26

## 2021-12-17 RX ADMIN — BETAMETHASONE SODIUM PHOSPHATE AND BETAMETHASONE ACETATE 12 MG: 3; 3 INJECTION, SUSPENSION INTRA-ARTICULAR; INTRALESIONAL; INTRAMUSCULAR; SOFT TISSUE at 15:26

## 2021-12-17 RX ADMIN — BUPIVACAINE HYDROCHLORIDE 2 ML: 2.5 INJECTION, SOLUTION INFILTRATION; PERINEURAL at 15:26

## 2021-12-22 ENCOUNTER — OFFICE VISIT (OUTPATIENT)
Dept: UROLOGY | Facility: CLINIC | Age: 86
End: 2021-12-22
Payer: MEDICARE

## 2021-12-22 VITALS
HEIGHT: 60 IN | SYSTOLIC BLOOD PRESSURE: 138 MMHG | BODY MASS INDEX: 24.62 KG/M2 | WEIGHT: 125.4 LBS | DIASTOLIC BLOOD PRESSURE: 80 MMHG

## 2021-12-22 DIAGNOSIS — R35.0 URINARY FREQUENCY: Primary | ICD-10-CM

## 2021-12-22 LAB — POST-VOID RESIDUAL VOLUME, ML POC: 7 ML

## 2021-12-22 PROCEDURE — 99213 OFFICE O/P EST LOW 20 MIN: CPT | Performed by: PHYSICIAN ASSISTANT

## 2021-12-22 PROCEDURE — 51798 US URINE CAPACITY MEASURE: CPT | Performed by: PHYSICIAN ASSISTANT

## 2021-12-22 RX ORDER — MIRABEGRON 25 MG/1
25 TABLET, FILM COATED, EXTENDED RELEASE ORAL DAILY
Qty: 90 TABLET | Refills: 3 | Status: SHIPPED | OUTPATIENT
Start: 2021-12-22

## 2022-01-03 DIAGNOSIS — I25.10 CORONARY ARTERY DISEASE INVOLVING NATIVE CORONARY ARTERY OF NATIVE HEART WITHOUT ANGINA PECTORIS: ICD-10-CM

## 2022-01-03 RX ORDER — CLOPIDOGREL BISULFATE 75 MG/1
75 TABLET ORAL DAILY
Qty: 90 TABLET | Refills: 3 | Status: SHIPPED | OUTPATIENT
Start: 2022-01-03 | End: 2022-04-08 | Stop reason: SDUPTHER

## 2022-02-03 NOTE — NURSING NOTE
Call placed to patient to discuss upcoming appointment at [facility] radiology department and complete consultation with patient's daughter Gerhardt Hoyles  Patient is having left hip CSI utilizing fluoroscopy guidance  Reviewed  patient's allergies , current anticoagulant medication of ASA 81 mg and Plavix present per patient but not required to stop per periprocedural management of coagulation status and hemostasis risk in percutaneous image guided procedure guidelines, patient has had procedure for the last 3 years per daughter, no questions when asked if any questions or concerns  Reminded patient of location and time expected for procedure, Patient expressed understanding by verbalizing and repeating instructions

## 2022-02-15 ENCOUNTER — HOSPITAL ENCOUNTER (OUTPATIENT)
Dept: RADIOLOGY | Facility: HOSPITAL | Age: 87
Discharge: HOME | End: 2022-02-15
Admitting: RADIOLOGY
Payer: MEDICARE

## 2022-02-15 DIAGNOSIS — M70.62 TROCHANTERIC BURSITIS OF LEFT HIP: ICD-10-CM

## 2022-02-15 PROCEDURE — 20610 DRAIN/INJ JOINT/BURSA W/O US: CPT

## 2022-02-15 PROCEDURE — 77002 NEEDLE LOCALIZATION BY XRAY: CPT

## 2022-02-15 RX ORDER — LIDOCAINE HYDROCHLORIDE 10 MG/ML
10 INJECTION, SOLUTION EPIDURAL; INFILTRATION; INTRACAUDAL; PERINEURAL
Status: COMPLETED | OUTPATIENT
Start: 2022-02-15 | End: 2022-02-15

## 2022-02-15 RX ORDER — METHYLPREDNISOLONE ACETATE 80 MG/ML
80 INJECTION, SUSPENSION INTRA-ARTICULAR; INTRALESIONAL; INTRAMUSCULAR; SOFT TISSUE
Status: COMPLETED | OUTPATIENT
Start: 2022-02-15 | End: 2022-02-15

## 2022-02-15 RX ORDER — BUPIVACAINE HYDROCHLORIDE 2.5 MG/ML
10 INJECTION, SOLUTION EPIDURAL; INFILTRATION; INTRACAUDAL
Status: COMPLETED | OUTPATIENT
Start: 2022-02-15 | End: 2022-02-15

## 2022-02-15 RX ADMIN — IOHEXOL 3 ML: 300 INJECTION, SOLUTION INTRAVENOUS at 11:40

## 2022-02-15 RX ADMIN — BUPIVACAINE HYDROCHLORIDE 2 ML: 2.5 INJECTION, SOLUTION EPIDURAL; INFILTRATION; INTRACAUDAL; PERINEURAL at 12:07

## 2022-02-15 RX ADMIN — METHYLPREDNISOLONE ACETATE 80 MG: 80 INJECTION, SUSPENSION INTRA-ARTICULAR; INTRALESIONAL; INTRAMUSCULAR; SOFT TISSUE at 11:40

## 2022-02-15 RX ADMIN — LIDOCAINE HYDROCHLORIDE 5 ML: 10 INJECTION, SOLUTION EPIDURAL; INFILTRATION; INTRACAUDAL; PERINEURAL at 11:40

## 2022-03-18 ENCOUNTER — OFFICE VISIT (OUTPATIENT)
Dept: OBGYN CLINIC | Facility: MEDICAL CENTER | Age: 87
End: 2022-03-18
Payer: MEDICARE

## 2022-03-18 VITALS
HEART RATE: 59 BPM | HEIGHT: 60 IN | SYSTOLIC BLOOD PRESSURE: 158 MMHG | BODY MASS INDEX: 23.56 KG/M2 | DIASTOLIC BLOOD PRESSURE: 67 MMHG | WEIGHT: 120 LBS

## 2022-03-18 DIAGNOSIS — M17.12 PRIMARY OSTEOARTHRITIS OF LEFT KNEE: ICD-10-CM

## 2022-03-18 DIAGNOSIS — M16.12 PRIMARY OSTEOARTHRITIS OF LEFT HIP: Primary | ICD-10-CM

## 2022-03-18 PROCEDURE — 99213 OFFICE O/P EST LOW 20 MIN: CPT | Performed by: ORTHOPAEDIC SURGERY

## 2022-03-18 PROCEDURE — 20610 DRAIN/INJ JOINT/BURSA W/O US: CPT | Performed by: ORTHOPAEDIC SURGERY

## 2022-03-18 RX ORDER — BETAMETHASONE SODIUM PHOSPHATE AND BETAMETHASONE ACETATE 3; 3 MG/ML; MG/ML
12 INJECTION, SUSPENSION INTRA-ARTICULAR; INTRALESIONAL; INTRAMUSCULAR; SOFT TISSUE
Status: COMPLETED | OUTPATIENT
Start: 2022-03-18 | End: 2022-03-18

## 2022-03-18 RX ORDER — LIDOCAINE HYDROCHLORIDE 10 MG/ML
2 INJECTION, SOLUTION INFILTRATION; PERINEURAL
Status: COMPLETED | OUTPATIENT
Start: 2022-03-18 | End: 2022-03-18

## 2022-03-18 RX ORDER — BUPIVACAINE HYDROCHLORIDE 2.5 MG/ML
2 INJECTION, SOLUTION INFILTRATION; PERINEURAL
Status: COMPLETED | OUTPATIENT
Start: 2022-03-18 | End: 2022-03-18

## 2022-03-18 RX ADMIN — LIDOCAINE HYDROCHLORIDE 2 ML: 10 INJECTION, SOLUTION INFILTRATION; PERINEURAL at 15:21

## 2022-03-18 RX ADMIN — BUPIVACAINE HYDROCHLORIDE 2 ML: 2.5 INJECTION, SOLUTION INFILTRATION; PERINEURAL at 15:21

## 2022-03-18 RX ADMIN — BETAMETHASONE SODIUM PHOSPHATE AND BETAMETHASONE ACETATE 12 MG: 3; 3 INJECTION, SUSPENSION INTRA-ARTICULAR; INTRALESIONAL; INTRAMUSCULAR; SOFT TISSUE at 15:21

## 2022-03-18 NOTE — PROGRESS NOTES
Assessment:  1  Primary osteoarthritis of left hip  CANCELED: FL injection left hip (non arthrogram)   2  Primary osteoarthritis of left knee         Plan:  The patient was provided with left trochanteric bursa and left IA knee steroid injections  The patient tolerated the procedure well  The patient should follow up in 3 months  To do next visit:  Return in about 3 months (around 6/18/2022)  The above stated was discussed in layman's terms and the patient expressed understanding  All questions were answered to the patient's satisfaction  Scribe Attestation    I,:  Ed Munoz am acting as a scribe while in the presence of the attending physician :       I,:  Eliecer Zelaya MD personally performed the services described in this documentation    as scribed in my presence :             Subjective:   Luis Garza is a 80 y o  female who presents for follow up of left hip  She is s/p left trochanteric bursa steroid injection, 12/17/2021, with benefit  She is s/p left IA hip injection under imaging with benefit, 2/15/2022  Today she complains of return of left generalized and lateral hip pain and recent onset of left knee pain  Most activity aggravates while rest alleviates  She does use a rolling walker for ambulation          Review of systems negative unless otherwise specified in HPI    Past Medical History:   Diagnosis Date    Anemia     last assessed - [id number]    Cancer [facility])     Depression     last assessed - [id number]    Glaucoma     Hypercalcemia     last assessed - [id number]    Hypertension     Hypothyroidism 11/30/2017    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    Macular degeneration     Nondisplaced fracture of base of fifth metacarpal bone, right hand, initial encounter for closed fracture 12/7/2018    Osteoarthritis     Plantar fasciitis of right foot     last assessed - [id number]    Stomach disorder     Superficial thrombophlebitis of leg unspecified laterality; last assessed - [id number]    Traumatic closed nondisplaced fracture of lumbar vertebra, initial encounter ([facility] [address] ) 12/5/2019    Trochanteric bursitis of left hip     last assessed - [id number]       Past Surgical History:   Procedure Laterality Date    CORONARY STENT PLACEMENT      stent RCA    FL INJECTION LEFT HIP (NON ARTHROGRAM)  11/26/2018    FL INJECTION LEFT HIP (NON ARTHROGRAM)  3/4/2019    FL INJECTION LEFT HIP (NON ARTHROGRAM)  6/10/2019    FL INJECTION LEFT HIP (NON ARTHROGRAM)  9/24/2019    FL INJECTION LEFT HIP (NON ARTHROGRAM)  12/30/2019    FL INJECTION LEFT HIP (NON ARTHROGRAM)  7/6/2020    FL INJECTION LEFT HIP (NON ARTHROGRAM)  12/28/2020    FL INJECTION LEFT HIP (NON ARTHROGRAM)  4/12/2021    FL INJECTION LEFT HIP (NON ARTHROGRAM)  7/12/2021    FL INJECTION LEFT HIP (NON ARTHROGRAM)  11/15/2021    FL INJECTION LEFT HIP (NON ARTHROGRAM)  2/15/2022    FOOT SURGERY      HIP SURGERY      HYSTERECTOMY      JOINT REPLACEMENT      Hip Replacement 2000    SKIN LESION EXCISION  11/2010    Face - BCC - nose    TOTAL ABDOMINAL HYSTERECTOMY W/ BILATERAL SALPINGOOPHORECTOMY      TOTAL HIP ARTHROPLASTY Right        Family History   Problem Relation Age of Onset    Stroke Mother         cerebrovascular accident (CVA)    Coronary artery disease Mother         coronary disease    Stroke Father     Coronary artery disease Father         coronary disease    Stroke Brother     Kidney cancer Family        Social History     Occupational History    Not on file   Tobacco Use    Smoking status: Never Smoker    Smokeless tobacco: Never Used   Vaping Use    Vaping Use: Never used   Substance and Sexual Activity    Alcohol use: Yes     Comment: rare    Drug use: No    Sexual activity: Not on file         Current Outpatient Medications:     acetaminophen (TYLENOL) 500 mg tablet, Take 500 mg by mouth every 6 (six) hours as needed for mild pain, Disp: , Rfl:     aspirin (ADULT ASPIRIN EC LOW STRENGTH) 81 mg EC tablet, Take 1 tablet by mouth daily, Disp: , Rfl:     atorvastatin (LIPITOR) 40 mg tablet, TAKE 1 TABLET BY MOUTH EVERY DAY, Disp: 90 tablet, Rfl: 3    cholecalciferol (VITAMIN D3) 1,000 units tablet, Take 1 tablet by mouth daily, Disp: , Rfl:     clopidogrel (PLAVIX) 75 mg tablet, Take 1 tablet (75 mg total) by mouth daily, Disp: 90 tablet, Rfl: 3    latanoprost (XALATAN) 0 005 % ophthalmic solution, INSTILL 1 DROP INTO BOTH EYES EVERY DAY AS DIRECTED, Disp: , Rfl: 4    metoprolol succinate (TOPROL-XL) 50 mg 24 hr tablet, TAKE 1 TABLET BY MOUTH EVERY DAY, Disp: 90 tablet, Rfl: 1    Mirabegron ER (Myrbetriq) 25 MG TB24, Take 25 mg by mouth daily, Disp: 90 tablet, Rfl: 3    Multiple Vitamins-Minerals (ICAPS AREDS 2) CAPS, Take by mouth, Disp: , Rfl:     nitroglycerin (NITROSTAT) 0 4 mg SL tablet, PLACE 1 TABLET (0 4 MG TOTAL) UNDER THE TONGUE EVERY 5 (FIVE) MINUTES AS NEEDED FOR CHEST PAIN, Disp: 25 tablet, Rfl: 3    olopatadine (Pataday) 0 1 % ophthalmic solution, , Disp: , Rfl:     Omega-3 Fatty Acids (OMEGA-3 FISH OIL) 1200 MG CAPS, Take by mouth, Disp: , Rfl:     omeprazole (PriLOSEC) 20 mg delayed release capsule, TAKE 1 CAPSULE BY MOUTH DAILY  DAYS, Disp: 90 capsule, Rfl: 1    Polyvinyl Alcohol-Povidone PF (REFRESH) 1 4-0 6 % SOLN, Apply to eye, Disp: , Rfl:     RESTASIS 0 05 % ophthalmic emulsion, instill 1 drop into both eyes twice a day, Disp: , Rfl: 0    valsartan-hydrochlorothiazide (DIOVAN-HCT) 80-12 5 MG per tablet, TAKE 1 TABLET BY MOUTH EVERY DAY, Disp: 90 tablet, Rfl: 3    Allergies   Allergen Reactions    Iv Contrast [Iodinated Diagnostic Agents]     Lactose - Food Allergy GI Intolerance    Other      Iv contrast  Adhesive tape    Ciprofloxacin      Patient does not remember            Vitals:    03/18/22 1456   BP: (!) 171/72   Pulse: 67       Objective:  Physical exam  · General: Awake, Alert, Oriented  · Eyes: Pupils equal, round and reactive to light  · Heart: regular rate and rhythm  · Lungs: No audible wheezing  · Abdomen: soft                    Ortho Exam  Left hip:  TTP over greater trochanter  Positive Stinchfield test   Good arc of motion  Patient sits comfortably in chair with hip flexed at 90 degrees  Patient stands from seated position without assistance    Left knee:  Valgus alignment  TTP over lateral joint line  No erythema or ecchymosis  No effusion or swelling  Normal strength  Good ROM with crepitus   Calf compartments soft and supple  Sensation intact  Toes are warm sensate and mobile          Diagnostics, reviewed and taken today if performed as documented:    None performed    Procedures, if performed today:    Large joint arthrocentesis: L greater trochanteric bursa  Universal Protocol:  Consent: Verbal consent obtained  Risks and benefits: risks, benefits and alternatives were discussed  Consent given by: patient  Time out: Immediately prior to procedure a "time out" was called to verify the correct patient, procedure, equipment, support staff and site/side marked as required  Timeout called at: 3/18/2022 3:09 PM   Patient understanding: patient states understanding of the procedure being performed  Site marked: the operative site was marked  Patient identity confirmed: verbally with patient    Supporting Documentation  Indications: pain   Procedure Details  Location: hip - L greater trochanteric bursa  Needle size: 22 G  Ultrasound guidance: no  Approach: lateral  Medications administered: 12 mg betamethasone acetate-betamethasone sodium phosphate 6 (3-3) mg/mL; 2 mL bupivacaine 0 25 %; 2 mL lidocaine 1 %    Patient tolerance: patient tolerated the procedure well with no immediate complications  Dressing:  Sterile dressing applied    Large joint arthrocentesis: L knee  Universal Protocol:  Consent: Verbal consent obtained    Risks and benefits: risks, benefits and alternatives were discussed  Consent given by: patient  Time out: Immediately prior to procedure a "time out" was called to verify the correct patient, procedure, equipment, support staff and site/side marked as required  Timeout called at: 3/18/2022 3:20 PM   Patient understanding: patient states understanding of the procedure being performed  Site marked: the operative site was marked  Patient identity confirmed: verbally with patient    Supporting Documentation  Indications: pain   Procedure Details  Location: knee - L knee  Preparation: Patient was prepped and draped in the usual sterile fashion  Needle size: 22 G  Ultrasound guidance: no  Approach: anterolateral  Medications administered: 12 mg betamethasone acetate-betamethasone sodium phosphate 6 (3-3) mg/mL; 2 mL bupivacaine 0 25 %; 2 mL lidocaine 1 %    Patient tolerance: patient tolerated the procedure well with no immediate complications  Dressing:  Sterile dressing applied          Portions of the record may have been created with voice recognition software  Occasional wrong word or "sound a like" substitutions may have occurred due to the inherent limitations of voice recognition software  Read the chart carefully and recognize, using context, where substitutions have occurred

## 2022-03-28 DIAGNOSIS — K21.9 GASTROESOPHAGEAL REFLUX DISEASE WITHOUT ESOPHAGITIS: ICD-10-CM

## 2022-03-28 RX ORDER — OMEPRAZOLE 20 MG/1
CAPSULE, DELAYED RELEASE ORAL
Qty: 90 CAPSULE | Refills: 1 | Status: SHIPPED | OUTPATIENT
Start: 2022-03-28

## 2022-04-05 ENCOUNTER — APPOINTMENT (OUTPATIENT)
Dept: LAB | Facility: MEDICAL CENTER | Age: 87
End: 2022-04-05
Payer: MEDICARE

## 2022-04-05 DIAGNOSIS — R74.8 ELEVATED ALKALINE PHOSPHATASE LEVEL: ICD-10-CM

## 2022-04-05 LAB
ALBUMIN SERPL BCP-MCNC: 3.9 G/DL (ref 3.5–5)
ALP SERPL-CCNC: 119 U/L (ref 46–116)
ALT SERPL W P-5'-P-CCNC: 27 U/L (ref 12–78)
ANION GAP SERPL CALCULATED.3IONS-SCNC: 3 MMOL/L (ref 4–13)
AST SERPL W P-5'-P-CCNC: 23 U/L (ref 5–45)
BASOPHILS # BLD AUTO: 0.04 THOUSANDS/ΜL (ref 0–0.1)
BASOPHILS NFR BLD AUTO: 1 % (ref 0–1)
BILIRUB SERPL-MCNC: 0.49 MG/DL (ref 0.2–1)
BUN SERPL-MCNC: 25 MG/DL (ref 5–25)
CALCIUM SERPL-MCNC: 10.9 MG/DL (ref 8.3–10.1)
CHLORIDE SERPL-SCNC: 108 MMOL/L (ref 100–108)
CO2 SERPL-SCNC: 28 MMOL/L (ref 21–32)
CREAT SERPL-MCNC: 1.06 MG/DL (ref 0.6–1.3)
EOSINOPHIL # BLD AUTO: 0.12 THOUSAND/ΜL (ref 0–0.61)
EOSINOPHIL NFR BLD AUTO: 1 % (ref 0–6)
ERYTHROCYTE [DISTWIDTH] IN BLOOD BY AUTOMATED COUNT: 14.1 % (ref 11.6–15.1)
GFR SERPL CREATININE-BSD FRML MDRD: 44 ML/MIN/1.73SQ M
GGT SERPL-CCNC: 25 U/L (ref 5–85)
GLUCOSE P FAST SERPL-MCNC: 99 MG/DL (ref 65–99)
HCT VFR BLD AUTO: 36.3 % (ref 34.8–46.1)
HGB BLD-MCNC: 11.2 G/DL (ref 11.5–15.4)
IMM GRANULOCYTES # BLD AUTO: 0.04 THOUSAND/UL (ref 0–0.2)
IMM GRANULOCYTES NFR BLD AUTO: 1 % (ref 0–2)
LYMPHOCYTES # BLD AUTO: 1.31 THOUSANDS/ΜL (ref 0.6–4.47)
LYMPHOCYTES NFR BLD AUTO: 16 % (ref 14–44)
MCH RBC QN AUTO: 29.2 PG (ref 26.8–34.3)
MCHC RBC AUTO-ENTMCNC: 30.9 G/DL (ref 31.4–37.4)
MCV RBC AUTO: 95 FL (ref 82–98)
MONOCYTES # BLD AUTO: 0.67 THOUSAND/ΜL (ref 0.17–1.22)
MONOCYTES NFR BLD AUTO: 8 % (ref 4–12)
NEUTROPHILS # BLD AUTO: 6.27 THOUSANDS/ΜL (ref 1.85–7.62)
NEUTS SEG NFR BLD AUTO: 73 % (ref 43–75)
NRBC BLD AUTO-RTO: 0 /100 WBCS
PLATELET # BLD AUTO: 224 THOUSANDS/UL (ref 149–390)
PMV BLD AUTO: 9.6 FL (ref 8.9–12.7)
POTASSIUM SERPL-SCNC: 4 MMOL/L (ref 3.5–5.3)
PROT SERPL-MCNC: 7.1 G/DL (ref 6.4–8.2)
PTH-INTACT SERPL-MCNC: 86.2 PG/ML (ref 18.4–80.1)
RBC # BLD AUTO: 3.84 MILLION/UL (ref 3.81–5.12)
SODIUM SERPL-SCNC: 139 MMOL/L (ref 136–145)
WBC # BLD AUTO: 8.45 THOUSAND/UL (ref 4.31–10.16)

## 2022-04-05 PROCEDURE — 80053 COMPREHEN METABOLIC PANEL: CPT | Performed by: FAMILY MEDICINE

## 2022-04-05 PROCEDURE — 85025 COMPLETE CBC W/AUTO DIFF WBC: CPT | Performed by: FAMILY MEDICINE

## 2022-04-05 PROCEDURE — 36415 COLL VENOUS BLD VENIPUNCTURE: CPT | Performed by: FAMILY MEDICINE

## 2022-04-05 PROCEDURE — 83970 ASSAY OF PARATHORMONE: CPT | Performed by: FAMILY MEDICINE

## 2022-04-05 PROCEDURE — 82977 ASSAY OF GGT: CPT

## 2022-04-08 ENCOUNTER — OFFICE VISIT (OUTPATIENT)
Dept: FAMILY MEDICINE CLINIC | Facility: CLINIC | Age: 87
End: 2022-04-08
Payer: MEDICARE

## 2022-04-08 VITALS
RESPIRATION RATE: 16 BRPM | HEIGHT: 60 IN | SYSTOLIC BLOOD PRESSURE: 128 MMHG | BODY MASS INDEX: 22.78 KG/M2 | DIASTOLIC BLOOD PRESSURE: 60 MMHG | TEMPERATURE: 97 F | OXYGEN SATURATION: 98 % | HEART RATE: 63 BPM | WEIGHT: 116 LBS

## 2022-04-08 DIAGNOSIS — Z00.00 MEDICARE ANNUAL WELLNESS VISIT, SUBSEQUENT: ICD-10-CM

## 2022-04-08 DIAGNOSIS — R74.8 ELEVATED ALKALINE PHOSPHATASE LEVEL: ICD-10-CM

## 2022-04-08 DIAGNOSIS — M15.9 GENERALIZED OSTEOARTHRITIS OF MULTIPLE SITES: ICD-10-CM

## 2022-04-08 DIAGNOSIS — E78.00 HYPERCHOLESTEREMIA: ICD-10-CM

## 2022-04-08 DIAGNOSIS — N18.32 STAGE 3B CHRONIC KIDNEY DISEASE (HCC): ICD-10-CM

## 2022-04-08 DIAGNOSIS — N25.81 HYPERPARATHYROIDISM, SECONDARY RENAL (HCC): ICD-10-CM

## 2022-04-08 DIAGNOSIS — M46.1 SACROILIITIS (HCC): ICD-10-CM

## 2022-04-08 DIAGNOSIS — I25.10 CORONARY ARTERY DISEASE INVOLVING NATIVE CORONARY ARTERY OF NATIVE HEART WITHOUT ANGINA PECTORIS: ICD-10-CM

## 2022-04-08 DIAGNOSIS — Z95.5 HISTORY OF CORONARY ARTERY STENT PLACEMENT: ICD-10-CM

## 2022-04-08 DIAGNOSIS — I10 ESSENTIAL HYPERTENSION: Primary | ICD-10-CM

## 2022-04-08 DIAGNOSIS — K21.9 GASTROESOPHAGEAL REFLUX DISEASE WITHOUT ESOPHAGITIS: ICD-10-CM

## 2022-04-08 DIAGNOSIS — Z13.89 ENCOUNTER FOR SCREENING FOR OTHER DISORDER: ICD-10-CM
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
## 2022-04-08 PROCEDURE — 99214 OFFICE O/P EST MOD 30 MIN: CPT | Performed by: FAMILY MEDICINE

## 2022-04-08 PROCEDURE — G0444 DEPRESSION SCREEN ANNUAL: HCPCS | Performed by: FAMILY MEDICINE

## 2022-04-08 PROCEDURE — G0439 PPPS, SUBSEQ VISIT: HCPCS | Performed by: FAMILY MEDICINE

## 2022-04-08 RX ORDER — CLOPIDOGREL BISULFATE 75 MG/1
75 TABLET ORAL DAILY
Qty: 90 TABLET | Refills: 3 | Status: SHIPPED | OUTPATIENT
Start: 2022-04-08

## 2022-04-08 RX ORDER — ATORVASTATIN CALCIUM 40 MG/1
40 TABLET, FILM COATED ORAL DAILY
Qty: 90 TABLET | Refills: 3 | Status: SHIPPED | OUTPATIENT
Start: 2022-04-08

## 2022-04-08 RX ORDER — METOPROLOL SUCCINATE 50 MG/1
50 TABLET, EXTENDED RELEASE ORAL DAILY
Qty: 90 TABLET | Refills: 3 | Status: SHIPPED | OUTPATIENT
Start: 2022-04-08

## 2022-04-08 RX ORDER — VALSARTAN AND HYDROCHLOROTHIAZIDE 80; 12.5 MG/1; MG/1
1 TABLET, FILM COATED ORAL DAILY
Qty: 90 TABLET | Refills: 3 | Status: SHIPPED | OUTPATIENT
Start: 2022-04-08

## 2022-04-08 NOTE — PROGRESS NOTES
Assessment and Plan:     Problem List Items Addressed This Visit        Digestive    Gastroesophageal reflux disease       Cardiovascular and Mediastinum    Essential hypertension - Primary    Relevant Medications    valsartan-hydrochlorothiazide (DIOVAN-HCT) 80-12 5 MG per tablet    metoprolol succinate (TOPROL-XL) 50 mg 24 hr tablet    Other Relevant Orders    CBC and differential    Comprehensive metabolic panel       Musculoskeletal and Integument    Generalized osteoarthritis of multiple sites    Sacroiliitis (HCC)       Genitourinary    Stage 3b chronic kidney disease ([facility] )       Other    History of coronary artery stent placement    Elevated alkaline phosphatase level      Other Visit Diagnoses     Hyperparathyroidism, secondary renal ([facility] )        Relevant Orders    PTH, intact    Hypercholesteremia        Relevant Medications    atorvastatin (LIPITOR) 40 mg tablet    Other Relevant Orders    TSH, 3rd generation with Free T4 reflex    Lipid panel    Coronary artery disease involving native coronary artery of native heart without angina pectoris        Relevant Medications    clopidogrel (PLAVIX) 75 mg tablet    metoprolol succinate (TOPROL-XL) 50 mg 24 hr tablet    Medicare annual wellness visit, subsequent        Encounter for screening for other disorder              Depression Screening and Follow-up Plan: Patient was screened for depression during today's encounter  They screened negative with a PHQ-2 score of 1  Falls Plan of Care: balance, strength, and gait training instructions were provided  Preventive health issues were discussed with patient, and age appropriate screening tests were ordered as noted in patient's After Visit Summary  Personalized health advice and appropriate referrals for health education or preventive services given if needed, as noted in patient's After Visit Summary       History of Present Illness:     Patient presents for Medicare Annual Wellness visit    Patient Care Team:  Jake Mtz MD as PCP - General  MD Jann Bundy MD (Orthopedic Surgery)  Evelia Gamble MD (Urology)  Raj Colvin MD (Ophthalmology)     Problem List:     Patient Active Problem List   Diagnosis    Chronic right shoulder pain    Greater trochanteric bursitis of left hip    Acquired valgus deformity of knee, left    Angiomyolipoma of kidney    Atherosclerotic heart disease of native coronary artery without angina pectoris    Gastroesophageal reflux disease    Generalized osteoarthritis of multiple sites    Mixed hyperlipidemia    Primary osteoarthritis of left hip    PVCs (premature ventricular contractions)    Tear of right rotator cuff    Venous insufficiency (chronic) (peripheral)    History of coronary artery stent placement    [facility]roiliitis ([facility] )    Essential hypertension    Lymphedema of right lower extremity    Stage 3b chronic kidney disease ([facility] )    Elevated alkaline phosphatase level      Past Medical and Surgical History:     Past Medical History:   Diagnosis Date    Anemia     last assessed - [id number]    Cancer [facility])     Depression     last assessed - [id number]    Glaucoma     Hypercalcemia     last assessed - [id number]    Hypertension     Hypothyroidism 11/30/2017    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    right lower extremity    Macular degeneration     Nondisplaced fracture of base of fifth metacarpal bone, right hand, initial encounter for closed fracture 12/7/2018    Osteoarthritis     Plantar fasciitis of right foot     last assessed - [id number]    Stomach disorder     Superficial thrombophlebitis of leg     unspecified laterality; last assessed - [id number]    Traumatic closed nondisplaced fracture of lumbar vertebra, initial encounter ([facility] ) 12/5/2019    Trochanteric bursitis of left hip     last assessed - [id number]     Past Surgical History:   Procedure Laterality Date    CORONARY STENT PLACEMENT      stent RCA  FL INJECTION LEFT HIP (NON ARTHROGRAM)  11/26/2018    FL INJECTION LEFT HIP (NON ARTHROGRAM)  3/4/2019    FL INJECTION LEFT HIP (NON ARTHROGRAM)  6/10/2019    FL INJECTION LEFT HIP (NON ARTHROGRAM)  9/24/2019    FL INJECTION LEFT HIP (NON ARTHROGRAM)  12/30/2019    FL INJECTION LEFT HIP (NON ARTHROGRAM)  7/6/2020    FL INJECTION LEFT HIP (NON ARTHROGRAM)  12/28/2020    FL INJECTION LEFT HIP (NON ARTHROGRAM)  4/12/2021    FL INJECTION LEFT HIP (NON ARTHROGRAM)  7/12/2021    FL INJECTION LEFT HIP (NON ARTHROGRAM)  11/15/2021    FL INJECTION LEFT HIP (NON ARTHROGRAM)  2/15/2022    FOOT SURGERY      HIP SURGERY      HYSTERECTOMY      JOINT REPLACEMENT      Hip Replacement 2000    SKIN LESION EXCISION  11/2010    Face - BCC - nose    TOTAL ABDOMINAL HYSTERECTOMY W/ BILATERAL SALPINGOOPHORECTOMY      TOTAL HIP ARTHROPLASTY Right       Family History:     Family History   Problem Relation Age of Onset    Stroke Mother         cerebrovascular accident (CVA)    Coronary artery disease Mother         coronary disease    Stroke Father     Coronary artery disease Father         coronary disease    Stroke Brother     Kidney cancer Family       Social History:     Social History     Socioeconomic History    Marital status:       Spouse name: None    Number of children: None    Years of education: None    Highest education level: None   Occupational History    None   Tobacco Use    Smoking status: Never Smoker    Smokeless tobacco: Never Used   Vaping Use    Vaping Use: Never used   Substance and Sexual Activity    Alcohol use: Yes     Comment: rare    Drug use: No    Sexual activity: None   Other Topics Concern    None   Social History Narrative    Caffeine use     Social Determinants of Health     Financial Resource Strain: Not on file   Food Insecurity: Not on file   Transportation Needs: Not on file   Physical Activity: Not on file   Stress: Not on file   Social Connections: Not on file   Intimate Partner Violence: Not on file   Housing Stability: Not on file      Medications and Allergies:     Current Outpatient Medications   Medication Sig Dispense Refill    acetaminophen (TYLENOL) 500 mg tablet Take 500 mg by mouth every 6 (six) hours as needed for mild pain      aspirin (ADULT ASPIRIN EC LOW STRENGTH) 81 mg EC tablet Take 1 tablet by mouth daily      atorvastatin (LIPITOR) 40 mg tablet Take 1 tablet (40 mg total) by mouth daily 90 tablet 3    cholecalciferol (VITAMIN D3) 1,000 units tablet Take 1 tablet by mouth daily      clopidogrel (PLAVIX) 75 mg tablet Take 1 tablet (75 mg total) by mouth daily 90 tablet 3    latanoprost (XALATAN) 0 005 % ophthalmic solution INSTILL 1 DROP INTO BOTH EYES EVERY DAY AS DIRECTED  4    metoprolol succinate (TOPROL-XL) 50 mg 24 hr tablet Take 1 tablet (50 mg total) by mouth daily 90 tablet 3    Mirabegron ER (Myrbetriq) 25 MG TB24 Take 25 mg by mouth daily 90 tablet 3    Multiple Vitamins-Minerals (ICAPS AREDS 2) CAPS Take by mouth      nitroglycerin (NITROSTAT) 0 4 mg SL tablet PLACE 1 TABLET (0 4 MG TOTAL) UNDER THE TONGUE EVERY 5 (FIVE) MINUTES AS NEEDED FOR CHEST PAIN 25 tablet 3    olopatadine (Pataday) 0 1 % ophthalmic solution       Omega-3 Fatty Acids (OMEGA-3 FISH OIL) 1200 MG CAPS Take by mouth      omeprazole (PriLOSEC) 20 mg delayed release capsule 1 capsule by mouth daily 90 capsule 1    Polyvinyl Alcohol-Povidone PF (REFRESH) 1 4-0 6 % SOLN Apply to eye      RESTASIS 0 05 % ophthalmic emulsion instill 1 drop into both eyes twice a day  0    valsartan-hydrochlorothiazide (DIOVAN-HCT) 80-12 5 MG per tablet Take 1 tablet by mouth daily 90 tablet 3     No current facility-administered medications for this visit       Allergies   Allergen Reactions    Iv Contrast [Iodinated Diagnostic Agents]     Lactose - Food Allergy GI Intolerance    Other      Iv contrast  Adhesive tape    Ciprofloxacin      Patient does not remember Immunizations:     Immunization History   Administered Date(s) Administered    COVID-19 PFIZER VACCINE 0 3 ML IM 01/31/2021, 02/20/2021, 10/09/2021    INFLUENZA 11/18/2015, 11/21/2016, 11/30/2017    Influenza Split High Dose Preservative Free IM 11/19/2012, 10/03/2013, 09/24/2014, 11/18/2015, 11/21/2016, 11/30/2017    Influenza, high dose seasonal 0 7 mL 12/03/2018, 11/04/2019, 09/01/2020, 09/13/2021    Influenza, seasonal, injectable 10/24/2011    Pneumococcal Conjugate 13-Valent 11/18/2015    Pneumococcal Polysaccharide PPV23 01/02/2008      Health Maintenance: There are no preventive care reminders to display for this patient  Topic Date Due    DTaP,Tdap,and Td Vaccines (1 - Tdap) Never done      Medicare Health Risk Assessment:     /60   Pulse 63   Temp (!) 97 °F (36 1 °C)   Resp 16   Ht 5' (1 524 m)   Wt 52 6 kg (116 lb)   SpO2 98%   BMI 22 65 kg/m²      Dmitri Johnson is here for her Subsequent Wellness visit  Last Medicare Wellness visit information reviewed, patient interviewed and updates made to the record today  Health Risk Assessment:   Patient rates overall health as fair  Patient feels that their physical health rating is slightly worse  Patient is dissatisfied with their life  Eyesight was rated as slightly worse  Hearing was rated as slightly worse  Patient feels that their emotional and mental health rating is same  Patients states they are sometimes angry  Patient states they are often unusually tired/fatigued  Patient states that she has experienced no weight loss or gain in last 6 months  Depression Screening:   PHQ-2 Score: 1      Fall Risk Screening: In the past year, patient has experienced: history of falling in past year    Number of falls: 1  Injured during fall?: Yes    Feels unsteady when standing or walking?: Yes    Worried about falling?: Yes      Urinary Incontinence Screening:   Patient has leaked urine accidently in the last six months   UI followed by Urology     Home Safety:  Patient has trouble with stairs inside or outside of their home  Patient has working smoke alarms and has working carbon monoxide detector  Home safety hazards include: none  Nutrition:   Current diet is Regular, Low Cholesterol and Limited junk food  Medications:   Patient is currently taking over-the-counter supplements  OTC medications include: see medication list  Patient is able to manage medications  Activities of Daily Living (ADLs)/Instrumental Activities of Daily Living (IADLs):   Walk and transfer into and out of bed and chair?: Yes  Dress and groom yourself?: Yes    Bathe or shower yourself?: Yes    Feed yourself?  Yes  Do your laundry/housekeeping?: Yes  Manage your money, pay your bills and track your expenses?: No  Make your own meals?: Yes    Do your own shopping?: No    Previous Hospitalizations:   Any hospitalizations or ED visits within the last 12 months?: Yes    How many hospitalizations have you had in the last year?: 1-2    Advance Care Planning:   Living will: Yes    Advanced directive: Yes      Cognitive Screening:   Provider or family/friend/caregiver concerned regarding cognition?: No    PREVENTIVE SCREENINGS      Cardiovascular Screening:    General: Screening Not Indicated and History Lipid Disorder      Diabetes Screening:     General: Screening Current      Colorectal Cancer Screening:     General: Screening Not Indicated      Breast Cancer Screening:     General: Screening Not Indicated      Cervical Cancer Screening:    General: Screening Not Indicated      Osteoporosis Screening:    General: Screening Not Indicated      Abdominal Aortic Aneurysm (AAA) Screening:        General: Screening Not Indicated      Lung Cancer Screening:     General: Screening Not Indicated      Hepatitis C Screening:    General: Screening Not Indicated    Screening, Brief Intervention, and Referral to Treatment (SBIRT)    Screening  Typical number of drinks in a day: 0  Typical number of drinks in a week: 0  Interpretation: Low risk drinking behavior  Brief Intervention  Alcohol & drug use screenings were reviewed  No concerns regarding substance use disorder identified  Other Counseling Topics:   Calcium and vitamin D intake and regular weightbearing exercise         Viola Puckett MD

## 2022-04-08 NOTE — PROGRESS NOTES
Assessment/Plan:         Diagnoses and all orders for this visit:    Essential hypertension  -     valsartan-hydrochlorothiazide (DIOVAN-HCT) 80-12 5 MG per tablet; Take 1 tablet by mouth daily  -     metoprolol succinate (TOPROL-XL) 50 mg 24 hr tablet; Take 1 tablet (50 mg total) by mouth daily  -     CBC and differential  -     Comprehensive metabolic panel    Stage 3b chronic kidney disease ([facility] [address] )    Hyperparathyroidism, secondary renal (HCC)  -     PTH, intact    Hypercholesteremia  -     atorvastatin (LIPITOR) 40 mg tablet; Take 1 tablet (40 mg total) by mouth daily  -     TSH, 3rd generation with Free T4 reflex  -     Lipid panel    Coronary artery disease involving native coronary artery of native heart without angina pectoris  -     clopidogrel (PLAVIX) 75 mg tablet; Take 1 tablet (75 mg total) by mouth daily    History of coronary artery stent placement    Elevated alkaline phosphatase level    Gastroesophageal reflux disease without esophagitis    Sacroiliitis (HCC)    Generalized osteoarthritis of multiple sites    Medicare annual wellness visit, subsequent    Encounter for screening for other disorder          Continue with current medications  No Ca++ supplements  Suspected 2nd hyperparathyroidism due to CKD  Deferred work up for primary hyperparathyroidism given advanced age, mild hypercalcemia  Stay hydrated  Avoid NSAIDs  OV 6 months with repeat labs  Patient ID: Julia Sousa is a 80 y o  female  Followup visit  Here with her daughter Desiree [place]  Hypertension/CKD stage 3  blood pressures have been stable on Valsartan HCTZ 80/12 5 daily and Metoprolol ER 50 mg daily  04/2022 creatinine 1 06  GFR 44  Electrolytes normal except for Ca++ 10 9  Hgb 11 2  PTH 86 2   09/2021 creatinine 0 97  GFR 50  08/2020 creatinine 1 20  GFR 39  Electrolytes normal except for chloride 110 and Ca++ 10 5  Hgb 11 9   05/2020 creatinine 1 19  GFR 39  05/2020 SPEP no monoclonal bands    Hyperlipidemia and CAD on Atorvastatin 40 mg daily  Last lipid profile 09/2021 cholesterol 169  TGs 117   HDL 49  LDL 97  04/2022  LFTs normal except for alkaline phosphatase 119-GGT normal at 25  11/2021  admission  for DANILO/dehydration/fall  Patient presented with vomiting and diarrhea  Admission creatinine 1 60  Electrolytes normal except for Ca++ 10 9  Creatinine  improved to 1 25 with IVF  GFR 37   Ca++ returned to normal at 9 1  Hemoglobin 12 7 decreased to 10 7 with hydration  Lactic acid elevated at 2 4  CK normal at 107  No injuries from fall  Multiple imaging studies  CT head/MRI brain no acute intracranial abnormalities  She completed home PT  Patient no longer driving  Patient lives alone in a [facility] apartment  Independent with ADLs and most of her IADLs  Assist with grocery shopping  Daughter pays her bills    She eats TV dinners or pre cook meals      Recent Results (from the past 672 hour(s))   CBC and differential    Collection Time: 04/05/22 11:57 AM   Result Value Ref Range    WBC 8 45 4 31 - 10 16 Thousand/uL    RBC 3 84 3 81 - 5 12 Million/uL    Hemoglobin 11 2 (L) 11 5 - 15 4 g/dL    Hematocrit 36 3 34 8 - 46 1 %    MCV 95 82 - 98 fL    MCH 29 2 26 8 - 34 3 pg    MCHC 30 9 (L) 31 4 - 37 4 g/dL    RDW 14 1 11 6 - 15 1 %    MPV 9 6 8 9 - 12 7 fL    Platelets 196 133 - 658 Thousands/uL    nRBC 0 /100 WBCs    Neutrophils Relative 73 43 - 75 %    Immat GRANS % 1 0 - 2 %    Lymphocytes Relative 16 14 - 44 %    Monocytes Relative 8 4 - 12 %    Eosinophils Relative 1 0 - 6 %    Basophils Relative 1 0 - 1 %    Neutrophils Absolute 6 27 1 85 - 7 62 Thousands/µL    Immature Grans Absolute 0 04 0 00 - 0 20 Thousand/uL    Lymphocytes Absolute 1 31 0 60 - 4 47 Thousands/µL    Monocytes Absolute 0 67 0 17 - 1 22 Thousand/µL    Eosinophils Absolute 0 12 0 00 - 0 61 Thousand/µL    Basophils Absolute 0 04 0 00 - 0 10 Thousands/µL   Comprehensive metabolic panel    Collection Time: 04/05/22 11:57 AM   Result Value Ref Range Sodium 139 136 - 145 mmol/L    Potassium 4 0 3 5 - 5 3 mmol/L    Chloride 108 100 - 108 mmol/L    CO2 28 21 - 32 mmol/L    ANION GAP 3 (L) 4 - 13 mmol/L    BUN 25 5 - 25 mg/dL    Creatinine 1 06 0 60 - 1 30 mg/dL    Glucose, Fasting 99 65 - 99 mg/dL    Calcium 10 9 (H) 8 3 - 10 1 mg/dL    AST 23 5 - 45 U/L    ALT 27 12 - 78 U/L    Alkaline Phosphatase 119 (H) 46 - 116 U/L    Total Protein 7 1 6 4 - 8 2 g/dL    Albumin 3 9 3 5 - 5 0 g/dL    Total Bilirubin 0 49 0 20 - 1 00 mg/dL    eGFR 44 ml/min/1 73sq m   PTH, intact    Collection Time: 04/05/22 11:57 AM   Result Value Ref Range    PTH 86 2 (H) 18 4 - 80 1 pg/mL   Gamma GT    Collection Time: 04/05/22 11:57 AM   Result Value Ref Range    GGT 25 5 - 85 U/L     Lab Results   Component Value Date    CHOLESTEROL 169 09/09/2021    CHOLESTEROL 165 08/25/2020    CHOLESTEROL 179 11/26/2019     Lab Results   Component Value Date    HDL 49 09/09/2021    HDL 40 08/25/2020    HDL 53 11/26/2019     Lab Results   Component Value Date    TRIG 117 09/09/2021    TRIG 139 08/25/2020    TRIG 131 11/26/2019     Lab Results   Component Value Date    LDLCALC 97 09/09/2021             The following portions of the patient's history were reviewed and updated as appropriate: allergies, current medications, past family history, past medical history, past social history, past surgical history and problem list         Review of Systems   Constitutional: Negative for appetite change, chills, fatigue, fever and unexpected weight change  HENT: Positive for hearing loss  Negative for congestion, ear pain, rhinorrhea, sore throat and trouble swallowing  Eyes: Negative for visual disturbance  ARMD and glaucoma  Respiratory: Negative for cough, shortness of breath and wheezing  No orthopnea or PND   Cardiovascular: Negative for chest pain, palpitations and leg swelling  CAD followed by Cardiology  07/2021 echocardiogram normal left ventricular systolic function    EF 55 %   Grade 1 diastolic dysfunction  Trace MR  Moderate TR with mild pulmonary hypertension  No pericardial effusion  Aortic root normal size  12/2019 CT scan chest mild left basilar atelectasis versus infiltrate  Normal heart size  Coronary artery calcifications  Normal caliber thoracic aorta with mild atherosclerotic calcifications  9 mm lobulated density in the posterior right upper lobe 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  No tracheal or endobronchial lesions  12/2019 EKG normal sinus rhythm  No acute changes  First-degree AV block  Left posterior fascicular block  Cardiology evaluation 5/2015 for exertional dyspnea and palpitations  Holter monitor showed normal sinus rhythm  139 single PVCs  No sustained ventricular rhythm  rare PACs, consisting of 183 single PACs  3 were noted in bigeminy  10 noted in couplets  no sustained supraventricular rhythm  No significant pauses or high grade AV block  Echocardiogram showed normal left ventricular function, ejection fraction 81% grade 1 diastolic dysfunction  No significant valvular abnormalities  Chronic swelling right lower leg  s/p resection of leiomyosarcoma 2002  Repeat echocardiogram 09/2017 normal left ventricular systolic function  EF 60%  No regional wall motion abnormalities  Right ventricle normal  Trace MR  No pericardial effusion  Gastrointestinal: Negative for abdominal pain, blood in stool, constipation, diarrhea, nausea and vomiting  GERD stable on Omeprazole  no reflux  no dysphagia  Mildly elevated alk phos 04/2022 119  GGTP normal at 25       Lab Results       Component                Value               Date                       ALT                      24                  09/09/2021                 AST                      19                  09/09/2021                 ALKPHOS                  138 (H)             09/09/2021                 BILITOT                  0 6                 11/16/2016 Alkaline Phosphatase       Date                     Value               Ref Range           Status                09/09/2021               138 (H)             46 - 116 U/L        Final                 08/25/2020               123 (H)             46 - 116 U/L        Final                 12/06/2019               97                  46 - 116 U/L        Final                 05/21/2018               124                 33 - 130 U/L        Final                 11/16/2016               108                 33 - 130 U/L        Final                 03/22/2016               94                  33 - 130 U/L        Final                 11/16/2015               118 (H)             46 - 116 U/L        Final                             Endocrine:        See HPI   History of mildly elevated Ca++  11/2021 Ca++ 9 1  PTH 95 5  GFR 37  Vitamin D 29  Suspect secondary to CKD  09/2021 Ca++ 10 5   09/2014  SPEP normal    Hypothyroidism no longer on medication  09/2021 TSH 2 830   08/2020 TSH 3 400   3/2017 TSH 5 310  Positive thyroid microsomal antibody  Patient was started on Levothyroxine 25 mcg daily in 2017 she stopped Levothyroxine when she started itching  No rash  Lab Results       Component                Value               Date                       PTH                      86 2 (H)            04/05/2022                 CALCIUM                  10 9 (H)            04/05/2022                     Lab Results       Component                Value               Date                       [id number]             2 830               09/09/2021                            Genitourinary: Negative for difficulty urinating, dysuria and frequency  OAB she started on Myrbetriq  Trospium stopped due to constipation  Previously on Vesicare- No improvement  Nocturia x 1  She is followed by Urology  09/2020   Right renal angiomyolipoma   renal u/s 09/2015   Musculoskeletal: Positive for arthralgias and gait problem (Patient uses a cane or walker for ambulation)  Negative for myalgias  Chronic left hip pain  OA left hip  She is followed by Orthopedics  11/2021 X-rays left hip severe osteoarthritis  Periodic left hip intra-articular steroid injections and left trochanteric bursa injections  OA right shoulder  X-rays of right shoulder showed mild glenohumeral osteoarthritis and mild right AC joint osteoarthritis  She completed a course of physical therapy  12/2019 admission after a fall  Patient was trying to close her curtains when she fell backwards  No LOC  CT scan chest,abdomen and pelvis nondisplaced fractures of the left transverse processes of L2 and L3  No other evidence of acute posttraumatic abnormality  She has both a cane and walker for ambulation  Skin: Negative for rash  Neurological: Negative for dizziness and headaches  See HPI  ER visit for fall 12/2018  No LOC  12/2018 CT scan of head no acute intracranial abnormality  Decreased attenuation noted in the periventricular and subcortical white matter is demonstrating moderate microangiopathic change  episode of slurred speech 10/2015 no recurrence  on ASA 81 mg daily and Plavix  she refused work up  Hematological: Negative for adenopathy  Does not bruise/bleed easily          Lab Results       Component                Value               Date                       WBC                      8 45                04/05/2022                 HGB                      11 2 (L)            04/05/2022                 HCT                      36 3                04/05/2022                 MCV                      95                  04/05/2022                 PLT                      224                 04/05/2022              Hemoglobin       Date                     Value               Ref Range           Status                04/05/2022               11 2 (L)            11 5 - 15 4 g/*     Final                 11/25/2021               10 7 (L) 11 5 - 15 4 g/*     Final                 11/24/2021               12 7                11 5 - 15 4 g/*     Final                 11/16/2016               12 2                11 7 - 15 5 g/*     Final                 03/22/2016               11 8                11 7 - 15 5 g/*     Final                 11/16/2015               12 3                11 5 - 15 4 g/*     Final                   Psychiatric/Behavioral: Negative for dysphoric mood and sleep disturbance  Objective:      /60   Pulse 63   Temp (!) 97 °F (36 1 °C)   Resp 16   Ht 5' (1 524 m)   Wt 52 6 kg (116 lb)   SpO2 98%   BMI 22 65 kg/m²     BP Readings from Last 3 Encounters:   04/08/22 128/60   03/18/22 158/67   12/22/21 138/80     Wt Readings from Last 3 Encounters:   04/08/22 52 6 kg (116 lb)   03/18/22 54 4 kg (120 lb)   12/22/21 56 9 kg (125 lb 6 4 oz)        Physical Exam  Vitals and nursing note reviewed  Constitutional:       General: She is not in acute distress  Appearance: She is well-developed  HENT:      Head: Atraumatic  Right Ear: Tympanic membrane and ear canal normal       Left Ear: Tympanic membrane and ear canal normal    Eyes:      General: No scleral icterus  Conjunctiva/sclera: Conjunctivae normal    Neck:      Thyroid: No thyroid mass or thyromegaly  Vascular: No carotid bruit or JVD  Trachea: No tracheal deviation  Cardiovascular:      Rate and Rhythm: Normal rate and regular rhythm  Heart sounds: Normal heart sounds  No murmur heard  No gallop  Pulmonary:      Effort: Pulmonary effort is normal  No respiratory distress  Breath sounds: Normal breath sounds  No wheezing or rales  Chest:   Breasts:      Right: No supraclavicular adenopathy  Left: No supraclavicular adenopathy  Musculoskeletal:      Right lower leg: Edema (mild chronic right ankle edema ) present  Left lower leg: No edema  Comments: Loss of ROM left hip with internal rotation   + reproducible pain    Lymphadenopathy:      Cervical: No cervical adenopathy  Upper Body:      Right upper body: No supraclavicular adenopathy  Left upper body: No supraclavicular adenopathy  Skin:     Findings: No rash  Nails: There is no clubbing  Neurological:      General: No focal deficit present  Mental Status: She is alert and oriented to person, place, and time  Motor: No weakness  Psychiatric:         Mood and Affect: Mood normal          Behavior: Behavior normal          Cognition and Memory: She exhibits impaired recent memory

## 2022-04-08 NOTE — PATIENT INSTRUCTIONS
Medicare Preventive Visit Patient Instructions  Thank you for completing your Welcome to Medicare Visit or Medicare Annual Wellness Visit today  Your next wellness visit will be due in one year (4/9/2023)  The screening/preventive services that you may require over the next 5-10 years are detailed below  Some tests may not apply to you based off risk factors and/or age  Screening tests ordered at today's visit but not completed yet may show as past due  Also, please note that scanned in results may not display below  Preventive Screenings:  Service Recommendations Previous Testing/Comments   Colorectal Cancer Screening  * Colonoscopy    * Fecal Occult Blood Test (FOBT)/Fecal Immunochemical Test (FIT)  * Fecal DNA/Cologuard Test  * Flexible Sigmoidoscopy Age: 54-65 years old   Colonoscopy: every 10 years (may be performed more frequently if at higher risk)  OR  FOBT/FIT: every 1 year  OR  Cologuard: every 3 years  OR  Sigmoidoscopy: every 5 years  Screening may be recommended earlier than age 48 if at higher risk for colorectal cancer  Also, an individualized decision between you and your healthcare provider will decide whether screening between the ages of 74-80 would be appropriate  Colonoscopy: Not on file  FOBT/FIT: Not on file  Cologuard: Not on file  Sigmoidoscopy: Not on file    Screening Not Indicated     Breast Cancer Screening Age: 36 years old  Frequency: every 1-2 years  Not required if history of left and right mastectomy Mammogram: 05/15/2017        Cervical Cancer Screening Between the ages of 21-29, pap smear recommended once every 3 years  Between the ages of 33-67, can perform pap smear with HPV co-testing every 5 years     Recommendations may differ for women with a history of total hysterectomy, cervical cancer, or abnormal pap smears in past  Pap Smear: Not on file    Screening Not Indicated   Hepatitis C Screening Once for adults born between 1945 and 1965  More frequently in patients at high risk for Hepatitis C Hep C Antibody: Not on file        Diabetes Screening 1-2 times per year if you're at risk for diabetes or have pre-diabetes Fasting glucose: 99 mg/dL   A1C: No results in last 5 years    Screening Current   Cholesterol Screening Once every 5 years if you don't have a lipid disorder  May order more often based on risk factors  Lipid panel: 09/09/2021    Screening Not Indicated  History Lipid Disorder     Other Preventive Screenings Covered by Medicare:  1  Abdominal Aortic Aneurysm (AAA) Screening: covered once if your at risk  You're considered to be at risk if you have a family history of AAA  2  Lung Cancer Screening: covers low dose CT scan once per year if you meet all of the following conditions: (1) Age 50-69; (2) No signs or symptoms of lung cancer; (3) Current smoker or have quit smoking within the last 15 years; (4) You have a tobacco smoking history of at least 30 pack years (packs per day multiplied by number of years you smoked); (5) You get a written order from a healthcare provider  3  Glaucoma Screening: covered annually if you're considered high risk: (1) You have diabetes OR (2) Family history of glaucoma OR (3)  aged 48 and older OR (3)  American aged 72 and older  3  Osteoporosis Screening: covered every 2 years if you meet one of the following conditions: (1) You're estrogen deficient and at risk for osteoporosis based off medical history and other findings; (2) Have a vertebral abnormality; (3) On glucocorticoid therapy for more than 3 months; (4) Have primary hyperparathyroidism; (5) On osteoporosis medications and need to assess response to drug therapy  · Last bone density test (DXA Scan): Not on file  5  HIV Screening: covered annually if you're between the age of 12-76  Also covered annually if you are younger than 13 and older than 72 with risk factors for HIV infection   For pregnant patients, it is covered up to 3 times per pregnancy  Immunizations:  Immunization Recommendations   Influenza Vaccine Annual influenza vaccination during flu season is recommended for all persons aged >= 6 months who do not have contraindications   Pneumococcal Vaccine (Prevnar and Pneumovax)  * Prevnar = PCV13  * Pneumovax = PPSV23   Adults 25-60 years old: 1-3 doses may be recommended based on certain risk factors  Adults 72 years old: Prevnar (PCV13) vaccine recommended followed by Pneumovax (PPSV23) vaccine  If already received PPSV23 since turning 65, then PCV13 recommended at least one year after PPSV23 dose  Hepatitis B Vaccine 3 dose series if at intermediate or high risk (ex: diabetes, end stage renal disease, liver disease)   Tetanus (Td) Vaccine - COST NOT COVERED BY MEDICARE PART B Following completion of primary series, a booster dose should be given every 10 years to maintain immunity against tetanus  Td may also be given as tetanus wound prophylaxis  Tdap Vaccine - COST NOT COVERED BY MEDICARE PART B Recommended at least once for all adults  For pregnant patients, recommended with each pregnancy  Shingles Vaccine (Shingrix) - COST NOT COVERED BY MEDICARE PART B  2 shot series recommended in those aged 48 and above     Health Maintenance Due:  There are no preventive care reminders to display for this patient  Immunizations Due:      Topic Date Due    DTaP,Tdap,and Td Vaccines (1 - Tdap) Never done     Advance Directives   What are advance directives? Advance directives are legal documents that state your wishes and plans for medical care  These plans are made ahead of time in case you lose your ability to make decisions for yourself  Advance directives can apply to any medical decision, such as the treatments you want, and if you want to donate organs  What are the types of advance directives? There are many types of advance directives, and each state has rules about how to use them   You may choose a combination of any of the following:  · Living will: This is a written record of the treatment you want  You can also choose which treatments you do not want, which to limit, and which to stop at a certain time  This includes surgery, medicine, IV fluid, and tube feedings  · Durable power of  for healthcare [facility]): This is a written record that states who you want to make healthcare choices for you when you are unable to make them for yourself  This person, called a proxy, is usually a family member or a friend  You may choose more than 1 proxy  · Do not resuscitate (DNR) order:  A DNR order is used in case your heart stops beating or you stop breathing  It is a request not to have certain forms of treatment, such as CPR  A DNR order may be included in other types of advance directives  · Medical directive: This covers the care that you want if you are in a coma, near death, or unable to make decisions for yourself  You can list the treatments you want for each condition  Treatment may include pain medicine, surgery, blood transfusions, dialysis, IV or tube feedings, and a ventilator (breathing machine)  · Values history: This document has questions about your views, beliefs, and how you feel and think about life  This information can help others choose the care that you would choose  Why are advance directives important? An advance directive helps you control your care  Although spoken wishes may be used, it is better to have your wishes written down  Spoken wishes can be misunderstood, or not followed  Treatments may be given even if you do not want them  An advance directive may make it easier for your family to make difficult choices about your care  © Copyright "[employer] 2018 Information is for End User's use only and may not be sold, redistributed or otherwise used for commercial purposes   All illustrations and images included in CareNotes® are the copyrighted property of [employer]  or [facility]

## 2022-04-19 NOTE — PROGRESS NOTES
Cardiology Follow Up    Janit Meckel  1/22/1926  [id number]  [address] [facility] [place]  [facility] [address]  [phone]  [phone]    1  Atypical chest pain  POCT ECG   2  Coronary artery disease involving native heart without angina pectoris, unspecified vessel or lesion type     3  Hypertension, unspecified type     4  Hyperlipidemia, unspecified hyperlipidemia type         Interval History:   Ms Arelis Cuello presents to our office with complaints of chest pain with sitting on the couch, left breast, stabbing pain, unknown length of time, 8-9/10, non radiating  Occurred after she ate dessert  Pearletha [place] is accompanied by her daughter  Home BP running 120/80            Medical History  Lives a lone in her own home  CAD, RCA stent 2009 at [facility] in [place]   Hypertension  Hyperlipidemia  Fall 11/29/21   Patient Active Problem List   Diagnosis    Chronic right shoulder pain    Greater trochanteric bursitis of left hip    Acquired valgus deformity of knee, left    Angiomyolipoma of kidney    Atherosclerotic heart disease of native coronary artery without angina pectoris    Gastroesophageal reflux disease    Generalized osteoarthritis of multiple sites    Mixed hyperlipidemia    Primary osteoarthritis of left hip    PVCs (premature ventricular contractions)    Tear of right rotator cuff    Venous insufficiency (chronic) (peripheral)    History of coronary artery stent placement    [facility]roiliitis ([facility] [address] )    Essential hypertension    Lymphedema of right lower extremity    Stage 3b chronic kidney disease ([facility] [address] )    Elevated alkaline phosphatase level     Past Medical History:   Diagnosis Date    Anemia     last assessed - [id number]    Cancer [facility])     Depression     last assessed - [id number]    Glaucoma     Hypercalcemia     last assessed - [id number]    Hypertension     Hypothyroidism 11/30/2017    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    right lower extremity    Macular degeneration     Nondisplaced fracture of base of fifth metacarpal bone, right hand, initial encounter for closed fracture 12/7/2018    Osteoarthritis     Plantar fasciitis of right foot     last assessed - 07Apr2017    Stomach disorder     Superficial thrombophlebitis of leg     unspecified laterality; last assessed - [id number]    Traumatic closed nondisplaced fracture of lumbar vertebra, initial encounter ([facility] ) 12/5/2019    Trochanteric bursitis of left hip     last assessed - 07Apr2017     Social History     Socioeconomic History    Marital status:       Spouse name: Not on file    Number of children: Not on file    Years of education: Not on file    Highest education level: Not on file   Occupational History    Not on file   Tobacco Use    Smoking status: Never Smoker    Smokeless tobacco: Never Used   Vaping Use    Vaping Use: Never used   Substance and Sexual Activity    Alcohol use: Yes     Comment: rare    Drug use: No    Sexual activity: Not on file   Other Topics Concern    Not on file   Social History Narrative    Caffeine use     Social Determinants of Health     Financial Resource Strain: Not on file   Food Insecurity: Not on file   Transportation Needs: Not on file   Physical Activity: Not on file   Stress: Not on file   Social Connections: Not on file   Intimate Partner Violence: Not on file   Housing Stability: Not on file      Family History   Problem Relation Age of Onset    Stroke Mother         cerebrovascular accident (CVA)    Coronary artery disease Mother         coronary disease    Stroke Father     Coronary artery disease Father         coronary disease    Stroke Brother     Kidney cancer Family      Past Surgical History:   Procedure Laterality Date    CORONARY STENT PLACEMENT      stent RCA    FL INJECTION LEFT HIP (NON ARTHROGRAM)  11/26/2018    FL INJECTION LEFT HIP (NON ARTHROGRAM)  3/4/2019  FL INJECTION LEFT HIP (NON ARTHROGRAM)  6/10/2019    FL INJECTION LEFT HIP (NON ARTHROGRAM)  9/24/2019    FL INJECTION LEFT HIP (NON ARTHROGRAM)  12/30/2019    FL INJECTION LEFT HIP (NON ARTHROGRAM)  7/6/2020    FL INJECTION LEFT HIP (NON ARTHROGRAM)  12/28/2020    FL INJECTION LEFT HIP (NON ARTHROGRAM)  4/12/2021    FL INJECTION LEFT HIP (NON ARTHROGRAM)  7/12/2021    FL INJECTION LEFT HIP (NON ARTHROGRAM)  11/15/2021    FL INJECTION LEFT HIP (NON ARTHROGRAM)  2/15/2022    FOOT SURGERY      HIP SURGERY      HYSTERECTOMY      JOINT REPLACEMENT      Hip Replacement 2000    SKIN LESION EXCISION  11/2010    Face - BCC - nose    TOTAL ABDOMINAL HYSTERECTOMY W/ BILATERAL SALPINGOOPHORECTOMY      TOTAL HIP ARTHROPLASTY Right        Current Outpatient Medications:     acetaminophen (TYLENOL) 500 mg tablet, Take 500 mg by mouth every 6 (six) hours as needed for mild pain, Disp: , Rfl:     aspirin (ADULT ASPIRIN EC LOW STRENGTH) 81 mg EC tablet, Take 1 tablet by mouth daily, Disp: , Rfl:     atorvastatin (LIPITOR) 40 mg tablet, Take 1 tablet (40 mg total) by mouth daily, Disp: 90 tablet, Rfl: 3    cholecalciferol (VITAMIN D3) 1,000 units tablet, Take 1 tablet by mouth daily, Disp: , Rfl:     clopidogrel (PLAVIX) 75 mg tablet, Take 1 tablet (75 mg total) by mouth daily, Disp: 90 tablet, Rfl: 3    latanoprost (XALATAN) 0 005 % ophthalmic solution, INSTILL 1 DROP INTO BOTH EYES EVERY DAY AS DIRECTED, Disp: , Rfl: 4    metoprolol succinate (TOPROL-XL) 50 mg 24 hr tablet, Take 1 tablet (50 mg total) by mouth daily, Disp: 90 tablet, Rfl: 3    Mirabegron ER (Myrbetriq) 25 MG TB24, Take 25 mg by mouth daily, Disp: 90 tablet, Rfl: 3    Multiple Vitamins-Minerals (ICAPS AREDS 2) CAPS, Take by mouth, Disp: , Rfl:     nitroglycerin (NITROSTAT) 0 4 mg SL tablet, PLACE 1 TABLET (0 4 MG TOTAL) UNDER THE TONGUE EVERY 5 (FIVE) MINUTES AS NEEDED FOR CHEST PAIN, Disp: 25 tablet, Rfl: 3    olopatadine (Pataday) 0 1 % ophthalmic solution, , Disp: , Rfl:     Omega-3 Fatty Acids (OMEGA-3 FISH OIL) 1200 MG CAPS, Take by mouth, Disp: , Rfl:     omeprazole (PriLOSEC) 20 mg delayed release capsule, 1 capsule by mouth daily, Disp: 90 capsule, Rfl: 1    Polyvinyl Alcohol-Povidone PF (REFRESH) 1 4-0 6 % SOLN, Apply to eye, Disp: , Rfl:     RESTASIS 0 05 % ophthalmic emulsion, instill 1 drop into both eyes twice a day, Disp: , Rfl: 0    valsartan-hydrochlorothiazide (DIOVAN-HCT) 80-12 5 MG per tablet, Take 1 tablet by mouth daily, Disp: 90 tablet, Rfl: 3  Allergies   Allergen Reactions    Iv Contrast [Iodinated Diagnostic Agents]     Lactose - Food Allergy GI Intolerance    Other      Iv contrast  Adhesive tape    Ciprofloxacin      Patient does not remember       Labs:  Appointment on 04/05/2022   Component Date Value    GGT 04/05/2022 25      Imaging: No results found  Review of Systems:  Review of Systems   Constitutional: Positive for unexpected weight change  Cardiovascular: Positive for chest pain  Musculoskeletal: Positive for arthralgias, gait problem and myalgias  Left hip pain    All other systems reviewed and are negative  Physical Exam:  Physical Exam  Vitals reviewed  Constitutional:       Appearance: Normal appearance  HENT:      Head: Normocephalic  Eyes:      Pupils: Pupils are equal, round, and reactive to light  Cardiovascular:      Rate and Rhythm: Normal rate and regular rhythm  Pulses: Normal pulses  Heart sounds: Normal heart sounds  Pulmonary:      Effort: Pulmonary effort is normal       Breath sounds: Normal breath sounds  Abdominal:      General: Bowel sounds are normal       Palpations: Abdomen is soft  Musculoskeletal:         General: Normal range of motion  Cervical back: Normal range of motion and neck supple  Right lower leg: No edema  Left lower leg: No edema  Skin:     General: Skin is warm and dry        Capillary Refill: Capillary refill takes less than 2 seconds  Neurological:      General: No focal deficit present  Mental Status: She is alert and oriented to person, place, and time  Psychiatric:         Mood and Affect: Mood normal          Behavior: Behavior normal          Discussion/Summary:  1  Atypical chest pain, stabbing pain, occurred after eating dessert, appears related to GERD  CP has not re occurred  No further testing needed at this time  Continue on Omeprazole 20mg daily  She was instructed to call our office if pain re occurs or to use SL NTG as directed  2  CAD RCA stent 2009 at [facility] on Plavix 75 mg daily, Diovan/ HCT 80-12 5 mg daily,  Metoprolol succinate 50 mg daily, Lipitor 40 mg daily, Omega 3 fatty acid 1200 mg daily  3  Hypertension home /80, office with "white coat syndrome" 160/66, continue on a 2gm sodium diet   4  Hyperlipidemia 9/09/21 , , HDL 49, LDL 97-  Continue Lipitor 40 mg daily, Omega 3 fatty acid 1200 mg daily, heart healthy diet

## 2022-04-20 ENCOUNTER — OFFICE VISIT (OUTPATIENT)
Dept: CARDIOLOGY CLINIC | Facility: CLINIC | Age: 87
End: 2022-04-20
Payer: MEDICARE

## 2022-04-20 VITALS
WEIGHT: 119.6 LBS | HEIGHT: 60 IN | OXYGEN SATURATION: 95 % | DIASTOLIC BLOOD PRESSURE: 62 MMHG | HEART RATE: 76 BPM | SYSTOLIC BLOOD PRESSURE: 180 MMHG | BODY MASS INDEX: 23.48 KG/M2

## 2022-04-20 DIAGNOSIS — I10 HYPERTENSION, UNSPECIFIED TYPE: ICD-10-CM

## 2022-04-20 DIAGNOSIS — R07.89 ATYPICAL CHEST PAIN: Primary | ICD-10-CM

## 2022-04-20 DIAGNOSIS — I25.10 CORONARY ARTERY DISEASE INVOLVING NATIVE HEART WITHOUT ANGINA PECTORIS, UNSPECIFIED VESSEL OR LESION TYPE: ICD-10-CM

## 2022-04-20 DIAGNOSIS — E78.5 HYPERLIPIDEMIA, UNSPECIFIED HYPERLIPIDEMIA TYPE: ICD-10-CM

## 2022-04-20 PROCEDURE — 99215 OFFICE O/P EST HI 40 MIN: CPT | Performed by: INTERNAL MEDICINE

## 2022-04-20 PROCEDURE — 93000 ELECTROCARDIOGRAM COMPLETE: CPT | Performed by: INTERNAL MEDICINE

## 2022-06-17 ENCOUNTER — OFFICE VISIT (OUTPATIENT)
Dept: OBGYN CLINIC | Facility: MEDICAL CENTER | Age: 87
End: 2022-06-17
Payer: MEDICARE

## 2022-06-17 VITALS
HEART RATE: 60 BPM | HEIGHT: 60 IN | DIASTOLIC BLOOD PRESSURE: 64 MMHG | SYSTOLIC BLOOD PRESSURE: 161 MMHG | WEIGHT: 118 LBS | BODY MASS INDEX: 23.16 KG/M2

## 2022-06-17 DIAGNOSIS — M16.12 PRIMARY OSTEOARTHRITIS OF LEFT HIP: Primary | ICD-10-CM

## 2022-06-17 DIAGNOSIS — M17.12 PRIMARY OSTEOARTHRITIS OF LEFT KNEE: ICD-10-CM

## 2022-06-17 DIAGNOSIS — M70.62 TROCHANTERIC BURSITIS OF LEFT HIP: ICD-10-CM

## 2022-06-17 PROCEDURE — 99213 OFFICE O/P EST LOW 20 MIN: CPT | Performed by: ORTHOPAEDIC SURGERY

## 2022-06-17 PROCEDURE — 20610 DRAIN/INJ JOINT/BURSA W/O US: CPT | Performed by: ORTHOPAEDIC SURGERY

## 2022-06-17 RX ORDER — BUPIVACAINE HYDROCHLORIDE 2.5 MG/ML
2 INJECTION, SOLUTION INFILTRATION; PERINEURAL
Status: COMPLETED | OUTPATIENT
Start: 2022-06-17 | End: 2022-06-17

## 2022-06-17 RX ORDER — BETAMETHASONE SODIUM PHOSPHATE AND BETAMETHASONE ACETATE 3; 3 MG/ML; MG/ML
12 INJECTION, SUSPENSION INTRA-ARTICULAR; INTRALESIONAL; INTRAMUSCULAR; SOFT TISSUE
Status: COMPLETED | OUTPATIENT
Start: 2022-06-17 | End: 2022-06-17

## 2022-06-17 RX ORDER — LIDOCAINE HYDROCHLORIDE 10 MG/ML
2 INJECTION, SOLUTION INFILTRATION; PERINEURAL
Status: COMPLETED | OUTPATIENT
Start: 2022-06-17 | End: 2022-06-17

## 2022-06-17 RX ADMIN — LIDOCAINE HYDROCHLORIDE 2 ML: 10 INJECTION, SOLUTION INFILTRATION; PERINEURAL at 14:51

## 2022-06-17 RX ADMIN — BUPIVACAINE HYDROCHLORIDE 2 ML: 2.5 INJECTION, SOLUTION INFILTRATION; PERINEURAL at 14:51

## 2022-06-17 RX ADMIN — BETAMETHASONE SODIUM PHOSPHATE AND BETAMETHASONE ACETATE 12 MG: 3; 3 INJECTION, SUSPENSION INTRA-ARTICULAR; INTRALESIONAL; INTRAMUSCULAR; SOFT TISSUE at 14:51

## 2022-06-17 RX ADMIN — BETAMETHASONE SODIUM PHOSPHATE AND BETAMETHASONE ACETATE 12 MG: 3; 3 INJECTION, SUSPENSION INTRA-ARTICULAR; INTRALESIONAL; INTRAMUSCULAR; SOFT TISSUE at 15:33

## 2022-06-17 NOTE — PROGRESS NOTES
Assessment:  1  Primary osteoarthritis of left hip     2  Primary osteoarthritis of left knee     3  Trochanteric bursitis of left hip         Plan:  The patient was provided with left knee and left trochanteric bursa steroid injections  The patient tolerated the procedure well  The patient should follow up in 3 months  To do next visit:  Return in about 3 months (around 9/17/2022)  The above stated was discussed in layman's terms and the patient expressed understanding  All questions were answered to the patient's satisfaction  Scribe Attestation    I,:  Cheryl Gutierres am acting as a scribe while in the presence of the attending physician :       I,:  Monika Dhaliwal MD personally performed the services described in this documentation    as scribed in my presence :             Subjective:   Irais Ferguson is a 80 y o  female who presents for follow up of left knee and left hip  She is s/p left knee and trochanteric bursa steroid injections with benefit, 3/18/2022  Today she complains of left generalized knee pain and left lateral hip pain  Prolonged walking aggravates while rest alleviates  She has had left IA hip injection with some benefit           Review of systems negative unless otherwise specified in HPI    Past Medical History:   Diagnosis Date    Anemia     last assessed - [id number]    Cancer [facility])     Depression     last assessed - [id number]    Glaucoma     Hypercalcemia     last assessed - [id number]    Hypertension     Hypothyroidism 11/30/2017    Leiomyosarcoma ([facility] [address] ) 2002    right lower extremity    Macular degeneration     Nondisplaced fracture of base of fifth metacarpal bone, right hand, initial encounter for closed fracture 12/7/2018    Osteoarthritis     Plantar fasciitis of right foot     last assessed - [id number]    Stomach disorder     Superficial thrombophlebitis of leg     unspecified laterality; last assessed - [id number]    Traumatic closed nondisplaced fracture of lumbar vertebra, initial encounter ([facility] [address] ) 12/5/2019    Trochanteric bursitis of left hip     last assessed - 07Apr2017       Past Surgical History:   Procedure Laterality Date    CORONARY STENT PLACEMENT      stent RCA    FL INJECTION LEFT HIP (NON ARTHROGRAM)  11/26/2018    FL INJECTION LEFT HIP (NON ARTHROGRAM)  3/4/2019    FL INJECTION LEFT HIP (NON ARTHROGRAM)  6/10/2019    FL INJECTION LEFT HIP (NON ARTHROGRAM)  9/24/2019    FL INJECTION LEFT HIP (NON ARTHROGRAM)  12/30/2019    FL INJECTION LEFT HIP (NON ARTHROGRAM)  7/6/2020    FL INJECTION LEFT HIP (NON ARTHROGRAM)  12/28/2020    FL INJECTION LEFT HIP (NON ARTHROGRAM)  4/12/2021    FL INJECTION LEFT HIP (NON ARTHROGRAM)  7/12/2021    FL INJECTION LEFT HIP (NON ARTHROGRAM)  11/15/2021    FL INJECTION LEFT HIP (NON ARTHROGRAM)  2/15/2022    FOOT SURGERY      HIP SURGERY      HYSTERECTOMY      JOINT REPLACEMENT      Hip Replacement 2000    SKIN LESION EXCISION  11/2010    Face - BCC - nose    TOTAL ABDOMINAL HYSTERECTOMY W/ BILATERAL SALPINGOOPHORECTOMY      TOTAL HIP ARTHROPLASTY Right        Family History   Problem Relation Age of Onset    Stroke Mother         cerebrovascular accident (CVA)    Coronary artery disease Mother         coronary disease    Stroke Father     Coronary artery disease Father         coronary disease    Stroke Brother     Kidney cancer Family        Social History     Occupational History    Not on file   Tobacco Use    Smoking status: Never Smoker    Smokeless tobacco: Never Used   Vaping Use    Vaping Use: Never used   Substance and Sexual Activity    Alcohol use: Yes     Comment: rare    Drug use: No    Sexual activity: Not on file         Current Outpatient Medications:     acetaminophen (TYLENOL) 500 mg tablet, Take 500 mg by mouth every 6 (six) hours as needed for mild pain, Disp: , Rfl:     aspirin (ECOTRIN LOW STRENGTH) 81 mg EC tablet, Take 1 tablet by mouth daily, Disp: , Rfl:     atorvastatin (LIPITOR) 40 mg tablet, Take 1 tablet (40 mg total) by mouth daily, Disp: 90 tablet, Rfl: 3    cholecalciferol (VITAMIN D3) 1,000 units tablet, Take 1 tablet by mouth daily, Disp: , Rfl:     clopidogrel (PLAVIX) 75 mg tablet, Take 1 tablet (75 mg total) by mouth daily, Disp: 90 tablet, Rfl: 3    latanoprost (XALATAN) 0 005 % ophthalmic solution, INSTILL 1 DROP INTO BOTH EYES EVERY DAY AS DIRECTED, Disp: , Rfl: 4    metoprolol succinate (TOPROL-XL) 50 mg 24 hr tablet, Take 1 tablet (50 mg total) by mouth daily, Disp: 90 tablet, Rfl: 3    Mirabegron ER (Myrbetriq) 25 MG TB24, Take 25 mg by mouth daily, Disp: 90 tablet, Rfl: 3    Multiple Vitamins-Minerals (ICAPS AREDS 2) CAPS, Take by mouth, Disp: , Rfl:     nitroglycerin (NITROSTAT) 0 4 mg SL tablet, PLACE 1 TABLET (0 4 MG TOTAL) UNDER THE TONGUE EVERY 5 (FIVE) MINUTES AS NEEDED FOR CHEST PAIN, Disp: 25 tablet, Rfl: 3    Omega-3 Fatty Acids (OMEGA-3 FISH OIL) 1200 MG CAPS, Take by mouth, Disp: , Rfl:     omeprazole (PriLOSEC) 20 mg delayed release capsule, 1 capsule by mouth daily, Disp: 90 capsule, Rfl: 1    Polyvinyl Alcohol-Povidone PF 1 4-0 6 % SOLN, Apply to eye, Disp: , Rfl:     RESTASIS 0 05 % ophthalmic emulsion, instill 1 drop into both eyes twice a day, Disp: , Rfl: 0    valsartan-hydrochlorothiazide (DIOVAN-HCT) 80-12 5 MG per tablet, Take 1 tablet by mouth daily, Disp: 90 tablet, Rfl: 3    olopatadine (Pataday) 0 1 % ophthalmic solution,  , Disp: , Rfl:     Allergies   Allergen Reactions    Iv Contrast [Iodinated Diagnostic Agents]     Other      Iv contrast  Adhesive tape    Ciprofloxacin      Patient does not remember            Vitals:    06/17/22 1405   BP: 163/62   Pulse: 73       Objective:  Physical exam  · General: Awake, Alert, Oriented  · Eyes: Pupils equal, round and reactive to light  · Heart: regular rate and rhythm  · Lungs: No audible wheezing  · Abdomen: soft                    Ortho Exam  Left hip:  TTP over greater trochanter  Good arc of motion  Patient sits comfortably in chair with hip flexed at 90 degrees  Patient stands from seated position without assistance    Left knee:  Valgus alignment  TTP over lateral joint line  No erythema or ecchymosis  No effusion or swelling  Normal strength  Good ROM with crepitus   Calf compartments soft and supple  Sensation intact  Toes are warm sensate and mobile      Diagnostics, reviewed and taken today if performed as documented:    None performed    Procedures, if performed today:    Large joint arthrocentesis: L knee  Universal Protocol:  Consent: Verbal consent obtained  Risks and benefits: risks, benefits and alternatives were discussed  Consent given by: patient  Time out: Immediately prior to procedure a "time out" was called to verify the correct patient, procedure, equipment, support staff and site/side marked as required  Timeout called at: 6/17/2022 2:34 PM   Patient understanding: patient states understanding of the procedure being performed  Site marked: the operative site was marked  Patient identity confirmed: verbally with patient    Supporting Documentation  Indications: pain   Procedure Details  Location: knee - L knee  Preparation: Patient was prepped and draped in the usual sterile fashion  Needle size: 22 G  Ultrasound guidance: no  Approach: anterolateral  Medications administered: 12 mg betamethasone acetate-betamethasone sodium phosphate 6 (3-3) mg/mL; 2 mL bupivacaine 0 25 %; 2 mL lidocaine 1 %    Patient tolerance: patient tolerated the procedure well with no immediate complications  Dressing:  Sterile dressing applied    Large joint arthrocentesis: L greater trochanteric bursa  Universal Protocol:  Consent: Verbal consent obtained    Risks and benefits: risks, benefits and alternatives were discussed  Consent given by: patient  Time out: Immediately prior to procedure a "time out" was called to verify the correct patient, procedure, equipment, support staff and site/side marked as required  Timeout called at: 6/17/2022 2:34 PM   Patient understanding: patient states understanding of the procedure being performed  Site marked: the operative site was marked  Patient identity confirmed: verbally with patient    Supporting Documentation  Indications: pain   Procedure Details  Location: hip - L greater trochanteric bursa  Needle size: 22 G  Ultrasound guidance: no  Approach: lateral  Medications administered: 12 mg betamethasone acetate-betamethasone sodium phosphate 6 (3-3) mg/mL; 2 mL bupivacaine 0 25 %; 2 mL lidocaine 1 %    Patient tolerance: patient tolerated the procedure well with no immediate complications  Dressing:  Sterile dressing applied            Portions of the record may have been created with voice recognition software  Occasional wrong word or "sound a like" substitutions may have occurred due to the inherent limitations of voice recognition software  Read the chart carefully and recognize, using context, where substitutions have occurred

## 2022-08-25 DIAGNOSIS — I49.3 PVCS (PREMATURE VENTRICULAR CONTRACTIONS): ICD-10-CM

## 2022-08-25 RX ORDER — NITROGLYCERIN 0.4 MG/1
0.4 TABLET SUBLINGUAL
Qty: 25 TABLET | Refills: 3 | Status: SHIPPED | OUTPATIENT
Start: 2022-08-25

## 2022-09-13 NOTE — PROGRESS NOTES
Cardiology Follow Up    Brook Dailey  1/22/1926  [id number]  [address] [facility] [address] [phone]    1  Essential (primary) hypertension     2  Pure hypercholesterolemia     3  PVC (premature ventricular contraction)     4  Coronary arteriosclerosis         Interval History: Patient here for follow-up visit   She has HTN and PVCs   She has CAD with RCA stent placed in 2009 at [facility] in [address] done 7/2021 demonstrated preserved LV systolic function   Grade 1 DD was suggested  Norberto Revels was moderate TR  Lipid profile done 9/2021 demonstrated total cholesterol of 169 with an HDL of 49 and a calculated LDL of 97    She is here today with her daughter who used to work with Dr Paul Small  Seen by our [address] 4/2022 in reference to chest discomfort which was felt to be atypical   Testing was not felt to be necessary  Patient has been well  She has had no chest pain or significant dyspnea  Her vital signs are stable today      Patient Active Problem List   Diagnosis    Chronic right shoulder pain    Greater trochanteric bursitis of left hip    Acquired valgus deformity of knee, left    Angiomyolipoma of kidney    Atherosclerotic heart disease of native coronary artery without angina pectoris    Gastroesophageal reflux disease    Generalized osteoarthritis of multiple sites    Mixed hyperlipidemia    Primary osteoarthritis of left hip    PVCs (premature ventricular contractions)    Tear of right rotator cuff    Venous insufficiency (chronic) (peripheral)    History of coronary artery stent placement    Sacroiliitis ([address] )    Essential hypertension    Lymphedema of right lower extremity    Stage 3b chronic kidney disease ([address] )    Elevated alkaline phosphatase level     Past Medical History:   Diagnosis Date    Anemia     last assessed - [id number]    Cancer ([address] )     Depression     last assessed - [id number]    Glaucoma     Hypercalcemia     last assessed - [id number]    Hypertension     Hypothyroidism 11/30/2017    [facility] 2002    right lower extremity    Macular degeneration     Nondisplaced fracture of base of fifth metacarpal bone, right hand, initial encounter for closed fracture 12/7/2018    Osteoarthritis     Plantar fasciitis of right foot     last assessed - [id number]    Stomach disorder     Superficial thrombophlebitis of leg     unspecified laterality; last assessed - [id number]    Traumatic closed nondisplaced fracture of lumbar vertebra, initial encounter ([facility] ) 12/5/2019    Trochanteric bursitis of left hip     last assessed - [id number]     Social History     Socioeconomic History    Marital status:       Spouse name: Not on file    Number of children: Not on file    Years of education: Not on file    Highest education level: Not on file   Occupational History    Not on file   Tobacco Use    Smoking status: Never Smoker    Smokeless tobacco: Never Used   Vaping Use    Vaping Use: Never used   Substance and Sexual Activity    Alcohol use: Yes     Comment: rare    Drug use: No    Sexual activity: Not on file   Other Topics Concern    Not on file   Social History Narrative    Caffeine use     Social Determinants of Health     Financial Resource Strain: Not on file   Food Insecurity: Not on file   Transportation Needs: Not on file   Physical Activity: Not on file   Stress: Not on file   Social Connections: Not on file   Intimate Partner Violence: Not on file   Housing Stability: Not on file      Family History   Problem Relation Age of Onset    Stroke Mother         cerebrovascular accident (CVA)    Coronary artery disease Mother         coronary disease    Stroke Father     Coronary artery disease Father         coronary disease    Stroke Brother     Kidney cancer Family      Past Surgical History:   Procedure Laterality Date    CORONARY STENT PLACEMENT      stent RCA    FL INJECTION LEFT HIP (NON ARTHROGRAM)  11/26/2018    FL INJECTION LEFT HIP (NON ARTHROGRAM)  3/4/2019    FL INJECTION LEFT HIP (NON ARTHROGRAM)  6/10/2019    FL INJECTION LEFT HIP (NON ARTHROGRAM)  9/24/2019    FL INJECTION LEFT HIP (NON ARTHROGRAM)  12/30/2019    FL INJECTION LEFT HIP (NON ARTHROGRAM)  7/6/2020    FL INJECTION LEFT HIP (NON ARTHROGRAM)  12/28/2020    FL INJECTION LEFT HIP (NON ARTHROGRAM)  4/12/2021    FL INJECTION LEFT HIP (NON ARTHROGRAM)  7/12/2021    FL INJECTION LEFT HIP (NON ARTHROGRAM)  11/15/2021    FL INJECTION LEFT HIP (NON ARTHROGRAM)  2/15/2022    FOOT SURGERY      HIP SURGERY      HYSTERECTOMY      JOINT REPLACEMENT      Hip Replacement 2000    SKIN LESION EXCISION  11/2010    Face - BCC - nose    TOTAL ABDOMINAL HYSTERECTOMY W/ BILATERAL SALPINGOOPHORECTOMY      TOTAL HIP ARTHROPLASTY Right        Current Outpatient Medications:     acetaminophen (TYLENOL) 500 mg tablet, Take 500 mg by mouth every 6 (six) hours as needed for mild pain, Disp: , Rfl:     aspirin (ECOTRIN LOW STRENGTH) 81 mg EC tablet, Take 1 tablet by mouth daily, Disp: , Rfl:     atorvastatin (LIPITOR) 40 mg tablet, Take 1 tablet (40 mg total) by mouth daily, Disp: 90 tablet, Rfl: 3    cholecalciferol (VITAMIN D3) 1,000 units tablet, Take 1 tablet by mouth daily, Disp: , Rfl:     clopidogrel (PLAVIX) 75 mg tablet, Take 1 tablet (75 mg total) by mouth daily, Disp: 90 tablet, Rfl: 3    latanoprost (XALATAN) 0 005 % ophthalmic solution, INSTILL 1 DROP INTO BOTH EYES EVERY DAY AS DIRECTED, Disp: , Rfl: 4    metoprolol succinate (TOPROL-XL) 50 mg 24 hr tablet, Take 1 tablet (50 mg total) by mouth daily, Disp: 90 tablet, Rfl: 3    Multiple Vitamins-Minerals (ICAPS AREDS 2) CAPS, Take by mouth, Disp: , Rfl:     Myrbetriq 25 MG TB24, TAKE 25 MG BY MOUTH DAILY, Disp: 90 tablet, Rfl: 3    olopatadine (Pataday) 0 1 % ophthalmic solution,  , Disp: , Rfl:     Omega-3 Fatty Acids (OMEGA-3 FISH OIL) 1200 MG CAPS, Take by mouth, Disp: , Rfl:     omeprazole (PriLOSEC) 20 mg delayed release capsule, 1 capsule by mouth daily, Disp: 90 capsule, Rfl: 3    Polyvinyl Alcohol-Povidone PF 1 4-0 6 % SOLN, Apply to eye, Disp: , Rfl:     RESTASIS 0 05 % ophthalmic emulsion, instill 1 drop into both eyes twice a day, Disp: , Rfl: 0    valsartan-hydrochlorothiazide (DIOVAN-HCT) 80-12 5 MG per tablet, Take 1 tablet by mouth daily, Disp: 90 tablet, Rfl: 3    nitroglycerin (NITROSTAT) 0 4 mg SL tablet, Place 1 tablet (0 4 mg total) under the tongue every 5 (five) minutes as needed for chest pain (Patient not taking: No sig reported), Disp: 25 tablet, Rfl: 3  Allergies   Allergen Reactions    Iv Contrast [Iodinated Diagnostic Agents]     Other      Iv contrast  Adhesive tape    Ciprofloxacin      Patient does not remember       Labs:not applicable  Imaging: No results found  Review of Systems:  Review of Systems   All other systems reviewed and are negative  Physical Exam:  /68 (BP Location: Right arm, Patient Position: Sitting, Cuff Size: Standard)   Pulse 71   Ht 5' 1" (1 549 m)   Wt 52 6 kg (115 lb 14 4 oz)   SpO2 97%   BMI 21 90 kg/m²   Physical Exam  Vitals reviewed  Constitutional:       Appearance: She is well-developed  HENT:      Head: Normocephalic and atraumatic  Eyes:      Conjunctiva/sclera: Conjunctivae normal       Pupils: Pupils are equal, round, and reactive to light  Cardiovascular:      Rate and Rhythm: Normal rate  Heart sounds: Normal heart sounds  Pulmonary:      Effort: Pulmonary effort is normal       Breath sounds: Normal breath sounds  Musculoskeletal:      Cervical back: Normal range of motion and neck supple  Skin:     General: Skin is warm and dry  Neurological:      Mental Status: She is alert and oriented to person, place, and time           Discussion/Summary:I will continue the patient's present medical regimen  The patient appears well compensated  I have asked the patient to call if there is a problem in the interim otherwise I will see the patient in six months time

## 2022-09-14 DIAGNOSIS — R35.0 URINARY FREQUENCY: ICD-10-CM

## 2022-09-14 RX ORDER — MIRABEGRON 25 MG/1
25 TABLET, FILM COATED, EXTENDED RELEASE ORAL DAILY
Qty: 90 TABLET | Refills: 3 | Status: SHIPPED | OUTPATIENT
Start: 2022-09-14

## 2022-09-19 DIAGNOSIS — K21.9 GASTROESOPHAGEAL REFLUX DISEASE WITHOUT ESOPHAGITIS: ICD-10-CM

## 2022-09-19 RX ORDER — OMEPRAZOLE 20 MG/1
CAPSULE, DELAYED RELEASE ORAL
Qty: 90 CAPSULE | Refills: 3 | Status: SHIPPED | OUTPATIENT
Start: 2022-09-19

## 2022-09-22 ENCOUNTER — OFFICE VISIT (OUTPATIENT)
Dept: CARDIOLOGY CLINIC | Facility: CLINIC | Age: 87
End: 2022-09-22
Payer: MEDICARE

## 2022-09-22 VITALS
SYSTOLIC BLOOD PRESSURE: 136 MMHG | WEIGHT: 115.9 LBS | HEIGHT: 61 IN | DIASTOLIC BLOOD PRESSURE: 68 MMHG | BODY MASS INDEX: 21.88 KG/M2 | HEART RATE: 71 BPM | OXYGEN SATURATION: 97 %

## 2022-09-22 DIAGNOSIS — E78.00 PURE HYPERCHOLESTEROLEMIA: ICD-10-CM

## 2022-09-22 DIAGNOSIS — I49.3 PVC (PREMATURE VENTRICULAR CONTRACTION): ICD-10-CM

## 2022-09-22 DIAGNOSIS — I10 ESSENTIAL (PRIMARY) HYPERTENSION: Primary | ICD-10-CM

## 2022-09-22 DIAGNOSIS — I25.10 CORONARY ARTERIOSCLEROSIS: ICD-10-CM

## 2022-09-22 PROCEDURE — 99214 OFFICE O/P EST MOD 30 MIN: CPT | Performed by: INTERNAL MEDICINE

## 2022-10-07 ENCOUNTER — OFFICE VISIT (OUTPATIENT)
Dept: OBGYN CLINIC | Facility: MEDICAL CENTER | Age: 87
End: 2022-10-07
Payer: MEDICARE

## 2022-10-07 VITALS
DIASTOLIC BLOOD PRESSURE: 62 MMHG | SYSTOLIC BLOOD PRESSURE: 154 MMHG | BODY MASS INDEX: 21.71 KG/M2 | WEIGHT: 115 LBS | HEART RATE: 73 BPM | HEIGHT: 61 IN

## 2022-10-07 DIAGNOSIS — M17.12 ARTHRITIS OF LEFT KNEE: ICD-10-CM

## 2022-10-07 DIAGNOSIS — M21.062 ACQUIRED VALGUS DEFORMITY OF KNEE, LEFT: ICD-10-CM

## 2022-10-07 DIAGNOSIS — M70.62 GREATER TROCHANTERIC BURSITIS OF LEFT HIP: Primary | ICD-10-CM

## 2022-10-07 PROCEDURE — 99212 OFFICE O/P EST SF 10 MIN: CPT | Performed by: PHYSICIAN ASSISTANT

## 2022-10-07 PROCEDURE — 20610 DRAIN/INJ JOINT/BURSA W/O US: CPT | Performed by: PHYSICIAN ASSISTANT

## 2022-10-07 RX ORDER — BETAMETHASONE SODIUM PHOSPHATE AND BETAMETHASONE ACETATE 3; 3 MG/ML; MG/ML
6 INJECTION, SUSPENSION INTRA-ARTICULAR; INTRALESIONAL; INTRAMUSCULAR; SOFT TISSUE
Status: COMPLETED | OUTPATIENT
Start: 2022-10-07 | End: 2022-10-07

## 2022-10-07 RX ORDER — BETAMETHASONE SODIUM PHOSPHATE AND BETAMETHASONE ACETATE 3; 3 MG/ML; MG/ML
12 INJECTION, SUSPENSION INTRA-ARTICULAR; INTRALESIONAL; INTRAMUSCULAR; SOFT TISSUE
Status: COMPLETED | OUTPATIENT
Start: 2022-10-07 | End: 2022-10-07

## 2022-10-07 RX ORDER — LIDOCAINE HYDROCHLORIDE 10 MG/ML
2 INJECTION, SOLUTION INFILTRATION; PERINEURAL
Status: COMPLETED | OUTPATIENT
Start: 2022-10-07 | End: 2022-10-07

## 2022-10-07 RX ORDER — BUPIVACAINE HYDROCHLORIDE 2.5 MG/ML
2 INJECTION, SOLUTION INFILTRATION; PERINEURAL
Status: COMPLETED | OUTPATIENT
Start: 2022-10-07 | End: 2022-10-07

## 2022-10-07 RX ADMIN — LIDOCAINE HYDROCHLORIDE 2 ML: 10 INJECTION, SOLUTION INFILTRATION; PERINEURAL at 15:48

## 2022-10-07 RX ADMIN — BUPIVACAINE HYDROCHLORIDE 2 ML: 2.5 INJECTION, SOLUTION INFILTRATION; PERINEURAL at 15:48

## 2022-10-07 RX ADMIN — BETAMETHASONE SODIUM PHOSPHATE AND BETAMETHASONE ACETATE 12 MG: 3; 3 INJECTION, SUSPENSION INTRA-ARTICULAR; INTRALESIONAL; INTRAMUSCULAR; SOFT TISSUE at 15:48

## 2022-10-07 RX ADMIN — BETAMETHASONE SODIUM PHOSPHATE AND BETAMETHASONE ACETATE 6 MG: 3; 3 INJECTION, SUSPENSION INTRA-ARTICULAR; INTRALESIONAL; INTRAMUSCULAR; SOFT TISSUE at 15:48

## 2022-10-07 NOTE — PROGRESS NOTES
Subjective;    40-year-old adult female, who benefits by periodic injections  Presents the office today and we will offer her left hip bursa and left knee injections    She has had no change in her medical history she states since last visit      Past Medical History:   Diagnosis Date    Anemia     last assessed - [id number]    Cancer [facility])     Depression     last assessed - [id number]    Glaucoma     Hypercalcemia     last assessed - [id number]    Hypertension     Hypothyroidism 11/30/2017    [facility]omy[facility] ) 2002    right lower extremity    Macular degeneration     Nondisplaced fracture of base of fifth metacarpal bone, right hand, initial encounter for closed fracture 12/7/2018    Osteoarthritis     Plantar fasciitis of right foot     last assessed - [id number]    Stomach disorder     Superficial thrombophlebitis of leg     unspecified laterality; last assessed - [id number]    Traumatic closed nondisplaced fracture of lumbar vertebra, [facility] ) 12/5/2019    Trochanteric bursitis of left hip     last assessed - [id number]       Past Surgical History:   Procedure Laterality Date    CORONARY STENT PLACEMENT      stent RCA    FL INJECTION LEFT HIP (NON ARTHROGRAM)  11/26/2018    FL INJECTION LEFT HIP (NON ARTHROGRAM)  3/4/2019    FL INJECTION LEFT HIP (NON ARTHROGRAM)  6/10/2019    FL INJECTION LEFT HIP (NON ARTHROGRAM)  9/24/2019    FL INJECTION LEFT HIP (NON ARTHROGRAM)  12/30/2019    FL INJECTION LEFT HIP (NON ARTHROGRAM)  7/6/2020    FL INJECTION LEFT HIP (NON ARTHROGRAM)  12/28/2020    FL INJECTION LEFT HIP (NON ARTHROGRAM)  4/12/2021    FL INJECTION LEFT HIP (NON ARTHROGRAM)  7/12/2021    FL INJECTION LEFT HIP (NON ARTHROGRAM)  11/15/2021    FL INJECTION LEFT HIP (NON ARTHROGRAM)  2/15/2022    FOOT SURGERY      HIP SURGERY      HYSTERECTOMY      JOINT REPLACEMENT      Hip Replacement 2000    SKIN LESION EXCISION  11/2010    Face - BCC - nose    TOTAL ABDOMINAL HYSTERECTOMY W/ BILATERAL SALPINGOOPHORECTOMY      TOTAL HIP ARTHROPLASTY Right        Family History   Problem Relation Age of Onset    Stroke Mother         cerebrovascular accident (CVA)    Coronary artery disease Mother         coronary disease    Stroke Father     Coronary artery disease Father         coronary disease    Stroke Brother     Kidney cancer Family        Social History     Tobacco Use    Smoking status: Never Smoker    Smokeless tobacco: Never Used   Vaping Use    Vaping Use: Never used   Substance Use Topics    Alcohol use: Yes     Comment: rare    Drug use: No     Exam;    She has reproducible or provocative pain over her greater troch bursa left hip  She has discomfort from her knee left knee  Large joint arthrocentesis: L greater trochanteric bursa  Universal Protocol:  Consent given by: patient    Procedure Details  Location: hip - L greater trochanteric bursa  Needle size: 22 G  Approach: lateral  Medications administered: 2 mL lidocaine 1 %; 6 mg betamethasone acetate-betamethasone sodium phosphate 6 (3-3) mg/mL    Patient tolerance: patient tolerated the procedure well with no immediate complications  Dressing:  Sterile dressing applied    Large joint arthrocentesis: L knee  Universal Protocol:  Consent given by: patient    Supporting Documentation  Indications: pain   Procedure Details  Location: knee - L knee  Needle size: 22 G  Medications administered: 2 mL bupivacaine 0 25 %; 2 mL lidocaine 1 %; 12 mg betamethasone acetate-betamethasone sodium phosphate 6 (3-3) mg/mL    Patient tolerance: patient tolerated the procedure well with no immediate complications  Dressing:  Sterile dressing applied      Impression;     Left hip greater trochanteric bursitis  Left hip  Left knee osteoarthritis  Left knee pain    Plan;    She was provided injections to both the left knee as well as left hip bursa at today's visit  We will see her on a p r n  basis  Her entire experience was supervised by and plan formulated by the attending surgeon was my privilege to assist him with her experience

## 2022-10-11 ENCOUNTER — APPOINTMENT (OUTPATIENT)
Dept: LAB | Facility: MEDICAL CENTER | Age: 87
End: 2022-10-11
Payer: MEDICARE

## 2022-10-11 LAB
ALBUMIN SERPL BCP-MCNC: 3.8 G/DL (ref 3.5–5)
ALP SERPL-CCNC: 108 U/L (ref 46–116)
ALT SERPL W P-5'-P-CCNC: 32 U/L (ref 12–78)
ANION GAP SERPL CALCULATED.3IONS-SCNC: 6 MMOL/L (ref 4–13)
AST SERPL W P-5'-P-CCNC: 29 U/L (ref 5–45)
BASOPHILS # BLD AUTO: 0.02 THOUSANDS/ΜL (ref 0–0.1)
BASOPHILS NFR BLD AUTO: 0 % (ref 0–1)
BILIRUB SERPL-MCNC: 0.54 MG/DL (ref 0.2–1)
BUN SERPL-MCNC: 44 MG/DL (ref 5–25)
CALCIUM SERPL-MCNC: 10.9 MG/DL (ref 8.3–10.1)
CHLORIDE SERPL-SCNC: 109 MMOL/L (ref 96–108)
CHOLEST SERPL-MCNC: 155 MG/DL
CO2 SERPL-SCNC: 22 MMOL/L (ref 21–32)
CREAT SERPL-MCNC: 1.08 MG/DL (ref 0.6–1.3)
EOSINOPHIL # BLD AUTO: 0.07 THOUSAND/ΜL (ref 0–0.61)
EOSINOPHIL NFR BLD AUTO: 1 % (ref 0–6)
ERYTHROCYTE [DISTWIDTH] IN BLOOD BY AUTOMATED COUNT: 12.9 % (ref 11.6–15.1)
GFR SERPL CREATININE-BSD FRML MDRD: 43 ML/MIN/1.73SQ M
GLUCOSE P FAST SERPL-MCNC: 95 MG/DL (ref 65–99)
HCT VFR BLD AUTO: 38.7 % (ref 34.8–46.1)
HDLC SERPL-MCNC: 48 MG/DL
HGB BLD-MCNC: 11.6 G/DL (ref 11.5–15.4)
IMM GRANULOCYTES # BLD AUTO: 0.04 THOUSAND/UL (ref 0–0.2)
IMM GRANULOCYTES NFR BLD AUTO: 1 % (ref 0–2)
LDLC SERPL CALC-MCNC: 73 MG/DL (ref 0–100)
LYMPHOCYTES # BLD AUTO: 1.75 THOUSANDS/ΜL (ref 0.6–4.47)
LYMPHOCYTES NFR BLD AUTO: 21 % (ref 14–44)
MCH RBC QN AUTO: 29.6 PG (ref 26.8–34.3)
MCHC RBC AUTO-ENTMCNC: 30 G/DL (ref 31.4–37.4)
MCV RBC AUTO: 99 FL (ref 82–98)
MONOCYTES # BLD AUTO: 0.74 THOUSAND/ΜL (ref 0.17–1.22)
MONOCYTES NFR BLD AUTO: 9 % (ref 4–12)
NEUTROPHILS # BLD AUTO: 5.63 THOUSANDS/ΜL (ref 1.85–7.62)
NEUTS SEG NFR BLD AUTO: 68 % (ref 43–75)
NONHDLC SERPL-MCNC: 107 MG/DL
NRBC BLD AUTO-RTO: 0 /100 WBCS
PLATELET # BLD AUTO: 245 THOUSANDS/UL (ref 149–390)
PMV BLD AUTO: 10.3 FL (ref 8.9–12.7)
POTASSIUM SERPL-SCNC: 4.5 MMOL/L (ref 3.5–5.3)
PROT SERPL-MCNC: 7.4 G/DL (ref 6.4–8.4)
PTH-INTACT SERPL-MCNC: 125 PG/ML (ref 18.4–80.1)
RBC # BLD AUTO: 3.92 MILLION/UL (ref 3.81–5.12)
SODIUM SERPL-SCNC: 137 MMOL/L (ref 135–147)
TRIGL SERPL-MCNC: 168 MG/DL
TSH SERPL DL<=0.05 MIU/L-ACNC: 1.43 UIU/ML (ref 0.45–4.5)
WBC # BLD AUTO: 8.25 THOUSAND/UL (ref 4.31–10.16)

## 2022-10-17 ENCOUNTER — PROCEDURE VISIT (OUTPATIENT)
Dept: FAMILY MEDICINE CLINIC | Facility: CLINIC | Age: 87
End: 2022-10-17
Payer: MEDICARE

## 2022-10-17 VITALS
WEIGHT: 114 LBS | DIASTOLIC BLOOD PRESSURE: 80 MMHG | TEMPERATURE: 96.6 F | HEART RATE: 58 BPM | BODY MASS INDEX: 21.52 KG/M2 | RESPIRATION RATE: 16 BRPM | HEIGHT: 61 IN | SYSTOLIC BLOOD PRESSURE: 140 MMHG | OXYGEN SATURATION: 97 %

## 2022-10-17 DIAGNOSIS — I10 ESSENTIAL HYPERTENSION: Primary | ICD-10-CM

## 2022-10-17 DIAGNOSIS — E78.2 MIXED HYPERLIPIDEMIA: ICD-10-CM

## 2022-10-17 DIAGNOSIS — N18.32 STAGE 3B CHRONIC KIDNEY DISEASE (HCC): ICD-10-CM

## 2022-10-17 DIAGNOSIS — I89.0 LYMPHEDEMA OF RIGHT LOWER EXTREMITY: ICD-10-CM

## 2022-10-17 DIAGNOSIS — M16.12 PRIMARY OSTEOARTHRITIS OF LEFT HIP: ICD-10-CM

## 2022-10-17 DIAGNOSIS — N25.81 HYPERPARATHYROIDISM, SECONDARY RENAL (HCC): ICD-10-CM

## 2022-10-17 DIAGNOSIS — Z23 ENCOUNTER FOR IMMUNIZATION: ICD-10-CM

## 2022-10-17 DIAGNOSIS — I25.10 ATHEROSCLEROSIS OF NATIVE CORONARY ARTERY OF NATIVE HEART WITHOUT ANGINA PECTORIS: ICD-10-CM

## 2022-10-17 PROCEDURE — G0008 ADMIN INFLUENZA VIRUS VAC: HCPCS

## 2022-10-17 PROCEDURE — 90662 IIV NO PRSV INCREASED AG IM: CPT

## 2022-10-17 PROCEDURE — 99214 OFFICE O/P EST MOD 30 MIN: CPT | Performed by: FAMILY MEDICINE

## 2022-10-17 NOTE — PROGRESS NOTES
Name: Regan Coles      : 1926      MRN: [id number]  Encounter Provider: Berlin Krabbe, MD  Encounter Date: 10/17/2022   Encounter department: [facility] [facility] [address]     1  Essential hypertension  -     CBC and differential  -     Comprehensive metabolic panel    2  Stage 3b chronic kidney disease (HCC)  -     Vitamin D 25 hydroxy    3  Mixed hyperlipidemia    4  Atherosclerosis of native coronary artery of native heart without angina pectoris  -     Lipid panel    5  Lymphedema of right lower extremity    6  Hyperparathyroidism, secondary renal (HCC)  -     PTH, intact    7  Primary osteoarthritis of left hip    8  Encounter for immunization  -     influenza vaccine, high-dose, PF 0 7 mL (FLUZONE HIGH-DOSE)    Continue with current medications  Avoid NSAIDs  OV 6 months with labs  Flu vaccine today  COV 19 booster recommended  Subjective     Followup visit  Here with her daughter Antony Pedraza  Hypertension/CKD stage 3  blood pressures have been stable on Valsartan HCTZ 80/12 5 daily and Metoprolol ER 50 mg daily  10/2022 creatinine 1 08  GFR 43  Electrolytes normal except for chloride 109 and Ca++ 10 9  Hgb 11 6    2022 creatinine 1 06  GFR 44  Electrolytes normal except for Ca++ 10 9  Hgb 11 2  PTH 86 2   2021 creatinine 0 97  GFR 50  2020 creatinine 1 20  GFR 39  Electrolytes normal except for chloride 110 and Ca++ 10 5  Hgb 11 9   2020 creatinine 1 19  GFR 39  2020 SPEP no monoclonal bands  Hyperlipidemia and CAD on Atorvastatin 40 mg daily  Lipid profile 10/2022 cholesterol 155  TGs 168   HDL 48  LDL 73  LFTs normal   2021  admission  for DANILO/dehydration/fall  Multiple imaging studies  CT head/MRI brain no acute intracranial abnormalities  She completed home PT  Patient no longer driving  Patient lives alone in a [facility] apartment  Independent with ADLs and most of her IADLs  Assist with grocery shopping  Daughter pays her bills    She eats TV dinners or pre cook meals    Recent Results (from the past 336 hour(s))   TSH, 3rd generation with Free T4 reflex    Collection Time: 10/11/22 10:37 AM   Result Value Ref Range    TSH 3RD GENERATON 1 430 0 450 - 4 500 uIU/mL   Lipid panel    Collection Time: 10/11/22 10:37 AM   Result Value Ref Range    Cholesterol 155 See Comment mg/dL    Triglycerides 168 (H) See Comment mg/dL    HDL, Direct 48 (L) >=50 mg/dL    LDL Calculated 73 0 - 100 mg/dL    Non-HDL-Chol (CHOL-HDL) 107 mg/dl   CBC and differential    Collection Time: 10/11/22 10:37 AM   Result Value Ref Range    WBC 8 25 4 31 - 10 16 Thousand/uL    RBC 3 92 3 81 - 5 12 Million/uL    Hemoglobin 11 6 11 5 - 15 4 g/dL    Hematocrit 38 7 34 8 - 46 1 %    MCV 99 (H) 82 - 98 fL    MCH 29 6 26 8 - 34 3 pg    MCHC 30 0 (L) 31 4 - 37 4 g/dL    RDW 12 9 11 6 - 15 1 %    MPV 10 3 8 9 - 12 7 fL    Platelets 420 066 - 008 Thousands/uL    nRBC 0 /100 WBCs    Neutrophils Relative 68 43 - 75 %    Immat GRANS % 1 0 - 2 %    Lymphocytes Relative 21 14 - 44 %    Monocytes Relative 9 4 - 12 %    Eosinophils Relative 1 0 - 6 %    Basophils Relative 0 0 - 1 %    Neutrophils Absolute 5 63 1 85 - 7 62 Thousands/µL    Immature Grans Absolute 0 04 0 00 - 0 20 Thousand/uL    Lymphocytes Absolute 1 75 0 60 - 4 47 Thousands/µL    Monocytes Absolute 0 74 0 17 - 1 22 Thousand/µL    Eosinophils Absolute 0 07 0 00 - 0 61 Thousand/µL    Basophils Absolute 0 02 0 00 - 0 10 Thousands/µL   Comprehensive metabolic panel    Collection Time: 10/11/22 10:37 AM   Result Value Ref Range    Sodium 137 135 - 147 mmol/L    Potassium 4 5 3 5 - 5 3 mmol/L    Chloride 109 (H) 96 - 108 mmol/L    CO2 22 21 - 32 mmol/L    ANION GAP 6 4 - 13 mmol/L    BUN 44 (H) 5 - 25 mg/dL    Creatinine 1 08 0 60 - 1 30 mg/dL    Glucose, Fasting 95 65 - 99 mg/dL    Calcium 10 9 (H) 8 3 - 10 1 mg/dL    AST 29 5 - 45 U/L    ALT 32 12 - 78 U/L    Alkaline Phosphatase 108 46 - 116 U/L    Total Protein 7 4 6 4 - 8 4 g/dL Albumin 3 8 3 5 - 5 0 g/dL    Total Bilirubin 0 54 0 20 - 1 00 mg/dL    eGFR 43 ml/min/1 73sq m   PTH, intact    Collection Time: 10/11/22 10:37 AM   Result Value Ref Range     0 (H) 18 4 - 80 1 pg/mL             Review of Systems   Constitutional: Positive for fatigue  Negative for appetite change, chills, fever and unexpected weight change  Mild weight loss per daughter  HENT: Positive for hearing loss  Negative for congestion, ear pain, rhinorrhea, sore throat and trouble swallowing  Eyes: Negative for visual disturbance  ARMD and glaucoma  Respiratory: Negative for cough, shortness of breath and wheezing  Cardiovascular: Negative for chest pain, palpitations and leg swelling  CAD followed by Cardiology  07/2021 echocardiogram normal left ventricular systolic function  EF 55 %  Grade 1 diastolic dysfunction  Trace MR  Moderate TR with mild pulmonary hypertension  No pericardial effusion  Aortic root normal size  12/2019 CT scan chest mild left basilar atelectasis versus infiltrate  Normal heart size  Coronary artery calcifications  Normal caliber thoracic aorta with mild atherosclerotic calcifications  9 mm lobulated density in the posterior right upper lobe 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  No tracheal or endobronchial lesions  12/2019 EKG normal sinus rhythm  No acute changes  First-degree AV block  Left posterior fascicular block  Cardiology evaluation 5/2015 for exertional dyspnea and palpitations  Holter monitor showed normal sinus rhythm  139 single PVCs  No sustained ventricular rhythm  rare PACs, consisting of 183 single PACs  3 were noted in bigeminy  10 noted in couplets  no sustained supraventricular rhythm  No significant pauses or high grade AV block  Echocardiogram showed normal left ventricular function, ejection fraction 55% grade 1 diastolic dysfunction  No significant valvular abnormalities   Chronic swelling right lower leg  s/p resection of leiomyosarcoma 2002  Repeat echocardiogram 09/2017 normal left ventricular systolic function  EF 60%  No regional wall motion abnormalities  Right ventricle normal  Trace MR  No pericardial effusion  Gastrointestinal: Negative for abdominal pain, blood in stool, constipation, diarrhea, nausea and vomiting  GERD stable on Omeprazole  no reflux  no dysphagia  Mildly elevated alk phos 04/2022 119  GGTP normal at 25  Lab Results       Component                Value               Date                       ALT                      32                  10/11/2022                 AST                      29                  10/11/2022                 GGT                      25                  04/05/2022                 ALKPHOS                  108                 10/11/2022                 BILITOT                  0 6                 11/16/2016                       Alkaline Phosphatase       Date                     Value               Ref Range           Status                09/09/2021               138 (H)             46 - 116 U/L        Final                 08/25/2020               123 (H)             46 - 116 U/L        Final                 12/06/2019               97                  46 - 116 U/L        Final                 05/21/2018               124                 33 - 130 U/L        Final                 11/16/2016               108                 33 - 130 U/L        Final                 03/22/2016               94                  33 - 130 U/L        Final                 11/16/2015               118 (H)             46 - 116 U/L        Final                             Endocrine:        See HPI   History of mildly elevated Ca++  11/2021 Ca++ 9 1  PTH 95 5  GFR 37  Vitamin D 29  Suspect secondary to CKD  09/2021 Ca++ 10 5   09/2014  SPEP normal    Hypothyroidism no longer on medication  3/2017 Positive thyroid microsomal antibody   Patient was started on Levothyroxine 25 mcg daily in 2017 she stopped Levothyroxine when she started itching  No rash  Lab Results       Component                Value               Date                       PTH                      125 0 (H)           10/11/2022                 CALCIUM                  10 9 (H)            10/11/2022                        Lab Results       Component                Value               Date                       VES3WNTERUGY             1 430               10/11/2022                              Genitourinary: Positive for difficulty urinating  Negative for dysuria and frequency  OAB on Myrbetriq  Trospium stopped due to constipation  Previously on Vesicare- No improvement  Nocturia x 1  She is followed by Urology  09/2020   Right renal angiomyolipoma  renal u/s 09/2015   Musculoskeletal: Positive for arthralgias and gait problem (Patient uses a cane or walker for ambulation)  Negative for myalgias  Chronic left hip and left knee pain  OA left hip  She is followed by Orthopedics  11/2021 X-rays left hip severe osteoarthritis  Periodic left hip intra-articular steroid injections and left trochanteric bursa injections  OA right shoulder  X-rays of right shoulder showed mild glenohumeral osteoarthritis and mild right AC joint osteoarthritis  She completed a course of physical therapy  12/2019 admission after a fall  CT scan chest,abdomen and pelvis nondisplaced fractures of the left transverse processes of L2 and L3  No other evidence of acute posttraumatic abnormality  She has both a cane and walker for ambulation  Skin: Negative for rash  Neurological: Negative for dizziness and headaches  ER visit for fall 12/2018  No LOC  12/2018 CT scan of head no acute intracranial abnormality  Decreased attenuation noted in the periventricular and subcortical white matter is demonstrating moderate microangiopathic change  Episode of slurred speech 10/2015 no recurrence   on ASA 81 mg daily and Plavix  she refused work up  Hematological: Negative for adenopathy  Does not bruise/bleed easily  Lab Results       Component                Value               Date                       WBC                      8 25                10/11/2022                 HGB                      11 6                10/11/2022                 HCT                      38 7                10/11/2022                 MCV                      99 (H)              10/11/2022                 PLT                      245                 10/11/2022                        Hemoglobin       Date                     Value               Ref Range           Status                04/05/2022               11 2 (L)            11 5 - 15 4 g/*     Final                 11/25/2021               10 7 (L)            11 5 - 15 4 g/*     Final                 11/24/2021               12 7                11 5 - 15 4 g/*     Final                 11/16/2016               12 2                11 7 - 15 5 g/*     Final                 03/22/2016               11 8                11 7 - 15 5 g/*     Final                 11/16/2015               12 3                11 5 - 15 4 g/*     Final                   Psychiatric/Behavioral: Negative for dysphoric mood and sleep disturbance         Past Medical History:   Diagnosis Date   • Anemia     last assessed - 24Sep2014   • Cancer [facility])    • Depression     last assessed - 24Sep2014   • Glaucoma    • Hypercalcemia     last assessed - 20Jul2015   • Hypertension    • Hypothyroidism 11/30/2017   • Leiomyosarcoma ([facility] [address] ) 2002    right lower extremity   • Macular degeneration    • Nondisplaced fracture of base of fifth metacarpal bone, right hand, initial encounter for closed fracture 12/7/2018   • Osteoarthritis    • Plantar fasciitis of right foot     last assessed - 07Apr2017   • Stomach disorder    • Superficial thrombophlebitis of leg     unspecified laterality; last assessed - [id number]   • Traumatic closed nondisplaced fracture of lumbar vertebra, initial encounter ([facility] [address] ) 12/5/2019   • Trochanteric bursitis of left hip     last assessed - 07Apr2017     Past Surgical History:   Procedure Laterality Date   • CORONARY STENT PLACEMENT      stent RCA   • FL INJECTION LEFT HIP (NON ARTHROGRAM)  11/26/2018   • FL INJECTION LEFT HIP (NON ARTHROGRAM)  3/4/2019   • FL INJECTION LEFT HIP (NON ARTHROGRAM)  6/10/2019   • FL INJECTION LEFT HIP (NON ARTHROGRAM)  9/24/2019   • FL INJECTION LEFT HIP (NON ARTHROGRAM)  12/30/2019   • FL INJECTION LEFT HIP (NON ARTHROGRAM)  7/6/2020   • FL INJECTION LEFT HIP (NON ARTHROGRAM)  12/28/2020   • FL INJECTION LEFT HIP (NON ARTHROGRAM)  4/12/2021   • FL INJECTION LEFT HIP (NON ARTHROGRAM)  7/12/2021   • FL INJECTION LEFT HIP (NON ARTHROGRAM)  11/15/2021   • FL INJECTION LEFT HIP (NON ARTHROGRAM)  2/15/2022   • FOOT SURGERY     • HIP SURGERY     • HYSTERECTOMY     • JOINT REPLACEMENT      Hip Replacement 2000   • SKIN LESION EXCISION  11/2010    Face - BCC - nose   • TOTAL ABDOMINAL HYSTERECTOMY W/ BILATERAL SALPINGOOPHORECTOMY     • TOTAL HIP ARTHROPLASTY Right      Family History   Problem Relation Age of Onset   • Stroke Mother         cerebrovascular accident (CVA)   • Coronary artery disease Mother         coronary disease   • Stroke Father    • Coronary artery disease Father         coronary disease   • Stroke Brother    • Kidney cancer Family      Social History     Socioeconomic History   • Marital status:       Spouse name: None   • Number of children: None   • Years of education: None   • Highest education level: None   Occupational History   • None   Tobacco Use   • Smoking status: Never Smoker   • Smokeless tobacco: Never Used   Vaping Use   • Vaping Use: Never used   Substance and Sexual Activity   • Alcohol use: Yes     Comment: rare   • Drug use: No   • Sexual activity: None   Other Topics Concern   • None   Social History Narrative    Caffeine use     Social Determinants of Health     Financial Resource Strain: Not on file   Food Insecurity: Not on file   Transportation Needs: Not on file   Physical Activity: Not on file   Stress: Not on file   Social Connections: Not on file   Intimate Partner Violence: Not on file   Housing Stability: Not on file     Current Outpatient Medications on File Prior to Visit   Medication Sig   • acetaminophen (TYLENOL) 500 mg tablet Take 500 mg by mouth every 6 (six) hours as needed for mild pain   • aspirin (ECOTRIN LOW STRENGTH) 81 mg EC tablet Take 1 tablet by mouth daily   • atorvastatin (LIPITOR) 40 mg tablet Take 1 tablet (40 mg total) by mouth daily   • cholecalciferol (VITAMIN D3) 1,000 units tablet Take 1 tablet by mouth daily   • clopidogrel (PLAVIX) 75 mg tablet Take 1 tablet (75 mg total) by mouth daily   • latanoprost (XALATAN) 0 005 % ophthalmic solution INSTILL 1 DROP INTO BOTH EYES EVERY DAY AS DIRECTED   • metoprolol succinate (TOPROL-XL) 50 mg 24 hr tablet Take 1 tablet (50 mg total) by mouth daily   • Multiple Vitamins-Minerals (ICAPS AREDS 2) CAPS Take by mouth   • Myrbetriq 25 MG TB24 TAKE 25 MG BY MOUTH DAILY   • nitroglycerin (NITROSTAT) 0 4 mg SL tablet Place 1 tablet (0 4 mg total) under the tongue every 5 (five) minutes as needed for chest pain   • olopatadine (Pataday) 0 1 % ophthalmic solution     • Omega-3 Fatty Acids (OMEGA-3 FISH OIL) 1200 MG CAPS Take by mouth   • omeprazole (PriLOSEC) 20 mg delayed release capsule 1 capsule by mouth daily   • Polyvinyl Alcohol-Povidone PF 1 4-0 6 % SOLN Apply to eye   • RESTASIS 0 05 % ophthalmic emulsion instill 1 drop into both eyes twice a day   • valsartan-hydrochlorothiazide (DIOVAN-HCT) 80-12 5 MG per tablet Take 1 tablet by mouth daily     Allergies   Allergen Reactions   • Iv Contrast [Iodinated Diagnostic Agents]    • Other      Iv contrast  Adhesive tape   • Ciprofloxacin      Patient does not remember     Immunization History   Administered Date(s) Administered • COVID-19 PFIZER VACCINE 0 3 ML IM 01/31/2021, 02/20/2021, 10/09/2021   • INFLUENZA 11/18/2015, 11/21/2016, 11/30/2017   • Influenza Split High Dose Preservative Free IM 11/19/2012, 10/03/2013, 09/24/2014, 11/18/2015, 11/21/2016, 11/30/2017   • Influenza, high dose seasonal 0 7 mL 12/03/2018, 11/04/2019, 09/01/2020, 09/13/2021, 10/17/2022   • Influenza, seasonal, injectable 10/24/2011   • Pneumococcal Conjugate 13-Valent 11/18/2015   • Pneumococcal Polysaccharide PPV23 01/02/2008       Objective     /80 (BP Location: Right leg, Patient Position: Sitting, Cuff Size: Standard)   Pulse 58   Temp (!) 96 6 °F (35 9 °C)   Resp 16   Ht 5' 1" (1 549 m)   Wt 51 7 kg (114 lb)   SpO2 97%   BMI 21 54 kg/m²     Wt Readings from Last 3 Encounters:   10/17/22 51 7 kg (114 lb)   10/07/22 52 2 kg (115 lb)   09/22/22 52 6 kg (115 lb 14 4 oz)         Physical Exam  Vitals and nursing note reviewed  Constitutional:       General: She is not in acute distress  Appearance: She is well-developed  HENT:      Right Ear: Tympanic membrane and ear canal normal       Left Ear: Tympanic membrane and ear canal normal    Eyes:      General: No scleral icterus  Extraocular Movements: Extraocular movements intact  Conjunctiva/sclera: Conjunctivae normal       Pupils: Pupils are equal, round, and reactive to light  Neck:      Thyroid: No thyroid mass or thyromegaly  Vascular: No carotid bruit or JVD  Trachea: No tracheal deviation  Cardiovascular:      Rate and Rhythm: Normal rate and regular rhythm  Heart sounds: Normal heart sounds  No murmur heard  No gallop  Pulmonary:      Effort: Pulmonary effort is normal  No respiratory distress  Breath sounds: Normal breath sounds  No wheezing or rales  Chest:   Breasts:      Right: No supraclavicular adenopathy  Left: No supraclavicular adenopathy  Musculoskeletal:      Right lower leg: Edema (mild chronic R ankle edema ) present  Left lower leg: No edema  Lymphadenopathy:      Cervical: No cervical adenopathy  Upper Body:      Right upper body: No supraclavicular adenopathy  Left upper body: No supraclavicular adenopathy  Skin:     Findings: No rash  Nails: There is no clubbing  Neurological:      General: No focal deficit present  Mental Status: She is alert and oriented to person, place, and time  Psychiatric:         Mood and Affect: Mood normal          Behavior: Behavior normal          Cognition and Memory: She exhibits impaired recent memory         Silver Sandra MD

## 2022-10-25 DIAGNOSIS — I10 ESSENTIAL HYPERTENSION: ICD-10-CM

## 2022-10-25 RX ORDER — VALSARTAN AND HYDROCHLOROTHIAZIDE 80; 12.5 MG/1; MG/1
1 TABLET, FILM COATED ORAL DAILY
Qty: 90 TABLET | Refills: 3 | Status: SHIPPED | OUTPATIENT
Start: 2022-10-25

## 2022-10-25 NOTE — TELEPHONE ENCOUNTER
Daughter tried to refill Diavan-HCTZ & was told [facility] will not approve refill because the script is too old  Please update script for refills       [facility]

## 2023-01-20 ENCOUNTER — APPOINTMENT (OUTPATIENT)
Dept: RADIOLOGY | Facility: MEDICAL CENTER | Age: 88
End: 2023-01-20

## 2023-01-20 ENCOUNTER — OFFICE VISIT (OUTPATIENT)
Dept: OBGYN CLINIC | Facility: MEDICAL CENTER | Age: 88
End: 2023-01-20

## 2023-01-20 VITALS
HEIGHT: 61 IN | DIASTOLIC BLOOD PRESSURE: 66 MMHG | SYSTOLIC BLOOD PRESSURE: 173 MMHG | BODY MASS INDEX: 21.14 KG/M2 | WEIGHT: 112 LBS | HEART RATE: 75 BPM

## 2023-01-20 DIAGNOSIS — M70.62 GREATER TROCHANTERIC BURSITIS OF LEFT HIP: Primary | ICD-10-CM

## 2023-01-20 DIAGNOSIS — M25.571 PAIN, JOINT, ANKLE AND FOOT, RIGHT: ICD-10-CM

## 2023-01-20 DIAGNOSIS — M19.071 OSTEOARTHRITIS OF RIGHT ANKLE, UNSPECIFIED OSTEOARTHRITIS TYPE: ICD-10-CM

## 2023-01-20 DIAGNOSIS — M17.12 ARTHRITIS OF LEFT KNEE: ICD-10-CM

## 2023-01-20 RX ORDER — BETAMETHASONE SODIUM PHOSPHATE AND BETAMETHASONE ACETATE 3; 3 MG/ML; MG/ML
12 INJECTION, SUSPENSION INTRA-ARTICULAR; INTRALESIONAL; INTRAMUSCULAR; SOFT TISSUE
Status: COMPLETED | OUTPATIENT
Start: 2023-01-20 | End: 2023-01-20

## 2023-01-20 RX ORDER — BETAMETHASONE SODIUM PHOSPHATE AND BETAMETHASONE ACETATE 3; 3 MG/ML; MG/ML
6 INJECTION, SUSPENSION INTRA-ARTICULAR; INTRALESIONAL; INTRAMUSCULAR; SOFT TISSUE
Status: COMPLETED | OUTPATIENT
Start: 2023-01-20 | End: 2023-01-20

## 2023-01-20 RX ORDER — BUPIVACAINE HYDROCHLORIDE 2.5 MG/ML
2 INJECTION, SOLUTION INFILTRATION; PERINEURAL
Status: COMPLETED | OUTPATIENT
Start: 2023-01-20 | End: 2023-01-20

## 2023-01-20 RX ORDER — LIDOCAINE HYDROCHLORIDE 10 MG/ML
2 INJECTION, SOLUTION INFILTRATION; PERINEURAL
Status: COMPLETED | OUTPATIENT
Start: 2023-01-20 | End: 2023-01-20

## 2023-01-20 RX ADMIN — BUPIVACAINE HYDROCHLORIDE 2 ML: 2.5 INJECTION, SOLUTION INFILTRATION; PERINEURAL at 13:59

## 2023-01-20 RX ADMIN — BUPIVACAINE HYDROCHLORIDE 2 ML: 2.5 INJECTION, SOLUTION INFILTRATION; PERINEURAL at 13:35

## 2023-01-20 RX ADMIN — BETAMETHASONE SODIUM PHOSPHATE AND BETAMETHASONE ACETATE 12 MG: 3; 3 INJECTION, SUSPENSION INTRA-ARTICULAR; INTRALESIONAL; INTRAMUSCULAR; SOFT TISSUE at 13:35

## 2023-01-20 RX ADMIN — LIDOCAINE HYDROCHLORIDE 2 ML: 10 INJECTION, SOLUTION INFILTRATION; PERINEURAL at 13:35

## 2023-01-20 RX ADMIN — BETAMETHASONE SODIUM PHOSPHATE AND BETAMETHASONE ACETATE 6 MG: 3; 3 INJECTION, SUSPENSION INTRA-ARTICULAR; INTRALESIONAL; INTRAMUSCULAR; SOFT TISSUE at 13:59

## 2023-01-20 NOTE — PROGRESS NOTES
Assessment:  1  Greater trochanteric bursitis of left hip  Large joint arthrocentesis: L greater trochanteric bursa      2  Arthritis of left knee  Large joint arthrocentesis: L knee      3  Pain, joint, ankle and foot, right  XR ankle 3+ vw right          Plan:  Left knee osteoarthritis, left trochanteric bursitis and right ankle  The patient was provided with left IA knee, left trochanteric bursa and right ankle steroid injections  The patient tolerated the procedure well  The patient should follow up in 3 months  To do next visit:  Return in about 3 months (around 4/20/2023)  The above stated was discussed in layman's terms and the patient expressed understanding  All questions were answered to the patient's satisfaction  Subjective:   Miladis Carpenter is a 80 y o  female who presents for follow up of left hip and knee  She is s/p left trochanteric bursa and left IA knee steroid injection with benefit, 6/17/2022  Today she complains of left lateral hip and generalized knee pain  Most activity can aggravate while rest alleviate  She does use Tylenol daily with benefit  She also complaints of right medial ankle pain  Prolonged weight bearing and walking aggravates while rest alleviates  She does use a rolling walker for ambulation  She mentions history of surgery 20 years ago          Review of systems negative unless otherwise specified in HPI    Past Medical History:   Diagnosis Date   • Anemia     last assessed - [id number]   • Cancer [facility])    • Depression     last assessed - [id number]   • Glaucoma    • Hypercalcemia     last assessed - [id number]   • Hypertension    • Hypothyroidism 11/30/2017   • 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   • Macular degeneration    • Nondisplaced fracture of base of fifth metacarpal bone, right hand, initial encounter for closed fracture 12/7/2018   • Osteoarthritis    • Plantar fasciitis of right foot     last assessed - 07Apr2017   • Stomach disorder    • Superficial thrombophlebitis of leg     unspecified laterality; last assessed - [id number]   • Traumatic closed nondisplaced fracture of lumbar vertebra, initial encounter ([facility] [address] ) 12/5/2019   • Trochanteric bursitis of left hip     last assessed - 07Apr2017       Past Surgical History:   Procedure Laterality Date   • CORONARY STENT PLACEMENT      stent RCA   • FL INJECTION LEFT HIP (NON ARTHROGRAM)  11/26/2018   • FL INJECTION LEFT HIP (NON ARTHROGRAM)  3/4/2019   • FL INJECTION LEFT HIP (NON ARTHROGRAM)  6/10/2019   • FL INJECTION LEFT HIP (NON ARTHROGRAM)  9/24/2019   • FL INJECTION LEFT HIP (NON ARTHROGRAM)  12/30/2019   • FL INJECTION LEFT HIP (NON ARTHROGRAM)  7/6/2020   • FL INJECTION LEFT HIP (NON ARTHROGRAM)  12/28/2020   • FL INJECTION LEFT HIP (NON ARTHROGRAM)  4/12/2021   • FL INJECTION LEFT HIP (NON ARTHROGRAM)  7/12/2021   • FL INJECTION LEFT HIP (NON ARTHROGRAM)  11/15/2021   • FL INJECTION LEFT HIP (NON ARTHROGRAM)  2/15/2022   • FOOT SURGERY     • HIP SURGERY     • HYSTERECTOMY     • JOINT REPLACEMENT      Hip Replacement 2000   • SKIN LESION EXCISION  11/2010    Face - BCC - nose   • TOTAL ABDOMINAL HYSTERECTOMY W/ BILATERAL SALPINGOOPHORECTOMY     • TOTAL HIP ARTHROPLASTY Right        Family History   Problem Relation Age of Onset   • Stroke Mother         cerebrovascular accident (CVA)   • Coronary artery disease Mother         coronary disease   • Stroke Father    • Coronary artery disease Father         coronary disease   • Stroke Brother    • Kidney cancer Family        Social History     Occupational History   • Not on file   Tobacco Use   • Smoking status: Never   • Smokeless tobacco: Never   Vaping Use   • Vaping Use: Never used   Substance and Sexual Activity   • Alcohol use: Yes     Comment: rare   • Drug use: No   • Sexual activity: Not on file         Current Outpatient Medications:   •  acetaminophen (TYLENOL) 500 mg tablet, Take 500 mg by mouth every 6 (six) hours as needed for mild pain, Disp: , Rfl:   •  aspirin (ECOTRIN LOW STRENGTH) 81 mg EC tablet, Take 1 tablet by mouth daily, Disp: , Rfl:   •  atorvastatin (LIPITOR) 40 mg tablet, Take 1 tablet (40 mg total) by mouth daily, Disp: 90 tablet, Rfl: 3  •  cholecalciferol (VITAMIN D3) 1,000 units tablet, Take 1 tablet by mouth daily, Disp: , Rfl:   •  clopidogrel (PLAVIX) 75 mg tablet, Take 1 tablet (75 mg total) by mouth daily, Disp: 90 tablet, Rfl: 3  •  latanoprost (XALATAN) 0 005 % ophthalmic solution, INSTILL 1 DROP INTO BOTH EYES EVERY DAY AS DIRECTED, Disp: , Rfl: 4  •  metoprolol succinate (TOPROL-XL) 50 mg 24 hr tablet, Take 1 tablet (50 mg total) by mouth daily, Disp: 90 tablet, Rfl: 3  •  Multiple Vitamins-Minerals (ICAPS AREDS 2) CAPS, Take by mouth, Disp: , Rfl:   •  Myrbetriq 25 MG TB24, TAKE 25 MG BY MOUTH DAILY, Disp: 90 tablet, Rfl: 3  •  nitroglycerin (NITROSTAT) 0 4 mg SL tablet, Place 1 tablet (0 4 mg total) under the tongue every 5 (five) minutes as needed for chest pain, Disp: 25 tablet, Rfl: 3  •  olopatadine (Pataday) 0 1 % ophthalmic solution,  , Disp: , Rfl:   •  Omega-3 Fatty Acids (OMEGA-3 FISH OIL) 1200 MG CAPS, Take by mouth, Disp: , Rfl:   •  omeprazole (PriLOSEC) 20 mg delayed release capsule, 1 capsule by mouth daily, Disp: 90 capsule, Rfl: 3  •  Polyvinyl Alcohol-Povidone PF 1 4-0 6 % SOLN, Apply to eye, Disp: , Rfl:   •  RESTASIS 0 05 % ophthalmic emulsion, instill 1 drop into both eyes twice a day, Disp: , Rfl: 0  •  valsartan-hydrochlorothiazide (DIOVAN-HCT) 80-12 5 MG per tablet, Take 1 tablet by mouth daily, Disp: 90 tablet, Rfl: 3    Allergies   Allergen Reactions   • Iv Contrast [Iodinated Contrast Media]    • Other      Iv contrast  Adhesive tape   • Ciprofloxacin      Patient does not remember            Vitals:    01/20/23 1312   BP: (!) 173/66   Pulse: 75       Objective:  Physical exam  · General: Awake, Alert, Oriented  · Eyes: Pupils equal, round and reactive to light  · Heart: regular rate and rhythm  · Lungs: No audible wheezing  · Abdomen: soft                    Ortho Exam  Left hip:  TTP over greater trochanter  Good arc of motion  Patient sits comfortably in chair with hip flexed at 90 degrees  Patient stands from seated position without assistance     Left knee:  Valgus alignment  TTP over lateral joint line  No erythema or ecchymosis  No effusion or swelling  Normal strength  Good ROM with crepitus   Calf compartments soft and supple  Sensation intact  Toes are warm sensate and mobile    Right ankle: Mature skin graft  TTP over Tibiotalar joint line  No irregularity of joint  No warmth or effusion  Sensation intact     Diagnostics, reviewed and taken today if performed as documented:    Right ankle x-ray:  Arthritic changes of tibiotalar joint with decreased joint space  Procedures, if performed today:    Large joint arthrocentesis: L knee  Universal Protocol:  Consent: Verbal consent obtained  Risks and benefits: risks, benefits and alternatives were discussed  Consent given by: patient  Time out: Immediately prior to procedure a "time out" was called to verify the correct patient, procedure, equipment, support staff and site/side marked as required    Timeout called at: 1/20/2023 1:32 PM   Patient understanding: patient states understanding of the procedure being performed  Site marked: the operative site was marked  Patient identity confirmed: verbally with patient    Supporting Documentation  Indications: pain   Procedure Details  Location: knee - L knee  Preparation: Patient was prepped and draped in the usual sterile fashion  Needle size: 22 G  Ultrasound guidance: no  Approach: anterolateral  Medications administered: 12 mg betamethasone acetate-betamethasone sodium phosphate 6 (3-3) mg/mL; 2 mL bupivacaine 0 25 %; 2 mL lidocaine 1 %    Patient tolerance: patient tolerated the procedure well with no immediate complications  Dressing:  Sterile dressing applied    Large joint arthrocentesis: L greater trochanteric bursa  Universal Protocol:  Consent: Verbal consent obtained  Risks and benefits: risks, benefits and alternatives were discussed  Consent given by: patient  Time out: Immediately prior to procedure a "time out" was called to verify the correct patient, procedure, equipment, support staff and site/side marked as required  Timeout called at: 1/20/2023 1:32 PM   Patient understanding: patient states understanding of the procedure being performed  Site marked: the operative site was marked  Patient identity confirmed: verbally with patient    Supporting Documentation  Indications: pain   Procedure Details  Location: hip - L greater trochanteric bursa  Needle size: 22 G  Ultrasound guidance: no  Approach: lateral  Medications administered: 12 mg betamethasone acetate-betamethasone sodium phosphate 6 (3-3) mg/mL; 2 mL bupivacaine 0 25 %; 2 mL lidocaine 1 %    Patient tolerance: patient tolerated the procedure well with no immediate complications  Dressing:  Sterile dressing applied    Medium joint arthrocentesis: R ankle  Universal Protocol:  Consent given by: patient  Time out: Immediately prior to procedure a "time out" was called to verify the correct patient, procedure, equipment, support staff and site/side marked as required    Timeout called at: 1/20/2023 1:58 PM   Site marked: the operative site was marked  Supporting Documentation  Indications: pain   Procedure Details  Location: ankle - R ankle  Preparation: Patient was prepped and draped in the usual sterile fashion  Needle size: 22 G  Ultrasound guidance: no  Approach: anteromedial  Medications administered: 6 mg betamethasone acetate-betamethasone sodium phosphate 6 (3-3) mg/mL; 2 mL bupivacaine 0 25 % (1ml bupivicaine 0 25% )    Patient tolerance: patient tolerated the procedure well with no immediate complications  Dressing:  Sterile dressing applied              Portions of the record may have been created with voice recognition software  Occasional wrong word or "sound a like" substitutions may have occurred due to the inherent limitations of voice recognition software  Read the chart carefully and recognize, using context, where substitutions have occurred

## 2023-03-06 NOTE — PROGRESS NOTES
3/8/2023      Chief Complaint   Patient presents with   • Urinary Incontinence     Assessment and Plan    1  Urinary incontinence  2  Overactive bladder  - Well managed on Myrbetriq 25 mg daily  Rx refilled  - PVR=6 mL   - She can follow up PRN at this time and obtain refills from PCP going forward  History of Present Illness  Bonnie Beltran is a 80 y o  female here for follow up evaluation of overactive bladder and urinary incontinence  Patient has been well managed on Myrbetriq 25 mg daily  Denies any new or bothersome urinary issues  Previously had failed anticholinergics in the past due to insurance issues as well as side effects  Review of Systems   Constitutional: Negative for chills and fever  Respiratory: Negative for shortness of breath  Cardiovascular: Negative for chest pain  Gastrointestinal: Negative for abdominal pain  Genitourinary: Negative for difficulty urinating, dysuria, flank pain, frequency, hematuria and urgency  Neurological: Negative for dizziness         Past Medical History  Past Medical History:   Diagnosis Date   • Anemia     last assessed - [id number]   • Cancer [facility])    • Depression     last assessed - [id number]   • Glaucoma    • Hypercalcemia     last assessed - [id number]   • Hypertension    • Hypothyroidism 11/30/2017   • [facility]    right lower extremity   • Macular degeneration    • Nondisplaced fracture of base of fifth metacarpal bone, right hand, initial encounter for closed fracture 12/7/2018   • Osteoarthritis    • Plantar fasciitis of right foot     last assessed - 07Apr2017   • Stomach disorder    • Superficial thrombophlebitis of leg     unspecified laterality; last assessed - [id number]   • Traumatic closed nondisplaced fracture of lumbar vertebra, initial encounter ([facility] [address] ) 12/5/2019   • Trochanteric bursitis of left hip     last assessed - 07Apr2017       Past Social History  Past Surgical History:   Procedure Laterality Date   • CORONARY STENT PLACEMENT      stent RCA   • FL INJECTION LEFT HIP (NON ARTHROGRAM)  11/26/2018   • FL INJECTION LEFT HIP (NON ARTHROGRAM)  3/4/2019   • FL INJECTION LEFT HIP (NON ARTHROGRAM)  6/10/2019   • FL INJECTION LEFT HIP (NON ARTHROGRAM)  9/24/2019   • FL INJECTION LEFT HIP (NON ARTHROGRAM)  12/30/2019   • FL INJECTION LEFT HIP (NON ARTHROGRAM)  7/6/2020   • FL INJECTION LEFT HIP (NON ARTHROGRAM)  12/28/2020   • FL INJECTION LEFT HIP (NON ARTHROGRAM)  4/12/2021   • FL INJECTION LEFT HIP (NON ARTHROGRAM)  7/12/2021   • FL INJECTION LEFT HIP (NON ARTHROGRAM)  11/15/2021   • FL INJECTION LEFT HIP (NON ARTHROGRAM)  2/15/2022   • FOOT SURGERY     • HIP SURGERY     • HYSTERECTOMY     • JOINT REPLACEMENT      Hip Replacement 2000   • SKIN LESION EXCISION  11/2010    Face - BCC - nose   • TOTAL ABDOMINAL HYSTERECTOMY W/ BILATERAL SALPINGOOPHORECTOMY     • TOTAL HIP ARTHROPLASTY Right      Social History     Tobacco Use   Smoking Status Never   • Passive exposure: Past   Smokeless Tobacco Never       Past Family History  Family History   Problem Relation Age of Onset   • Stroke Mother         cerebrovascular accident (CVA)   • Coronary artery disease Mother         coronary disease   • Stroke Father    • Coronary artery disease Father         coronary disease   • Stroke Brother    • Kidney cancer Family        Past Social history  Social History     Socioeconomic History   • Marital status:       Spouse name: Not on file   • Number of children: Not on file   • Years of education: Not on file   • Highest education level: Not on file   Occupational History   • Not on file   Tobacco Use   • Smoking status: Never     Passive exposure: Past   • Smokeless tobacco: Never   Vaping Use   • Vaping Use: Never used   Substance and Sexual Activity   • Alcohol use: Yes     Comment: rare   • Drug use: No   • Sexual activity: Not Currently   Other Topics Concern   • Not on file   Social History Narrative    Caffeine use Social Determinants of Health     Financial Resource Strain: Not on file   Food Insecurity: Not on file   Transportation Needs: Not on file   Physical Activity: Not on file   Stress: Not on file   Social Connections: Not on file   Intimate Partner Violence: Not on file   Housing Stability: Not on file       Current Medications  Current Outpatient Medications   Medication Sig Dispense Refill   • acetaminophen (TYLENOL) 500 mg tablet Take 500 mg by mouth every 6 (six) hours as needed for mild pain     • aspirin (ECOTRIN LOW STRENGTH) 81 mg EC tablet Take 1 tablet by mouth daily     • atorvastatin (LIPITOR) 40 mg tablet Take 1 tablet (40 mg total) by mouth daily 90 tablet 3   • cholecalciferol (VITAMIN D3) 1,000 units tablet Take 1 tablet by mouth daily     • clopidogrel (PLAVIX) 75 mg tablet Take 1 tablet (75 mg total) by mouth daily 90 tablet 3   • latanoprost (XALATAN) 0 005 % ophthalmic solution INSTILL 1 DROP INTO BOTH EYES EVERY DAY AS DIRECTED  4   • metoprolol succinate (TOPROL-XL) 50 mg 24 hr tablet Take 1 tablet (50 mg total) by mouth daily 90 tablet 3   • Multiple Vitamins-Minerals (ICAPS AREDS 2) CAPS Take by mouth     • Myrbetriq 25 MG TB24 TAKE 25 MG BY MOUTH DAILY 90 tablet 3   • nitroglycerin (NITROSTAT) 0 4 mg SL tablet Place 1 tablet (0 4 mg total) under the tongue every 5 (five) minutes as needed for chest pain (Patient taking differently: Place 0 4 mg under the tongue every 5 (five) minutes as needed for chest pain When needed) 25 tablet 3   • olopatadine (Pataday) 0 1 % ophthalmic solution When needed     • Omega-3 Fatty Acids (OMEGA-3 FISH OIL) 1200 MG CAPS Take by mouth     • omeprazole (PriLOSEC) 20 mg delayed release capsule 1 capsule by mouth daily 90 capsule 3   • Polyvinyl Alcohol-Povidone PF 1 4-0 6 % SOLN Apply to eye     • RESTASIS 0 05 % ophthalmic emulsion instill 1 drop into both eyes twice a day  0   • valsartan-hydrochlorothiazide (DIOVAN-HCT) 80-12 5 MG per tablet Take 1 tablet by mouth daily 90 tablet 3     No current facility-administered medications for this visit  Allergies  Allergies   Allergen Reactions   • Iv Contrast [Iodinated Contrast Media]    • Other      Iv contrast  Adhesive tape   • Ciprofloxacin      Patient does not remember         The following portions of the patient's history were reviewed and updated as appropriate: allergies, current medications, past medical history, past social history, past surgical history and problem list       Vitals  Vitals:    03/08/23 1041   BP: 130/88   Pulse: 74   SpO2: 97%   Weight: 50 8 kg (112 lb)   Height: 5' 1" (1 549 m)           Physical Exam  Physical Exam  Constitutional:       Appearance: Normal appearance  HENT:      Head: Normocephalic and atraumatic  Right Ear: External ear normal       Left Ear: External ear normal       Nose: Nose normal    Eyes:      General: No scleral icterus  Conjunctiva/sclera: Conjunctivae normal    Cardiovascular:      Pulses: Normal pulses  Pulmonary:      Effort: Pulmonary effort is normal    Musculoskeletal:      Cervical back: Normal range of motion  Comments: Ambulating with walker   Neurological:      General: No focal deficit present  Mental Status: She is alert and oriented to person, place, and time  Psychiatric:         Mood and Affect: Mood normal          Behavior: Behavior normal          Thought Content:  Thought content normal          Judgment: Judgment normal            Results  Recent Results (from the past 1 hour(s))   POCT Measure PVR    Collection Time: 03/08/23 10:46 AM   Result Value Ref Range    POST-VOID RESIDUAL VOLUME, ML POC 6 mL   ]  No results found for: PSA  Lab Results   Component Value Date    GLUCOSE 96 11/16/2015    CALCIUM 10 9 (H) 10/11/2022     11/16/2016    K 4 5 10/11/2022    CO2 22 10/11/2022     (H) 10/11/2022    BUN 44 (H) 10/11/2022    CREATININE 1 08 10/11/2022     Lab Results   Component Value Date    WBC 8 25 10/11/2022    HGB 11 6 10/11/2022    HCT 38 7 10/11/2022    MCV 99 (H) 10/11/2022     10/11/2022           Orders  Orders Placed This Encounter   Procedures   • POCT Measure PVR       Jai Travis

## 2023-03-08 ENCOUNTER — OFFICE VISIT (OUTPATIENT)
Dept: UROLOGY | Facility: CLINIC | Age: 88
End: 2023-03-08

## 2023-03-08 VITALS
HEART RATE: 74 BPM | SYSTOLIC BLOOD PRESSURE: 130 MMHG | DIASTOLIC BLOOD PRESSURE: 88 MMHG | BODY MASS INDEX: 21.14 KG/M2 | WEIGHT: 112 LBS | HEIGHT: 61 IN | OXYGEN SATURATION: 97 %

## 2023-03-08 DIAGNOSIS — R35.0 URINARY FREQUENCY: ICD-10-CM

## 2023-03-08 DIAGNOSIS — R32 URINARY INCONTINENCE, UNSPECIFIED TYPE: Primary | ICD-10-CM

## 2023-03-08 LAB — POST-VOID RESIDUAL VOLUME, ML POC: 6 ML

## 2023-03-08 RX ORDER — MIRABEGRON 25 MG/1
25 TABLET, FILM COATED, EXTENDED RELEASE ORAL DAILY
Qty: 90 TABLET | Refills: 3 | Status: SHIPPED | OUTPATIENT
Start: 2023-03-08

## 2023-03-10 DIAGNOSIS — I10 ESSENTIAL HYPERTENSION: ICD-10-CM

## 2023-03-10 RX ORDER — METOPROLOL SUCCINATE 50 MG/1
TABLET, EXTENDED RELEASE ORAL
Qty: 90 TABLET | Refills: 3 | Status: SHIPPED | OUTPATIENT
Start: 2023-03-10

## 2023-03-28 ENCOUNTER — TELEPHONE (OUTPATIENT)
Dept: OBGYN CLINIC | Facility: HOSPITAL | Age: 88
End: 2023-03-28

## 2023-03-28 NOTE — TELEPHONE ENCOUNTER
Caller: Severa Clinton Elderly    Doctor: Hyacinth Rizvi    Reason for call: calling to verify fax # to fax a form over   Provided fax # to [employer]    Call back#:

## 2023-04-04 ENCOUNTER — RA CDI HCC (OUTPATIENT)
Dept: OTHER | Facility: HOSPITAL | Age: 88
End: 2023-04-04

## 2023-04-04 NOTE — PROGRESS NOTES
[facility] [address]  coding opportunities       Chart reviewed, no opportunity found: CHART REVIEWED, NO OPPORTUNITY FOUND        Patients Insurance     Medicare Insurance: Medicare

## 2023-04-11 PROBLEM — N25.81 HYPERPARATHYROIDISM, SECONDARY RENAL (HCC): Status: ACTIVE | Noted: 2023-04-11

## 2023-04-11 PROBLEM — M25.512 CHRONIC PAIN OF BOTH SHOULDERS: Status: ACTIVE | Noted: 2018-05-11

## 2023-04-11 PROBLEM — R74.8 ELEVATED ALKALINE PHOSPHATASE LEVEL: Status: RESOLVED | Noted: 2021-11-24 | Resolved: 2023-04-11

## 2023-04-26 DIAGNOSIS — E78.00 HYPERCHOLESTEREMIA: ICD-10-CM

## 2023-04-26 RX ORDER — ATORVASTATIN CALCIUM 40 MG/1
40 TABLET, FILM COATED ORAL DAILY
Qty: 90 TABLET | Refills: 3 | Status: SHIPPED | OUTPATIENT
Start: 2023-04-26

## 2023-04-26 RX ORDER — ATORVASTATIN CALCIUM 40 MG/1
TABLET, FILM COATED ORAL
Qty: 90 TABLET | Refills: 3 | OUTPATIENT
Start: 2023-04-26

## 2023-06-05 NOTE — PROGRESS NOTES
Cardiology Follow Up    [facility]  1/22/1926  [id number]  [address] [id number] [phone]    1  Essential (primary) hypertension        2  Pure hypercholesterolemia        3  PVC (premature ventricular contraction)        4  Coronary arteriosclerosis            Interval History:  Patient here for follow-up visit   She has HTN and PVCs  She has CAD with RCA stent placed in [address] [place] [address] in [address] done 7/2021 demonstrated preserved LV systolic function   Grade 1 DD was suggested  Oksana Higgins was moderate TR  Lipid profile done 10/2022 demonstrated total cholesterol of 155 with an HDL of 48 and a calculated LDL of 73    She is here today with her daughter who used to work with Dr Rock Blanca  Seen by our [address] 4/2022 in reference to chest discomfort which was felt to be atypical   Testing was not felt to be necessary  Patient has had no chest pain or significant dyspnea  Her vital signs are stable today      Patient Active Problem List   Diagnosis   • Chronic pain of both shoulders   • Greater trochanteric bursitis of left hip   • Acquired valgus deformity of knee, left   • Angiomyolipoma of kidney   • Atherosclerotic heart disease of native coronary artery without angina pectoris   • Gastroesophageal reflux disease   • Generalized osteoarthritis of multiple sites   • Mixed hyperlipidemia   • Primary osteoarthritis of left hip   • PVCs (premature ventricular contractions)   • Tear of right rotator cuff   • Venous insufficiency (chronic) (peripheral)   • History of coronary artery stent placement   • Sacroiliitis (HCC)   • Essential hypertension   • Lymphedema of right lower extremity   • Stage 3b chronic kidney disease ([address] )   • Hyperparathyroidism, secondary renal ([address] )     Past Medical History:   Diagnosis Date   • Anemia     last assessed - [id number]   • Cancer [facility])    • Depression     last assessed - [id number]   • Glaucoma    • Hypercalcemia     last assessed - [id number]   • Hypertension    • Hypothyroidism 11/30/2017   • 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   • Macular degeneration    • Nondisplaced fracture of base of fifth metacarpal bone, right hand, initial encounter for closed fracture 12/7/2018   • Osteoarthritis    • Plantar fasciitis of right foot     last assessed - [id number]   • Stomach disorder    • Superficial thrombophlebitis of leg     unspecified laterality; last assessed - [id number]   • Traumatic closed nondisplaced fracture of lumbar vertebra, [facility] 12/5/2019   • Trochanteric bursitis of left hip     last assessed - [id number]     Social History     Socioeconomic History   • Marital status:      Spouse name: Not on file   • Number of children: Not on file   • Years of education: Not on file   • Highest education level: Not on file   Occupational History   • Not on file   Tobacco Use   • Smoking status: Never     Passive exposure: Past   • Smokeless tobacco: Never   Vaping Use   • Vaping Use: Never used   Substance and Sexual Activity   • Alcohol use: Yes     Comment: rare   • Drug use: No   • Sexual activity: Not Currently   Other Topics Concern   • Not on file   Social History Narrative    Caffeine use     Social Determinants of Health     Financial Resource Strain: Low Risk  (4/10/2023)    Overall Financial Resource Strain (CARDIA)    • Difficulty of Paying Living Expenses: Not very hard   Food Insecurity: Not on file   Transportation Needs: Unknown (4/10/2023)    PRAPARE - Transportation    • Lack of Transportation (Medical):  No    • Lack of Transportation (Non-Medical): Patient refused   Physical Activity: Not on file   Stress: Not on file   Social Connections: Not on file   Intimate Partner Violence: Not on file   Housing Stability: Not on file      Family History   Problem Relation Age of Onset   • Stroke Mother cerebrovascular accident (CVA)   • Coronary artery disease Mother         coronary disease   • Stroke Father    • Coronary artery disease Father         coronary disease   • Stroke Brother    • Kidney cancer Family      Past Surgical History:   Procedure Laterality Date   • CORONARY STENT PLACEMENT      stent RCA   • FL INJECTION LEFT HIP (NON ARTHROGRAM)  11/26/2018   • FL INJECTION LEFT HIP (NON ARTHROGRAM)  3/4/2019   • FL INJECTION LEFT HIP (NON ARTHROGRAM)  6/10/2019   • FL INJECTION LEFT HIP (NON ARTHROGRAM)  9/24/2019   • FL INJECTION LEFT HIP (NON ARTHROGRAM)  12/30/2019   • FL INJECTION LEFT HIP (NON ARTHROGRAM)  7/6/2020   • FL INJECTION LEFT HIP (NON ARTHROGRAM)  12/28/2020   • FL INJECTION LEFT HIP (NON ARTHROGRAM)  4/12/2021   • FL INJECTION LEFT HIP (NON ARTHROGRAM)  7/12/2021   • FL INJECTION LEFT HIP (NON ARTHROGRAM)  11/15/2021   • FL INJECTION LEFT HIP (NON ARTHROGRAM)  2/15/2022   • FOOT SURGERY     • HIP SURGERY     • HYSTERECTOMY     • JOINT REPLACEMENT      Hip Replacement 2000   • SKIN LESION EXCISION  11/2010    Face - BCC - nose   • TOTAL ABDOMINAL HYSTERECTOMY W/ BILATERAL SALPINGOOPHORECTOMY     • TOTAL HIP ARTHROPLASTY Right        Current Outpatient Medications:   •  acetaminophen (TYLENOL) 500 mg tablet, Take 500 mg by mouth every 6 (six) hours as needed for mild pain, Disp: , Rfl:   •  aspirin (ECOTRIN LOW STRENGTH) 81 mg EC tablet, Take 1 tablet by mouth daily, Disp: , Rfl:   •  atorvastatin (LIPITOR) 40 mg tablet, Take 1 tablet (40 mg total) by mouth daily, Disp: 90 tablet, Rfl: 3  •  cholecalciferol (VITAMIN D3) 1,000 units tablet, Take 1 tablet by mouth daily, Disp: , Rfl:   •  clopidogrel (PLAVIX) 75 mg tablet, Take 1 tablet (75 mg total) by mouth daily, Disp: 90 tablet, Rfl: 3  •  latanoprost (XALATAN) 0 005 % ophthalmic solution, INSTILL 1 DROP INTO BOTH EYES EVERY DAY AS DIRECTED, Disp: , Rfl: 4  •  metoprolol succinate (TOPROL-XL) 50 mg 24 hr tablet, TAKE 1 TABLET BY MOUTH EVERY DAY, Disp: 90 tablet, Rfl: 3  •  Mirabegron ER (Myrbetriq) 25 MG TB24, Take 25 mg by mouth daily, Disp: 90 tablet, Rfl: 3  •  Multiple Vitamins-Minerals (ICAPS AREDS 2) CAPS, Take by mouth, Disp: , Rfl:   •  nitroglycerin (NITROSTAT) 0 4 mg SL tablet, Place 1 tablet (0 4 mg total) under the tongue every 5 (five) minutes as needed for chest pain (Patient taking differently: Place 0 4 mg under the tongue every 5 (five) minutes as needed for chest pain When needed), Disp: 25 tablet, Rfl: 3  •  olopatadine (Pataday) 0 1 % ophthalmic solution, When needed, Disp: , Rfl:   •  Omega-3 Fatty Acids (OMEGA-3 FISH OIL) 1200 MG CAPS, Take by mouth, Disp: , Rfl:   •  omeprazole (PriLOSEC) 20 mg delayed release capsule, 1 capsule by mouth daily, Disp: 90 capsule, Rfl: 3  •  Polyvinyl Alcohol-Povidone PF 1 4-0 6 % SOLN, Apply to eye, Disp: , Rfl:   •  RESTASIS 0 05 % ophthalmic emulsion, instill 1 drop into both eyes twice a day, Disp: , Rfl: 0  •  valsartan-hydrochlorothiazide (DIOVAN-HCT) 80-12 5 MG per tablet, Take 1 tablet by mouth daily, Disp: 90 tablet, Rfl: 3  Allergies   Allergen Reactions   • Iv Contrast [Iodinated Contrast Media]    • Other      Iv contrast  Adhesive tape   • Ciprofloxacin      Patient does not remember       Labs:not applicable  Imaging: No results found  Review of Systems:  Review of Systems   All other systems reviewed and are negative  Physical Exam:  /70 (BP Location: Right arm, Patient Position: Sitting, Cuff Size: Standard)   Pulse 71   Wt 49 kg (108 lb 1 6 oz)   SpO2 (!) 71%   BMI 20 43 kg/m²   Physical Exam  Vitals reviewed  Constitutional:       Appearance: She is well-developed  HENT:      Head: Normocephalic and atraumatic  Eyes:      Conjunctiva/sclera: Conjunctivae normal       Pupils: Pupils are equal, round, and reactive to light  Cardiovascular:      Rate and Rhythm: Normal rate  Heart sounds: Normal heart sounds     Pulmonary:      Effort: Pulmonary effort is normal       Breath sounds: Normal breath sounds  Musculoskeletal:      Cervical back: Normal range of motion and neck supple  Skin:     General: Skin is warm and dry  Neurological:      Mental Status: She is alert and oriented to person, place, and time  Discussion/Summary:I will continue the patient's present medical regimen  The patient appears well compensated  I have asked the patient to call if there is a problem in the interim otherwise I will see the patient in six months time

## 2023-06-13 ENCOUNTER — OFFICE VISIT (OUTPATIENT)
Dept: CARDIOLOGY CLINIC | Facility: CLINIC | Age: 88
End: 2023-06-13
Payer: MEDICARE

## 2023-06-13 VITALS
HEART RATE: 71 BPM | BODY MASS INDEX: 20.43 KG/M2 | DIASTOLIC BLOOD PRESSURE: 70 MMHG | OXYGEN SATURATION: 71 % | WEIGHT: 108.1 LBS | SYSTOLIC BLOOD PRESSURE: 150 MMHG

## 2023-06-13 DIAGNOSIS — I49.3 PVC (PREMATURE VENTRICULAR CONTRACTION): ICD-10-CM

## 2023-06-13 DIAGNOSIS — I25.10 CORONARY ARTERIOSCLEROSIS: ICD-10-CM

## 2023-06-13 DIAGNOSIS — E78.00 PURE HYPERCHOLESTEROLEMIA: ICD-10-CM

## 2023-06-13 DIAGNOSIS — I10 ESSENTIAL (PRIMARY) HYPERTENSION: Primary | ICD-10-CM

## 2023-06-13 PROCEDURE — 99214 OFFICE O/P EST MOD 30 MIN: CPT | Performed by: INTERNAL MEDICINE

## 2023-06-14 DIAGNOSIS — I25.10 CORONARY ARTERY DISEASE INVOLVING NATIVE CORONARY ARTERY OF NATIVE HEART WITHOUT ANGINA PECTORIS: ICD-10-CM

## 2023-06-15 RX ORDER — CLOPIDOGREL BISULFATE 75 MG/1
75 TABLET ORAL DAILY
Qty: 90 TABLET | Refills: 3 | Status: SHIPPED | OUTPATIENT
Start: 2023-06-15

## 2023-08-30 DIAGNOSIS — K21.9 GASTROESOPHAGEAL REFLUX DISEASE WITHOUT ESOPHAGITIS: ICD-10-CM

## 2023-08-30 DIAGNOSIS — I49.3 PVCS (PREMATURE VENTRICULAR CONTRACTIONS): ICD-10-CM

## 2023-08-30 RX ORDER — OMEPRAZOLE 20 MG/1
CAPSULE, DELAYED RELEASE ORAL
Qty: 90 CAPSULE | Refills: 3 | Status: SHIPPED | OUTPATIENT
Start: 2023-08-30

## 2023-08-30 RX ORDER — NITROGLYCERIN 0.4 MG/1
0.4 TABLET SUBLINGUAL
Qty: 25 TABLET | Refills: 3 | Status: SHIPPED | OUTPATIENT
Start: 2023-08-30

## 2023-09-29 ENCOUNTER — APPOINTMENT (EMERGENCY)
Dept: RADIOLOGY | Facility: HOSPITAL | Age: 88
End: 2023-09-29
Payer: MEDICARE

## 2023-09-29 ENCOUNTER — APPOINTMENT (EMERGENCY)
Dept: CT IMAGING | Facility: HOSPITAL | Age: 88
End: 2023-09-29
Payer: MEDICARE

## 2023-09-29 ENCOUNTER — HOSPITAL ENCOUNTER (EMERGENCY)
Facility: HOSPITAL | Age: 88
Discharge: HOME | End: 2023-09-29
Attending: SURGERY
Payer: MEDICARE

## 2023-09-29 VITALS
RESPIRATION RATE: 18 BRPM | DIASTOLIC BLOOD PRESSURE: 73 MMHG | WEIGHT: 123.02 LBS | SYSTOLIC BLOOD PRESSURE: 179 MMHG | TEMPERATURE: 98 F | HEART RATE: 78 BPM | OXYGEN SATURATION: 97 %
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
## 2023-09-29 PROBLEM — S09.90XA HEAD INJURY: Status: ACTIVE | Noted: 2023-09-29

## 2023-09-29 LAB
BASE EXCESS BLDA CALC-SCNC: 0 MMOL/L (ref -2–3)
BASE EXCESS BLDA CALC-SCNC: 0 MMOL/L (ref -2–3)
CA-I BLD-SCNC: 1.41 MMOL/L (ref 1.12–1.32)
CA-I BLD-SCNC: 1.41 MMOL/L (ref 1.12–1.32)
GLUCOSE SERPL-MCNC: 118 MG/DL (ref 65–140)
GLUCOSE SERPL-MCNC: 118 MG/DL (ref 65–140)
HCO3 BLDA-SCNC: 24.1 MMOL/L (ref 24–30)
HCO3 BLDA-SCNC: 24.1 MMOL/L (ref 24–30)
HCT VFR BLD CALC: 29 % (ref 34.8–46.1)
HCT VFR BLD CALC: 29 % (ref 34.8–46.1)
HGB BLDA-MCNC: 9.9 G/DL (ref 11.5–15.4)
HGB BLDA-MCNC: 9.9 G/DL (ref 11.5–15.4)
PCO2 BLD: 25 MMOL/L (ref 21–32)
PCO2 BLD: 25 MMOL/L (ref 21–32)
PCO2 BLD: 37 MM HG (ref 42–50)
PCO2 BLD: 37 MM HG (ref 42–50)
PH BLD: 7.42 [PH] (ref 7.3–7.4)
PH BLD: 7.42 [PH] (ref 7.3–7.4)
PO2 BLD: 25 MM HG (ref 35–45)
PO2 BLD: 25 MM HG (ref 35–45)
POTASSIUM BLD-SCNC: 4.6 MMOL/L (ref 3.5–5.3)
POTASSIUM BLD-SCNC: 4.6 MMOL/L (ref 3.5–5.3)
SAO2 % BLD FROM PO2: 47 % (ref 60–85)
SAO2 % BLD FROM PO2: 47 % (ref 60–85)
SODIUM BLD-SCNC: 138 MMOL/L (ref 136–145)
SODIUM BLD-SCNC: 138 MMOL/L (ref 136–145)
SPECIMEN SOURCE: ABNORMAL
SPECIMEN SOURCE: ABNORMAL

## 2023-09-29 PROCEDURE — 76705 ECHO EXAM OF ABDOMEN: CPT | Performed by: SURGERY

## 2023-09-29 PROCEDURE — 84295 ASSAY OF SERUM SODIUM: CPT

## 2023-09-29 PROCEDURE — 82803 BLOOD GASES ANY COMBINATION: CPT

## 2023-09-29 PROCEDURE — 71045 X-RAY EXAM CHEST 1 VIEW: CPT

## 2023-09-29 PROCEDURE — 82947 ASSAY GLUCOSE BLOOD QUANT: CPT

## 2023-09-29 PROCEDURE — 82330 ASSAY OF CALCIUM: CPT

## 2023-09-29 PROCEDURE — 72125 CT NECK SPINE W/O DYE: CPT

## 2023-09-29 PROCEDURE — 84132 ASSAY OF SERUM POTASSIUM: CPT

## 2023-09-29 PROCEDURE — 70450 CT HEAD/BRAIN W/O DYE: CPT

## 2023-09-29 PROCEDURE — 85014 HEMATOCRIT: CPT

## 2023-09-29 PROCEDURE — 99284 EMERGENCY DEPT VISIT MOD MDM: CPT

## 2023-09-29 PROCEDURE — 93308 TTE F-UP OR LMTD: CPT | Performed by: SURGERY

## 2023-09-29 RX ORDER — ACETAMINOPHEN 325 MG/1
975 TABLET ORAL ONCE
Status: COMPLETED | OUTPATIENT
Start: 2023-09-29 | End: 2023-09-29

## 2023-09-29 RX ADMIN — ACETAMINOPHEN 975 MG: 325 TABLET, FILM COATED ORAL at 19:54

## 2023-09-29 NOTE — INCIDENTAL FINDINGS
The following findings require follow up:  Radiographic finding   Finding: "Large opacity in the right midlung, new from 2019. Recommend further evaluation with chest CT."   Measured about 4.7 cm x 4.2 cm on plain film     This finding was discussed with the patient and her daughter at bedside. I showed them the images and indicated that this is a new finding from 2019. They expressed understanding of the finding and recommended follow up CT chest if the patient wishes to pursue further evaluation.       Follow up should be done within 1 month(s)    Please notify the following clinician to assist with the follow up:  Marlin Marie MD Phone   [phone]

## 2023-09-29 NOTE — ED PROVIDER NOTES
Emergency Department Airway Evaluation and Management Form    History  Obtained from: Patient, EMS  Patient has no allergy information on record. No chief complaint on file. HPI   29-year-old female presenting to the ED for evaluation of fall with head strike on anticoagulation. Complains of head pain, left leg pain    No past medical history on file. No past surgical history on file. No family history on file. I have reviewed and agree with the history as documented. Review of Systems   Limited due to trauma evaluation    Physical Exam  /85   Pulse 91   Temp 98 °F (36.7 °C) (Tympanic)   Resp 18   Wt 55.8 kg (123 lb 0.3 oz)   SpO2 97%     Physical Exam   Primary survey:  Airway is naturally patent and intact  Equal breath sounds bilaterally  Strong pulses in all extremities   GCS 15  Exposure being obtained    ED Medications  Medications - No data to display    Intubation  Procedures    Notes  Patient was seen as a trauma level B activation. Airway support was provided to the trauma team.  No acute airway interventions indicated.   Defer all other care/management to trauma surgery service    Final Diagnosis  Final diagnoses:   None       ED Provider  Electronically Signed by     Del Skiff, DO  09/29/23 [id number]

## 2023-09-29 NOTE — PROCEDURES
POC FAST US    Date/Time: 9/29/2023 7:38 PM    Performed by: Viviana Blanca PA-C  Authorized by: Viviana Blanca PA-C    Patient location:  Trauma  Procedure details:     Exam Type:  Diagnostic    Indications: blunt abdominal trauma and blunt chest trauma      Assess for:  Intra-abdominal fluid and pericardial effusion    Technique: FAST      Views obtained:  Heart - Pericardial sac, LUQ - Splenorenal space, Suprapubic - Pouch of Uriel and RUQ - Rowe's Pouch    Image quality: diagnostic      Image availability:  Images available in PACS and video obtained  FAST Findings:     RUQ (Hepatorenal) free fluid: absent      LUQ (Splenorenal) free fluid: absent      Suprapubic free fluid: absent      Cardiac wall motion: identified      Pericardial effusion: absent    Interpretation:     Impressions: negative

## 2023-09-29 NOTE — H&P
H&P - Trauma   Leslee Mckinnon 80 y.o. female MRN: [id number]  Unit/Bed#: ED-38 Encounter: [id number]    Trauma Alert: Level B   Model of Arrival: Ambulance    Trauma Team: Attending To and MENG Brown  Consultants:     None     Assessment/Plan   Active Problems / Assessment:   -Mechanical fall at home, head strike, no loss of consciousness, takes Plavix daily  -Occipital cephalhematoma  -No other complaints of pain  -GCS 15, nonfocal       Plan:   - FAST negative  -Incidental right lung mass on chest x-ray discussed with patient and daughter, please reference incidental findings note  -CT head, C-spine negative for acute traumatic injuries  -Tylenol for head pain  -Ambulate, tolerated diet and discharge home with daughter. Cervical Collar Clearance: The patient had a CT scan of the cervical spine demonstrating no acute injury. On exam, the patient had no midline point tenderness or paresthesias/numbness/weakness in the extremities. The patient had full range of motion (was then able to flex, extend, and rotate head laterally) without pain. There were no distracting injuries and the patient was not intoxicated. The patient's cervical spine was cleared radiologically and clinically. Cervical collar removed at this time. Florinda Beck PA-C  9/29/2023 8:40 PM         History of Present Illness     Chief Complaint: Fall  Mechanism:Fall     HPI:    Leslee Mckinnon is a 80 y.o. female who presents after a fall at home. Patient reports that she tripped in her living room and fell backwards, striking the back of her head on carpeted floor. She denies loss of consciousness, and reports that she takes Plavix daily. She reports that under the carpet is concrete and she has developed a bump on her head. Review of Systems   HENT: Negative for facial swelling. Eyes: Negative for photophobia. Respiratory: Negative for shortness of breath. Cardiovascular: Negative for chest pain. Gastrointestinal: Negative for abdominal pain and nausea. Musculoskeletal: Negative for back pain and neck pain. Skin: Negative for wound. Neurological: Positive for headaches. Negative for dizziness, syncope, weakness, light-headedness and numbness. Psychiatric/Behavioral: Negative for confusion. All other systems reviewed and are negative. 12-point, complete review of systems was reviewed and negative except as stated above. Historical Information     Past Medical History:   Diagnosis Date   • Atherosclerosis      Past Surgical History:   Procedure Laterality Date   • CORONARY ANGIOPLASTY WITH STENT PLACEMENT        Unable to obtain history due to none    Social History     Tobacco Use   • Smoking status: Never   • Smokeless tobacco: Never   Substance Use Topics   • Alcohol use: Not Currently   • Drug use: Never       There is no immunization history on file for this patient. Last Tetanus: unknown  Family History: Non-contributory    1. Before the illness or injury that brought you to the Emergency, did you need someone to help you on a regular basis? 0=No   2. Since the illness or injury that brought you to the Emergency, have you needed more help than usual to take care of yourself? 0=No   3. Have you been hospitalized for one or more nights during the past 6 months (excluding a stay in the Emergency Department)? 0=No   4. In general, do you see well? 1=No   5. In general, do you have serious problems with your memory? 1=Yes   6. Do you take more than three different medications everyday? 1=Yes   TOTAL   3     Did you order a geriatric consult if the score was 2 or greater?: n/a     Meds/Allergies   all current active meds have been reviewed  Allergies have not been reviewed;   Not on File    Objective   Initial Vitals:   Temperature: 98 °F (36.7 °C) (09/29/23 1724)  Pulse: 91 (09/29/23 1724)  Respirations: 18 (09/29/23 1724)  Blood Pressure: 151/85 (09/29/23 1724)    Primary Survey:   Airway: Status: patent;        Pre-hospital Interventions: none        Hospital Interventions: none  Breathing:        Pre-hospital Interventions: none       Effort: normal       Right breath sounds: normal       Left breath sounds: normal  Circulation:        Rhythm: regular       Rate: regular   Right Pulses Left Pulses    R radial: 2+  R femoral: 2+  R pedal: 2+     L radial: 2+  L femoral: 2+  L pedal: 2+       Disability:        GCS: Eye: 4; Verbal: 5 Motor: 6 Total: 15       Right Pupil: round;  reactive         Left Pupil:  round;  reactive      R Motor Strength L Motor Strength    R : 5/5  R dorsiflex: 5/5  R plantarflex: 5/5 L : 5/5  L dorsiflex: 5/5  L plantarflex: 5/5        Sensory:  No sensory deficit  Exposure:       Completed: Yes      Secondary Survey:  Physical Exam  Vitals reviewed. Constitutional:       General: She is not in acute distress. Appearance: Normal appearance. HENT:      Head: Normocephalic. Comments: Posterior scalp cephalohematoma     Right Ear: External ear normal.      Left Ear: External ear normal.      Nose: Nose normal.      Mouth/Throat:      Mouth: Mucous membranes are moist.      Pharynx: Oropharynx is clear. Eyes:      Extraocular Movements: Extraocular movements intact. Conjunctiva/sclera: Conjunctivae normal.      Pupils: Pupils are equal, round, and reactive to light. Cardiovascular:      Rate and Rhythm: Normal rate and regular rhythm. Pulses: Normal pulses. Heart sounds: Normal heart sounds. Pulmonary:      Effort: Pulmonary effort is normal. No respiratory distress. Breath sounds: Normal breath sounds. Chest:      Chest wall: No tenderness. Abdominal:      General: Abdomen is flat. Bowel sounds are normal. There is no distension. Palpations: Abdomen is soft. There is no mass. Tenderness: There is no abdominal tenderness. There is no guarding or rebound. Hernia: No hernia is present.    Musculoskeletal: General: No swelling, tenderness, deformity or signs of injury. Normal range of motion. Cervical back: No tenderness. Skin:     General: Skin is warm and dry. Capillary Refill: Capillary refill takes less than 2 seconds. Findings: No bruising or lesion. Neurological:      General: No focal deficit present. Mental Status: She is alert and oriented to person, place, and time. Mental status is at baseline. Sensory: No sensory deficit. Motor: No weakness. Psychiatric:         Mood and Affect: Mood normal.         Behavior: Behavior normal.         Invasive Devices     None               Lab Results: I have personally reviewed all pertinent laboratory/test results 09/29/23 and in the preceding 24 hours. Recent Labs     09/29/23  1729   HGB 9.9*   HCT 29*   CO2 25   CAIONIZED 1.41*       Imaging Results: I have personally reviewed pertinent images saved in PACS. CT scan findings (and other pertinent positive findings on images) were discussed with radiology. My interpretation of the images/reports are as follows:  Chest Xray(s): incidental finding, no acute findings   FAST exam(s): negative for acute findings   CT Scan(s): negative for acute findings   Additional Xray(s): N/A     Other Studies:   TRAUMA - CT head wo contrast   Final Result by Tosha Esteves MD (09/29 1802)      Right parieto-occipital scalp hematoma, however no acute intracranial pathology. In particular, no intracranial hemorrhage or calvarial fracture. I personally discussed this study with trauma surgery on 9/29/2023 at 6:00 PM.                  Workstation performed: [id number]         TRAUMA - CT spine cervical wo contrast   Final Result by Tosha Esteves MD (09/29 1802)      No cervical spine fracture or traumatic malalignment.       I personally discussed this study with trauma surgery on 9/29/2023 at 6:00 PM.                        Workstation performed: [id number]         XR Trauma multiple ([facility] trauma bay ONLY)   Final Result by Ramakrishna Luna MD (09/29 1803)   Addendum (preliminary) 1 of 1 by Ramakrishna Luna MD (09/29 1803)   ADDENDUM:  I personally discussed this study with trauma surgery on    9/29/2023 at 6:00 PM.               Final      Large opacity in the right midlung, new from 2019. Recommend further evaluation with chest CT. No evidence of pneumothorax. No obvious displaced fractures within limitation of supine AP chest technique. The study was marked in [facility] for immediate notification. Workstation performed: [id number]         XR chest 1 view    (Results Pending)         Code Status: No Order  Advance Directive and Living Will:      Power of :    POLST:    I have spent 30 minutes with Patient and family today in which greater than 50% of this time was spent in counseling/coordination of care regarding Diagnostic results, Prognosis, Risks and benefits of tx options, Instructions for management, Patient and family education, Importance of tx compliance, Risk factor reductions, Impressions, Counseling / Coordination of care, Documenting in the medical record, Reviewing / ordering tests, medicine, procedures  , Obtaining or reviewing history   and Communicating with other healthcare professionals .

## 2023-09-30 NOTE — ED NOTES
Pt ambulating to bathroom with RN and pt's daughter, approximately 40 feet. Pt's gait steady.      Macy Haywood RN  09/29/23 2010

## 2023-10-10 ENCOUNTER — OFFICE VISIT (OUTPATIENT)
Dept: FAMILY MEDICINE CLINIC | Facility: CLINIC | Age: 88
End: 2023-10-10
Payer: MEDICARE

## 2023-10-10 VITALS
HEART RATE: 64 BPM | RESPIRATION RATE: 16 BRPM | DIASTOLIC BLOOD PRESSURE: 78 MMHG | BODY MASS INDEX: 20.77 KG/M2 | OXYGEN SATURATION: 97 % | WEIGHT: 110 LBS | HEIGHT: 61 IN | TEMPERATURE: 97.3 F | SYSTOLIC BLOOD PRESSURE: 136 MMHG

## 2023-10-10 DIAGNOSIS — E78.00 HYPERCHOLESTEREMIA: ICD-10-CM

## 2023-10-10 DIAGNOSIS — M15.9 GENERALIZED OSTEOARTHRITIS OF MULTIPLE SITES: ICD-10-CM
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
## 2023-10-10 DIAGNOSIS — N18.32 STAGE 3B CHRONIC KIDNEY DISEASE (HCC): ICD-10-CM

## 2023-10-10 DIAGNOSIS — Z23 ENCOUNTER FOR IMMUNIZATION: ICD-10-CM

## 2023-10-10 DIAGNOSIS — I25.10 ATHEROSCLEROSIS OF NATIVE CORONARY ARTERY OF NATIVE HEART WITHOUT ANGINA PECTORIS: ICD-10-CM

## 2023-10-10 DIAGNOSIS — S00.03XA HEMATOMA OF SCALP, INITIAL ENCOUNTER: ICD-10-CM

## 2023-10-10 DIAGNOSIS — R91.8 OPACITY OF LUNG ON IMAGING STUDY: ICD-10-CM

## 2023-10-10 DIAGNOSIS — N25.81 HYPERPARATHYROIDISM, SECONDARY RENAL (HCC): ICD-10-CM

## 2023-10-10 DIAGNOSIS — I10 ESSENTIAL HYPERTENSION: ICD-10-CM

## 2023-10-10 DIAGNOSIS — D64.9 NORMOCYTIC ANEMIA: ICD-10-CM

## 2023-10-10 PROBLEM — S09.90XA HEAD INJURY: Status: RESOLVED | Noted: 2023-09-29 | Resolved: 2023-10-10

## 2023-10-10 PROCEDURE — G0008 ADMIN INFLUENZA VIRUS VAC: HCPCS | Performed by: FAMILY MEDICINE

## 2023-10-10 PROCEDURE — 90662 IIV NO PRSV INCREASED AG IM: CPT | Performed by: FAMILY MEDICINE

## 2023-10-10 PROCEDURE — 99214 OFFICE O/P EST MOD 30 MIN: CPT | Performed by: FAMILY MEDICINE

## 2023-10-10 NOTE — PROGRESS NOTES
Name: Ronnie Caba      : 1926      MRN: [id number]  Encounter Provider: Marga Prather MD  Encounter Date: 10/10/2023   Encounter department: [address] [place] [address]     1. Fall, initial encounter    2. Hematoma of scalp, initial encounter    3. Opacity of lung on imaging study    4. Essential hypertension  -     Comprehensive metabolic panel    5. Stage 3b chronic kidney disease ([address])    6. Hypercholesteremia  -     Lipid panel    7. Normocytic anemia  -     CBC and differential    8. Mixed hyperlipidemia    9. Encounter for immunization  -     influenza vaccine, high-dose, PF 0.7 mL (FLUZONE HIGH-DOSE)    Discussion with patient and daughter regarding abnormal chest x-ray finding- patient and daughter opted not to pursue additional testing/work-up. Remote history of resection myosarcoma right lower extremity . OV 6 months with labs. Flu vaccine today        Subjective     Followup visit. Here with her daughter Chandni Duval. ER visit after a fall 2023. Scalp contusion. No LOC. CT scan head right parietal occipital scalp hematoma. No acute intracranial pathology. Mild microangiopathic changes. CT scan cervical spine no cervical spine fracture or traumatic malalignment. Moderate multilevel cervical degenerative changes. Chest x-ray large opacity right midlung field new from 2019. No pleural effusions. No pneumothorax. Hypertension/CKD stage 3  blood pressures have been stable on Valsartan HCTZ 80/12.5 daily and Metoprolol ER 50 mg daily. 2023 creatinine 1.08. GFR 47. Electrolytes normal except for Ca++ 11.0. Hgb 11.5. PTH 48 decreased 125. Vitamin D 50.  2022 creatinine 1.06. GFR 44. Electrolytes normal except for Ca++ 10.9. Hgb 11.2. 2021 creatinine 0.97. GFR 50. 2020 SPEP no monoclonal bands. Hyperlipidemia and CAD on Atorvastatin 40 mg daily. Lipid profile 2023 cholesterol 163. TGs 114 . HDL 49. LDL 93.  LFTs normal.      2021  admission  for DANILO/dehydration/fall. Multiple imaging studies. CT head/MRI brain no acute intracranial abnormalities. She completed home PT. Patient no longer driving. Patient lives alone in a senior high rise apartment. Independent with ADLs and most of her IADLs. She has a HHA coming with 2x/week  Assist with grocery shopping. Daughter pays her bills. She is getting Meals on Wheels     Procedure: XR chest 1 view    Result Date: 10/3/2023  Narrative: ADDENDUM:  I personally discussed this study with trauma surgery on 9/29/2023 at 6:00 PM.     Addended by: Varun Hammonds MD on Mon Oct 02, 2023 11:26:10 AM   ADDENDUM:  I personally discussed this study with trauma surgery on 9/29/2023 at 6:00 PM.     Addended by: Varun Hammonds MD on Fri Sep 29, 2023 06:03:23 PM   TRAUMA SERIES  INDICATION:  TRAUMA. COMPARISON: No similar prior studies are available for comparison under this medical record number. Comparison was made to CT of the chest, abdomen and pelvis from 12/5/2019 under the patient's second medical record number although direct [id number] comparison was not available. VIEWS: AP view of the chest.   FINDINGS:  There is a large opacity in the right mid lung, new from 2019. No pleural effusions. No evidence of pneumothorax. Cardiac silhouette not accurately accessed on this projection. No obvious displaced fractures within limitation of supine AP chest technique. Impression:  Large opacity in the right midlung, new from 2019. Recommend further evaluation with chest CT. No evidence of pneumothorax. No obvious displaced fractures within limitation of supine AP chest technique. The study was marked in [facility] for immediate notification.    Workstation performed: [id number]   Signed By:  Varun Hammonds MD on 9/29/2023  5:35 PM        Procedure: XR Trauma multiple (SLB/[facility] trauma bay ONLY)    Addendum Date: 10/2/2023 Addendum:   ADDENDUM:  I personally discussed this study with trauma surgery on 9/29/2023 at 6:00 PM.     Addendum Date: 9/29/2023 Addendum:   ADDENDUM:  I personally discussed this study with trauma surgery on 9/29/2023 at 6:00 PM.     Result Date: 10/2/2023  Narrative: TRAUMA SERIES INDICATION:  TRAUMA. COMPARISON: No similar prior studies are available for comparison under this medical record number. Comparison was made to CT of the chest, abdomen and pelvis from 12/5/2019 under the patient's second medical record number although direct [id number] comparison was not available. VIEWS: AP view of the chest. FINDINGS: There is a large opacity in the right mid lung, new from 2019. No pleural effusions. No evidence of pneumothorax. Cardiac silhouette not accurately accessed on this projection. No obvious displaced fractures within limitation of supine AP chest technique. Impression: Large opacity in the right midlung, new from 2019. Recommend further evaluation with chest CT. No evidence of pneumothorax. No obvious displaced fractures within limitation of supine AP chest technique. The study was marked in [facility] for immediate notification. Workstation performed: [id number]     Procedure: US bedside procedure    Result Date: 10/2/2023  Narrative: [id number]. [id number]. [id number]    Procedure: TRAUMA - CT head wo contrast    Result Date: 9/29/2023  Narrative: CT BRAIN - WITHOUT CONTRAST INDICATION:   TRAUMA. As per review of electronic medical record, patient on anticoagulation status post fall with head strike. COMPARISON: No similar prior studies are available for comparison under this medical record number. Comparison was made to 11/24/2021 under the patient's second medical record number although direct [id number] comparison was not available. TECHNIQUE:  CT examination of the brain was performed. Multiplanar 2D reformatted images were created from the source data. Radiation dose length product (DLP) for this visit:  1116 mGy-cm .   This examination, like all CT scans performed in the [facility], was performed utilizing techniques to minimize radiation dose exposure, including the use of iterative reconstruction and automated exposure control. IMAGE QUALITY:  Diagnostic. FINDINGS: PARENCHYMA AND EXTRA-AXIAL SPACES:  Decreased attenuation is noted in periventricular and subcortical white matter,  statistically most likely representing mild microangiopathic changes. No evidence of acute infarction. No mass effect or midline shift. No acute parenchymal hemorrhage. No extra-axial fluid collections. VENTRICLES:  Normal for the patient's age. VISUALIZED ORBITS AND PARANASAL SINUSES:  Unremarkable visualized orbits. Clear paranasal sinuses. CALVARIUM AND EXTRACRANIAL SOFT TISSUES: Right parieto-occipital scalp hematoma. No calvarial fracture. Impression: Right parieto-occipital scalp hematoma, however no acute intracranial pathology. In particular, no intracranial hemorrhage or calvarial fracture. I personally discussed this study with trauma surgery on 9/29/2023 at 6:00 PM. Workstation performed: [id number]     Procedure: TRAUMA - CT spine cervical wo contrast    Result Date: 9/29/2023  Narrative: CT CERVICAL SPINE - WITHOUT CONTRAST INDICATION:   TRAUMA. COMPARISON:  No similar prior studies available for comparison. TECHNIQUE:  CT examination of the cervical spine was performed without intravenous contrast.  Contiguous axial images were obtained. Multiplanar 2D reformatted images were created from the source data. Radiation dose length product (DLP) for this visit:  399 mGy-cm . This examination, like all CT scans performed in the [facility], was performed utilizing techniques to minimize radiation dose exposure, including the use of iterative reconstruction and automated exposure control. IMAGE QUALITY:  Diagnostic. FINDINGS: ALIGNMENT: Likely degenerative grade 1 retrolisthesis of C5 on C4. Cervical spine alignment is otherwise maintained.  No traumatic subluxation. VERTEBRAE:  No fracture. DEGENERATIVE CHANGES:  Moderate multilevel cervical degenerative changes are noted. No critical central canal stenosis. PREVERTEBRAL AND PARASPINAL SOFT TISSUES: Incidental discovery of one or more thyroid nodule(s) measuring less than 1.5 cm and without suspicious features is noted in this patient who is above 28years old; according to guidelines published in the February 2015 white paper on incidental thyroid nodules in the Journal of the [employer]), no further evaluation is recommended. THORACIC INLET: A large opacity projects over the right midlung on the  view, as on the concurrent chest radiograph. The portions of the upper lobes visualized on the CT images demonstrate biapical scarring. Impression: No cervical spine fracture or traumatic malalignment. I personally discussed this study with trauma surgery on 9/29/2023 at 6:00 PM. Workstation performed: [id number]           Review of Systems   Constitutional: Positive for unexpected weight change. Negative for appetite change, chills, fatigue and fever. Mild weight loss per daughter. HENT: Positive for hearing loss. Negative for congestion, ear pain, rhinorrhea, sore throat and trouble swallowing. Eyes: Positive for visual disturbance. ARMD and glaucoma. Respiratory: Negative for cough, shortness of breath and wheezing. Cardiovascular: Negative for chest pain, palpitations and leg swelling. CAD followed by Cardiology. 07/2021 echocardiogram normal left ventricular systolic function. EF 55 %. Grade 1 diastolic dysfunction. Trace MR. Moderate TR with mild pulmonary hypertension. No pericardial effusion. Aortic root normal size. 12/2019 CT scan chest mild left basilar atelectasis versus infiltrate. Normal heart size. Coronary artery calcifications. Normal caliber thoracic aorta with mild atherosclerotic calcifications.   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 No tracheal or endobronchial lesions. 12/2019 EKG normal sinus rhythm. No acute changes. First-degree AV block. Left posterior fascicular block. Cardiology evaluation 5/2015 for exertional dyspnea and palpitations. Holter monitor showed normal sinus rhythm. 139 single PVCs. No sustained ventricular rhythm. rare PACs, consisting of 183 single PACs. 3 were noted in bigeminy. 10 noted in couplets. no sustained supraventricular rhythm. No significant pauses or high grade AV block. Echocardiogram showed normal left ventricular function, ejection fraction 33% grade 1 diastolic dysfunction. No significant valvular abnormalities. Chronic swelling right lower leg. s/p resection of leiomyosarcoma 2002. Repeat echocardiogram 09/2017 normal left ventricular systolic function. EF 60%. No regional wall motion abnormalities. Right ventricle normal. Trace MR. No pericardial effusion. Gastrointestinal: Negative for abdominal pain, blood in stool, constipation, diarrhea, nausea and vomiting. GERD stable on Omeprazole. no reflux. no dysphagia. 04/2023 LFTs normal.  Mildly elevated alk phos 04/2022 119. GGTP normal at 25.      Lab Results       Component                Value               Date                       ALT                      32                  10/11/2022                 AST                      29                  10/11/2022                 GGT                      25                  04/05/2022                 ALKPHOS                  108                 10/11/2022                 BILITOT                  0.6                 11/16/2016                       Alkaline Phosphatase       Date                     Value               Ref Range           Status                09/09/2021               138 (H)             46 - 116 U/L        Final                 08/25/2020               123 (H)             46 - 116 U/L        Final 12/06/2019               97                  46 - 116 U/L        Final                 05/21/2018               124                 33 - 130 U/L        Final                 11/16/2016               108                 33 - 130 U/L        Final                 03/22/2016               94                  33 - 130 U/L        Final                 11/16/2015               118 (H)             46 - 116 U/L        Final                             Endocrine:        See HPI   History of mildly elevated Ca++ Suspect secondary to CKD   Hypothyroidism no longer on medication. 3/2017 Positive thyroid microsomal antibody. Patient was started on Levothyroxine 25 mcg daily in 2017 she stopped Levothyroxine when she started itching. No rash. Lab Results       Component                Value               Date                       PTH                      125.0 (H)           10/11/2022                 CALCIUM                  10.9 (H)            10/11/2022                        Lab Results       Component                Value               Date                       [id number]             1.430               10/11/2022                              Genitourinary: Negative for difficulty urinating, dysuria and frequency. OAB on Myrbetriq. Trospium stopped due to constipation. Previously on Vesicare- No improvement. Nocturia x 1. She is followed by Urology. 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 renal u/s 09/2015   Musculoskeletal: Positive for arthralgias and gait problem (Patient uses a cane or walker for ambulation). Negative for myalgias. Chronic shoulder pain/decrease ROM  Chronic left hip and left knee pain. OA left hip. She is followed by Orthopedics. 11/2021 X-rays left hip severe osteoarthritis. Periodic left hip intra-articular steroid injections and left trochanteric bursa injections. OA right shoulder.   X-rays of right shoulder showed mild glenohumeral osteoarthritis and mild right AC joint osteoarthritis. She completed a course of physical therapy. 12/2019 admission after a fall. CT scan chest,abdomen and pelvis nondisplaced fractures of the left transverse processes of L2 and L3. No other evidence of acute posttraumatic abnormality. She has both a cane and walker for ambulation. Skin: Negative for rash. Neurological: Negative for dizziness and headaches. See HPI    Hematological: Negative for adenopathy. Does not bruise/bleed easily. Lab Results       Component                Value               Date                       WBC                      8.25                10/11/2022                 HGB                      11.6                10/11/2022                 HCT                      38.7                10/11/2022                 MCV                      99 (H)              10/11/2022                 PLT                      245                 10/11/2022                        Hemoglobin       Date                     Value               Ref Range           Status                04/05/2022               11.2 (L)            11.5 - 15.4 g/*     Final                 11/25/2021               10.7 (L)            11.5 - 15.4 g/*     Final                 11/24/2021               12.7                11.5 - 15.4 g/*     Final                 11/16/2016               12.2                11.7 - 15.5 g/*     Final                 03/22/2016               11.8                11.7 - 15.5 g/*     Final                 11/16/2015               12.3                11.5 - 15.4 g/*     Final                   Psychiatric/Behavioral: Negative for dysphoric mood and sleep disturbance.        Past Medical History:   Diagnosis Date   • Anemia     last assessed - 24Sep2014   • Atherosclerosis    • Cancer [facility])    • Depression     last assessed - 24Sep2014   • Glaucoma    • Hypercalcemia     last assessed - [id number]   • Hypertension    • Hypothyroidism 11/30/2017   • Leiomyosarcoma [facility]) 2002    right lower extremity   • Macular degeneration    • Nondisplaced fracture of base of fifth metacarpal bone, right hand, initial encounter for closed fracture 12/7/2018   • Osteoarthritis    • Plantar fasciitis of right foot     last assessed - 07Apr2017   • Stomach disorder    • Superficial thrombophlebitis of leg     unspecified laterality; last assessed - [id number]   • Traumatic closed nondisplaced fracture of lumbar vertebra, initial encounter ([address]) 12/5/2019   • Trochanteric bursitis of left hip     last assessed - 07Apr2017     Past Surgical History:   Procedure Laterality Date   • CORONARY ANGIOPLASTY WITH STENT PLACEMENT     • CORONARY STENT PLACEMENT      stent RCA   • FL INJECTION LEFT HIP (NON ARTHROGRAM)  11/26/2018   • FL INJECTION LEFT HIP (NON ARTHROGRAM)  3/4/2019   • FL INJECTION LEFT HIP (NON ARTHROGRAM)  6/10/2019   • FL INJECTION LEFT HIP (NON ARTHROGRAM)  9/24/2019   • FL INJECTION LEFT HIP (NON ARTHROGRAM)  12/30/2019   • FL INJECTION LEFT HIP (NON ARTHROGRAM)  7/6/2020   • FL INJECTION LEFT HIP (NON ARTHROGRAM)  12/28/2020   • FL INJECTION LEFT HIP (NON ARTHROGRAM)  4/12/2021   • FL INJECTION LEFT HIP (NON ARTHROGRAM)  7/12/2021   • FL INJECTION LEFT HIP (NON ARTHROGRAM)  11/15/2021   • FL INJECTION LEFT HIP (NON ARTHROGRAM)  2/15/2022   • FOOT SURGERY     • HIP SURGERY     • HYSTERECTOMY     • JOINT REPLACEMENT      Hip Replacement 2000   • SKIN LESION EXCISION  11/2010    Face - BCC - nose   • TOTAL ABDOMINAL HYSTERECTOMY W/ BILATERAL SALPINGOOPHORECTOMY     • TOTAL HIP ARTHROPLASTY Right      Family History   Problem Relation Age of Onset   • Stroke Mother         cerebrovascular accident (CVA)   • Coronary artery disease Mother         coronary disease   • Stroke Father    • Coronary artery disease Father         coronary disease   • Stroke Brother    • Kidney cancer Family      Social History     Socioeconomic History   • Marital status:       Spouse name: None   • Number of children: None   • Years of education: None   • Highest education level: None   Occupational History   • None   Tobacco Use   • Smoking status: Never     Passive exposure: Past   • Smokeless tobacco: Never   Vaping Use   • Vaping Use: Never used   Substance and Sexual Activity   • Alcohol use: Not Currently     Comment: rare   • Drug use: Never   • Sexual activity: Not Currently   Other Topics Concern   • None   Social History Narrative    ** Merged History Encounter **         Caffeine use       Social Determinants of Health     Financial Resource Strain: Low Risk  (4/10/2023)    Overall Financial Resource Strain (CARDIA)    • Difficulty of Paying Living Expenses: Not very hard   Food Insecurity: Not on file   Transportation Needs: Unknown (4/10/2023)    PRAPARE - Transportation    • Lack of Transportation (Medical):  No    • Lack of Transportation (Non-Medical): Patient refused   Physical Activity: Not on file   Stress: Not on file   Social Connections: Not on file   Intimate Partner Violence: Not on file   Housing Stability: Not on file     Current Outpatient Medications on File Prior to Visit   Medication Sig   • acetaminophen (TYLENOL) 500 mg tablet Take 500 mg by mouth every 6 (six) hours as needed for mild pain   • aspirin (ECOTRIN LOW STRENGTH) 81 mg EC tablet Take 1 tablet by mouth daily   • atorvastatin (LIPITOR) 40 mg tablet Take 1 tablet (40 mg total) by mouth daily   • cholecalciferol (VITAMIN D3) 1,000 units tablet Take 1 tablet by mouth daily   • clopidogrel (PLAVIX) 75 mg tablet Take 1 tablet (75 mg total) by mouth daily   • latanoprost (XALATAN) 0.005 % ophthalmic solution INSTILL 1 DROP INTO BOTH EYES EVERY DAY AS DIRECTED   • metoprolol succinate (TOPROL-XL) 50 mg 24 hr tablet TAKE 1 TABLET BY MOUTH EVERY DAY   • Mirabegron ER (Myrbetriq) 25 MG TB24 Take 25 mg by mouth daily   • Multiple Vitamins-Minerals (ICAPS AREDS 2) CAPS Take by mouth   • nitroglycerin (NITROSTAT) 0.4 mg SL tablet Place 1 tablet (0.4 mg total) under the tongue every 5 (five) minutes as needed for chest pain   • olopatadine (Pataday) 0.1 % ophthalmic solution When needed   • Omega-3 Fatty Acids (OMEGA-3 FISH OIL) 1200 MG CAPS Take by mouth   • omeprazole (PriLOSEC) 20 mg delayed release capsule 1 capsule by mouth daily   • Polyvinyl Alcohol-Povidone PF 1.4-0.6 % SOLN Apply to eye   • RESTASIS 0.05 % ophthalmic emulsion instill 1 drop into both eyes twice a day   • valsartan-hydrochlorothiazide (DIOVAN-HCT) 80-12.5 MG per tablet Take 1 tablet by mouth daily     Allergies   Allergen Reactions   • Iv Contrast [Iodinated Contrast Media]    • Other      Iv contrast  Adhesive tape   • Ciprofloxacin      Patient does not remember     Immunization History   Administered Date(s) Administered   • COVID-19 PFIZER VACCINE 0.3 ML IM 01/31/2021, 02/20/2021, 10/09/2021   • COVID-19 Pfizer Vac BIVALENT Edward-sucrose 12 Yr+ IM 10/27/2022   • INFLUENZA 11/18/2015, 11/21/2016, 11/30/2017, 10/17/2022   • Influenza Split High Dose Preservative Free IM 11/19/2012, 10/03/2013, 09/24/2014, 11/18/2015, 11/21/2016, 11/30/2017   • Influenza, high dose seasonal 0.7 mL 12/03/2018, 11/04/2019, 09/01/2020, 09/13/2021, 10/17/2022, 10/10/2023   • Influenza, seasonal, injectable 10/24/2011   • Pneumococcal Conjugate 13-Valent 11/18/2015   • Pneumococcal Polysaccharide PPV23 01/02/2008       Objective     /78 (BP Location: Left arm, Patient Position: Sitting, Cuff Size: Standard)   Pulse 64   Temp (!) 97.3 °F (36.3 °C)   Resp 16   Ht 5' 1" (1.549 m)   Wt 49.9 kg (110 lb)   SpO2 97%   BMI 20.78 kg/m²       BP Readings from Last 3 Encounters:   10/10/23 136/78   09/29/23 (!) 179/73   06/13/23 150/70     Wt Readings from Last 3 Encounters:   10/10/23 49.9 kg (110 lb)   09/29/23 55.8 kg (123 lb 0.3 oz)   06/13/23 49 kg (108 lb 1.6 oz)     Physical Exam  HENT:      Head: Atraumatic. Comments: Right posterior scalp hematoma resolved.   Eyes: General: No scleral icterus. Conjunctiva/sclera: Conjunctivae normal.   Neck:      Thyroid: No thyroid mass, thyromegaly or thyroid tenderness. Vascular: No carotid bruit. Cardiovascular:      Rate and Rhythm: Normal rate and regular rhythm. Heart sounds: Normal heart sounds. No murmur heard. No gallop. Comments: + mild lymphedema R ankle   Pulmonary:      Effort: Pulmonary effort is normal. No respiratory distress. Breath sounds: Normal breath sounds. No wheezing or rales. Musculoskeletal:      Cervical back: Normal range of motion. Right lower leg: Edema present. Left lower leg: No edema. Lymphadenopathy:      Cervical: No cervical adenopathy. Skin:     Findings: No lesion or rash. Neurological:      General: No focal deficit present. Mental Status: She is alert. Psychiatric:         Mood and Affect: Mood normal.         Behavior: Behavior normal.         Cognition and Memory: She exhibits impaired recent memory.        Dania Arciniega MD

## 2023-11-20 DIAGNOSIS — I10 ESSENTIAL HYPERTENSION: ICD-10-CM

## 2023-11-20 RX ORDER — VALSARTAN AND HYDROCHLOROTHIAZIDE 80; 12.5 MG/1; MG/1
1 TABLET, FILM COATED ORAL DAILY
Qty: 30 TABLET | Refills: 5 | Status: SHIPPED | OUTPATIENT
Start: 2023-11-20

## 2023-12-04 NOTE — PROGRESS NOTES
Cardiology Follow Up    Becka Grant  1/22/1926  [id number]  [facility] [address]  [phone]    1. Essential (primary) hypertension        2. Pure hypercholesterolemia        3. PVC (premature ventricular contraction)        4. Coronary arteriosclerosis            Interval History: Patient here for follow-up visit. She has HTN and PVCs. She has CAD with RCA stent placed in 2009 at [facility] in [place]. Echocardiogram done 7/2021 demonstrated preserved LV systolic function. Grade 1 DD was suggested. There was moderate TR. Lipid profile done 10/2022 demonstrated total cholesterol of 155 with an HDL of 48 and a calculated LDL of 73. She is here today with her daughter who used to work with Dr. Nuno Mane. Seen by our [address] [place] [address] 4/2022 in reference to chest discomfort which was felt to be atypical.  Testing was not felt to be necessary. Patient was seen in the ED September 2023 in reference to a fall. Chest x-ray demonstrated large opacity in the right mid lung field new from 2019. This is being managed conservatively. Patient has had no chest pain or significant dyspnea.       Patient Active Problem List   Diagnosis   • Chronic pain of both shoulders   • Greater trochanteric bursitis of left hip   • Acquired valgus deformity of knee, left   • Angiomyolipoma of kidney   • Atherosclerotic heart disease of native coronary artery without angina pectoris   • Gastroesophageal reflux disease   • Generalized osteoarthritis of multiple sites   • Mixed hyperlipidemia   • Primary osteoarthritis of left hip   • PVCs (premature ventricular contractions)   • Tear of right rotator cuff   • Venous insufficiency (chronic) (peripheral)   • History of coronary artery stent placement   • Sacroiliitis (HCC)   • Essential hypertension   • Lymphedema of right lower extremity   • Stage 3b chronic kidney disease ([address]) • Hyperparathyroidism, secondary renal ([address])     Past Medical History:   Diagnosis Date   • Anemia     last assessed - [id number]   • Atherosclerosis    • Cancer 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   • Macular degeneration    • Nondisplaced fracture of base of fifth metacarpal bone, right hand, initial encounter for closed fracture 12/7/2018   • Osteoarthritis    • Plantar fasciitis of right foot     last assessed - 07Apr2017   • Stomach disorder    • Superficial thrombophlebitis of leg     unspecified laterality; last assessed - [id number]   • Traumatic closed nondisplaced fracture of lumbar vertebra, initial encounter ([address]) 12/5/2019   • Trochanteric bursitis of left hip     last assessed - 07Apr2017     Social History     Socioeconomic History   • Marital status:      Spouse name: Not on file   • Number of children: Not on file   • Years of education: Not on file   • Highest education level: Not on file   Occupational History   • Not on file   Tobacco Use   • Smoking status: Never     Passive exposure: Past   • Smokeless tobacco: Never   Vaping Use   • Vaping status: Never Used   Substance and Sexual Activity   • Alcohol use: Not Currently     Comment: rare   • Drug use: Never   • Sexual activity: Not Currently   Other Topics Concern   • Not on file   Social History Narrative    ** Merged History Encounter **         Caffeine use       Social Determinants of Health     Financial Resource Strain: Low Risk  (4/10/2023)    Overall Financial Resource Strain (CARDIA)    • Difficulty of Paying Living Expenses: Not very hard   Food Insecurity: Not on file   Transportation Needs: Unknown (4/10/2023)    PRAPARE - Transportation    • Lack of Transportation (Medical):  No    • Lack of Transportation (Non-Medical): Patient declined Physical Activity: Not on file   Stress: Not on file   Social Connections: Not on file   Intimate Partner Violence: Not on file   Housing Stability: Not on file      Family History   Problem Relation Age of Onset   • Stroke Mother         cerebrovascular accident (CVA)   • Coronary artery disease Mother         coronary disease   • Stroke Father    • Coronary artery disease Father         coronary disease   • Stroke Brother    • Kidney cancer Family      Past Surgical History:   Procedure Laterality Date   • CORONARY ANGIOPLASTY WITH STENT PLACEMENT     • CORONARY STENT PLACEMENT      stent RCA   • FL INJECTION LEFT HIP (NON ARTHROGRAM)  11/26/2018   • FL INJECTION LEFT HIP (NON ARTHROGRAM)  3/4/2019   • FL INJECTION LEFT HIP (NON ARTHROGRAM)  6/10/2019   • FL INJECTION LEFT HIP (NON ARTHROGRAM)  9/24/2019   • FL INJECTION LEFT HIP (NON ARTHROGRAM)  12/30/2019   • FL INJECTION LEFT HIP (NON ARTHROGRAM)  7/6/2020   • FL INJECTION LEFT HIP (NON ARTHROGRAM)  12/28/2020   • FL INJECTION LEFT HIP (NON ARTHROGRAM)  4/12/2021   • FL INJECTION LEFT HIP (NON ARTHROGRAM)  7/12/2021   • FL INJECTION LEFT HIP (NON ARTHROGRAM)  11/15/2021   • FL INJECTION LEFT HIP (NON ARTHROGRAM)  2/15/2022   • FOOT SURGERY     • HIP SURGERY     • HYSTERECTOMY     • JOINT REPLACEMENT      Hip Replacement 2000   • SKIN LESION EXCISION  11/2010    Face - BCC - nose   • TOTAL ABDOMINAL HYSTERECTOMY W/ BILATERAL SALPINGOOPHORECTOMY     • TOTAL HIP ARTHROPLASTY Right        Current Outpatient Medications:   •  acetaminophen (TYLENOL) 500 mg tablet, Take 500 mg by mouth every 6 (six) hours as needed for mild pain, Disp: , Rfl:   •  aspirin (ECOTRIN LOW STRENGTH) 81 mg EC tablet, Take 1 tablet by mouth daily, Disp: , Rfl:   •  atorvastatin (LIPITOR) 40 mg tablet, Take 1 tablet (40 mg total) by mouth daily, Disp: 90 tablet, Rfl: 3  •  cholecalciferol (VITAMIN D3) 1,000 units tablet, Take 1 tablet by mouth daily, Disp: , Rfl:   •  clopidogrel (PLAVIX) 75 mg tablet, Take 1 tablet (75 mg total) by mouth daily, Disp: 90 tablet, Rfl: 3  •  latanoprost (XALATAN) 0.005 % ophthalmic solution, INSTILL 1 DROP INTO BOTH EYES EVERY DAY AS DIRECTED, Disp: , Rfl: 4  •  metoprolol succinate (TOPROL-XL) 50 mg 24 hr tablet, TAKE 1 TABLET BY MOUTH EVERY DAY, Disp: 90 tablet, Rfl: 3  •  Mirabegron ER (Myrbetriq) 25 MG TB24, Take 25 mg by mouth daily, Disp: 90 tablet, Rfl: 3  •  Multiple Vitamins-Minerals (ICAPS AREDS 2) CAPS, Take by mouth, Disp: , Rfl:   •  nitroglycerin (NITROSTAT) 0.4 mg SL tablet, Place 1 tablet (0.4 mg total) under the tongue every 5 (five) minutes as needed for chest pain, Disp: 25 tablet, Rfl: 3  •  olopatadine (Pataday) 0.1 % ophthalmic solution, When needed, Disp: , Rfl:   •  Omega-3 Fatty Acids (OMEGA-3 FISH OIL) 1200 MG CAPS, Take by mouth, Disp: , Rfl:   •  omeprazole (PriLOSEC) 20 mg delayed release capsule, 1 capsule by mouth daily, Disp: 90 capsule, Rfl: 3  •  Polyvinyl Alcohol-Povidone PF 1.4-0.6 % SOLN, Apply to eye, Disp: , Rfl:   •  RESTASIS 0.05 % ophthalmic emulsion, instill 1 drop into both eyes twice a day, Disp: , Rfl: 0  •  valsartan-hydrochlorothiazide (DIOVAN-HCT) 80-12.5 MG per tablet, TAKE 1 TABLET BY MOUTH EVERY DAY, Disp: 30 tablet, Rfl: 5  Allergies   Allergen Reactions   • Iv Contrast [Iodinated Contrast Media]    • Other      Iv contrast  Adhesive tape   • Ciprofloxacin      Patient does not remember       Labs:not applicable  Imaging: No results found. Review of Systems:  Review of Systems   All other systems reviewed and are negative. Physical Exam:  /56 (BP Location: Right arm, Patient Position: Sitting, Cuff Size: Standard)   Pulse 79   Ht 5' 1" (1.549 m)   Wt 47.7 kg (105 lb 3.2 oz)   SpO2 100%   BMI 19.88 kg/m²   Physical Exam  Vitals reviewed. Constitutional:       Appearance: She is well-developed. HENT:      Head: Normocephalic and atraumatic.    Eyes:      Conjunctiva/sclera: Conjunctivae normal. Pupils: Pupils are equal, round, and reactive to light. Cardiovascular:      Rate and Rhythm: Normal rate. Heart sounds: Normal heart sounds. Pulmonary:      Effort: Pulmonary effort is normal.      Breath sounds: Normal breath sounds. Musculoskeletal:      Cervical back: Normal range of motion and neck supple. Skin:     General: Skin is warm and dry. Neurological:      Mental Status: She is alert and oriented to person, place, and time. Discussion/Summary:I will continue the patient's present medical regimen. The patient appears well compensated. I have asked the patient to call if there is a problem in the interim otherwise I will see the patient in six months time.

## 2023-12-14 ENCOUNTER — OFFICE VISIT (OUTPATIENT)
Dept: CARDIOLOGY CLINIC | Facility: CLINIC | Age: 88
End: 2023-12-14
Payer: MEDICARE

## 2023-12-14 VITALS
SYSTOLIC BLOOD PRESSURE: 138 MMHG | BODY MASS INDEX: 19.86 KG/M2 | HEART RATE: 79 BPM | OXYGEN SATURATION: 100 % | DIASTOLIC BLOOD PRESSURE: 56 MMHG | WEIGHT: 105.2 LBS | HEIGHT: 61 IN

## 2023-12-14 DIAGNOSIS — I10 ESSENTIAL (PRIMARY) HYPERTENSION: Primary | ICD-10-CM

## 2023-12-14 DIAGNOSIS — I25.10 CORONARY ARTERIOSCLEROSIS: ICD-10-CM

## 2023-12-14 DIAGNOSIS — I49.3 PVC (PREMATURE VENTRICULAR CONTRACTION): ICD-10-CM

## 2023-12-14 DIAGNOSIS — E78.00 PURE HYPERCHOLESTEROLEMIA: ICD-10-CM

## 2023-12-14 PROCEDURE — 99214 OFFICE O/P EST MOD 30 MIN: CPT | Performed by: INTERNAL MEDICINE

## 2023-12-19 ENCOUNTER — APPOINTMENT (OUTPATIENT)
Dept: RADIOLOGY | Facility: MEDICAL CENTER | Age: 88
End: 2023-12-19
Payer: MEDICARE

## 2023-12-19 ENCOUNTER — OFFICE VISIT (OUTPATIENT)
Dept: URGENT CARE | Facility: MEDICAL CENTER | Age: 88
End: 2023-12-19
Payer: MEDICARE

## 2023-12-19 VITALS
HEIGHT: 61 IN | OXYGEN SATURATION: 95 % | WEIGHT: 105 LBS | HEART RATE: 86 BPM | RESPIRATION RATE: 18 BRPM | SYSTOLIC BLOOD PRESSURE: 160 MMHG | TEMPERATURE: 97.9 F | BODY MASS INDEX: 19.83 KG/M2 | DIASTOLIC BLOOD PRESSURE: 70 MMHG

## 2023-12-19 DIAGNOSIS — R05.1 ACUTE COUGH: ICD-10-CM

## 2023-12-19 DIAGNOSIS — J20.8 ACUTE BRONCHITIS DUE TO OTHER SPECIFIED ORGANISMS: Primary | ICD-10-CM

## 2023-12-19 PROCEDURE — G0463 HOSPITAL OUTPT CLINIC VISIT: HCPCS | Performed by: STUDENT IN AN ORGANIZED HEALTH CARE EDUCATION/TRAINING PROGRAM

## 2023-12-19 PROCEDURE — 71046 X-RAY EXAM CHEST 2 VIEWS: CPT

## 2023-12-19 PROCEDURE — 99213 OFFICE O/P EST LOW 20 MIN: CPT | Performed by: STUDENT IN AN ORGANIZED HEALTH CARE EDUCATION/TRAINING PROGRAM

## 2023-12-19 RX ORDER — AZITHROMYCIN 250 MG/1
TABLET, FILM COATED ORAL
Qty: 6 TABLET | Refills: 0 | Status: SHIPPED | OUTPATIENT
Start: 2023-12-19 | End: 2023-12-23

## 2023-12-19 NOTE — PROGRESS NOTES
[facility]        NAME: Minna Silva is a 89 y.o. female  : 1926    MRN: [id number]  DATE: 2023  TIME: 5:09 PM    Assessment and Orders   Acute bronchitis due to other specified organisms [J20.8]  1. Acute bronchitis due to other specified organisms  azithromycin (ZITHROMAX) 250 mg tablet      2. Acute cough  XR chest pa & lateral            Plan and Discussion      No acute pulmonary disease noted on chest x-ray.  Patient does have a large mass in her right lung which she is aware.  Formal recommendation is for CT scan however patient has decided not to pursue this at this time.  Given patient's symptoms of upper respiratory infection with increased mucus production and increased risk of bacterial pneumonia, will cover with oral azithromycin.  Strict ED precautions given.      Discussed ED precautions including (but not limited to)  Difficultly breathing or shortness of breath  Chest pain  Acutely worsening symptoms.     Risks and benefits discussed. Patient understands and agrees with the plan.     Follow up with PCP.     Chief Complaint     Chief Complaint   Patient presents with    Cold Like Symptoms     Pt. With nasal congestion, post nasal drip, and cough for the past 8 days. No fevers.          History of Present Illness       Cough  This is a new problem. The current episode started 1 to 4 weeks ago (8 days). The problem has been unchanged. The cough is Productive of sputum. Associated symptoms include nasal congestion and postnasal drip. Pertinent negatives include no chest pain, ear pain, fever, sore throat, shortness of breath or wheezing. large lung mass in right lung       Review of Systems   Review of Systems   Constitutional:  Negative for fever.   HENT:  Positive for postnasal drip. Negative for ear pain and sore throat.    Respiratory:  Positive for cough. Negative for shortness of breath and wheezing.    Cardiovascular:  Negative for chest pain.         Current  Medications       Current Outpatient Medications:     acetaminophen (TYLENOL) 500 mg tablet, Take 500 mg by mouth every 6 (six) hours as needed for mild pain, Disp: , Rfl:     aspirin (ECOTRIN LOW STRENGTH) 81 mg EC tablet, Take 1 tablet by mouth daily, Disp: , Rfl:     atorvastatin (LIPITOR) 40 mg tablet, Take 1 tablet (40 mg total) by mouth daily, Disp: 90 tablet, Rfl: 3    azithromycin (ZITHROMAX) 250 mg tablet, Take 2 tablets today then 1 tablet daily x 4 days, Disp: 6 tablet, Rfl: 0    cholecalciferol (VITAMIN D3) 1,000 units tablet, Take 1 tablet by mouth daily, Disp: , Rfl:     clopidogrel (PLAVIX) 75 mg tablet, Take 1 tablet (75 mg total) by mouth daily, Disp: 90 tablet, Rfl: 3    latanoprost (XALATAN) 0.005 % ophthalmic solution, INSTILL 1 DROP INTO BOTH EYES EVERY DAY AS DIRECTED, Disp: , Rfl: 4    metoprolol succinate (TOPROL-XL) 50 mg 24 hr tablet, TAKE 1 TABLET BY MOUTH EVERY DAY, Disp: 90 tablet, Rfl: 3    Mirabegron ER (Myrbetriq) 25 MG TB24, Take 25 mg by mouth daily, Disp: 90 tablet, Rfl: 3    Multiple Vitamins-Minerals (ICAPS AREDS 2) CAPS, Take by mouth, Disp: , Rfl:     Omega-3 Fatty Acids (OMEGA-3 FISH OIL) 1200 MG CAPS, Take by mouth, Disp: , Rfl:     omeprazole (PriLOSEC) 20 mg delayed release capsule, 1 capsule by mouth daily, Disp: 90 capsule, Rfl: 3    RESTASIS 0.05 % ophthalmic emulsion, instill 1 drop into both eyes twice a day, Disp: , Rfl: 0    valsartan-hydrochlorothiazide (DIOVAN-HCT) 80-12.5 MG per tablet, TAKE 1 TABLET BY MOUTH EVERY DAY, Disp: 30 tablet, Rfl: 5    nitroglycerin (NITROSTAT) 0.4 mg SL tablet, Place 1 tablet (0.4 mg total) under the tongue every 5 (five) minutes as needed for chest pain, Disp: 25 tablet, Rfl: 3    olopatadine (Pataday) 0.1 % ophthalmic solution, When needed (Patient not taking: Reported on 12/19/2023), Disp: , Rfl:     Polyvinyl Alcohol-Povidone PF 1.4-0.6 % SOLN, Apply to eye (Patient not taking: Reported on 12/19/2023), Disp: , Rfl:     Current  Allergies     Allergies as of 12/19/2023 - Reviewed 12/19/2023   Allergen Reaction Noted    Iv contrast [iodinated contrast media]  12/05/2019    Other  02/09/2018    Ciprofloxacin  04/12/2017            The following portions of the patient's history were reviewed and updated as appropriate: allergies, current medications, past family history, past medical history, past social history, past surgical history and problem list.     Past Medical History:   Diagnosis Date    Anemia     last assessed - [id number]    Atherosclerosis     Cancer ([facility])     Depression     last assessed - [id number]    Glaucoma     Hypercalcemia     last assessed - [id number]    Hypertension     Hypothyroidism 11/30/2017    Leiomyosarcoma ([facility]) 2002    right lower extremity    Macular degeneration     Nondisplaced fracture of base of fifth metacarpal bone, right hand, initial encounter for closed fracture 12/7/2018    Osteoarthritis     Plantar fasciitis of right foot     last assessed - [id number]    Stomach disorder     Superficial thrombophlebitis of leg     unspecified laterality; last assessed - [id number]    Traumatic closed nondisplaced fracture of lumbar vertebra, initial encounter ([facility]) 12/5/2019    Trochanteric bursitis of left hip     last assessed - 07Apr2017       Past Surgical History:   Procedure Laterality Date    CORONARY ANGIOPLASTY WITH STENT PLACEMENT      CORONARY STENT PLACEMENT      stent RCA    FL INJECTION LEFT HIP (NON ARTHROGRAM)  11/26/2018    FL INJECTION LEFT HIP (NON ARTHROGRAM)  3/4/2019    FL INJECTION LEFT HIP (NON ARTHROGRAM)  6/10/2019    FL INJECTION LEFT HIP (NON ARTHROGRAM)  9/24/2019    FL INJECTION LEFT HIP (NON ARTHROGRAM)  12/30/2019    FL INJECTION LEFT HIP (NON ARTHROGRAM)  7/6/2020    FL INJECTION LEFT HIP (NON ARTHROGRAM)  12/28/2020    FL INJECTION LEFT HIP (NON ARTHROGRAM)  4/12/2021    FL INJECTION LEFT HIP (NON ARTHROGRAM)  7/12/2021    FL INJECTION LEFT HIP (NON ARTHROGRAM)  11/15/2021    FL  "INJECTION LEFT HIP (NON ARTHROGRAM)  2/15/2022    FOOT SURGERY      HIP SURGERY      HYSTERECTOMY      JOINT REPLACEMENT      Hip Replacement 2000    SKIN LESION EXCISION  11/2010    Face - BCC - nose    TOTAL ABDOMINAL HYSTERECTOMY W/ BILATERAL SALPINGOOPHORECTOMY      TOTAL HIP ARTHROPLASTY Right        Family History   Problem Relation Age of Onset    Stroke Mother         cerebrovascular accident (CVA)    Coronary artery disease Mother         coronary disease    Stroke Father     Coronary artery disease Father         coronary disease    Stroke Brother     Kidney cancer Family          Medications have been verified.        Objective   /70   Pulse 86   Temp 97.9 °F (36.6 °C)   Resp 18   Ht 5' 1\" (1.549 m)   Wt 47.6 kg (105 lb)   SpO2 95%   BMI 19.84 kg/m²   No LMP recorded. Patient is postmenopausal.       Physical Exam     Physical Exam  Constitutional:       General: She is not in acute distress.     Appearance: She is not ill-appearing or toxic-appearing.   HENT:      Head: Normocephalic and atraumatic.      Nose: Congestion present.      Comments: Boggy nasal turbinates     Mouth/Throat:      Pharynx: Posterior oropharyngeal erythema present.      Comments: Cobblestone appearance in posterior pharynx  Cardiovascular:      Rate and Rhythm: Normal rate and regular rhythm.   Pulmonary:      Effort: Pulmonary effort is normal. No respiratory distress.      Breath sounds: No wheezing.      Comments: Decreased breath sounds in right lung    Chest:      Chest wall: No tenderness.   Neurological:      General: No focal deficit present.      Mental Status: She is alert and oriented to person, place, and time.   Psychiatric:         Mood and Affect: Mood normal.         Behavior: Behavior normal.         Thought Content: Thought content normal.         Judgment: Judgment normal.               Maricruz Gould DO"

## 2024-01-01 ENCOUNTER — HOME CARE VISIT (OUTPATIENT)
Dept: HOME HEALTH SERVICES | Facility: HOME HEALTHCARE | Age: 89
End: 2024-01-01
Payer: MEDICARE

## 2024-01-01 ENCOUNTER — HOME CARE VISIT (OUTPATIENT)
Dept: HOME HOSPICE | Facility: HOSPICE | Age: 89
End: 2024-01-01
Payer: MEDICARE

## 2024-01-01 PROCEDURE — G0299 HHS/HOSPICE OF RN EA 15 MIN: HCPCS

## 2024-01-01 PROCEDURE — G0155 HHCP-SVS OF CSW,EA 15 MIN: HCPCS

## 2024-01-04 ENCOUNTER — HOME CARE VISIT (OUTPATIENT)
Dept: HOME HOSPICE | Facility: HOSPICE | Age: 89
End: 2024-01-04
Payer: MEDICARE

## 2024-01-04 ENCOUNTER — HOSPICE ADMISSION (OUTPATIENT)
Dept: HOME HOSPICE | Facility: HOSPICE | Age: 89
End: 2024-01-04
Payer: MEDICARE

## 2024-01-04 DIAGNOSIS — R91.8 LUNG MASS: Primary | ICD-10-CM

## 2024-01-08 ENCOUNTER — HOME CARE VISIT (OUTPATIENT)
Dept: HOME HEALTH SERVICES | Facility: HOME HEALTHCARE | Age: 89
End: 2024-01-08
Payer: MEDICARE

## 2024-01-08 ENCOUNTER — HOME CARE VISIT (OUTPATIENT)
Dept: HOME HOSPICE | Facility: HOSPICE | Age: 89
End: 2024-01-08
Payer: MEDICARE

## 2024-01-08 PROCEDURE — G0299 HHS/HOSPICE OF RN EA 15 MIN: HCPCS

## 2024-01-08 PROCEDURE — 10330064 UNDERPAD, REUSE 36X36 1DZ     BECKCL

## 2024-01-08 PROCEDURE — 10330064 CUSHION, BED WEDGE W/WHT CVR 24X24X7 MAB

## 2024-01-08 PROCEDURE — 10330064 ALIGNER, FOAM BODY SM (8/CS)

## 2024-01-08 PROCEDURE — 10330057 MEDICATION, GENERAL

## 2024-01-08 PROCEDURE — 10330064 BRIEF, WINGS CHOICE PLUS QUILTED MED (12

## 2024-01-09 VITALS
WEIGHT: 105 LBS | HEART RATE: 78 BPM | RESPIRATION RATE: 18 BRPM | DIASTOLIC BLOOD PRESSURE: 67 MMHG | SYSTOLIC BLOOD PRESSURE: 123 MMHG | BODY MASS INDEX: 19.84 KG/M2 | TEMPERATURE: 97.1 F

## 2024-01-10 ENCOUNTER — HOME CARE VISIT (OUTPATIENT)
Dept: HOME HEALTH SERVICES | Facility: HOME HEALTHCARE | Age: 89
End: 2024-01-10
Payer: MEDICARE

## 2024-01-10 PROCEDURE — G0156 HHCP-SVS OF AIDE,EA 15 MIN: HCPCS

## 2024-01-11 ENCOUNTER — HOME CARE VISIT (OUTPATIENT)
Dept: HOME HOSPICE | Facility: HOSPICE | Age: 89
End: 2024-01-11
Payer: MEDICARE

## 2024-01-11 ENCOUNTER — HOME CARE VISIT (OUTPATIENT)
Dept: HOME HEALTH SERVICES | Facility: HOME HEALTHCARE | Age: 89
End: 2024-01-11
Payer: MEDICARE

## 2024-01-11 PROCEDURE — G0155 HHCP-SVS OF CSW,EA 15 MIN: HCPCS

## 2024-01-11 PROCEDURE — G0299 HHS/HOSPICE OF RN EA 15 MIN: HCPCS

## 2024-01-12 ENCOUNTER — HOME CARE VISIT (OUTPATIENT)
Dept: HOME HEALTH SERVICES | Facility: HOME HEALTHCARE | Age: 89
End: 2024-01-12
Payer: MEDICARE

## 2024-01-12 PROCEDURE — G0156 HHCP-SVS OF AIDE,EA 15 MIN: HCPCS

## 2024-01-17 ENCOUNTER — HOME CARE VISIT (OUTPATIENT)
Dept: HOME HOSPICE | Facility: HOSPICE | Age: 89
End: 2024-01-17
Payer: MEDICARE

## 2024-01-17 ENCOUNTER — HOME CARE VISIT (OUTPATIENT)
Dept: HOME HEALTH SERVICES | Facility: HOME HEALTHCARE | Age: 89
End: 2024-01-17
Payer: MEDICARE

## 2024-01-17 PROCEDURE — G0156 HHCP-SVS OF AIDE,EA 15 MIN: HCPCS

## 2024-01-18 ENCOUNTER — HOME CARE VISIT (OUTPATIENT)
Dept: HOME HEALTH SERVICES | Facility: HOME HEALTHCARE | Age: 89
End: 2024-01-18
Payer: MEDICARE

## 2024-01-18 PROCEDURE — G0299 HHS/HOSPICE OF RN EA 15 MIN: HCPCS

## 2024-01-19 PROCEDURE — 10330057 MEDICATION, GENERAL

## 2024-01-20 ENCOUNTER — HOME CARE VISIT (OUTPATIENT)
Dept: HOME HEALTH SERVICES | Facility: HOME HEALTHCARE | Age: 89
End: 2024-01-20
Payer: MEDICARE

## 2024-01-20 PROCEDURE — G0156 HHCP-SVS OF AIDE,EA 15 MIN: HCPCS

## 2024-01-21 VITALS
TEMPERATURE: 98.1 F | SYSTOLIC BLOOD PRESSURE: 148 MMHG | RESPIRATION RATE: 14 BRPM | HEART RATE: 78 BPM | DIASTOLIC BLOOD PRESSURE: 60 MMHG

## 2024-01-22 ENCOUNTER — HOME CARE VISIT (OUTPATIENT)
Dept: HOME HOSPICE | Facility: HOSPICE | Age: 89
End: 2024-01-22
Payer: MEDICARE

## 2024-01-22 ENCOUNTER — HOME CARE VISIT (OUTPATIENT)
Dept: HOME HEALTH SERVICES | Facility: HOME HEALTHCARE | Age: 89
End: 2024-01-22
Payer: MEDICARE

## 2024-01-22 PROCEDURE — G0299 HHS/HOSPICE OF RN EA 15 MIN: HCPCS

## 2024-01-22 PROCEDURE — G0155 HHCP-SVS OF CSW,EA 15 MIN: HCPCS

## 2024-01-24 ENCOUNTER — HOME CARE VISIT (OUTPATIENT)
Dept: HOME HEALTH SERVICES | Facility: HOME HEALTHCARE | Age: 89
End: 2024-01-24
Payer: MEDICARE

## 2024-01-24 PROCEDURE — G0156 HHCP-SVS OF AIDE,EA 15 MIN: HCPCS

## 2024-01-26 ENCOUNTER — HOME CARE VISIT (OUTPATIENT)
Dept: HOME HEALTH SERVICES | Facility: HOME HEALTHCARE | Age: 89
End: 2024-01-26
Payer: MEDICARE

## 2024-01-26 PROCEDURE — G0156 HHCP-SVS OF AIDE,EA 15 MIN: HCPCS

## 2024-01-28 VITALS
DIASTOLIC BLOOD PRESSURE: 60 MMHG | TEMPERATURE: 98.2 F | HEART RATE: 68 BPM | SYSTOLIC BLOOD PRESSURE: 150 MMHG | RESPIRATION RATE: 12 BRPM

## 2024-01-31 ENCOUNTER — HOME CARE VISIT (OUTPATIENT)
Dept: HOME HEALTH SERVICES | Facility: HOME HEALTHCARE | Age: 89
End: 2024-01-31
Payer: MEDICARE

## 2024-01-31 VITALS
DIASTOLIC BLOOD PRESSURE: 68 MMHG | HEART RATE: 78 BPM | TEMPERATURE: 97.5 F | SYSTOLIC BLOOD PRESSURE: 124 MMHG | RESPIRATION RATE: 18 BRPM

## 2024-01-31 PROCEDURE — G0156 HHCP-SVS OF AIDE,EA 15 MIN: HCPCS

## 2024-01-31 PROCEDURE — G0299 HHS/HOSPICE OF RN EA 15 MIN: HCPCS

## 2024-02-01 PROCEDURE — 10330057 MEDICATION, GENERAL

## 2024-02-02 ENCOUNTER — HOME CARE VISIT (OUTPATIENT)
Dept: HOME HEALTH SERVICES | Facility: HOME HEALTHCARE | Age: 89
End: 2024-02-02
Payer: MEDICARE

## 2024-02-02 PROCEDURE — G0156 HHCP-SVS OF AIDE,EA 15 MIN: HCPCS

## 2024-02-05 ENCOUNTER — HOME CARE VISIT (OUTPATIENT)
Dept: HOME HEALTH SERVICES | Facility: HOME HEALTHCARE | Age: 89
End: 2024-02-05
Payer: MEDICARE

## 2024-02-05 PROCEDURE — G0299 HHS/HOSPICE OF RN EA 15 MIN: HCPCS

## 2024-02-07 ENCOUNTER — HOME CARE VISIT (OUTPATIENT)
Dept: HOME HEALTH SERVICES | Facility: HOME HEALTHCARE | Age: 89
End: 2024-02-07
Payer: MEDICARE

## 2024-02-07 PROCEDURE — G0156 HHCP-SVS OF AIDE,EA 15 MIN: HCPCS

## 2024-02-09 ENCOUNTER — HOME CARE VISIT (OUTPATIENT)
Dept: HOME HEALTH SERVICES | Facility: HOME HEALTHCARE | Age: 89
End: 2024-02-09
Payer: MEDICARE

## 2024-02-09 ENCOUNTER — HOME CARE VISIT (OUTPATIENT)
Dept: HOME HOSPICE | Facility: HOSPICE | Age: 89
End: 2024-02-09
Payer: MEDICARE

## 2024-02-09 PROCEDURE — G0156 HHCP-SVS OF AIDE,EA 15 MIN: HCPCS

## 2024-02-11 VITALS
RESPIRATION RATE: 12 BRPM | HEART RATE: 76 BPM | DIASTOLIC BLOOD PRESSURE: 98 MMHG | TEMPERATURE: 98.3 F | SYSTOLIC BLOOD PRESSURE: 140 MMHG

## 2024-02-12 ENCOUNTER — HOME CARE VISIT (OUTPATIENT)
Dept: HOME HEALTH SERVICES | Facility: HOME HEALTHCARE | Age: 89
End: 2024-02-12
Payer: MEDICARE

## 2024-02-12 ENCOUNTER — HOME CARE VISIT (OUTPATIENT)
Dept: HOME HOSPICE | Facility: HOSPICE | Age: 89
End: 2024-02-12
Payer: MEDICARE

## 2024-02-12 PROCEDURE — G0299 HHS/HOSPICE OF RN EA 15 MIN: HCPCS

## 2024-02-14 ENCOUNTER — HOME CARE VISIT (OUTPATIENT)
Dept: HOME HEALTH SERVICES | Facility: HOME HEALTHCARE | Age: 89
End: 2024-02-14
Payer: MEDICARE

## 2024-02-14 PROCEDURE — G0156 HHCP-SVS OF AIDE,EA 15 MIN: HCPCS

## 2024-02-16 ENCOUNTER — HOME CARE VISIT (OUTPATIENT)
Dept: HOME HEALTH SERVICES | Facility: HOME HEALTHCARE | Age: 89
End: 2024-02-16
Payer: MEDICARE

## 2024-02-16 PROCEDURE — G0156 HHCP-SVS OF AIDE,EA 15 MIN: HCPCS

## 2024-02-18 VITALS
DIASTOLIC BLOOD PRESSURE: 68 MMHG | RESPIRATION RATE: 12 BRPM | TEMPERATURE: 97.9 F | SYSTOLIC BLOOD PRESSURE: 140 MMHG | HEART RATE: 80 BPM

## 2024-02-19 ENCOUNTER — HOME CARE VISIT (OUTPATIENT)
Dept: HOME HEALTH SERVICES | Facility: HOME HEALTHCARE | Age: 89
End: 2024-02-19
Payer: MEDICARE

## 2024-02-19 PROCEDURE — G0299 HHS/HOSPICE OF RN EA 15 MIN: HCPCS

## 2024-02-21 ENCOUNTER — HOME CARE VISIT (OUTPATIENT)
Dept: HOME HOSPICE | Facility: HOSPICE | Age: 89
End: 2024-02-21
Payer: MEDICARE

## 2024-02-21 ENCOUNTER — HOME CARE VISIT (OUTPATIENT)
Dept: HOME HEALTH SERVICES | Facility: HOME HEALTHCARE | Age: 89
End: 2024-02-21
Payer: MEDICARE

## 2024-02-21 PROCEDURE — G0156 HHCP-SVS OF AIDE,EA 15 MIN: HCPCS

## 2024-02-23 ENCOUNTER — HOME CARE VISIT (OUTPATIENT)
Dept: HOME HEALTH SERVICES | Facility: HOME HEALTHCARE | Age: 89
End: 2024-02-23
Payer: MEDICARE

## 2024-02-23 PROCEDURE — G0156 HHCP-SVS OF AIDE,EA 15 MIN: HCPCS

## 2024-02-25 VITALS
HEART RATE: 72 BPM | SYSTOLIC BLOOD PRESSURE: 140 MMHG | RESPIRATION RATE: 14 BRPM | DIASTOLIC BLOOD PRESSURE: 68 MMHG | TEMPERATURE: 98.4 F

## 2024-02-26 ENCOUNTER — HOME CARE VISIT (OUTPATIENT)
Dept: HOME HOSPICE | Facility: HOSPICE | Age: 89
End: 2024-02-26
Payer: MEDICARE

## 2024-02-26 ENCOUNTER — HOME CARE VISIT (OUTPATIENT)
Dept: HOME HEALTH SERVICES | Facility: HOME HEALTHCARE | Age: 89
End: 2024-02-26
Payer: MEDICARE

## 2024-02-26 VITALS
HEART RATE: 60 BPM | RESPIRATION RATE: 12 BRPM | TEMPERATURE: 98.6 F | SYSTOLIC BLOOD PRESSURE: 130 MMHG | DIASTOLIC BLOOD PRESSURE: 64 MMHG

## 2024-02-26 PROCEDURE — G0155 HHCP-SVS OF CSW,EA 15 MIN: HCPCS

## 2024-02-26 PROCEDURE — G0299 HHS/HOSPICE OF RN EA 15 MIN: HCPCS

## 2024-03-01 ENCOUNTER — HOME CARE VISIT (OUTPATIENT)
Dept: HOME HEALTH SERVICES | Facility: HOME HEALTHCARE | Age: 89
End: 2024-03-01
Payer: MEDICARE

## 2024-03-01 DIAGNOSIS — R32 URINARY INCONTINENCE, UNSPECIFIED TYPE: ICD-10-CM

## 2024-03-01 DIAGNOSIS — R35.0 URINARY FREQUENCY: ICD-10-CM

## 2024-03-01 PROCEDURE — G0156 HHCP-SVS OF AIDE,EA 15 MIN: HCPCS

## 2024-03-01 RX ORDER — MIRABEGRON 25 MG/1
25 TABLET, FILM COATED, EXTENDED RELEASE ORAL DAILY
Qty: 90 TABLET | Refills: 1 | Status: SHIPPED | OUTPATIENT
Start: 2024-03-01

## 2024-03-04 ENCOUNTER — HOME CARE VISIT (OUTPATIENT)
Dept: HOME HOSPICE | Facility: HOSPICE | Age: 89
End: 2024-03-04
Payer: MEDICARE

## 2024-03-04 ENCOUNTER — HOME CARE VISIT (OUTPATIENT)
Dept: HOME HEALTH SERVICES | Facility: HOME HEALTHCARE | Age: 89
End: 2024-03-04
Payer: MEDICARE

## 2024-03-04 PROCEDURE — G0299 HHS/HOSPICE OF RN EA 15 MIN: HCPCS

## 2024-03-06 ENCOUNTER — HOME CARE VISIT (OUTPATIENT)
Dept: HOME HEALTH SERVICES | Facility: HOME HEALTHCARE | Age: 89
End: 2024-03-06
Payer: MEDICARE

## 2024-03-06 PROCEDURE — G0156 HHCP-SVS OF AIDE,EA 15 MIN: HCPCS

## 2024-03-08 ENCOUNTER — HOME CARE VISIT (OUTPATIENT)
Dept: HOME HEALTH SERVICES | Facility: HOME HEALTHCARE | Age: 89
End: 2024-03-08
Payer: MEDICARE

## 2024-03-08 PROCEDURE — G0156 HHCP-SVS OF AIDE,EA 15 MIN: HCPCS

## 2024-03-08 PROCEDURE — G0299 HHS/HOSPICE OF RN EA 15 MIN: HCPCS

## 2024-03-09 ENCOUNTER — HOME CARE VISIT (OUTPATIENT)
Dept: HOME HOSPICE | Facility: HOSPICE | Age: 89
End: 2024-03-09
Payer: MEDICARE

## 2024-03-09 NOTE — CASE COMMUNICATION
Called patients daughter, Saturday 3/9 for check in telephone encounter. No answer from daughter Violetta. Left VM for daughter and provided call back number.

## 2024-03-10 VITALS
HEART RATE: 72 BPM | TEMPERATURE: 97.3 F | DIASTOLIC BLOOD PRESSURE: 50 MMHG | RESPIRATION RATE: 12 BRPM | SYSTOLIC BLOOD PRESSURE: 112 MMHG

## 2024-03-13 ENCOUNTER — HOME CARE VISIT (OUTPATIENT)
Dept: HOME HEALTH SERVICES | Facility: HOME HEALTHCARE | Age: 89
End: 2024-03-13
Payer: MEDICARE

## 2024-03-13 PROCEDURE — G0156 HHCP-SVS OF AIDE,EA 15 MIN: HCPCS

## 2024-03-14 ENCOUNTER — HOME CARE VISIT (OUTPATIENT)
Dept: HOME HEALTH SERVICES | Facility: HOME HEALTHCARE | Age: 89
End: 2024-03-14
Payer: MEDICARE

## 2024-03-14 PROCEDURE — G0156 HHCP-SVS OF AIDE,EA 15 MIN: HCPCS

## 2024-03-15 ENCOUNTER — HOME CARE VISIT (OUTPATIENT)
Dept: HOME HEALTH SERVICES | Facility: HOME HEALTHCARE | Age: 89
End: 2024-03-15
Payer: MEDICARE

## 2024-03-15 PROCEDURE — G0156 HHCP-SVS OF AIDE,EA 15 MIN: HCPCS

## 2024-03-16 ENCOUNTER — HOME CARE VISIT (OUTPATIENT)
Dept: HOME HOSPICE | Facility: HOSPICE | Age: 89
End: 2024-03-16
Payer: MEDICARE

## 2024-03-20 ENCOUNTER — HOME CARE VISIT (OUTPATIENT)
Dept: HOME HOSPICE | Facility: HOSPICE | Age: 89
End: 2024-03-20
Payer: MEDICARE